# Patient Record
Sex: FEMALE | Race: WHITE | HISPANIC OR LATINO | Employment: OTHER | URBAN - METROPOLITAN AREA
[De-identification: names, ages, dates, MRNs, and addresses within clinical notes are randomized per-mention and may not be internally consistent; named-entity substitution may affect disease eponyms.]

---

## 2018-09-13 ENCOUNTER — HOSPITAL ENCOUNTER (EMERGENCY)
Facility: MEDICAL CENTER | Age: 53
End: 2018-09-13
Attending: EMERGENCY MEDICINE
Payer: COMMERCIAL

## 2018-09-13 VITALS
OXYGEN SATURATION: 97 % | HEART RATE: 78 BPM | TEMPERATURE: 96.8 F | BODY MASS INDEX: 25.6 KG/M2 | SYSTOLIC BLOOD PRESSURE: 145 MMHG | WEIGHT: 139.99 LBS | DIASTOLIC BLOOD PRESSURE: 81 MMHG | RESPIRATION RATE: 16 BRPM

## 2018-09-13 DIAGNOSIS — Z91.148 HISTORY OF MEDICATION NONCOMPLIANCE: ICD-10-CM

## 2018-09-13 DIAGNOSIS — I73.9 PERIPHERAL VASCULAR DISEASE (HCC): ICD-10-CM

## 2018-09-13 LAB
ANION GAP SERPL CALC-SCNC: 7 MMOL/L (ref 0–11.9)
APTT PPP: 28.8 SEC (ref 24.7–36)
BASOPHILS # BLD AUTO: 0.9 % (ref 0–1.8)
BASOPHILS # BLD: 0.06 K/UL (ref 0–0.12)
BUN SERPL-MCNC: 12 MG/DL (ref 8–22)
CALCIUM SERPL-MCNC: 9.1 MG/DL (ref 8.5–10.5)
CHLORIDE SERPL-SCNC: 107 MMOL/L (ref 96–112)
CK SERPL-CCNC: 140 U/L (ref 0–154)
CO2 SERPL-SCNC: 24 MMOL/L (ref 20–33)
CREAT SERPL-MCNC: 0.96 MG/DL (ref 0.5–1.4)
EOSINOPHIL # BLD AUTO: 0.5 K/UL (ref 0–0.51)
EOSINOPHIL NFR BLD: 7.5 % (ref 0–6.9)
ERYTHROCYTE [DISTWIDTH] IN BLOOD BY AUTOMATED COUNT: 40.4 FL (ref 35.9–50)
GLUCOSE SERPL-MCNC: 85 MG/DL (ref 65–99)
HCT VFR BLD AUTO: 44.9 % (ref 37–47)
HGB BLD-MCNC: 14.9 G/DL (ref 12–16)
IMM GRANULOCYTES # BLD AUTO: 0.01 K/UL (ref 0–0.11)
IMM GRANULOCYTES NFR BLD AUTO: 0.1 % (ref 0–0.9)
INR PPP: 0.94 (ref 0.87–1.13)
LYMPHOCYTES # BLD AUTO: 2.24 K/UL (ref 1–4.8)
LYMPHOCYTES NFR BLD: 33.4 % (ref 22–41)
MCH RBC QN AUTO: 28.8 PG (ref 27–33)
MCHC RBC AUTO-ENTMCNC: 33.2 G/DL (ref 33.6–35)
MCV RBC AUTO: 86.7 FL (ref 81.4–97.8)
MONOCYTES # BLD AUTO: 0.88 K/UL (ref 0–0.85)
MONOCYTES NFR BLD AUTO: 13.1 % (ref 0–13.4)
NEUTROPHILS # BLD AUTO: 3.01 K/UL (ref 2–7.15)
NEUTROPHILS NFR BLD: 45 % (ref 44–72)
NRBC # BLD AUTO: 0 K/UL
NRBC BLD-RTO: 0 /100 WBC
PLATELET # BLD AUTO: 277 K/UL (ref 164–446)
PMV BLD AUTO: 9 FL (ref 9–12.9)
POTASSIUM SERPL-SCNC: 3.6 MMOL/L (ref 3.6–5.5)
PROTHROMBIN TIME: 12.3 SEC (ref 12–14.6)
RBC # BLD AUTO: 5.18 M/UL (ref 4.2–5.4)
SODIUM SERPL-SCNC: 138 MMOL/L (ref 135–145)
WBC # BLD AUTO: 6.7 K/UL (ref 4.8–10.8)

## 2018-09-13 PROCEDURE — 36415 COLL VENOUS BLD VENIPUNCTURE: CPT

## 2018-09-13 PROCEDURE — 96374 THER/PROPH/DIAG INJ IV PUSH: CPT

## 2018-09-13 PROCEDURE — 80048 BASIC METABOLIC PNL TOTAL CA: CPT

## 2018-09-13 PROCEDURE — 93931 UPPER EXTREMITY STUDY: CPT

## 2018-09-13 PROCEDURE — 85610 PROTHROMBIN TIME: CPT

## 2018-09-13 PROCEDURE — 82550 ASSAY OF CK (CPK): CPT

## 2018-09-13 PROCEDURE — 85730 THROMBOPLASTIN TIME PARTIAL: CPT

## 2018-09-13 PROCEDURE — 85025 COMPLETE CBC W/AUTO DIFF WBC: CPT

## 2018-09-13 PROCEDURE — 96376 TX/PRO/DX INJ SAME DRUG ADON: CPT

## 2018-09-13 PROCEDURE — 700111 HCHG RX REV CODE 636 W/ 250 OVERRIDE (IP): Performed by: EMERGENCY MEDICINE

## 2018-09-13 PROCEDURE — 99284 EMERGENCY DEPT VISIT MOD MDM: CPT

## 2018-09-13 RX ORDER — DABIGATRAN ETEXILATE 150 MG/1
150 CAPSULE ORAL 2 TIMES DAILY
Qty: 60 CAP | Refills: 0 | Status: SHIPPED | OUTPATIENT
Start: 2018-09-13 | End: 2022-02-19

## 2018-09-13 RX ORDER — MORPHINE SULFATE 4 MG/ML
4 INJECTION, SOLUTION INTRAMUSCULAR; INTRAVENOUS ONCE
Status: COMPLETED | OUTPATIENT
Start: 2018-09-13 | End: 2018-09-13

## 2018-09-13 RX ADMIN — MORPHINE SULFATE 4 MG: 4 INJECTION INTRAVENOUS at 21:38

## 2018-09-13 RX ADMIN — MORPHINE SULFATE 4 MG: 4 INJECTION INTRAVENOUS at 19:36

## 2018-09-13 ASSESSMENT — PAIN SCALES - GENERAL
PAINLEVEL_OUTOF10: 3
PAINLEVEL_OUTOF10: 4
PAINLEVEL_OUTOF10: 4
PAINLEVEL_OUTOF10: 3

## 2018-09-13 ASSESSMENT — LIFESTYLE VARIABLES: DO YOU DRINK ALCOHOL: NO

## 2018-09-14 NOTE — ED TRIAGE NOTES
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 Multiple vascular surgeries on right arm. Pt now with 3rd digit blue/pale.

## 2018-09-14 NOTE — ED NOTES
Pt given discharge instructions by Dr Drake, pt verbalizes understanding of instructions, pt reports pain is in control after Iv Morphine

## 2018-09-14 NOTE — ED NOTES
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

## 2018-09-14 NOTE — DISCHARGE INSTRUCTIONS
Your blood pressure is high today.  This requires followup by a primary care doctor.  Please keep a log of your blood pressures if possible to take to your doctor appointment.  Try to increase the amount of exercise you do in your day to day living, and eliminate sodas and other sweetened beverages from your diet.      Peripheral Vascular Disease  Peripheral vascular disease (PVD) is a disease of the blood vessels that are not part of your heart and brain. A simple term for PVD is poor circulation. In most cases, PVD narrows the blood vessels that carry blood from your heart to the rest of your body. This can result in a decreased supply of blood to your arms, legs, and internal organs, like your stomach or kidneys. However, it most often affects a person’s lower legs and feet.  There are two types of PVD.  · Organic PVD. This is the more common type. It is caused by damage to the structure of blood vessels.  · Functional PVD. This is caused by conditions that make blood vessels contract and tighten (spasm).  Without treatment, PVD tends to get worse over time.  PVD can also lead to acute ischemic limb. This is when an arm or limb suddenly has trouble getting enough blood. This is a medical emergency.  What are the causes?  Each type of PVD has many different causes. The most common cause of PVD is buildup of a fatty material (plaque) inside of your arteries (atherosclerosis). Small amounts of plaque can break off from the walls of the blood vessels and become lodged in a smaller artery. This blocks blood flow and can cause acute ischemic limb.  Other common causes of PVD include:  · Blood clots that form inside of blood vessels.  · Injuries to blood vessels.  · Diseases that cause inflammation of blood vessels or cause blood vessel spasms.  · Health behaviors and health history that increase your risk of developing PVD.  What increases the risk?  You may have a greater risk of PVD if you:  · Have a family history  of PVD.  · Have certain medical conditions, including:  ¨ High cholesterol.  ¨ Diabetes.  ¨ High blood pressure (hypertension).  ¨ Coronary heart disease.  ¨ Past problems with blood clots.  ¨ Past injury, such as burns or a broken bone. These may have damaged blood vessels in your limbs.  ¨ Buerger disease. This is caused by inflamed blood vessels in your hands and feet.  ¨ Some forms of arthritis.  ¨ Rare birth defects that affect the arteries in your legs.  · Use tobacco.  · Do not get enough exercise.  · Are obese.  · Are age 50 or older.  What are the signs or symptoms?  PVD may cause many different symptoms. Your symptoms depend on what part of your body is not getting enough blood. Some common signs and symptoms include:  · Cramps in your lower legs. This may be a symptom of poor leg circulation (claudication).  · Pain and weakness in your legs while you are physically active that goes away when you rest (intermittent claudication).  · Leg pain when at rest.  · Leg numbness, tingling, or weakness.  · Coldness in a leg or foot, especially when compared with the other leg.  · Skin or hair changes. These can include:  ¨ Hair loss.  ¨ Shiny skin.  ¨ Pale or bluish skin.  ¨ Thick toenails.  · Inability to get or maintain an erection (erectile dysfunction).  People with PVD are more prone to developing ulcers and sores on their toes, feet, or legs. These may take longer than normal to heal.  How is this diagnosed?  Your health care provider may diagnose PVD from your signs and symptoms. The health care provider will also do a physical exam. You may have tests to find out what is causing your PVD and determine its severity. Tests may include:  · Blood pressure recordings from your arms and legs and measurements of the strength of your pulses (pulse volume recordings).  · Imaging studies using sound waves to take pictures of the blood flow through your blood vessels (Doppler ultrasound).  · Injecting a dye into your  blood vessels before having imaging studies using:  ¨ X-rays (angiogram or arteriogram).  ¨ Computer-generated X-rays (CT angiogram).  ¨ A powerful electromagnetic field and a computer (magnetic resonance angiogram or MRA).  How is this treated?  Treatment for PVD depends on the cause of your condition and the severity of your symptoms. It also depends on your age. Underlying causes need to be treated and controlled. These include long-lasting (chronic) conditions, such as diabetes, high cholesterol, and high blood pressure. You may need to first try making lifestyle changes and taking medicines. Surgery may be needed if these do not work.  Lifestyle changes may include:  · Quitting smoking.  · Exercising regularly.  · Following a low-fat, low-cholesterol diet.  Medicines may include:  · Blood thinners to prevent blood clots.  · Medicines to improve blood flow.  · Medicines to improve your blood cholesterol levels.  Surgical procedures may include:  · A procedure that uses an inflated balloon to open a blocked artery and improve blood flow (angioplasty).  · A procedure to put in a tube (stent) to keep a blocked artery open (stent implant).  · Surgery to reroute blood flow around a blocked artery (peripheral bypass surgery).  · Surgery to remove dead tissue from an infected wound on the affected limb.  · Amputation. This is surgical removal of the affected limb. This may be necessary in cases of acute ischemic limb that are not improved through medical or surgical treatments.  Follow these instructions at home:  · Take medicines only as directed by your health care provider.  · Do not use any tobacco products, including cigarettes, chewing tobacco, or electronic cigarettes. If you need help quitting, ask your health care provider.  · Lose weight if you are overweight, and maintain a healthy weight as directed by your health care provider.  · Eat a diet that is low in fat and cholesterol. If you need help, ask your  health care provider.  · Exercise regularly. Ask your health care provider to suggest some good activities for you.  · Use compression stockings or other mechanical devices as directed by your health care provider.  · Take good care of your feet.  ¨ Wear comfortable shoes that fit well.  ¨ Check your feet often for any cuts or sores.  Contact a health care provider if:  · You have cramps in your legs while walking.  · You have leg pain when you are at rest.  · You have coldness in a leg or foot.  · Your skin changes.  · You have erectile dysfunction.  · You have cuts or sores on your feet that are not healing.  Get help right away if:  · Your arm or leg turns cold and blue.  · Your arms or legs become red, warm, swollen, painful, or numb.  · You have chest pain or trouble breathing.  · You suddenly have weakness in your face, arm, or leg.  · You become very confused or lose the ability to speak.  · You suddenly have a very bad headache or lose your vision.  This information is not intended to replace advice given to you by your health care provider. Make sure you discuss any questions you have with your health care provider.  Document Released: 01/25/2006 Document Revised: 05/25/2017 Document Reviewed: 05/28/2015  [employer] Interactive Patient Education © 2017 [employer]

## 2018-09-14 NOTE — ED NOTES
Pt back to room. IV established and blood drawn. Pt with blue discoloration to 3rd digit on right hand. Tender to touch. Good radial pulse noted, finger is cold to touch

## 2018-09-14 NOTE — ED NOTES
Pt left er ambulatory she states she is going to walk across the street to a friend's house  Patient verbalizes understanding of discharge instructions and follow up plan

## 2018-09-14 NOTE — ED PROVIDER NOTES
"ED Provider Note    Scribed for Leslie Drake M.D. by Amanda Priest. 9/13/2018, 7:12 PM.    Primary care provider: Mohsen Tamasaby, M.D.  Means of arrival: Walk-in  History obtained from: Patient  History limited by: None    CHIEF COMPLAINT  Chief Complaint   Patient presents with   • Numbness       HPI  Roslyn Kilgore is a 53 y.o. female with a history of arterial occlusion and right index finger amputation who presents to the Emergency Department for evaluation of numbness and tingling to middle right finger, onset today. Patient is supposed to be on blood thinners but has not been taking any since her right index finger amputation performed by Dr. Wilhelm. She felt she was \"taking too many\" medications. She last saw Dr. Wilhelm 1 year ago. She describes numbness and tingling to the finger without pain. No associated chest pain, shortness of breath, abdominal pain, nausea, or vomiting. Patient has a history of alcohol abuse but has been sober for 20 years.     REVIEW OF SYSTEMS  Positives as above. Pertinent negatives include chest pain, shortness of breath, abdominal pain, nausea, or vomiting.    All other review of systems are negative.    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    SURGICAL HISTORY   has a past surgical history that includes other; other; angiogram (8/17/2009); thrombectomy (12/21/2009); angiogram (2/2/2010); angiogram (4/15/2010); angioplasty balloon (4/15/2010); femoral artery repair (6/28/2010); mammoplasty reduction (8/24/2010); u/s leiomyomata ablate <200 cc; thrombectomy (1/9/2011); angiogram " "(1/9/2011); primary c section; hysteroscopy novasure-2 (9/27/2010); angiogram (Right, 11/5/2015); angioplasty upper (Right, 11/5/2015); other orthopedic surgery; debridement (Right, 11/11/2015); and endarterectomy (Right, 11/18/2015).    SOCIAL HISTORY  Social History   Substance Use Topics   • Smoking status: Former Smoker     Types: Cigarettes   • Smokeless tobacco: Never Used      Comment: 1 pk a day for 6 yrs, Quit 1998   • Alcohol use No      Comment: pt stated that she used to be alcoholic 20 years ago      History   Drug Use   • Types: Inhaled, Marijuana     Comment: THC       FAMILY HISTORY  Family History   Problem Relation Age of Onset   • Stroke Mother    • Lung Disease Neg Hx    • Cancer Neg Hx    • Diabetes Neg Hx    • Heart Disease Neg Hx        CURRENT MEDICATIONS  Reviewed. See Encounter Summary.     ALLERGIES  Allergies   Allergen Reactions   • Acetaminophen      \"liver on how it metabolizes plavix making it twice more potent\" per pt.   • Alcohol      REQUESTS NO ALCOHOL CONTENT IN HER MEDICATIONS (per pt, pt is an alcoholic)   • Trazodone Hives     Caused breat hyperplasia. Pt then underwent a breast reduction and attempted to take again, new reaction: Hives.       PHYSICAL EXAM  VITAL SIGNS: /92   Pulse 84   Temp 36.1 °C (96.9 °F) (Temporal)   Resp 16   Wt 63.5 kg (139 lb 15.9 oz)   LMP 01/04/2011   SpO2 93%   BMI 25.60 kg/m²    Pulse ox interpretation: I interpret this pulse ox as normal.  Constitutional: Alert in no apparent distress.  HENT: Normocephalic atraumatic, MMM  Eyes: PERR, Conjunctiva normal, Non-icteric.   Neck: Normal range of motion, No tenderness, Supple, No stridor.   Lymphatic: No lymphadenopathy noted.   Cardiovascular: Regular rate and rhythm, no murmurs.   Thorax & Lungs: Normal breath sounds, No respiratory distress, No wheezing, No chest tenderness.   Abdomen: Bowel sounds normal, Soft, No tenderness, No pulsatile masses. No peritoneal signs.  Extremities: " Right index finger amputation below IP joint. Middle right digit dusky with delayed capillary refill, tender and cool. 2+ radial pulse.   Musculoskeletal: Good range of motion in right hand. No tenderness to palpation or major deformities noted.   Neurologic: Alert and oriented, No focal deficits noted.       DIFFERENTIAL DIAGNOSIS AND WORK UP PLAN    7:12 PM Patient seen and examined at bedside. The patient presents with numbness and tingling to her middle right finger and the differential diagnosis includes but is not limited to arterial occlusion, dissection, venous occlusion. Ordered for UE ART Duplex, Arterial Evaluation RUE, CBC, CMP, Prothrombin Time, APTT, Creatine Kinase, Estimated GFR to evaluate. Patient will be treated with Morphine 4 mg/mL for her symptoms.     DIAGNOSTIC STUDIES / PROCEDURES     LABS  CBC without signs of infection BMP coags are within normal limits normal CPK  All labs were reviewed by me.    RADIOLOGY  UE ART DUPLX/IMAG   Final Result      ARTERIAL EVALUATION UPPER EXTREMITY    (Results Pending)   Findings   Right Upper Extremity   Patent axillary to radial bypass graft, some plaque visualized at the    proximal anastomosis with no evidence of hemodynamic significance.   Graft velocities (cm/sec):   Proximal to anastomosis: 88   Anastomosis: 98   Proximal graft: 98   Mid graft: 99   Distal graft: 38   Distal anastomosis: 100   Distal to anastomosis: 111     Photoplethysmography of the right hand digits:   Waveforms of the 1st, 4th and 5th digits demonstrate a sharp upstroke and    hig amplitude.   Waveforms of the 3rd digit are low amplitude and widened.    The radiologist's interpretation of all radiological studies have been reviewed by me.    COURSE & MEDICAL DECISION MAKING  Pertinent Labs & Imaging studies reviewed. (See chart for details)    9:54 PM Discussed the patient's case with Dr. Wilhelm (Vascular Surgery) who states there is nothing she can do for the patient at this  "time. She agrees to see the patient in the clinic. Discussed plan of care with patient, she understands and agrees to plan she will be restarted on Pradaxa she has not been taking it for very long time.  She understands that she may end up losing the finger but she should return to the ED for any signs or symptoms of infection redness swelling or discharge from the wound.. Patient admits to doing \"a line of crank\" before coming here. The patient will be discharged with Pradaxa and should return if symptoms worsen or if new symptoms arise. The patient understands and agrees to plan.     /81   Pulse 78   Temp 36 °C (96.8 °F)   Resp 16   Wt 63.5 kg (139 lb 15.9 oz)   LMP 01/04/2011   SpO2 97%   BMI 25.60 kg/m²       Patient has known hypertension that is being followed by her primary care provider.        DISPOSITION:  Patient will be discharged home in stable condition.    FOLLOW UP:  Mohsen Tamasaby, M.D.  [address] [id number] [phone]    Schedule an appointment as soon as possible for a visit      Greta Wilehlm M.D.  [address]  [facility] [address] [phone]    Schedule an appointment as soon as possible for a visit      [facility], Emergency Dept  [address] [facility] [address] [phone]    If symptoms worsen - if the hand or arm become blue      OUTPATIENT MEDICATIONS:  New Prescriptions    DABIGATRAN (PRADAXA) 150 MG CAP CAPSULE    Take 1 Cap by mouth 2 Times a Day.       FINAL IMPRESSION  1. Peripheral vascular disease (HCC)    2. History of medication noncompliance          Amanda BLACK), am scribing for, and in the presence of, Leslie Drake M.D..    Electronically signed by: Amanda Polanco), 9/13/2018    Leslie BLACK M.D. personally performed the services described in this documentation, as scribed by Amanda Priest in my presence, and it is both accurate and " complete.    The note accurately reflects work and decisions made by me.  Leslie Drake  9/13/2018  11:09 PM    This dictation has been created using voice recognition software and/or scribes. The accuracy of the dictation is limited by the abilities of the software and the expertise of the scribes. I expect there may be some errors of grammar and possibly content. I made every attempt to manually correct the errors within my dictation. However, errors related to voice recognition software and/or scribes may still exist and should be interpreted within the appropriate context. ANGELIQUE.

## 2022-02-18 ENCOUNTER — APPOINTMENT (OUTPATIENT)
Dept: RADIOLOGY | Facility: MEDICAL CENTER | Age: 57
End: 2022-02-18
Attending: EMERGENCY MEDICINE
Payer: COMMERCIAL

## 2022-02-18 ENCOUNTER — HOSPITAL ENCOUNTER (EMERGENCY)
Facility: MEDICAL CENTER | Age: 57
End: 2022-02-18
Attending: EMERGENCY MEDICINE
Payer: COMMERCIAL

## 2022-02-18 VITALS
TEMPERATURE: 98.1 F | OXYGEN SATURATION: 98 % | DIASTOLIC BLOOD PRESSURE: 74 MMHG | SYSTOLIC BLOOD PRESSURE: 162 MMHG | BODY MASS INDEX: 26.33 KG/M2 | RESPIRATION RATE: 16 BRPM | HEIGHT: 63 IN | HEART RATE: 70 BPM | WEIGHT: 148.59 LBS

## 2022-02-18 DIAGNOSIS — R10.84 GENERALIZED ABDOMINAL PAIN: ICD-10-CM

## 2022-02-18 DIAGNOSIS — K57.92 DIVERTICULITIS: ICD-10-CM

## 2022-02-18 LAB
ALBUMIN SERPL BCP-MCNC: 4.4 G/DL (ref 3.2–4.9)
ALBUMIN/GLOB SERPL: 1.5 G/DL
ALP SERPL-CCNC: 128 U/L (ref 30–99)
ALT SERPL-CCNC: 22 U/L (ref 2–50)
ANION GAP SERPL CALC-SCNC: 12 MMOL/L (ref 7–16)
APPEARANCE UR: CLEAR
APTT PPP: 30.2 SEC (ref 24.7–36)
AST SERPL-CCNC: 18 U/L (ref 12–45)
BASOPHILS # BLD AUTO: 0.7 % (ref 0–1.8)
BASOPHILS # BLD: 0.06 K/UL (ref 0–0.12)
BILIRUB SERPL-MCNC: 0.4 MG/DL (ref 0.1–1.5)
BILIRUB UR QL STRIP.AUTO: NEGATIVE
BUN SERPL-MCNC: 11 MG/DL (ref 8–22)
CALCIUM SERPL-MCNC: 9.5 MG/DL (ref 8.5–10.5)
CHLORIDE SERPL-SCNC: 100 MMOL/L (ref 96–112)
CO2 SERPL-SCNC: 26 MMOL/L (ref 20–33)
COLOR UR: YELLOW
CREAT SERPL-MCNC: 0.83 MG/DL (ref 0.5–1.4)
EOSINOPHIL # BLD AUTO: 0.51 K/UL (ref 0–0.51)
EOSINOPHIL NFR BLD: 5.8 % (ref 0–6.9)
ERYTHROCYTE [DISTWIDTH] IN BLOOD BY AUTOMATED COUNT: 40.5 FL (ref 35.9–50)
GLOBULIN SER CALC-MCNC: 3 G/DL (ref 1.9–3.5)
GLUCOSE SERPL-MCNC: 108 MG/DL (ref 65–99)
GLUCOSE UR STRIP.AUTO-MCNC: NEGATIVE MG/DL
HCT VFR BLD AUTO: 48.1 % (ref 37–47)
HGB BLD-MCNC: 16 G/DL (ref 12–16)
IMM GRANULOCYTES # BLD AUTO: 0.03 K/UL (ref 0–0.11)
IMM GRANULOCYTES NFR BLD AUTO: 0.3 % (ref 0–0.9)
INR PPP: 0.94 (ref 0.87–1.13)
KETONES UR STRIP.AUTO-MCNC: NEGATIVE MG/DL
LACTATE BLD-SCNC: 1 MMOL/L (ref 0.5–2)
LEUKOCYTE ESTERASE UR QL STRIP.AUTO: NEGATIVE
LIPASE SERPL-CCNC: 28 U/L (ref 11–82)
LYMPHOCYTES # BLD AUTO: 2.02 K/UL (ref 1–4.8)
LYMPHOCYTES NFR BLD: 22.8 % (ref 22–41)
MCH RBC QN AUTO: 28.8 PG (ref 27–33)
MCHC RBC AUTO-ENTMCNC: 33.3 G/DL (ref 33.6–35)
MCV RBC AUTO: 86.7 FL (ref 81.4–97.8)
MICRO URNS: NORMAL
MONOCYTES # BLD AUTO: 1.17 K/UL (ref 0–0.85)
MONOCYTES NFR BLD AUTO: 13.2 % (ref 0–13.4)
NEUTROPHILS # BLD AUTO: 5.06 K/UL (ref 2–7.15)
NEUTROPHILS NFR BLD: 57.2 % (ref 44–72)
NITRITE UR QL STRIP.AUTO: NEGATIVE
NRBC # BLD AUTO: 0 K/UL
NRBC BLD-RTO: 0 /100 WBC
PH UR STRIP.AUTO: 6.5 [PH] (ref 5–8)
PLATELET # BLD AUTO: 332 K/UL (ref 164–446)
PMV BLD AUTO: 9.2 FL (ref 9–12.9)
POTASSIUM SERPL-SCNC: 3.9 MMOL/L (ref 3.6–5.5)
PROT SERPL-MCNC: 7.4 G/DL (ref 6–8.2)
PROT UR QL STRIP: NEGATIVE MG/DL
PROTHROMBIN TIME: 12.3 SEC (ref 12–14.6)
RBC # BLD AUTO: 5.55 M/UL (ref 4.2–5.4)
RBC UR QL AUTO: NEGATIVE
SODIUM SERPL-SCNC: 138 MMOL/L (ref 135–145)
SP GR UR STRIP.AUTO: 1.01
UROBILINOGEN UR STRIP.AUTO-MCNC: 0.2 MG/DL
WBC # BLD AUTO: 8.9 K/UL (ref 4.8–10.8)

## 2022-02-18 PROCEDURE — 96375 TX/PRO/DX INJ NEW DRUG ADDON: CPT

## 2022-02-18 PROCEDURE — A9270 NON-COVERED ITEM OR SERVICE: HCPCS | Performed by: EMERGENCY MEDICINE

## 2022-02-18 PROCEDURE — 96374 THER/PROPH/DIAG INJ IV PUSH: CPT | Mod: XS

## 2022-02-18 PROCEDURE — 74177 CT ABD & PELVIS W/CONTRAST: CPT | Mod: MG

## 2022-02-18 PROCEDURE — 85730 THROMBOPLASTIN TIME PARTIAL: CPT

## 2022-02-18 PROCEDURE — 700117 HCHG RX CONTRAST REV CODE 255: Performed by: EMERGENCY MEDICINE

## 2022-02-18 PROCEDURE — 85025 COMPLETE CBC W/AUTO DIFF WBC: CPT

## 2022-02-18 PROCEDURE — 99285 EMERGENCY DEPT VISIT HI MDM: CPT

## 2022-02-18 PROCEDURE — 700102 HCHG RX REV CODE 250 W/ 637 OVERRIDE(OP): Performed by: EMERGENCY MEDICINE

## 2022-02-18 PROCEDURE — 81003 URINALYSIS AUTO W/O SCOPE: CPT

## 2022-02-18 PROCEDURE — 80053 COMPREHEN METABOLIC PANEL: CPT

## 2022-02-18 PROCEDURE — 83690 ASSAY OF LIPASE: CPT

## 2022-02-18 PROCEDURE — 83605 ASSAY OF LACTIC ACID: CPT

## 2022-02-18 PROCEDURE — 700111 HCHG RX REV CODE 636 W/ 250 OVERRIDE (IP): Performed by: EMERGENCY MEDICINE

## 2022-02-18 PROCEDURE — 85610 PROTHROMBIN TIME: CPT

## 2022-02-18 RX ORDER — HYDROCODONE BITARTRATE AND ACETAMINOPHEN 5; 325 MG/1; MG/1
1 TABLET ORAL ONCE
Status: COMPLETED | OUTPATIENT
Start: 2022-02-18 | End: 2022-02-18

## 2022-02-18 RX ORDER — AMOXICILLIN AND CLAVULANATE POTASSIUM 875; 125 MG/1; MG/1
1 TABLET, FILM COATED ORAL ONCE
Status: COMPLETED | OUTPATIENT
Start: 2022-02-18 | End: 2022-02-18

## 2022-02-18 RX ORDER — ONDANSETRON 2 MG/ML
4 INJECTION INTRAMUSCULAR; INTRAVENOUS ONCE
Status: COMPLETED | OUTPATIENT
Start: 2022-02-18 | End: 2022-02-18

## 2022-02-18 RX ORDER — MORPHINE SULFATE 4 MG/ML
4 INJECTION INTRAVENOUS ONCE
Status: COMPLETED | OUTPATIENT
Start: 2022-02-18 | End: 2022-02-18

## 2022-02-18 RX ORDER — AMOXICILLIN AND CLAVULANATE POTASSIUM 875; 125 MG/1; MG/1
1 TABLET, FILM COATED ORAL 2 TIMES DAILY
Qty: 20 TABLET | Refills: 0 | Status: SHIPPED | OUTPATIENT
Start: 2022-02-18 | End: 2022-02-28

## 2022-02-18 RX ADMIN — IOHEXOL 90 ML: 350 INJECTION, SOLUTION INTRAVENOUS at 12:59

## 2022-02-18 RX ADMIN — MORPHINE SULFATE 4 MG: 4 INJECTION INTRAVENOUS at 12:26

## 2022-02-18 RX ADMIN — AMOXICILLIN AND CLAVULANATE POTASSIUM 1 TABLET: 875; 125 TABLET, FILM COATED ORAL at 14:35

## 2022-02-18 RX ADMIN — HYDROCODONE BITARTRATE AND ACETAMINOPHEN 1 TABLET: 5; 325 TABLET ORAL at 14:35

## 2022-02-18 RX ADMIN — ONDANSETRON 4 MG: 2 INJECTION INTRAMUSCULAR; INTRAVENOUS at 12:26

## 2022-02-18 NOTE — ED TRIAGE NOTES
Roslyn Kilgore  57 y.o. female  Chief Complaint   Patient presents with   • Abdominal Pain     Onset of 7/10 sharp aching stomach pain 2 days ago with feeling of fullness. Patient thinks she has a prolapsed colon and that might be related to this issue.        Pt ambulatory to triage with steady gait for above complaint. Patient reports occasional cocaine use but does not want it listed in her chart.    Pt is alert and oriented, speaking in full sentences, follows commands and responds appropriately to questions. Resp are even and unlabored.     Protocol ordered. Placed in hallway for phlebotomy. Pt educated on triage process. Pt encouraged to alert staff for any changes. This RN masked and in appropriate PPE during encounter.     Vitals:    02/18/22 1011   BP: 129/95   Pulse: (!) 102   Resp: 16   Temp: 36.3 °C (97.3 °F)   SpO2: 96%

## 2022-02-18 NOTE — ED PROVIDER NOTES
ED Provider Note        Primary care provider: Mohsen Tamasaby, M.D.    I verified that the patient was wearing a mask and I was wearing appropriate PPE every time I entered the room. The patient's mask was on the patient at all times during my encounter except for a brief view of the oropharynx.      CHIEF COMPLAINT  Chief Complaint   Patient presents with   • Abdominal Pain     Onset of 7/10 sharp aching stomach pain 2 days ago with feeling of fullness. Patient thinks she has a prolapsed colon and that might be related to this issue.        HPI  Roslyn Kilgore is a 57 y.o. female who presents to the Emergency Department with chief complaint of abdominal pain.  Patient reports 2 days of increasing abdominal pain and sense of fullness.  Patient reports that she has had something intermittently prolapsing out of her rectum.  She is concerned that it may be her rectum she states it comes out approximately half an inch and then goes back in.  It does not seem to be limited to one section of her rectum.  Patient has moderate pain diffusely throughout her abdomen but does seem to localize to the left lower quadrant.  She has had chills no measured fever minor nausea no emesis no headache altered mental status cough congestion chest pain shortness of breath no other acute symptoms or modifying factors.    REVIEW OF SYSTEMS  10 systems reviewed and otherwise negative, pertinent positives and negatives listed in the history of present illness.    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    SURGICAL HISTORY   has a past surgical history that  "includes other; other; angiogram (8/17/2009); thrombectomy (12/21/2009); angiogram (2/2/2010); angiogram (4/15/2010); angioplasty balloon (4/15/2010); femoral artery repair (6/28/2010); mammoplasty reduction (8/24/2010); u/s leiomyomata ablate <200 cc; thrombectomy (1/9/2011); angiogram (1/9/2011); primary c section; hysteroscopy novasure-2 (9/27/2010); angiogram (Right, 11/5/2015); angioplasty upper (Right, 11/5/2015); other orthopedic surgery; debridement (Right, 11/11/2015); and endarterectomy (Right, 11/18/2015).    SOCIAL HISTORY  Social History     Tobacco Use   • Smoking status: Former Smoker     Types: Cigarettes   • Smokeless tobacco: Never Used   • Tobacco comment: 1 pk a day for 6 yrs, Quit 1998   Vaping Use   • Vaping Use: Some days   Substance Use Topics   • Alcohol use: No     Comment: pt stated that she used to be alcoholic 20 years ago   • Drug use: Yes     Types: Inhaled, Marijuana     Comment: THC      Social History     Substance and Sexual Activity   Drug Use Yes   • Types: Inhaled, Marijuana    Comment: THC       FAMILY HISTORY  Non-Contributory    CURRENT MEDICATIONS  Home Medications     Reviewed by Radames Fierro R.N. (Registered Nurse) on 02/18/22 at 1042  Med List Status: Partial   Medication Last Dose Status   dabigatran (PRADAXA) 150 MG Cap capsule  Active                ALLERGIES  Allergies   Allergen Reactions   • Alcohol      REQUESTS NO ALCOHOL CONTENT IN HER MEDICATIONS (per pt, pt is an alcoholic)   • Trazodone Hives     Caused breat hyperplasia. Pt then underwent a breast reduction and attempted to take again, new reaction: Hives.       PHYSICAL EXAM  VITAL SIGNS: /95   Pulse (!) 102   Temp 36.3 °C (97.3 °F) (Temporal)   Resp 16   Ht 1.6 m (5' 3\")   Wt 67.4 kg (148 lb 9.4 oz)   LMP 01/04/2011   SpO2 96%   BMI 26.32 kg/m²   Pulse ox interpretation: I interpret this pulse ox as normal.  Constitutional: Alert and oriented x 3, normal distress  HEENT: Atraumatic " normocephalic, pupils are equal round, extraocular movements are intact. The nares is clear, external ears are normal, mouth shows moist mucous membranes  Neck: no obvious JVD or tracheal deviation  Cardiovascular: Regular rate and rhythm no murmur rub or gallop   Thorax & Lungs: No respiratory distress, no wheezes rales or rhonchi, No chest tenderness.   GI: Fuhs tenderness minor localization to the left lower quadrant nondistended positive bowel sounds no peritoneal signs  Rectal: Normal rectal tone no obvious rectal prolapse no obvious fissure or hemorrhoids  Skin: Warm dry no obvious acute rash or lesion  Musculoskeletal: Moving all extremities with normal range strength, no acute  deformity  Neurologic: Cranial nerves III through XII are grossly intact, no sensory deficit, no cerebellar dysfunction   Psychiatric: Appropriate affect for situation at this time      DIAGNOSTIC STUDIES / PROCEDURES  LABS      Results for orders placed or performed during the hospital encounter of 02/18/22   CBC WITH DIFFERENTIAL   Result Value Ref Range    WBC 8.9 4.8 - 10.8 K/uL    RBC 5.55 (H) 4.20 - 5.40 M/uL    Hemoglobin 16.0 12.0 - 16.0 g/dL    Hematocrit 48.1 (H) 37.0 - 47.0 %    MCV 86.7 81.4 - 97.8 fL    MCH 28.8 27.0 - 33.0 pg    MCHC 33.3 (L) 33.6 - 35.0 g/dL    RDW 40.5 35.9 - 50.0 fL    Platelet Count 332 164 - 446 K/uL    MPV 9.2 9.0 - 12.9 fL    Neutrophils-Polys 57.20 44.00 - 72.00 %    Lymphocytes 22.80 22.00 - 41.00 %    Monocytes 13.20 0.00 - 13.40 %    Eosinophils 5.80 0.00 - 6.90 %    Basophils 0.70 0.00 - 1.80 %    Immature Granulocytes 0.30 0.00 - 0.90 %    Nucleated RBC 0.00 /100 WBC    Neutrophils (Absolute) 5.06 2.00 - 7.15 K/uL    Lymphs (Absolute) 2.02 1.00 - 4.80 K/uL    Monos (Absolute) 1.17 (H) 0.00 - 0.85 K/uL    Eos (Absolute) 0.51 0.00 - 0.51 K/uL    Baso (Absolute) 0.06 0.00 - 0.12 K/uL    Immature Granulocytes (abs) 0.03 0.00 - 0.11 K/uL    NRBC (Absolute) 0.00 K/uL   COMP METABOLIC PANEL    Result Value Ref Range    Sodium 138 135 - 145 mmol/L    Potassium 3.9 3.6 - 5.5 mmol/L    Chloride 100 96 - 112 mmol/L    Co2 26 20 - 33 mmol/L    Anion Gap 12.0 7.0 - 16.0    Glucose 108 (H) 65 - 99 mg/dL    Bun 11 8 - 22 mg/dL    Creatinine 0.83 0.50 - 1.40 mg/dL    Calcium 9.5 8.5 - 10.5 mg/dL    AST(SGOT) 18 12 - 45 U/L    ALT(SGPT) 22 2 - 50 U/L    Alkaline Phosphatase 128 (H) 30 - 99 U/L    Total Bilirubin 0.4 0.1 - 1.5 mg/dL    Albumin 4.4 3.2 - 4.9 g/dL    Total Protein 7.4 6.0 - 8.2 g/dL    Globulin 3.0 1.9 - 3.5 g/dL    A-G Ratio 1.5 g/dL   LIPASE   Result Value Ref Range    Lipase 28 11 - 82 U/L   URINALYSIS    Specimen: Urine   Result Value Ref Range    Color Yellow     Character Clear     Specific Gravity 1.007 <1.035    Ph 6.5 5.0 - 8.0    Glucose Negative Negative mg/dL    Ketones Negative Negative mg/dL    Protein Negative Negative mg/dL    Bilirubin Negative Negative    Urobilinogen, Urine 0.2 Negative    Nitrite Negative Negative    Leukocyte Esterase Negative Negative    Occult Blood Negative Negative    Micro Urine Req see below    LACTIC ACID   Result Value Ref Range    Lactic Acid 1.0 0.5 - 2.0 mmol/L   PT/INR   Result Value Ref Range    PT 12.3 12.0 - 14.6 sec    INR 0.94 0.87 - 1.13   PTT   Result Value Ref Range    APTT 30.2 24.7 - 36.0 sec   ESTIMATED GFR   Result Value Ref Range    GFR If African American >60 >60 mL/min/1.73 m 2    GFR If Non African American >60 >60 mL/min/1.73 m 2       All labs reviewed by me.      RADIOLOGY  CT-ABDOMEN-PELVIS WITH   Final Result      1.  Colonic diverticulosis. Evaluation for sigmoid wall thickening is suboptimal due to decompression. No pericolonic inflammatory changes are seen however an early diverticulitis be difficult to exclude.   2.  Atherosclerosis.            COURSE & MEDICAL DECISION MAKING  Pertinent Labs & Imaging studies reviewed. (See chart for details)    11:11 AM - Patient seen and examined at bedside.         Patient noted to have  "slightly elevated blood pressure likely circumstantial secondary to presenting complaint. Referred to primary care physician for further evaluation.        Medical Decision Making: CT shows colonic diverticulosis and some thickening of the distal colon somewhat limited by to decompression of the colon.  Slight concern for diverticulitis.  Patient symptoms are consistent with acute diverticulitis an abundance of caution should be covered with antibiotics at this time she is given Augmentin here and discharged with the same.  In regards to her rectal prolapse this is not evident on physical exam she says it is intermittent.  She states has been going on for over 1 year.  She has been referred to general surgery for further evaluation of this.  Return here for worsening pain fevers chills nausea vomiting blood in stool blood in emesis any other acute symptoms or concerns otherwise discharged stable and improved condition.    /95   Pulse (!) 102   Temp 36.3 °C (97.3 °F) (Temporal)   Resp 16   Ht 1.6 m (5' 3\")   Wt 67.4 kg (148 lb 9.4 oz)   LMP 01/04/2011   SpO2 96%   BMI 26.32 kg/m²     NICOLE CHAN MD  [address]  [facility] [address]  [phone]        Mohsen Tamasaby, M.D.  [address] [id number] [phone]          [facility], Emergency Dept  [address] [facility] [id number] [phone]    in 12-24 hours if symptoms persist, immediately If symptoms worsen, or if you develop any other symptoms or concerns      Discharge Medication List as of 2/18/2022  2:38 PM      START taking these medications    Details   amoxicillin-clavulanate (AUGMENTIN) 875-125 MG Tab Take 1 Tablet by mouth 2 times a day for 10 days., Disp-20 Tablet, R-0, Normal             FINAL IMPRESSION  1. Generalized abdominal pain Active   2. Diverticulitis Active          This dictation has been created using voice recognition software and/or scribes. The accuracy of " the dictation is limited by the abilities of the software and the expertise of the scribes. I expect there may be some errors of grammar and possibly content. I made every attempt to manually correct the errors within my dictation. However, errors related to voice recognition software and/or scribes may still exist and should be interpreted within the appropriate context.

## 2022-02-19 ENCOUNTER — APPOINTMENT (OUTPATIENT)
Dept: RADIOLOGY | Facility: MEDICAL CENTER | Age: 57
DRG: 872 | End: 2022-02-19
Attending: EMERGENCY MEDICINE
Payer: COMMERCIAL

## 2022-02-19 ENCOUNTER — HOSPITAL ENCOUNTER (INPATIENT)
Facility: MEDICAL CENTER | Age: 57
LOS: 1 days | DRG: 872 | End: 2022-02-21
Attending: EMERGENCY MEDICINE | Admitting: STUDENT IN AN ORGANIZED HEALTH CARE EDUCATION/TRAINING PROGRAM
Payer: COMMERCIAL

## 2022-02-19 DIAGNOSIS — R10.11 RIGHT UPPER QUADRANT ABDOMINAL PAIN: ICD-10-CM

## 2022-02-19 DIAGNOSIS — K57.92 ACUTE DIVERTICULITIS: ICD-10-CM

## 2022-02-19 DIAGNOSIS — K57.92 DIVERTICULITIS: ICD-10-CM

## 2022-02-19 LAB
ALBUMIN SERPL BCP-MCNC: 3.9 G/DL (ref 3.2–4.9)
ALBUMIN/GLOB SERPL: 1.2 G/DL
ALP SERPL-CCNC: 110 U/L (ref 30–99)
ALT SERPL-CCNC: 22 U/L (ref 2–50)
ANION GAP SERPL CALC-SCNC: 10 MMOL/L (ref 7–16)
AST SERPL-CCNC: 21 U/L (ref 12–45)
BASOPHILS # BLD AUTO: 0.6 % (ref 0–1.8)
BASOPHILS # BLD: 0.07 K/UL (ref 0–0.12)
BILIRUB SERPL-MCNC: 0.4 MG/DL (ref 0.1–1.5)
BUN SERPL-MCNC: 17 MG/DL (ref 8–22)
CALCIUM SERPL-MCNC: 9 MG/DL (ref 8.5–10.5)
CHLORIDE SERPL-SCNC: 100 MMOL/L (ref 96–112)
CO2 SERPL-SCNC: 22 MMOL/L (ref 20–33)
CREAT SERPL-MCNC: 0.81 MG/DL (ref 0.5–1.4)
EOSINOPHIL # BLD AUTO: 0.36 K/UL (ref 0–0.51)
EOSINOPHIL NFR BLD: 2.9 % (ref 0–6.9)
ERYTHROCYTE [DISTWIDTH] IN BLOOD BY AUTOMATED COUNT: 41.2 FL (ref 35.9–50)
GLOBULIN SER CALC-MCNC: 3.2 G/DL (ref 1.9–3.5)
GLUCOSE SERPL-MCNC: 112 MG/DL (ref 65–99)
HCT VFR BLD AUTO: 43.5 % (ref 37–47)
HGB BLD-MCNC: 14.2 G/DL (ref 12–16)
IMM GRANULOCYTES # BLD AUTO: 0.05 K/UL (ref 0–0.11)
IMM GRANULOCYTES NFR BLD AUTO: 0.4 % (ref 0–0.9)
LIPASE SERPL-CCNC: 23 U/L (ref 11–82)
LYMPHOCYTES # BLD AUTO: 1.77 K/UL (ref 1–4.8)
LYMPHOCYTES NFR BLD: 14.2 % (ref 22–41)
MCH RBC QN AUTO: 28.6 PG (ref 27–33)
MCHC RBC AUTO-ENTMCNC: 32.6 G/DL (ref 33.6–35)
MCV RBC AUTO: 87.7 FL (ref 81.4–97.8)
MONOCYTES # BLD AUTO: 1.89 K/UL (ref 0–0.85)
MONOCYTES NFR BLD AUTO: 15.2 % (ref 0–13.4)
NEUTROPHILS # BLD AUTO: 8.33 K/UL (ref 2–7.15)
NEUTROPHILS NFR BLD: 66.7 % (ref 44–72)
NRBC # BLD AUTO: 0 K/UL
NRBC BLD-RTO: 0 /100 WBC
PLATELET # BLD AUTO: 283 K/UL (ref 164–446)
PMV BLD AUTO: 9.3 FL (ref 9–12.9)
POTASSIUM SERPL-SCNC: 4.1 MMOL/L (ref 3.6–5.5)
PROT SERPL-MCNC: 7.1 G/DL (ref 6–8.2)
RBC # BLD AUTO: 4.96 M/UL (ref 4.2–5.4)
SODIUM SERPL-SCNC: 132 MMOL/L (ref 135–145)
WBC # BLD AUTO: 12.5 K/UL (ref 4.8–10.8)

## 2022-02-19 PROCEDURE — 84484 ASSAY OF TROPONIN QUANT: CPT

## 2022-02-19 PROCEDURE — 80053 COMPREHEN METABOLIC PANEL: CPT

## 2022-02-19 PROCEDURE — 85025 COMPLETE CBC W/AUTO DIFF WBC: CPT

## 2022-02-19 PROCEDURE — 36415 COLL VENOUS BLD VENIPUNCTURE: CPT

## 2022-02-19 PROCEDURE — 83690 ASSAY OF LIPASE: CPT

## 2022-02-19 PROCEDURE — 76705 ECHO EXAM OF ABDOMEN: CPT

## 2022-02-19 PROCEDURE — 96375 TX/PRO/DX INJ NEW DRUG ADDON: CPT

## 2022-02-19 PROCEDURE — 71045 X-RAY EXAM CHEST 1 VIEW: CPT

## 2022-02-19 PROCEDURE — 83880 ASSAY OF NATRIURETIC PEPTIDE: CPT

## 2022-02-19 PROCEDURE — 99285 EMERGENCY DEPT VISIT HI MDM: CPT

## 2022-02-19 PROCEDURE — 85379 FIBRIN DEGRADATION QUANT: CPT

## 2022-02-19 PROCEDURE — 700111 HCHG RX REV CODE 636 W/ 250 OVERRIDE (IP): Performed by: EMERGENCY MEDICINE

## 2022-02-19 RX ORDER — ONDANSETRON 2 MG/ML
4 INJECTION INTRAMUSCULAR; INTRAVENOUS ONCE
Status: COMPLETED | OUTPATIENT
Start: 2022-02-19 | End: 2022-02-20

## 2022-02-19 RX ADMIN — FENTANYL CITRATE 50 MCG: 50 INJECTION, SOLUTION INTRAMUSCULAR; INTRAVENOUS at 23:00

## 2022-02-19 ASSESSMENT — FIBROSIS 4 INDEX: FIB4 SCORE: 0.67

## 2022-02-20 ENCOUNTER — APPOINTMENT (OUTPATIENT)
Dept: RADIOLOGY | Facility: MEDICAL CENTER | Age: 57
DRG: 872 | End: 2022-02-20
Attending: EMERGENCY MEDICINE
Payer: COMMERCIAL

## 2022-02-20 ENCOUNTER — APPOINTMENT (OUTPATIENT)
Dept: RADIOLOGY | Facility: MEDICAL CENTER | Age: 57
DRG: 872 | End: 2022-02-20
Attending: STUDENT IN AN ORGANIZED HEALTH CARE EDUCATION/TRAINING PROGRAM
Payer: COMMERCIAL

## 2022-02-20 PROBLEM — K57.92 ACUTE DIVERTICULITIS: Status: ACTIVE | Noted: 2022-02-20

## 2022-02-20 PROBLEM — E87.1 HYPONATREMIA: Status: ACTIVE | Noted: 2022-02-20

## 2022-02-20 PROBLEM — A41.9 SEPSIS (HCC): Status: ACTIVE | Noted: 2022-02-20

## 2022-02-20 PROBLEM — K57.92 DIVERTICULITIS: Status: ACTIVE | Noted: 2022-02-20

## 2022-02-20 LAB
ALBUMIN SERPL BCP-MCNC: 3.1 G/DL (ref 3.2–4.9)
ALBUMIN/GLOB SERPL: 1.1 G/DL
ALP SERPL-CCNC: 95 U/L (ref 30–99)
ALT SERPL-CCNC: 19 U/L (ref 2–50)
ANION GAP SERPL CALC-SCNC: 9 MMOL/L (ref 7–16)
APPEARANCE UR: CLEAR
AST SERPL-CCNC: 17 U/L (ref 12–45)
BACTERIA #/AREA URNS HPF: NEGATIVE /HPF
BILIRUB SERPL-MCNC: 0.3 MG/DL (ref 0.1–1.5)
BILIRUB UR QL STRIP.AUTO: NEGATIVE
BUN SERPL-MCNC: 9 MG/DL (ref 8–22)
CALCIUM SERPL-MCNC: 8.4 MG/DL (ref 8.5–10.5)
CHLORIDE SERPL-SCNC: 99 MMOL/L (ref 96–112)
CO2 SERPL-SCNC: 24 MMOL/L (ref 20–33)
COLOR UR: YELLOW
CREAT SERPL-MCNC: 0.76 MG/DL (ref 0.5–1.4)
D DIMER PPP IA.FEU-MCNC: 1.78 UG/ML (FEU) (ref 0–0.5)
EKG IMPRESSION: NORMAL
EPI CELLS #/AREA URNS HPF: NEGATIVE /HPF
GLOBULIN SER CALC-MCNC: 2.9 G/DL (ref 1.9–3.5)
GLUCOSE SERPL-MCNC: 95 MG/DL (ref 65–99)
GLUCOSE UR STRIP.AUTO-MCNC: NEGATIVE MG/DL
HYALINE CASTS #/AREA URNS LPF: NORMAL /LPF
KETONES UR STRIP.AUTO-MCNC: ABNORMAL MG/DL
LACTATE BLD-SCNC: 0.9 MMOL/L (ref 0.5–2)
LACTATE BLD-SCNC: 1.5 MMOL/L (ref 0.5–2)
LEUKOCYTE ESTERASE UR QL STRIP.AUTO: NEGATIVE
MICRO URNS: ABNORMAL
NITRITE UR QL STRIP.AUTO: NEGATIVE
NT-PROBNP SERPL IA-MCNC: 551 PG/ML (ref 0–125)
PH UR STRIP.AUTO: 6 [PH] (ref 5–8)
POTASSIUM SERPL-SCNC: 3.8 MMOL/L (ref 3.6–5.5)
PROT SERPL-MCNC: 6 G/DL (ref 6–8.2)
PROT UR QL STRIP: 30 MG/DL
RBC # URNS HPF: NORMAL /HPF
RBC UR QL AUTO: NEGATIVE
SARS-COV+SARS-COV-2 AG RESP QL IA.RAPID: NOTDETECTED
SODIUM SERPL-SCNC: 132 MMOL/L (ref 135–145)
SP GR UR STRIP.AUTO: >=1.045
SPECIMEN SOURCE: NORMAL
TROPONIN T SERPL-MCNC: 6 NG/L (ref 6–19)
UROBILINOGEN UR STRIP.AUTO-MCNC: 1 MG/DL
WBC #/AREA URNS HPF: NORMAL /HPF

## 2022-02-20 PROCEDURE — 96376 TX/PRO/DX INJ SAME DRUG ADON: CPT

## 2022-02-20 PROCEDURE — 81001 URINALYSIS AUTO W/SCOPE: CPT

## 2022-02-20 PROCEDURE — A9270 NON-COVERED ITEM OR SERVICE: HCPCS | Performed by: STUDENT IN AN ORGANIZED HEALTH CARE EDUCATION/TRAINING PROGRAM

## 2022-02-20 PROCEDURE — 700111 HCHG RX REV CODE 636 W/ 250 OVERRIDE (IP): Performed by: STUDENT IN AN ORGANIZED HEALTH CARE EDUCATION/TRAINING PROGRAM

## 2022-02-20 PROCEDURE — 96365 THER/PROPH/DIAG IV INF INIT: CPT

## 2022-02-20 PROCEDURE — 80053 COMPREHEN METABOLIC PANEL: CPT

## 2022-02-20 PROCEDURE — 770001 HCHG ROOM/CARE - MED/SURG/GYN PRIV*

## 2022-02-20 PROCEDURE — 83605 ASSAY OF LACTIC ACID: CPT

## 2022-02-20 PROCEDURE — 96375 TX/PRO/DX INJ NEW DRUG ADDON: CPT

## 2022-02-20 PROCEDURE — C9803 HOPD COVID-19 SPEC COLLECT: HCPCS | Performed by: EMERGENCY MEDICINE

## 2022-02-20 PROCEDURE — 700101 HCHG RX REV CODE 250: Performed by: EMERGENCY MEDICINE

## 2022-02-20 PROCEDURE — 99222 1ST HOSP IP/OBS MODERATE 55: CPT | Mod: AI | Performed by: STUDENT IN AN ORGANIZED HEALTH CARE EDUCATION/TRAINING PROGRAM

## 2022-02-20 PROCEDURE — 87040 BLOOD CULTURE FOR BACTERIA: CPT | Mod: 91

## 2022-02-20 PROCEDURE — 700102 HCHG RX REV CODE 250 W/ 637 OVERRIDE(OP): Performed by: STUDENT IN AN ORGANIZED HEALTH CARE EDUCATION/TRAINING PROGRAM

## 2022-02-20 PROCEDURE — 87426 SARSCOV CORONAVIRUS AG IA: CPT

## 2022-02-20 PROCEDURE — 700101 HCHG RX REV CODE 250: Performed by: STUDENT IN AN ORGANIZED HEALTH CARE EDUCATION/TRAINING PROGRAM

## 2022-02-20 PROCEDURE — 74177 CT ABD & PELVIS W/CONTRAST: CPT | Mod: ME

## 2022-02-20 PROCEDURE — 700105 HCHG RX REV CODE 258: Performed by: EMERGENCY MEDICINE

## 2022-02-20 PROCEDURE — 93005 ELECTROCARDIOGRAM TRACING: CPT | Performed by: EMERGENCY MEDICINE

## 2022-02-20 PROCEDURE — 71275 CT ANGIOGRAPHY CHEST: CPT | Mod: ME

## 2022-02-20 PROCEDURE — 74019 RADEX ABDOMEN 2 VIEWS: CPT

## 2022-02-20 PROCEDURE — 700105 HCHG RX REV CODE 258: Performed by: STUDENT IN AN ORGANIZED HEALTH CARE EDUCATION/TRAINING PROGRAM

## 2022-02-20 PROCEDURE — 700117 HCHG RX CONTRAST REV CODE 255: Performed by: EMERGENCY MEDICINE

## 2022-02-20 PROCEDURE — 700111 HCHG RX REV CODE 636 W/ 250 OVERRIDE (IP): Performed by: EMERGENCY MEDICINE

## 2022-02-20 RX ORDER — ACETAMINOPHEN 500 MG
1000 TABLET ORAL EVERY 6 HOURS PRN
COMMUNITY
End: 2022-02-28 | Stop reason: SDUPTHER

## 2022-02-20 RX ORDER — KETOROLAC TROMETHAMINE 30 MG/ML
30 INJECTION, SOLUTION INTRAMUSCULAR; INTRAVENOUS ONCE
Status: COMPLETED | OUTPATIENT
Start: 2022-02-20 | End: 2022-02-20

## 2022-02-20 RX ORDER — ACETAMINOPHEN 500 MG
1000 TABLET ORAL EVERY 6 HOURS PRN
Status: DISCONTINUED | OUTPATIENT
Start: 2022-02-25 | End: 2022-02-21 | Stop reason: HOSPADM

## 2022-02-20 RX ORDER — ASPIRIN 325 MG
650 TABLET ORAL EVERY 6 HOURS PRN
Status: ON HOLD | COMMUNITY
End: 2022-02-21

## 2022-02-20 RX ORDER — OXYCODONE HYDROCHLORIDE 5 MG/1
5 TABLET ORAL
Status: DISCONTINUED | OUTPATIENT
Start: 2022-02-20 | End: 2022-02-21 | Stop reason: HOSPADM

## 2022-02-20 RX ORDER — MORPHINE SULFATE 4 MG/ML
4 INJECTION INTRAVENOUS ONCE
Status: COMPLETED | OUTPATIENT
Start: 2022-02-20 | End: 2022-02-20

## 2022-02-20 RX ORDER — CEFTRIAXONE 2 G/1
2 INJECTION, POWDER, FOR SOLUTION INTRAMUSCULAR; INTRAVENOUS DAILY
Status: DISCONTINUED | OUTPATIENT
Start: 2022-02-21 | End: 2022-02-21 | Stop reason: HOSPADM

## 2022-02-20 RX ORDER — HEPARIN SODIUM 5000 [USP'U]/ML
5000 INJECTION, SOLUTION INTRAVENOUS; SUBCUTANEOUS EVERY 8 HOURS
Status: DISCONTINUED | OUTPATIENT
Start: 2022-02-20 | End: 2022-02-21 | Stop reason: HOSPADM

## 2022-02-20 RX ORDER — HYDROMORPHONE HYDROCHLORIDE 1 MG/ML
0.25 INJECTION, SOLUTION INTRAMUSCULAR; INTRAVENOUS; SUBCUTANEOUS
Status: DISCONTINUED | OUTPATIENT
Start: 2022-02-20 | End: 2022-02-21 | Stop reason: HOSPADM

## 2022-02-20 RX ORDER — SODIUM CHLORIDE, SODIUM LACTATE, POTASSIUM CHLORIDE, CALCIUM CHLORIDE 600; 310; 30; 20 MG/100ML; MG/100ML; MG/100ML; MG/100ML
1000 INJECTION, SOLUTION INTRAVENOUS ONCE
Status: ACTIVE | OUTPATIENT
Start: 2022-02-20 | End: 2022-02-21

## 2022-02-20 RX ORDER — CEFTRIAXONE 2 G/1
2 INJECTION, POWDER, FOR SOLUTION INTRAMUSCULAR; INTRAVENOUS ONCE
Status: COMPLETED | OUTPATIENT
Start: 2022-02-20 | End: 2022-02-20

## 2022-02-20 RX ORDER — KETOROLAC TROMETHAMINE 30 MG/ML
30 INJECTION, SOLUTION INTRAMUSCULAR; INTRAVENOUS EVERY 6 HOURS PRN
Status: DISCONTINUED | OUTPATIENT
Start: 2022-02-20 | End: 2022-02-21 | Stop reason: HOSPADM

## 2022-02-20 RX ORDER — MORPHINE SULFATE 4 MG/ML
1 INJECTION INTRAVENOUS
Status: DISCONTINUED | OUTPATIENT
Start: 2022-02-20 | End: 2022-02-20

## 2022-02-20 RX ORDER — OXYCODONE HYDROCHLORIDE 5 MG/1
2.5 TABLET ORAL
Status: DISCONTINUED | OUTPATIENT
Start: 2022-02-20 | End: 2022-02-21 | Stop reason: HOSPADM

## 2022-02-20 RX ORDER — ACETAMINOPHEN 500 MG
1000 TABLET ORAL EVERY 6 HOURS
Status: DISCONTINUED | OUTPATIENT
Start: 2022-02-20 | End: 2022-02-21 | Stop reason: HOSPADM

## 2022-02-20 RX ORDER — SODIUM CHLORIDE, SODIUM LACTATE, POTASSIUM CHLORIDE, CALCIUM CHLORIDE 600; 310; 30; 20 MG/100ML; MG/100ML; MG/100ML; MG/100ML
INJECTION, SOLUTION INTRAVENOUS CONTINUOUS
Status: DISCONTINUED | OUTPATIENT
Start: 2022-02-20 | End: 2022-02-21

## 2022-02-20 RX ORDER — METRONIDAZOLE 500 MG/1
500 TABLET ORAL EVERY 8 HOURS
Status: DISCONTINUED | OUTPATIENT
Start: 2022-02-20 | End: 2022-02-21 | Stop reason: HOSPADM

## 2022-02-20 RX ORDER — ACETAMINOPHEN 325 MG/1
650 TABLET ORAL EVERY 6 HOURS PRN
Status: DISCONTINUED | OUTPATIENT
Start: 2022-02-20 | End: 2022-02-20

## 2022-02-20 RX ADMIN — SODIUM CHLORIDE, POTASSIUM CHLORIDE, SODIUM LACTATE AND CALCIUM CHLORIDE: 600; 310; 30; 20 INJECTION, SOLUTION INTRAVENOUS at 23:04

## 2022-02-20 RX ADMIN — CEFTRIAXONE SODIUM 2 G: 2 INJECTION, POWDER, FOR SOLUTION INTRAMUSCULAR; INTRAVENOUS at 04:30

## 2022-02-20 RX ADMIN — OXYCODONE 5 MG: 5 TABLET ORAL at 08:47

## 2022-02-20 RX ADMIN — MORPHINE SULFATE 1 MG: 4 INJECTION INTRAVENOUS at 05:26

## 2022-02-20 RX ADMIN — FENTANYL CITRATE 50 MCG: 50 INJECTION, SOLUTION INTRAMUSCULAR; INTRAVENOUS at 00:01

## 2022-02-20 RX ADMIN — MORPHINE SULFATE 4 MG: 4 INJECTION INTRAVENOUS at 04:03

## 2022-02-20 RX ADMIN — IOHEXOL 65 ML: 350 INJECTION, SOLUTION INTRAVENOUS at 03:35

## 2022-02-20 RX ADMIN — KETOROLAC TROMETHAMINE 30 MG: 30 INJECTION, SOLUTION INTRAMUSCULAR at 13:11

## 2022-02-20 RX ADMIN — FENTANYL CITRATE 50 MCG: 50 INJECTION, SOLUTION INTRAMUSCULAR; INTRAVENOUS at 04:10

## 2022-02-20 RX ADMIN — SODIUM CHLORIDE, POTASSIUM CHLORIDE, SODIUM LACTATE AND CALCIUM CHLORIDE: 600; 310; 30; 20 INJECTION, SOLUTION INTRAVENOUS at 14:35

## 2022-02-20 RX ADMIN — METRONIDAZOLE 500 MG: 500 INJECTION, SOLUTION INTRAVENOUS at 11:34

## 2022-02-20 RX ADMIN — IOHEXOL 40 ML: 350 INJECTION, SOLUTION INTRAVENOUS at 00:52

## 2022-02-20 RX ADMIN — ACETAMINOPHEN 1000 MG: 500 TABLET ORAL at 17:03

## 2022-02-20 RX ADMIN — METRONIDAZOLE 500 MG: 500 TABLET ORAL at 23:03

## 2022-02-20 RX ADMIN — SODIUM CHLORIDE, POTASSIUM CHLORIDE, SODIUM LACTATE AND CALCIUM CHLORIDE: 600; 310; 30; 20 INJECTION, SOLUTION INTRAVENOUS at 05:27

## 2022-02-20 RX ADMIN — ACETAMINOPHEN 1000 MG: 500 TABLET ORAL at 11:35

## 2022-02-20 RX ADMIN — OXYCODONE 5 MG: 5 TABLET ORAL at 23:03

## 2022-02-20 RX ADMIN — KETOROLAC TROMETHAMINE 30 MG: 30 INJECTION, SOLUTION INTRAMUSCULAR at 19:56

## 2022-02-20 RX ADMIN — OXYCODONE 5 MG: 5 TABLET ORAL at 12:26

## 2022-02-20 RX ADMIN — OXYCODONE 5 MG: 5 TABLET ORAL at 17:04

## 2022-02-20 RX ADMIN — HYDROMORPHONE HYDROCHLORIDE 0.25 MG: 1 INJECTION, SOLUTION INTRAMUSCULAR; INTRAVENOUS; SUBCUTANEOUS at 10:18

## 2022-02-20 RX ADMIN — MORPHINE SULFATE 1 MG: 4 INJECTION INTRAVENOUS at 07:34

## 2022-02-20 RX ADMIN — ONDANSETRON 4 MG: 2 INJECTION INTRAMUSCULAR; INTRAVENOUS at 00:01

## 2022-02-20 RX ADMIN — HEPARIN SODIUM 5000 UNITS: 5000 INJECTION, SOLUTION INTRAVENOUS; SUBCUTANEOUS at 23:03

## 2022-02-20 RX ADMIN — HEPARIN SODIUM 5000 UNITS: 5000 INJECTION, SOLUTION INTRAVENOUS; SUBCUTANEOUS at 13:11

## 2022-02-20 RX ADMIN — ACETAMINOPHEN 1000 MG: 500 TABLET ORAL at 23:03

## 2022-02-20 ASSESSMENT — ENCOUNTER SYMPTOMS
EYES NEGATIVE: 1
NECK PAIN: 0
HEADACHES: 0
ABDOMINAL PAIN: 1
DEPRESSION: 0
MYALGIAS: 0
NAUSEA: 1
VOMITING: 0
RESPIRATORY NEGATIVE: 1
NERVOUS/ANXIOUS: 1
SORE THROAT: 0
DIZZINESS: 0
COUGH: 0
PSYCHIATRIC NEGATIVE: 1
CHILLS: 0
FEVER: 0
NEUROLOGICAL NEGATIVE: 1
HEARTBURN: 0
SHORTNESS OF BREATH: 1
BLURRED VISION: 0
SPUTUM PRODUCTION: 0
FOCAL WEAKNESS: 0
PALPITATIONS: 0
CARDIOVASCULAR NEGATIVE: 1
DOUBLE VISION: 0
MUSCULOSKELETAL NEGATIVE: 1

## 2022-02-20 ASSESSMENT — COGNITIVE AND FUNCTIONAL STATUS - GENERAL
DAILY ACTIVITIY SCORE: 24
SUGGESTED CMS G CODE MODIFIER MOBILITY: CH
MOBILITY SCORE: 24
SUGGESTED CMS G CODE MODIFIER DAILY ACTIVITY: CH

## 2022-02-20 ASSESSMENT — PATIENT HEALTH QUESTIONNAIRE - PHQ9
2. FEELING DOWN, DEPRESSED, IRRITABLE, OR HOPELESS: NOT AT ALL
SUM OF ALL RESPONSES TO PHQ9 QUESTIONS 1 AND 2: 0
1. LITTLE INTEREST OR PLEASURE IN DOING THINGS: NOT AT ALL

## 2022-02-20 ASSESSMENT — PAIN DESCRIPTION - PAIN TYPE: TYPE: ACUTE PAIN

## 2022-02-20 ASSESSMENT — PAIN SCALES - WONG BAKER: WONGBAKER_NUMERICALRESPONSE: HURTS A LITTLE MORE

## 2022-02-20 NOTE — H&P
[facility] Medicine History & Physical Note    Date of Service  2/20/2022    Primary Care Physician  Mohsen Tamasaby, M.D.    Consultants  None    Code Status  Full Code    Chief Complaint  Chief Complaint   Patient presents with   • Abdominal Pain     Pt reports abd pain since Monday. Pt was seen here yesterday for the same, was told she had diverticulitis to the LLQ.  Pt reports pain is now on the R flank. Pt reports nausea/ constipation. Denies any urinary symptoms.         History of Presenting Illness  Roslyn Kilgroe is a 57 y.o. female who presented 2/19/2022 with right upper quadrant abdominal pain.  Patient states that pain started about 2 days ago, with radiation up to her neck and difficult for her to breathe.  She initially presented to ED yesterday.  CT abdomen shows diverticulosis.  Right upper quadrant ultrasound negative.  She was sent home with Augmentin.  However, at home she continued to have abdominal pain and developed a fever of 101 °F.  She denies any left-sided abdominal pain.  Due to continued pain and fever, decision was made to return to the ED for further evaluation.    Patient denies taking any home medications. Denies drinking, smoking, illicit drug use.     In the ED, patient found to have elevated BP. Remarkable labs include leukocytosis. CT abd shows sigmoid colon diverticulitis with minimal associated inflammatory stranding and bowel wall thickening without pericolonic abscess or free air. Patient given fluids, rocephin, flagyl, and fentanyl, and endorsed to medicine.     I discussed the plan of care with patient.    Review of Systems  Review of Systems   Constitutional: Positive for malaise/fatigue.   HENT: Negative.    Eyes: Negative.    Respiratory: Negative.    Cardiovascular: Negative.    Gastrointestinal: Positive for abdominal pain.   Genitourinary: Negative.    Musculoskeletal: Negative.    Skin: Negative.    Neurological: Negative.    Endo/Heme/Allergies: Negative.     Psychiatric/Behavioral: Negative.        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    Surgical History   has a past surgical history that includes other; other; angiogram (8/17/2009); thrombectomy (12/21/2009); angiogram (2/2/2010); angiogram (4/15/2010); angioplasty balloon (4/15/2010); femoral artery repair (6/28/2010); mammoplasty reduction (8/24/2010); pr u/s leiomyomata ablate <200 cc; thrombectomy (1/9/2011); angiogram (1/9/2011); primary c section; hysteroscopy novasure-2 (9/27/2010); angiogram (Right, 11/5/2015); angioplasty upper (Right, 11/5/2015); other orthopedic surgery; debridement (Right, 11/11/2015); and endarterectomy (Right, 11/18/2015).     Family History  family history includes Stroke in her mother.   Family history reviewed with patient. There is no family history that is pertinent to the chief complaint.     Social History   reports that she has quit smoking. Her smoking use included cigarettes. She has never used smokeless tobacco. She reports current drug use. Drugs: Inhaled and Marijuana. She reports that she does not drink alcohol.    Allergies  Allergies   Allergen Reactions   • Alcohol      REQUESTS NO ALCOHOL CONTENT IN HER MEDICATIONS (per pt, pt is an alcoholic)   • Trazodone Hives     Caused breat hyperplasia. Pt then underwent a breast reduction and attempted to take again, new reaction: Hives.       Medications  Prior to Admission Medications   Prescriptions Last Dose Informant Patient Reported? Taking?   amoxicillin-clavulanate (AUGMENTIN) 875-125 MG Tab   No No   Sig: Take 1 Tablet by mouth 2 times a day  for 10 days.      Facility-Administered Medications: None       Physical Exam  Temp:  [37.3 °C (99.1 °F)-37.6 °C (99.6 °F)] 37.3 °C (99.1 °F)  Pulse:  [89-97] 91  Resp:  [17-20] 18  BP: (141-166)/(73-92) 162/77  SpO2:  [90 %-96 %] 95 %  Blood Pressure: (!) 162/77   Temperature: 37.3 °C (99.1 °F)   Pulse: 91   Respiration: 18   Pulse Oximetry: 95 %       Physical Exam  Constitutional:       Appearance: Normal appearance. She is normal weight.   HENT:      Head: Normocephalic.      Nose: Nose normal.      Mouth/Throat:      Mouth: Mucous membranes are moist.   Eyes:      Pupils: Pupils are equal, round, and reactive to light.   Cardiovascular:      Rate and Rhythm: Normal rate and regular rhythm.   Pulmonary:      Breath sounds: Normal breath sounds.      Comments: Tachypneic with respiratory rate between 20 and 30  Abdominal:      General: Abdomen is flat. Bowel sounds are normal.      Palpations: Abdomen is soft.      Comments: Denny sign negative   Musculoskeletal:         General: No swelling. Normal range of motion.      Cervical back: Normal range of motion.   Skin:     General: Skin is warm.   Neurological:      General: No focal deficit present.      Mental Status: She is alert and oriented to person, place, and time. Mental status is at baseline.   Psychiatric:         Mood and Affect: Mood normal.         Behavior: Behavior normal.         Thought Content: Thought content normal.         Judgment: Judgment normal.         Laboratory:  Recent Labs     02/18/22 1050 02/19/22 2142   WBC 8.9 12.5*   RBC 5.55* 4.96   HEMOGLOBIN 16.0 14.2   HEMATOCRIT 48.1* 43.5   MCV 86.7 87.7   MCH 28.8 28.6   MCHC 33.3* 32.6*   RDW 40.5 41.2   PLATELETCT 332 283   MPV 9.2 9.3     Recent Labs     02/18/22  1050 02/19/22 2142   SODIUM 138 132*   POTASSIUM 3.9 4.1   CHLORIDE 100 100   CO2 26 22   GLUCOSE 108* 112*   BUN 11 17   CREATININE 0.83 0.81   CALCIUM 9.5 9.0     Recent Labs     02/18/22  1050 02/19/22 2142   ALTSGPT  22 22   ASTSGOT 18 21   ALKPHOSPHAT 128* 110*   TBILIRUBIN 0.4 0.4   LIPASE 28 23   GLUCOSE 108* 112*     Recent Labs     02/18/22  1050   APTT 30.2   INR 0.94     Recent Labs     02/19/22  2142   NTPROBNP 551*         Recent Labs     02/19/22  2142   TROPONINT 6       Imaging:  CT-ABDOMEN-PELVIS WITH   Final Result      Sigmoid colon diverticulitis with minimal associated inflammatory stranding and bowel wall thickening. No pericolonic abscess or free air.      CT-CTA CHEST PULMONARY ARTERY W/ RECONS   Final Result      1.  No acute pulmonary embolism or other acute intrathoracic abnormality.   2.  Small hiatal hernia.         US-RUQ   Final Result      Right upper quadrant ultrasound is within normal limits.      DX-CHEST-PORTABLE (1 VIEW)   Final Result      No radiographic evidence of acute cardiopulmonary process.          no X-Ray or EKG requiring interpretation    Assessment/Plan:  I anticipate this patient will require at least two midnights for appropriate medical management, necessitating inpatient admission.    * Diverticulitis  Assessment & Plan  Admit patient to medical floor  Rocephin and flagyl  Pain medications  Fluids  Advanced diet as tolerated   Lactic acid Lipase negative      Sepsis (HCC)  Assessment & Plan  This is Sepsis Present on admission  SIRS criteria identified on my evaluation include: Fever, with temperature greater than 101 deg F and Leukocytosis, with WBC greater than 12,000  Source is Diverticulitis  Sepsis protocol initiated  Fluid resuscitation ordered per protocol  IV antibiotics as appropriate for source of sepsis  While organ dysfunction may be noted elsewhere in this problem list or in the chart, degree of organ dysfunction does not meet CMS criteria for severe sepsis            VTE prophylaxis: heparin ppx

## 2022-02-20 NOTE — CARE PLAN
The patient is Stable - Low risk of patient condition declining or worsening    Shift Goals  Clinical Goals: pain control  Patient Goals: Pain control, rest  Family Goals: NA    Progress made toward(s) clinical / shift goals:        Problem: Knowledge Deficit - Standard  Goal: Patient and family/care givers will demonstrate understanding of plan of care, disease process/condition, diagnostic tests and medications  Outcome: Progressing     Problem: Pain - Standard  Goal: Alleviation of pain or a reduction in pain to the patient’s comfort goal  Outcome: Progressing       Patient is able to communicate needs effectively. Uses call light appropriately. Updated patient on plan of care and medications. Fall precautions in place. All patient's questions answered at this time. Call light in reach.

## 2022-02-20 NOTE — PROGRESS NOTES
Received report from previous shift RN.  Assessment complete.  Patient A&O x 4. Patient calls appropriately.  Patient ambulates with standby assist .  Patient has 6/10 pain. Pain managed with prescribed medications, rest, repositioning, and heat pad.  Denies N&V. Tolerating regular diet.    + void, + flatus, last BM 2/19.  Patient on RA,  denies SOB.     Reviewed plan of care with patient. Call light and personal belongings within reach. Hourly rounding in place. All needs met at this time.

## 2022-02-20 NOTE — ED NOTES
Pt is alert and oriented, speaking in full sentences, follows commands and responds appropriately to questions. Resp are even and unlabored. On cardiac monitor, RA, SR No needs expressed at this point in time.           31-May-2017 15:17

## 2022-02-20 NOTE — PROGRESS NOTES
[facility] Medicine Daily Progress Note    Date of Service  2/20/2022    Chief Complaint  Roslyn Kilgore is a 57 y.o. female presented 2/19/2022 with abdominal pain    Hospital Course  Roslyn Kilgore is a 57 y.o. female who presented 2/19/2022 with right upper quadrant abdominal pain that started about 2 days ago, with radiation up to her neck and difficult for her to breathe.  She initially presented to ED 2/18/2022.  CT abdomen shows diverticulosis.  Right upper quadrant ultrasound was unremarkarble.  She was sent home with Augmentin.  However, at home, she continued to have abdominal pain and developed a fever of 101 °F. Due to continued pain and fever, decision was made to return to the ED for further evaluation. Patient denies taking any home medications. Denies drinking, smoking, illicit drug use.      In the ED, patient was found to have elevated BP. Remarkable labs include leukocytosis. CT abd showed sigmoid colon diverticulitis with minimal associated inflammatory stranding and bowel wall thickening without pericolonic abscess or free air. Patient was given fluids, Abx coverage with rocephin and flagyl, and fentanyl, and endorsed to medicine.     Interval Problem Update  2/20/2022  Vitals reviewed; afebrile.  The rest of the vitals within normal parameters. BP slightly elevated in 150-160s range.   Pain scale reported - 8-9 in abd (more in RUQ)  Blood glucose in last 24hrs - around 100s   I/O - last BM 2/19    At bedside during rounds, appeared quite uncomfortable with RUQ pain that makes her hard to take a deep breath. I explained adjustment in pain regimen and imaging findings discussed (diverticulitis but overall, benign findings). Abd soft, normoactive BS,  Plan to continue to monitor discussed.     At around noon, still continue to be in abd pain. KUB ordered, repeat CMP requested and Ketorolac added as an alternate pain regimen.     Labs reviewed   BCx in progress       I have personally seen  and examined the patient at bedside. I discussed the plan of care with patient and bedside RN.    Consultants/Specialty  N/A    Code Status  Full Code    Disposition  Patient is not medically cleared for discharge.   Anticipate discharge to to home with close outpatient follow-up.  I have placed the appropriate orders for post-discharge needs.    Review of Systems  Review of Systems   Constitutional: Positive for malaise/fatigue. Negative for chills and fever.   HENT: Negative for sore throat.    Eyes: Negative for blurred vision and double vision.   Respiratory: Positive for shortness of breath. Negative for cough and sputum production.    Cardiovascular: Negative for chest pain, palpitations and leg swelling.   Gastrointestinal: Positive for abdominal pain and nausea. Negative for heartburn and vomiting.   Genitourinary: Negative for dysuria, frequency and urgency.   Musculoskeletal: Negative for myalgias and neck pain.   Neurological: Negative for dizziness, focal weakness and headaches.   Psychiatric/Behavioral: Negative for depression. The patient is nervous/anxious.         Physical Exam  Temp:  [36.3 °C (97.3 °F)-37.6 °C (99.6 °F)] 36.3 °C (97.3 °F)  Pulse:  [88-97] 95  Resp:  [17-29] 18  BP: (141-166)/(73-96) 154/96  SpO2:  [90 %-98 %] 96 %    Physical Exam  Vitals and nursing note reviewed.   Constitutional:       General: She is in acute distress.      Appearance: She is not ill-appearing.   HENT:      Head: Normocephalic and atraumatic.      Mouth/Throat:      Mouth: Mucous membranes are dry.      Pharynx: Oropharynx is clear.   Eyes:      General: No scleral icterus.     Conjunctiva/sclera: Conjunctivae normal.   Cardiovascular:      Rate and Rhythm: Normal rate and regular rhythm.      Pulses: Normal pulses.      Heart sounds: Normal heart sounds.   Pulmonary:      Effort: Pulmonary effort is normal. No respiratory distress.      Breath sounds: Normal breath sounds. No wheezing.   Abdominal:       General: Bowel sounds are normal. There is no distension.      Palpations: Abdomen is soft.      Tenderness: There is abdominal tenderness (More in RUQ). There is no guarding or rebound.   Musculoskeletal:         General: No swelling or tenderness. Normal range of motion.   Skin:     General: Skin is warm and dry.      Capillary Refill: Capillary refill takes less than 2 seconds.   Neurological:      General: No focal deficit present.      Mental Status: She is alert and oriented to person, place, and time. Mental status is at baseline.   Psychiatric:         Mood and Affect: Mood normal.         Fluids  No intake or output data in the 24 hours ending 02/20/22 1357    Laboratory  Recent Labs     02/18/22  1050 02/19/22  2142   WBC 8.9 12.5*   RBC 5.55* 4.96   HEMOGLOBIN 16.0 14.2   HEMATOCRIT 48.1* 43.5   MCV 86.7 87.7   MCH 28.8 28.6   MCHC 33.3* 32.6*   RDW 40.5 41.2   PLATELETCT 332 283   MPV 9.2 9.3     Recent Labs     02/18/22  1050 02/19/22  2142   SODIUM 138 132*   POTASSIUM 3.9 4.1   CHLORIDE 100 100   CO2 26 22   GLUCOSE 108* 112*   BUN 11 17   CREATININE 0.83 0.81   CALCIUM 9.5 9.0     Recent Labs     02/18/22  1050   APTT 30.2   INR 0.94               Imaging  CT-ABDOMEN-PELVIS WITH   Final Result      Sigmoid colon diverticulitis with minimal associated inflammatory stranding and bowel wall thickening. No pericolonic abscess or free air.      CT-CTA CHEST PULMONARY ARTERY W/ RECONS   Final Result      1.  No acute pulmonary embolism or other acute intrathoracic abnormality.   2.  Small hiatal hernia.         US-RUQ   Final Result      Right upper quadrant ultrasound is within normal limits.      DX-CHEST-PORTABLE (1 VIEW)   Final Result      No radiographic evidence of acute cardiopulmonary process.      [id number] VIEWS    (Results Pending)        Assessment/Plan  * Diverticulitis- (present on admission)  Assessment & Plan  Patient has unusual presentation of diverticulitis.  Complains of right upper  quadrant pain without any left lower quadrant pain.  However, right upper quadrant ultrasound negative 2/19 and no signs of elevated LFTs except for a mildly elevated alkaline phosphatase.  Denny sign negative.   Rocephin and flagyl  Pain medications  Fluids  Advanced diet as tolerated   Lactic acid and Lipase negative    2/20: Pain regimen adjusted; KUB ordered to eval free air under diaphragm     Sepsis (HCC)- (present on admission)  Assessment & Plan  This is Sepsis Present on admission  SIRS criteria identified on admission include: Fever, with temperature greater than 101 deg F and Leukocytosis, with WBC greater than 12,000  Source is Diverticulitis  Sepsis protocol initiated  Fluid resuscitation ordered per protocol  IV antibiotics as appropriate for source of sepsis  While organ dysfunction may be noted elsewhere in this problem list or in the chart, degree of organ dysfunction does not meet CMS criteria for severe sepsis          Hyponatremia- (present on admission)  Assessment & Plan  Will monitor and trend  Mild, asymptomatic       VTE prophylaxis: heparin ppx    I have performed a physical exam and reviewed and updated ROS and Plan today (2/20/2022).

## 2022-02-20 NOTE — ED PROVIDER NOTES
ED Provider Note    CHIEF COMPLAINT  Chief Complaint   Patient presents with   • Abdominal Pain     Pt reports abd pain since Monday. Pt was seen here yesterday for the same, was told she had diverticulitis to the LLQ.  Pt reports pain is now on the R flank. Pt reports nausea/ constipation. Denies any urinary symptoms.         HPI  Roslyn Kilgore is a 57 y.o. female who presents with right-sided abdominal pain radiating to her shoulder. She has pain with deep inspiration. She notes that she felt fullness in her abdomen on Tuesday when she flew back from a trip to [place]. She took some laxatives and following that she developed severe belly pain.  At first it was rather diffuse.  She was evaluated here in the emergency department yesterday and CT was performed that was equivocal for possible early diverticulitis.  Now, pain is focal to the right upper quadrant.  She does not have left-sided abdominal pain today. No fevers. She started antibiotics yesterday.  The patient reports shortness of breath.  She states that she has pain with deep inspiration secondary to right-sided pain in the chest and abdomen. No cough or fevers. No prior abdominal surgeries other than  in the past. No dysuria or hematuria.    REVIEW OF SYSTEMS  See HPI for further details. All other systems are negative.     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    SOCIAL HISTORY  Social History     Tobacco Use   • Smoking status: Former Smoker     Types: Cigarettes   • Smokeless tobacco: Never Used   • Tobacco comment: 1 pk a day for 6 yrs, Quit    Vaping Use  "  • Vaping Use: Some days   Substance and Sexual Activity   • Alcohol use: No     Comment: pt stated that she used to be alcoholic 20 years ago   • Drug use: Yes     Types: Inhaled, Marijuana     Comment: THC, meth use   • Sexual activity: Yes     Partners: Male       SURGICAL HISTORY   has a past surgical history that includes other; other; angiogram (8/17/2009); thrombectomy (12/21/2009); angiogram (2/2/2010); angiogram (4/15/2010); angioplasty balloon (4/15/2010); femoral artery repair (6/28/2010); mammoplasty reduction (8/24/2010); u/s leiomyomata ablate <200 cc; thrombectomy (1/9/2011); angiogram (1/9/2011); primary c section; hysteroscopy novasure-2 (9/27/2010); angiogram (Right, 11/5/2015); angioplasty upper (Right, 11/5/2015); other orthopedic surgery; debridement (Right, 11/11/2015); and endarterectomy (Right, 11/18/2015).    CURRENT MEDICATIONS  Home Medications     Reviewed by Roz Camara R.N. (Registered Nurse) on 02/19/22 at 2132  Med List Status: <None>   Medication Last Dose Status   amoxicillin-clavulanate (AUGMENTIN) 875-125 MG Tab  Active                ALLERGIES  Allergies   Allergen Reactions   • Alcohol      REQUESTS NO ALCOHOL CONTENT IN HER MEDICATIONS (per pt, pt is an alcoholic)   • Trazodone Hives     Caused breat hyperplasia. Pt then underwent a breast reduction and attempted to take again, new reaction: Hives.       PHYSICAL EXAM  VITAL SIGNS: BP (!) 166/92   Pulse 97   Temp 37.6 °C (99.6 °F) (Temporal)   Resp 18   Ht 1.6 m (5' 3\")   Wt 67.9 kg (149 lb 11.1 oz)   LMP 01/04/2011   SpO2 95% Comment: Room air  BMI 26.52 kg/m²   Pulse ox interpretation: I interpret this pulse ox as normal.  Constitutional: Alert in mild distress secondary to pain  HENT: No signs of trauma, Bilateral external ears normal, Nose normal.   Eyes: Pupils are equal and reactive, Conjunctiva normal, Non-icteric.   Neck: Normal range of motion, Supple, No stridor.   Cardiovascular: Regular rate and " rhythm.   Thorax & Lungs: Normal breath sounds, No respiratory distress, No wheezing   Abdomen: Bowel sounds normal, Soft, right upper quadrant tenderness, No masses, No pulsatile masses. No peritoneal signs.  Skin: Warm, Dry, No erythema, No rash.   Back: No bony tenderness, No CVA tenderness.   Extremities: Intact distal pulses, No edema, No tenderness, No cyanosis  Musculoskeletal: Good range of motion in all major joints. No major deformities noted.   Neurologic: Alert, No focal deficits noted.       DIAGNOSTIC STUDIES / PROCEDURES    EKG - Physician interpretation  Results for orders placed or performed during the hospital encounter of 22   EKG   Result Value Ref Range    Report       [facility] Emergency Dept.    Test Date:  2022  Pt Name:    OSMANI MORENO              Department: ER  MRN:        [id number]                      Room:       RD 11  Gender:     Female                       Technician: [id number]  :        [id number]                   Requested By:NICK RAZO  Order #:    [id number]                    Reading MD: NICK RAZO MD    Measurements  Intervals                                Axis  Rate:       94                           P:          70  MN:         129                          QRS:        48  QRSD:       104                          T:          12  QT:         345  QTc:        432    Interpretive Statements  Sinus rhythm  Compared to ECG 2015 16:21:52  Electronically Signed On 2022 2:09:13 PST by NICK RAZO MD           LABS  Labs Reviewed   CBC WITH DIFFERENTIAL - Abnormal; Notable for the following components:       Result Value    WBC 12.5 (*)     MCHC 32.6 (*)     Lymphocytes 14.20 (*)     Monocytes 15.20 (*)     Neutrophils (Absolute) 8.33 (*)     Monos (Absolute) 1.89 (*)     All other components within normal limits   COMP METABOLIC PANEL - Abnormal; Notable for the following components:    Sodium 132 (*)     Glucose 112 (*)     Alkaline  "Phosphatase 110 (*)     All other components within normal limits   URINALYSIS - Abnormal; Notable for the following components:    Specific Gravity >=1.045 (*)     Ketones Trace (*)     Protein 30 (*)     All other components within normal limits   D-DIMER - Abnormal; Notable for the following components:    D-Dimer Screen 1.78 (*)     All other components within normal limits   PROBRAIN NATRIURETIC PEPTIDE, NT - Abnormal; Notable for the following components:    NT-proBNP 551 (*)     All other components within normal limits   LIPASE   ESTIMATED GFR   TROPONIN   COV-2, FLU A/B, AND RSV BY PCR ([employer])   SARS-COV ANTIGEN JA   URINE MICROSCOPIC (W/UA)   LACTIC ACID   BLOOD CULTURE    Narrative:     Per Hospital Policy: Only change Specimen Src: to \"Line\" if  specified by physician order.   BLOOD CULTURE    Narrative:     Per Hospital Policy: Only change Specimen Src: to \"Line\" if  specified by physician order.         RADIOLOGY  CT-ABDOMEN-PELVIS WITH   Final Result      Sigmoid colon diverticulitis with minimal associated inflammatory stranding and bowel wall thickening. No pericolonic abscess or free air.      CT-CTA CHEST PULMONARY ARTERY W/ RECONS   Final Result      1.  No acute pulmonary embolism or other acute intrathoracic abnormality.   2.  Small hiatal hernia.         US-RUQ   Final Result      Right upper quadrant ultrasound is within normal limits.      DX-CHEST-PORTABLE (1 VIEW)   Final Result      No radiographic evidence of acute cardiopulmonary process.            COURSE & MEDICAL DECISION MAKING    Medications   fentaNYL (SUBLIMAZE) injection 50 mcg (has no administration in time range)   fentaNYL (SUBLIMAZE) injection 50 mcg (50 mcg Intravenous Given 2/19/22 2300)   ondansetron (ZOFRAN) syringe/vial injection 4 mg (4 mg Intravenous Given 2/20/22 0001)   fentaNYL (SUBLIMAZE) injection 50 mcg (50 mcg Intravenous Given 2/20/22 0001)   iohexol (OMNIPAQUE) 350 mg/mL (IV) (40 mL Intravenous Given " "2/20/22 0052)       Pertinent Labs & Imaging studies reviewed. (See chart for details)  57 y.o. female presenting with right upper quadrant abdominal pain.  Has significant pain there and the patient appears very uncomfortable as a result.  She was given IV analgesic medications.  Laboratory studies were very nonspecific.  Has slightly elevated WBC of 12.5.  No sniffing metabolic abnormalities or liver enzyme abnormalities.  Normal bilirubin.  Given right upper quadrant discomfort ultrasound was performed which was largely unremarkable.    She notes that pain is worse with deep inspiration and she has some shallow breathing.  She recently traveled back from [place].  No lower extremity swelling or pain.  Past medical history does list prior history of DVT.  D-dimer was performed and it was found to be significantly elevated.  CT pulmonary angiogram was ordered for further evaluation.  No acute abnormalities were identified to explain the patient's pain symptoms.    Abdominal pain symptoms persisted despite repeated doses of IV analgesic medications.  Though she had CT abdomen pelvis yesterday, CT abdomen pelvis was repeated again today to evaluate for possible interval worsening or acute changes compared to yesterday given worsening of the patient's symptoms.  Did show evidence of sigmoid diverticulitis without perforation or abscess.  No obvious acute pathology in the right upper quadrant however the patient is experiencing the worst pain.  Patient was started on IV antibiotics for concerns of diverticulitis.    Given the patient's intractable pain symptoms in the setting of diverticulitis diagnosis, patient will be hospitalized for further management.  Spoke with Dr. Smalls from hospitalist service who is agreeable to the hospitalization.      BP (!) 162/77   Pulse 95   Temp 37.3 °C (99.1 °F) (Temporal)   Resp (!) 29   Ht 1.6 m (5' 3\")   Wt 67.9 kg (149 lb 11.1 oz)   LMP 01/04/2011   SpO2 95%   BMI 26.52 " kg/m²         FINAL IMPRESSION  1. Right upper quadrant abdominal pain    2. Diverticulitis            Electronically signed by: Chi Piña M.D., 2/19/2022 10:12 PM

## 2022-02-20 NOTE — ED NOTES
Lab is at bedside to grab second set of BC. Will start IV ABX after BC are collected. ERP is also at bedside to explain reason of why pt will be admitted.

## 2022-02-20 NOTE — ASSESSMENT & PLAN NOTE
This is Sepsis Present on admission  SIRS criteria identified on admission include: Fever, with temperature greater than 101 deg F and Leukocytosis, with WBC greater than 12,000  Source is Diverticulitis  Sepsis protocol initiated  Fluid resuscitation ordered per protocol  IV antibiotics as appropriate for source of sepsis  While organ dysfunction may be noted elsewhere in this problem list or in the chart, degree of organ dysfunction does not meet CMS criteria for severe sepsis

## 2022-02-20 NOTE — DIETARY
NUTRITION SERVICES - Alert received for newly identified wound. No skin breakdown noted per RN skin assessment. No wound team consult pending.  No nutrition recommendations at this time. RD will monitor per dept policy.

## 2022-02-20 NOTE — CARE PLAN
The patient is Stable - Low risk of patient condition declining or worsening    Shift Goals  Clinical Goals: pain control  Patient Goals: Pain control, rest  Family Goals: NA    Progress made toward(s) clinical / shift goals:  Pain controlled per MAR (One dose IV morphine given). IV fluids started. Education provided. Pt resting in bed with call light in reach.    Problem: Fluid Volume  Goal: Fluid volume balance will be maintained  Outcome: Progressing     Problem: Pain - Standard  Goal: Alleviation of pain or a reduction in pain to the patient’s comfort goal  Outcome: Progressing       Patient is not progressing towards the following goals: none

## 2022-02-20 NOTE — PROGRESS NOTES
4 Eyes Skin Assessment Completed by GABO Dial and GABO Boogie.    Head WDL  Ears WDL  Nose WDL  Mouth WDL  Neck WDL  Breast/Chest WDL  Shoulder Blades WDL  Spine WDL  (R) Arm/Elbow/Hand WDL  (L) Arm/Elbow/Hand WDL  Abdomen WDL  Groin WDL  Scrotum/Coccyx/Buttocks WDL  (R) Leg WDL  (L) Leg WDL  (R) Heel/Foot/Toe WDL  (L) Heel/Foot/Toe WDL          Devices In Places Pulse Ox      Interventions In Place N/A    Possible Skin Injury No    Pictures Uploaded Into Epic N/A  Wound Consult Placed N/A  RN Wound Prevention Protocol Ordered No

## 2022-02-20 NOTE — ASSESSMENT & PLAN NOTE
Patient has unusual presentation of diverticulitis.  Complains of right upper quadrant pain without any left lower quadrant pain.  However, right upper quadrant ultrasound negative 2/19 and no signs of elevated LFTs except for a mildly elevated alkaline phosphatase.  Denny sign negative.   Rocephin and flagyl  Pain medications  Fluids  Advanced diet as tolerated   Lactic acid and Lipase negative    2/20: Pain regimen adjusted; KUB ordered to eval free air under diaphragm

## 2022-02-20 NOTE — ED TRIAGE NOTES
"Chief Complaint   Patient presents with   • Abdominal Pain     Pt reports abd pain since Monday. Pt was seen here yesterday for the same, was told she had diverticulitis to the LLQ.  Pt reports pain is now on the R flank. Pt reports nausea/ constipation. Denies any urinary symptoms.         Pt ambulatory to triage for above complaint.   Educated on triage process, encourage to inform staff of any changes.     Pt has hx of arterial occlusion to R arm- no BP    BP (!) 166/92   Pulse 97   Temp 37.6 °C (99.6 °F) (Temporal)   Resp 18   Ht 1.6 m (5' 3\")   Wt 67.9 kg (149 lb 11.1 oz)   LMP 01/04/2011   SpO2 95% Comment: Room air  BMI 26.52 kg/m²   "

## 2022-02-20 NOTE — PROGRESS NOTES
Med rec completed per patient at bedside.  Allergies reviewed with patient.  Patient is on a 10 day course of Augmentin prescribed on 2/18/2022. Patient denies other antibiotic usage within last 30 days and denies taking any other prescription medications.  Patient's preferred pharmacy: [facility] on [address], or [facility] on [address] if [facility] is after hours.

## 2022-02-20 NOTE — ED NOTES
Pt is alert and oriented, speaking in full sentences, follows commands and responds appropriately to questions. Resp are even and unlabored. On cardiac monitor. No needs expressed at this point in time.

## 2022-02-21 VITALS
BODY MASS INDEX: 26.52 KG/M2 | SYSTOLIC BLOOD PRESSURE: 142 MMHG | RESPIRATION RATE: 17 BRPM | DIASTOLIC BLOOD PRESSURE: 90 MMHG | HEART RATE: 75 BPM | OXYGEN SATURATION: 95 % | TEMPERATURE: 97.8 F | HEIGHT: 63 IN | WEIGHT: 149.69 LBS

## 2022-02-21 LAB
ALBUMIN SERPL BCP-MCNC: 2.9 G/DL (ref 3.2–4.9)
ALBUMIN/GLOB SERPL: 1 G/DL
ALP SERPL-CCNC: 96 U/L (ref 30–99)
ALT SERPL-CCNC: 17 U/L (ref 2–50)
ANION GAP SERPL CALC-SCNC: 7 MMOL/L (ref 7–16)
AST SERPL-CCNC: 20 U/L (ref 12–45)
BASOPHILS # BLD AUTO: 0.6 % (ref 0–1.8)
BASOPHILS # BLD: 0.05 K/UL (ref 0–0.12)
BILIRUB SERPL-MCNC: 0.2 MG/DL (ref 0.1–1.5)
BUN SERPL-MCNC: 9 MG/DL (ref 8–22)
CALCIUM SERPL-MCNC: 8.6 MG/DL (ref 8.5–10.5)
CHLORIDE SERPL-SCNC: 104 MMOL/L (ref 96–112)
CO2 SERPL-SCNC: 25 MMOL/L (ref 20–33)
CREAT SERPL-MCNC: 0.7 MG/DL (ref 0.5–1.4)
EOSINOPHIL # BLD AUTO: 0.37 K/UL (ref 0–0.51)
EOSINOPHIL NFR BLD: 4.7 % (ref 0–6.9)
ERYTHROCYTE [DISTWIDTH] IN BLOOD BY AUTOMATED COUNT: 42.4 FL (ref 35.9–50)
GLOBULIN SER CALC-MCNC: 2.9 G/DL (ref 1.9–3.5)
GLUCOSE SERPL-MCNC: 85 MG/DL (ref 65–99)
HCT VFR BLD AUTO: 39.5 % (ref 37–47)
HGB BLD-MCNC: 12.8 G/DL (ref 12–16)
IMM GRANULOCYTES # BLD AUTO: 0.02 K/UL (ref 0–0.11)
IMM GRANULOCYTES NFR BLD AUTO: 0.3 % (ref 0–0.9)
LYMPHOCYTES # BLD AUTO: 1.54 K/UL (ref 1–4.8)
LYMPHOCYTES NFR BLD: 19.8 % (ref 22–41)
MCH RBC QN AUTO: 28.8 PG (ref 27–33)
MCHC RBC AUTO-ENTMCNC: 32.4 G/DL (ref 33.6–35)
MCV RBC AUTO: 88.8 FL (ref 81.4–97.8)
MONOCYTES # BLD AUTO: 1.18 K/UL (ref 0–0.85)
MONOCYTES NFR BLD AUTO: 15.1 % (ref 0–13.4)
NEUTROPHILS # BLD AUTO: 4.63 K/UL (ref 2–7.15)
NEUTROPHILS NFR BLD: 59.5 % (ref 44–72)
NRBC # BLD AUTO: 0 K/UL
NRBC BLD-RTO: 0 /100 WBC
PLATELET # BLD AUTO: 283 K/UL (ref 164–446)
PMV BLD AUTO: 9.3 FL (ref 9–12.9)
POTASSIUM SERPL-SCNC: 4 MMOL/L (ref 3.6–5.5)
PROT SERPL-MCNC: 5.8 G/DL (ref 6–8.2)
RBC # BLD AUTO: 4.45 M/UL (ref 4.2–5.4)
SODIUM SERPL-SCNC: 136 MMOL/L (ref 135–145)
WBC # BLD AUTO: 7.8 K/UL (ref 4.8–10.8)

## 2022-02-21 PROCEDURE — 700111 HCHG RX REV CODE 636 W/ 250 OVERRIDE (IP): Performed by: STUDENT IN AN ORGANIZED HEALTH CARE EDUCATION/TRAINING PROGRAM

## 2022-02-21 PROCEDURE — 80053 COMPREHEN METABOLIC PANEL: CPT

## 2022-02-21 PROCEDURE — A9270 NON-COVERED ITEM OR SERVICE: HCPCS | Performed by: STUDENT IN AN ORGANIZED HEALTH CARE EDUCATION/TRAINING PROGRAM

## 2022-02-21 PROCEDURE — 700105 HCHG RX REV CODE 258: Performed by: STUDENT IN AN ORGANIZED HEALTH CARE EDUCATION/TRAINING PROGRAM

## 2022-02-21 PROCEDURE — 99239 HOSP IP/OBS DSCHRG MGMT >30: CPT | Performed by: STUDENT IN AN ORGANIZED HEALTH CARE EDUCATION/TRAINING PROGRAM

## 2022-02-21 PROCEDURE — 700102 HCHG RX REV CODE 250 W/ 637 OVERRIDE(OP): Performed by: STUDENT IN AN ORGANIZED HEALTH CARE EDUCATION/TRAINING PROGRAM

## 2022-02-21 PROCEDURE — 85025 COMPLETE CBC W/AUTO DIFF WBC: CPT

## 2022-02-21 RX ADMIN — SODIUM CHLORIDE, POTASSIUM CHLORIDE, SODIUM LACTATE AND CALCIUM CHLORIDE: 600; 310; 30; 20 INJECTION, SOLUTION INTRAVENOUS at 06:42

## 2022-02-21 RX ADMIN — HEPARIN SODIUM 5000 UNITS: 5000 INJECTION, SOLUTION INTRAVENOUS; SUBCUTANEOUS at 05:55

## 2022-02-21 RX ADMIN — CEFTRIAXONE SODIUM 2 G: 2 INJECTION, POWDER, FOR SOLUTION INTRAMUSCULAR; INTRAVENOUS at 06:39

## 2022-02-21 RX ADMIN — KETOROLAC TROMETHAMINE 30 MG: 30 INJECTION, SOLUTION INTRAMUSCULAR at 09:55

## 2022-02-21 RX ADMIN — ACETAMINOPHEN 1000 MG: 500 TABLET ORAL at 05:55

## 2022-02-21 RX ADMIN — METRONIDAZOLE 500 MG: 500 TABLET ORAL at 05:55

## 2022-02-21 ASSESSMENT — PAIN DESCRIPTION - PAIN TYPE: TYPE: ACUTE PAIN

## 2022-02-21 NOTE — CARE PLAN
The patient is Stable - Low risk of patient condition declining or worsening    Shift Goals  Clinical Goals: pain control  Patient Goals: Pain control, rest  Family Goals: NA    Problem: Pain - Standard  Goal: Alleviation of pain or a reduction in pain to the patient’s comfort goal  Outcome: Progressing     Problem: Hemodynamics  Goal: Patient's hemodynamics, fluid balance and neurologic status will be stable or improve    Outcome: Progressing

## 2022-02-21 NOTE — DISCHARGE INSTRUCTIONS
Discharge Instructions    Discharged to home by car with relative. Discharged via wheelchair, hospital escort: Yes.  Special equipment needed: Not Applicable    Be sure to schedule a follow-up appointment with your primary care doctor or any specialists as instructed.     Discharge Plan:   Diet Plan: Discussed  Activity Level: Discussed  Confirmed Follow up Appointment: Patient to Call and Schedule Appointment  Confirmed Symptoms Management: Discussed  Medication Reconciliation Updated: Yes  Influenza Vaccine Indication: Not indicated: Previously immunized this influenza season and > 8 years of age    I understand that a diet low in cholesterol, fat, and sodium is recommended for good health. Unless I have been given specific instructions below for another diet, I accept this instruction as my diet prescription.   Other diet: Heart healthy    Special Instructions: None    · Is patient discharged on Warfarin / Coumadin?   No     Depression / Suicide Risk    As you are discharged from this [facility] facility, it is important to learn how to keep safe from harming yourself.    Recognize the warning signs:  · Abrupt changes in personality, positive or negative- including increase in energy   · Giving away possessions  · Change in eating patterns- significant weight changes-  positive or negative  · Change in sleeping patterns- unable to sleep or sleeping all the time   · Unwillingness or inability to communicate  · Depression  · Unusual sadness, discouragement and loneliness  · Talk of wanting to die  · Neglect of personal appearance   · Rebelliousness- reckless behavior  · Withdrawal from people/activities they love  · Confusion- inability to concentrate     If you or a loved one observes any of these behaviors or has concerns about self-harm, here's what you can do:  · Talk about it- your feelings and reasons for harming yourself  · Remove any means that you might use to hurt yourself (examples: pills, rope,  extension cords, firearm)  · Get professional help from the community (Mental Health, Substance Abuse, psychological counseling)  · Do not be alone:Call your Safe Contact- someone whom you trust who will be there for you.  · Call your local CRISIS HOTLINE 731-7883 or 934-039-5484  · Call your local Children's Mobile Crisis Response Team [place] (557) 690-5148 or www.[employer]  · Call the toll free National Suicide Prevention Hotlines   · National Suicide Prevention Lifeline 937-486-KOMR (9837)  · National Hope Line Network 800-SUICIDE (933-0178)

## 2022-02-21 NOTE — PROGRESS NOTES
Bedside report received.  Assessment complete.  A&O x 4. Patient calls appropriately.  Patient ambulates with no assist.    Patient has 6/10 pain. Pain managed with prescribed medications.  Denies N&V. Tolerating clears diet.  + void, + flatus, + BM.  Patient denies SOB.  Patient pleasant with staff and resting in bed.  Review plan with of care with patient. Call light and personal belongings within reach. Hourly rounding in place. All needs met at this time.

## 2022-02-22 ENCOUNTER — HOSPITAL ENCOUNTER (EMERGENCY)
Facility: MEDICAL CENTER | Age: 57
End: 2022-02-22
Attending: EMERGENCY MEDICINE
Payer: COMMERCIAL

## 2022-02-22 ENCOUNTER — APPOINTMENT (OUTPATIENT)
Dept: RADIOLOGY | Facility: MEDICAL CENTER | Age: 57
End: 2022-02-22
Attending: EMERGENCY MEDICINE
Payer: COMMERCIAL

## 2022-02-22 VITALS
HEIGHT: 63 IN | TEMPERATURE: 97.2 F | DIASTOLIC BLOOD PRESSURE: 60 MMHG | WEIGHT: 151.68 LBS | HEART RATE: 76 BPM | RESPIRATION RATE: 16 BRPM | OXYGEN SATURATION: 97 % | SYSTOLIC BLOOD PRESSURE: 104 MMHG | BODY MASS INDEX: 26.88 KG/M2

## 2022-02-22 DIAGNOSIS — R10.11 RIGHT UPPER QUADRANT ABDOMINAL PAIN: ICD-10-CM

## 2022-02-22 LAB
ALBUMIN SERPL BCP-MCNC: 3.7 G/DL (ref 3.2–4.9)
ALBUMIN/GLOB SERPL: 1.1 G/DL
ALP SERPL-CCNC: 109 U/L (ref 30–99)
ALT SERPL-CCNC: 19 U/L (ref 2–50)
ANION GAP SERPL CALC-SCNC: 13 MMOL/L (ref 7–16)
APPEARANCE UR: CLEAR
AST SERPL-CCNC: 18 U/L (ref 12–45)
BASOPHILS # BLD AUTO: 0.7 % (ref 0–1.8)
BASOPHILS # BLD: 0.06 K/UL (ref 0–0.12)
BILIRUB SERPL-MCNC: 0.2 MG/DL (ref 0.1–1.5)
BILIRUB UR QL STRIP.AUTO: NEGATIVE
BUN SERPL-MCNC: 8 MG/DL (ref 8–22)
CALCIUM SERPL-MCNC: 9 MG/DL (ref 8.5–10.5)
CHLORIDE SERPL-SCNC: 104 MMOL/L (ref 96–112)
CO2 SERPL-SCNC: 21 MMOL/L (ref 20–33)
COLOR UR: YELLOW
CREAT SERPL-MCNC: 0.89 MG/DL (ref 0.5–1.4)
EOSINOPHIL # BLD AUTO: 0.2 K/UL (ref 0–0.51)
EOSINOPHIL NFR BLD: 2.3 % (ref 0–6.9)
ERYTHROCYTE [DISTWIDTH] IN BLOOD BY AUTOMATED COUNT: 40.2 FL (ref 35.9–50)
GLOBULIN SER CALC-MCNC: 3.3 G/DL (ref 1.9–3.5)
GLUCOSE SERPL-MCNC: 159 MG/DL (ref 65–99)
GLUCOSE UR STRIP.AUTO-MCNC: NEGATIVE MG/DL
HCT VFR BLD AUTO: 41.7 % (ref 37–47)
HGB BLD-MCNC: 14.1 G/DL (ref 12–16)
IMM GRANULOCYTES # BLD AUTO: 0.04 K/UL (ref 0–0.11)
IMM GRANULOCYTES NFR BLD AUTO: 0.5 % (ref 0–0.9)
KETONES UR STRIP.AUTO-MCNC: NEGATIVE MG/DL
LEUKOCYTE ESTERASE UR QL STRIP.AUTO: NEGATIVE
LIPASE SERPL-CCNC: 22 U/L (ref 11–82)
LYMPHOCYTES # BLD AUTO: 1.17 K/UL (ref 1–4.8)
LYMPHOCYTES NFR BLD: 13.4 % (ref 22–41)
MCH RBC QN AUTO: 29 PG (ref 27–33)
MCHC RBC AUTO-ENTMCNC: 33.8 G/DL (ref 33.6–35)
MCV RBC AUTO: 85.8 FL (ref 81.4–97.8)
MICRO URNS: NORMAL
MONOCYTES # BLD AUTO: 0.94 K/UL (ref 0–0.85)
MONOCYTES NFR BLD AUTO: 10.8 % (ref 0–13.4)
NEUTROPHILS # BLD AUTO: 6.32 K/UL (ref 2–7.15)
NEUTROPHILS NFR BLD: 72.3 % (ref 44–72)
NITRITE UR QL STRIP.AUTO: NEGATIVE
NRBC # BLD AUTO: 0 K/UL
NRBC BLD-RTO: 0 /100 WBC
PH UR STRIP.AUTO: 6 [PH] (ref 5–8)
PLATELET # BLD AUTO: 389 K/UL (ref 164–446)
PMV BLD AUTO: 9.6 FL (ref 9–12.9)
POTASSIUM SERPL-SCNC: 3.6 MMOL/L (ref 3.6–5.5)
PROT SERPL-MCNC: 7 G/DL (ref 6–8.2)
PROT UR QL STRIP: NEGATIVE MG/DL
RBC # BLD AUTO: 4.86 M/UL (ref 4.2–5.4)
RBC UR QL AUTO: NEGATIVE
SODIUM SERPL-SCNC: 138 MMOL/L (ref 135–145)
SP GR UR STRIP.AUTO: 1.01
UROBILINOGEN UR STRIP.AUTO-MCNC: 0.2 MG/DL
WBC # BLD AUTO: 8.7 K/UL (ref 4.8–10.8)

## 2022-02-22 PROCEDURE — 85025 COMPLETE CBC W/AUTO DIFF WBC: CPT

## 2022-02-22 PROCEDURE — 99284 EMERGENCY DEPT VISIT MOD MDM: CPT

## 2022-02-22 PROCEDURE — A9537 TC99M MEBROFENIN: HCPCS

## 2022-02-22 PROCEDURE — 81003 URINALYSIS AUTO W/O SCOPE: CPT

## 2022-02-22 PROCEDURE — 83690 ASSAY OF LIPASE: CPT

## 2022-02-22 PROCEDURE — 80053 COMPREHEN METABOLIC PANEL: CPT

## 2022-02-22 RX ORDER — SINCALIDE 5 UG/5ML
INJECTION, POWDER, LYOPHILIZED, FOR SOLUTION INTRAVENOUS
Status: DISCONTINUED
Start: 2022-02-22 | End: 2022-02-22 | Stop reason: HOSPADM

## 2022-02-22 RX ORDER — WATER 10 ML/10ML
INJECTION INTRAMUSCULAR; INTRAVENOUS; SUBCUTANEOUS
Status: DISCONTINUED
Start: 2022-02-22 | End: 2022-02-22 | Stop reason: HOSPADM

## 2022-02-22 ASSESSMENT — FIBROSIS 4 INDEX: FIB4 SCORE: 0.99

## 2022-02-22 NOTE — ED PROVIDER NOTES
ED Provider Note    CHIEF COMPLAINT  Chief Complaint   Patient presents with   • Abdominal Pain   • Fever     101 this morning , took tylenol couple hours ago       HPI  Roslyn Kilgore is a 57 y.o. female who returns to the emergency department with ongoing abdominal pain.  Patient reports that she has been having abdominal pain for the last week.  Patient was recently discharged from our hospital.  Patient had a CT which showed diverticulosis and a right upper quadrant ultrasound which was unremarkable.  Patient had fever at home and therefore came to the emergency department.  Patient had a repeat CT which showed sigmoid: Diverticulitis without any associated abscess or free air.  Patient was given ceftriaxone and Flagyl.  Patient was discharged home with Augmentin.  Patient has been home for 1 day.  She reports that she had a fever at home and is having ongoing pain.  Patient denies any associated chest pain, she denies any pleuritic nature of the pain, she denies any decreased exertional capacity.  Patient denies any calf pain or calf swelling.  Patient denies any associated shortness of breath.    REVIEW OF SYSTEMS  ROS    See HPI for further details. All other systems are negative.     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    SOCIAL HISTORY  Social History     Tobacco Use   • Smoking status: Former Smoker     Types: Cigarettes   • Smokeless tobacco: Never Used   • Tobacco comment: 1 pk a day for 6 yrs, Quit 1998   Vaping Use   • Vaping Use: Some days   Substance and Sexual Activity   • Alcohol use: No     Comment: pt stated that  she used to be alcoholic 20 years ago   • Drug use: Yes     Types: Inhaled, Marijuana     Comment: THC, meth use   • Sexual activity: Yes     Partners: Male       SURGICAL HISTORY   has a past surgical history that includes other; other; angiogram (8/17/2009); thrombectomy (12/21/2009); angiogram (2/2/2010); angiogram (4/15/2010); angioplasty balloon (4/15/2010); femoral artery repair (6/28/2010); mammoplasty reduction (8/24/2010); u/s leiomyomata ablate <200 cc; thrombectomy (1/9/2011); angiogram (1/9/2011); primary c section; hysteroscopy novasure-2 (9/27/2010); angiogram (Right, 11/5/2015); angioplasty upper (Right, 11/5/2015); other orthopedic surgery; debridement (Right, 11/11/2015); and endarterectomy (Right, 11/18/2015).    CURRENT MEDICATIONS  Home Medications     Reviewed by Patricia Salazar R.N. (Registered Nurse) on 02/22/22 at 1141  Med List Status: <None>   Medication Last Dose Status   acetaminophen (TYLENOL) 500 MG Tab  Active   amoxicillin-clavulanate (AUGMENTIN) 875-125 MG Tab  Active                ALLERGIES  Allergies   Allergen Reactions   • Alcohol Unspecified     REQUESTS NO ALCOHOL CONTENT IN HER MEDICATIONS (per pt, pt is an alcoholic)   • Trazodone Hives     Caused breat hyperplasia. Pt then underwent a breast reduction and attempted to take again, new reaction: Hives.       PHYSICAL EXAM  Vitals:    02/22/22 1129   BP: 154/89   Pulse: 89   Resp: 16   Temp: 36 °C (96.8 °F)   SpO2: 98%       Physical Exam  Constitutional:       Appearance: She is well-developed.   HENT:      Head: Normocephalic and atraumatic.   Eyes:      Conjunctiva/sclera: Conjunctivae normal.   Cardiovascular:      Rate and Rhythm: Normal rate and regular rhythm.   Pulmonary:      Effort: Pulmonary effort is normal.      Breath sounds: Normal breath sounds.   Abdominal:      General: Bowel sounds are normal. There is no distension.      Palpations: Abdomen is soft.      Tenderness: There is abdominal tenderness in the  right upper quadrant. There is no rebound.   Musculoskeletal:      Cervical back: Normal range of motion and neck supple.   Skin:     General: Skin is warm and dry.      Findings: No rash.   Neurological:      Mental Status: She is alert and oriented to person, place, and time.   Psychiatric:         Behavior: Behavior normal.           DIAGNOSTIC STUDIES / PROCEDURES    Rads  NM-BILIARY (HIDA) SCAN WITH CCK   Final Result      Normal hepatobiliary scan. No evidence of acute cholecystitis.  Normal gallbladder ejection fraction.         Normal gallbladder ejection fraction is 38% or greater.            Labs  Results for orders placed or performed during the hospital encounter of 02/22/22   CBC WITH DIFFERENTIAL   Result Value Ref Range    WBC 8.7 4.8 - 10.8 K/uL    RBC 4.86 4.20 - 5.40 M/uL    Hemoglobin 14.1 12.0 - 16.0 g/dL    Hematocrit 41.7 37.0 - 47.0 %    MCV 85.8 81.4 - 97.8 fL    MCH 29.0 27.0 - 33.0 pg    MCHC 33.8 33.6 - 35.0 g/dL    RDW 40.2 35.9 - 50.0 fL    Platelet Count 389 164 - 446 K/uL    MPV 9.6 9.0 - 12.9 fL    Neutrophils-Polys 72.30 (H) 44.00 - 72.00 %    Lymphocytes 13.40 (L) 22.00 - 41.00 %    Monocytes 10.80 0.00 - 13.40 %    Eosinophils 2.30 0.00 - 6.90 %    Basophils 0.70 0.00 - 1.80 %    Immature Granulocytes 0.50 0.00 - 0.90 %    Nucleated RBC 0.00 /100 WBC    Neutrophils (Absolute) 6.32 2.00 - 7.15 K/uL    Lymphs (Absolute) 1.17 1.00 - 4.80 K/uL    Monos (Absolute) 0.94 (H) 0.00 - 0.85 K/uL    Eos (Absolute) 0.20 0.00 - 0.51 K/uL    Baso (Absolute) 0.06 0.00 - 0.12 K/uL    Immature Granulocytes (abs) 0.04 0.00 - 0.11 K/uL    NRBC (Absolute) 0.00 K/uL   COMP METABOLIC PANEL   Result Value Ref Range    Sodium 138 135 - 145 mmol/L    Potassium 3.6 3.6 - 5.5 mmol/L    Chloride 104 96 - 112 mmol/L    Co2 21 20 - 33 mmol/L    Anion Gap 13.0 7.0 - 16.0    Glucose 159 (H) 65 - 99 mg/dL    Bun 8 8 - 22 mg/dL    Creatinine 0.89 0.50 - 1.40 mg/dL    Calcium 9.0 8.5 - 10.5 mg/dL    AST(SGOT) 18 12 -  45 U/L    ALT(SGPT) 19 2 - 50 U/L    Alkaline Phosphatase 109 (H) 30 - 99 U/L    Total Bilirubin 0.2 0.1 - 1.5 mg/dL    Albumin 3.7 3.2 - 4.9 g/dL    Total Protein 7.0 6.0 - 8.2 g/dL    Globulin 3.3 1.9 - 3.5 g/dL    A-G Ratio 1.1 g/dL   LIPASE   Result Value Ref Range    Lipase 22 11 - 82 U/L   URINALYSIS    Specimen: Urine   Result Value Ref Range    Color Yellow     Character Clear     Specific Gravity 1.013 <1.035    Ph 6.0 5.0 - 8.0    Glucose Negative Negative mg/dL    Ketones Negative Negative mg/dL    Protein Negative Negative mg/dL    Bilirubin Negative Negative    Urobilinogen, Urine 0.2 Negative    Nitrite Negative Negative    Leukocyte Esterase Negative Negative    Occult Blood Negative Negative    Micro Urine Req see below    ESTIMATED GFR   Result Value Ref Range    GFR If African American >60 >60 mL/min/1.73 m 2    GFR If Non African American >60 >60 mL/min/1.73 m 2           COURSE & MEDICAL DECISION MAKING  Pertinent Labs & Imaging studies reviewed. (See chart for details)    Patient here with ongoing mild right upper quadrant pain.  She has mild right upper quadrant tenderness on exam.  She had a recent normal ultrasound of her gallbladder.  She also had a recent CT scan which showed diverticulitis and no evidence of any right upper quadrant or right-sided pathology.  Patient denies any associated cough or fevers, furthermore she had a CTA of her chest 2 days ago.  Patient without any associated neck or back pain.  I do not believe this is referred pain.  Possible functional bowel pain.  Certainly patient could have a dysfunctioning gallbladder as the cause, will check HIDA scan.  Patient's HIDA scan has been ordered, unfortunately this will take almost all day to occur as patient is not on the nuclear medicine schedule and an order for this to occur in the emergency department able take place after hours.  Patient basic labs are very reassuring.  She has no leukocytosis.  Her vitals are entirely  unremarkable.  She does not have a fever.  She is not tachycardic.  She does not appear toxic.  Patient's HIDA scan is unremarkable.  I stressed the importance of her following up with her primary care physician.  I have offered to give her pain medication, she does not want any at this point.  I have discussed return precautions.  The patient will return for worsening symptoms and is stable at the time of discharge. The patient verbalizes understanding and will comply.    FINAL IMPRESSION    1. Right upper quadrant abdominal pain               Electronically signed by: Chavez Medina M.D., 2/22/2022 2:24 PM

## 2022-02-22 NOTE — ED TRIAGE NOTES
Chief Complaint   Patient presents with   • Abdominal Pain   • Fever     101 this morning , took tylenol couple hours ago     Pt ambulated to triage c/o right mid abdominal pain , pt was d/c from hospital yesterday with similar symptoms. Pt said that all her test came back negative. She was prescribed abx and tylenol.   Pt requesting to be admitted to figure out what is wrong with her.

## 2022-02-22 NOTE — ED NOTES
Pt ambulated to rm 64 from triage note.  Pt has been seen multiple times in the last week with negative work ups.  CT, labs, and US all negative

## 2022-02-22 NOTE — DISCHARGE SUMMARY
Discharge Summary    CHIEF COMPLAINT ON ADMISSION  Chief Complaint   Patient presents with   • Abdominal Pain     Pt reports abd pain since Monday. Pt was seen here yesterday for the same, was told she had diverticulitis to the LLQ.  Pt reports pain is now on the R flank. Pt reports nausea/ constipation. Denies any urinary symptoms.         Reason for Admission  Acute diverticulitis    Admission Date  2/19/2022    CODE STATUS  FULL    HPI & HOSPITAL COURSE  Roslyn Kilgore is a 57 y.o. female who presented 2/19/2022 with right upper quadrant abdominal pain that started about 2 days ago, with radiation up to her neck and difficult for her to breathe.  She initially presented to ED 2/18/2022.  CT abdomen shows diverticulosis.  Right upper quadrant ultrasound was unremarkarble.  She was sent home with Augmentin.  However, at home, she continued to have abdominal pain and developed a fever of 101 °F. Due to continued pain and fever, decision was made to return to the ED for further evaluation. Patient denies taking any home medications. Denies drinking, smoking, illicit drug use.      In the ED, patient was found to have elevated BP. Remarkable labs include leukocytosis. CT abd showed sigmoid colon diverticulitis with minimal associated inflammatory stranding and bowel wall thickening without pericolonic abscess or free air. Patient was given fluids, Abx coverage with rocephin and flagyl, and fentanyl, and endorsed to medicine.     2/20: stable vitals but continued to have continued abd pain in RUQ. Pain regimen adjusted. CMP grossly unremarkable. KUB done was without free air in abdomen.     2/21: Clinically improved with resolving abd pain. Tolerating diet. At this point, a plan to DC discussed. Reassurance provided. Pt may continue oral Abx that was already prescribed to her. With adequate pain control, agree to DC without opioids.     Therefore, she is discharged in fair and stable condition to home with close  outpatient follow-up.    The patient met 2-midnight criteria for an inpatient stay at the time of discharge.    Discharge Date  2/21/2022    FOLLOW UP ITEMS POST DISCHARGE  N/A    DISCHARGE DIAGNOSES  Principal Problem:    Diverticulitis POA: Yes  Active Problems:    Sepsis (HCC) POA: Yes    Acute diverticulitis POA: Yes    Hyponatremia POA: Yes  Resolved Problems:    * No resolved hospital problems. *      FOLLOW UP  Mohsen Tamasaby, M.D.  [address] [address]  [phone]    Schedule an appointment as soon as possible for a visit       [facility], Emergency Dept  [address] [facility] [id number] [phone]    As needed, If symptoms worsen    Mohsen Tamasaby, M.D.  [address] [address]  [phone]            MEDICATIONS ON DISCHARGE     Medication List      CONTINUE taking these medications      Instructions   acetaminophen 500 MG Tabs  Commonly known as: TYLENOL   Take 1,000 mg by mouth every 6 hours as needed for Mild Pain. 2 tablets = 1,000 mg.  Dose: 1,000 mg     amoxicillin-clavulanate 875-125 MG Tabs  Commonly known as: AUGMENTIN   Take 1 Tablet by mouth 2 times a day for 10 days.  Dose: 1 Tablet        STOP taking these medications    aspirin 325 MG Tabs  Commonly known as: ASA            Allergies  Allergies   Allergen Reactions   • Alcohol Unspecified     REQUESTS NO ALCOHOL CONTENT IN HER MEDICATIONS (per pt, pt is an alcoholic)   • Trazodone Hives     Caused breat hyperplasia. Pt then underwent a breast reduction and attempted to take again, new reaction: Hives.       DIET  Regular    ACTIVITY  As tolerated.  Weight bearing as tolerated    CONSULTATIONS  N/A    PROCEDURES  N/A    LABORATORY  Lab Results   Component Value Date    SODIUM 136 02/21/2022    POTASSIUM 4.0 02/21/2022    CHLORIDE 104 02/21/2022    CO2 25 02/21/2022    GLUCOSE 85 02/21/2022    BUN 9 02/21/2022    CREATININE 0.70 02/21/2022    CREATININE 0.8  06/12/2006        Lab Results   Component Value Date    WBC 7.8 02/21/2022    HEMOGLOBIN 12.8 02/21/2022    HEMATOCRIT 39.5 02/21/2022    PLATELETCT 283 02/21/2022        Total time of the discharge process exceeds 35 minutes.

## 2022-02-23 NOTE — DISCHARGE INSTRUCTIONS
It is important that you follow-up closely with your primary care physician. Your ultrasounds, nuclear medicine HIDA scans, and CTs failed to reveal any evidence of abnormality in the right upper quadrant. Therefore given that we have not been able to figure out exactly what the pain is from it is important that you follow-up closely with your primary care physician for ongoing observation. Continue taking the medications were prescribed to you.

## 2022-02-23 NOTE — ED NOTES
Patient educated on discharge instructions, follow up appointments, and home care. Patient verbalized understanding. Patient ambulated to [facility].

## 2022-02-25 LAB
BACTERIA BLD CULT: NORMAL
BACTERIA BLD CULT: NORMAL
SIGNIFICANT IND 70042: NORMAL
SIGNIFICANT IND 70042: NORMAL
SITE SITE: NORMAL
SITE SITE: NORMAL
SOURCE SOURCE: NORMAL
SOURCE SOURCE: NORMAL

## 2022-02-28 ENCOUNTER — OFFICE VISIT (OUTPATIENT)
Dept: INTERNAL MEDICINE | Facility: OTHER | Age: 57
End: 2022-02-28
Payer: COMMERCIAL

## 2022-02-28 VITALS
HEART RATE: 94 BPM | HEIGHT: 63 IN | TEMPERATURE: 97.9 F | OXYGEN SATURATION: 92 % | SYSTOLIC BLOOD PRESSURE: 149 MMHG | DIASTOLIC BLOOD PRESSURE: 78 MMHG | BODY MASS INDEX: 26.19 KG/M2 | WEIGHT: 147.8 LBS

## 2022-02-28 DIAGNOSIS — Z83.3 FAMILY HISTORY OF TYPE 2 DIABETES MELLITUS: ICD-10-CM

## 2022-02-28 DIAGNOSIS — Z12.12 SCREENING FOR COLORECTAL CANCER: ICD-10-CM

## 2022-02-28 DIAGNOSIS — J45.20 MILD INTERMITTENT EXTRINSIC ASTHMA WITHOUT COMPLICATION: ICD-10-CM

## 2022-02-28 DIAGNOSIS — Z12.11 SCREENING FOR COLORECTAL CANCER: ICD-10-CM

## 2022-02-28 DIAGNOSIS — Z12.31 ENCOUNTER FOR SCREENING MAMMOGRAM FOR BREAST CANCER: ICD-10-CM

## 2022-02-28 DIAGNOSIS — I77.1 ARTERIAL INSUFFICIENCY (HCC): ICD-10-CM

## 2022-02-28 DIAGNOSIS — E78.5 DYSLIPIDEMIA: ICD-10-CM

## 2022-02-28 DIAGNOSIS — Z01.419 VISIT FOR PELVIC EXAM: ICD-10-CM

## 2022-02-28 DIAGNOSIS — I10 PRIMARY HYPERTENSION: ICD-10-CM

## 2022-02-28 DIAGNOSIS — K57.92 DIVERTICULITIS: ICD-10-CM

## 2022-02-28 PROBLEM — J45.909 EXTRINSIC ASTHMA WITHOUT COMPLICATION: Status: ACTIVE | Noted: 2022-02-28

## 2022-02-28 PROCEDURE — 99214 OFFICE O/P EST MOD 30 MIN: CPT | Mod: GC | Performed by: STUDENT IN AN ORGANIZED HEALTH CARE EDUCATION/TRAINING PROGRAM

## 2022-02-28 RX ORDER — MONTELUKAST SODIUM 10 MG/1
10 TABLET ORAL DAILY
Qty: 90 TABLET | Refills: 0 | Status: SHIPPED | OUTPATIENT
Start: 2022-02-28 | End: 2022-05-29

## 2022-02-28 RX ORDER — ALBUTEROL SULFATE 90 UG/1
2 AEROSOL, METERED RESPIRATORY (INHALATION) EVERY 4 HOURS PRN
Qty: 1 EACH | Refills: 3 | Status: SHIPPED | OUTPATIENT
Start: 2022-02-28 | End: 2022-05-29

## 2022-02-28 RX ORDER — ACETAMINOPHEN 500 MG
500-1000 TABLET ORAL EVERY 8 HOURS PRN
Qty: 21 TABLET | Refills: 0 | Status: SHIPPED | OUTPATIENT
Start: 2022-02-28 | End: 2022-03-07

## 2022-02-28 ASSESSMENT — FIBROSIS 4 INDEX: FIB4 SCORE: 0.62

## 2022-03-01 NOTE — ASSESSMENT & PLAN NOTE
Patient with recent diagnosis of acute diverticulitis of the sigmoid colon.  She was hospitalized at [facility] and given a brief course of IV antibiotics.  She was discharged on Augmentin for a total 10-day course.  Her final day of antibiotics is tomorrow.  Her symptoms have been improving, but she is continued to have right upper quadrant abdominal pain.  This is likely related to her acute colitis as her right upper quadrant ultrasound and HIDA scan and CT of the abdomen was negative in the hospital.  She also has a history of total hysterectomy and mesh and pain could be related to scar tissue.    Plan:  Given her prior history of hypercoagulability, would keep acute arterial or venous clot on the differential  We will hold off on CT angiography for now as her symptoms have been improving and treat conservatively and follow-up in 2 weeks  Advised patient that if her symptoms get worse to contact us sooner  Tylenol as needed for pain, avoid NSAIDs given history of GI bleed when on anticoagulation in the past  Also send her for screening and possibly diagnostic colonoscopy

## 2022-03-01 NOTE — ASSESSMENT & PLAN NOTE
No LDL since 2013.  We will obtain a lipid panel and calculate 10-year risk score to see if patient would benefit from statin and aspirin   jahaira hankins  Address: [address] [address]  Phone: [phone]  Phone: (   )    -  Fax: (       -

## 2022-03-01 NOTE — ASSESSMENT & PLAN NOTE
Prior history of right upper extremity thrombosis and anticoagulation use, but has not been anticoagulated for the last couple years due to a couple GI bleeds suffered while on medication.  In the past, she reports being on Coumadin and Plavix.  Her vascular surgeon is Greta Wilhelm and advised patient to schedule a follow-up with her to discuss need for ongoing therapy

## 2022-03-01 NOTE — ASSESSMENT & PLAN NOTE
Patient is interested in getting a pelvic exam, but would like to wait till next visit to set that up.  She will have a 2-week follow-up at which time she can be scheduled.

## 2022-03-01 NOTE — PATIENT INSTRUCTIONS
Try tylenol as needed for pain, 500-1000 mg every 8 hours no more then 3g in one day     Finish antibiotics, try heating pad     Will send to GI for a colonoscopy and for screening mammogram     Will f/u in 2 weeks and if no improvement can consider CA angiogram to look for a possible clot     Refill Singulair and Symbicort, refill albuterol     Obtain blood pressure machine and keep a log 2 times a day for the next 2 weeks to see if we need to put you on a medication     Will get cholesterol, diabetes screen, and thyroid labs     Set up a follow up with your vascular doctor     Set up a pelvic exam at next visit

## 2022-03-01 NOTE — ASSESSMENT & PLAN NOTE
Patient reports being allergic to her cat and having morning wheezing and chest tightness approximately twice a week.  He was on Singulair and as needed albuterol in the past as well as a steroid inhaler at that she cannot recall the name of.    Plan:  We will restart Singulair  We will place her on as needed Symbicort and as needed albuterol

## 2022-03-01 NOTE — PROGRESS NOTES
Established Patient    Roslyn presents today with the following:    CC: Post hospital follow-up    HPI:   Patient with recent diagnosis of acute diverticulitis of the sigmoid colon.  She was hospitalized at [facility] and given a brief course of IV antibiotics.  She was discharged on Augmentin for a total 10-day course.  Her final day of antibiotics is tomorrow.  Her symptoms have been improving, but she is continued to have right upper quadrant abdominal pain.  This is likely related to her acute colitis as her right upper quadrant ultrasound and HIDA scan and CT of the abdomen was negative in the hospital.  She also has a history of total hysterectomy and mesh and pain could be related to scar tissue. Has a hx of right upper extremity clot and family hx of clotting disorder, but patient can't recall which disorder. Not currently on AC due to 2 prior GI bleeds. She follows with Dr. Wilhelm, vascular, but has not seen her in a couple years. Was on blood pressure medication in the past, but stopped her meds and got lost to follow up approximately 7 years ago. Diagnosis of HLD, but last lipid panel from 2013. No recent A1C on file. Reports 10 lb weight gain.     Patient Active Problem List    Diagnosis Date Noted   • Diverticulitis 02/20/2022   • Visit for pelvic exam 02/28/2022   • Extrinsic asthma without complication 02/28/2022   • Acute diverticulitis 02/20/2022   • Sepsis ([facility]) 02/20/2022   • Hyponatremia 02/20/2022   • Stricture of artery ([facility]) 11/18/2015   • Intermittent venous claudication of upper extremity 11/18/2015   • Atherosclerosis of native arteries of extremities with intermittent claudication, other extremity ([facility]) 11/11/2015   • Claudication ([facility]) 11/05/2015   • History of alcohol abuse 01/22/2015   • Osteoarthrosis 01/22/2015   • History of female genital system disorder 01/22/2015   • Chronic pain 07/17/2013   • Narcotic dependence ([facility]) 07/17/2013   • Dyslipidemia 07/17/2013   • Drug use  07/17/2013   • Arterial stenosis (HCC) 07/16/2013   • Chronic arm pain 02/22/2013   • Arterial insufficiency (HCC) 02/22/2013   • Pelvic pain 02/22/2013   • HTN (hypertension) 02/22/2013       Current Outpatient Medications   Medication Sig Dispense Refill   • acetaminophen (TYLENOL) 500 MG Tab Take 1-2 Tablets by mouth every 8 hours as needed for Mild Pain for up to 7 days. 2 tablets = 1,000 mg. 21 Tablet 0   • montelukast (SINGULAIR) 10 MG Tab Take 1 Tablet by mouth every day for 90 days. 90 Tablet 0   • fluticasone-salmeterol (ADVAIR) 500-50 MCG/DOSE AEROSOL POWDER, BREATH ACTIVATED Inhale 1 Puff every 12 hours as needed for up to 90 days. 60 Each 3   • albuterol 108 (90 Base) MCG/ACT Aero Soln inhalation aerosol Inhale 2 Puffs every four hours as needed for Shortness of Breath for up to 90 days. 1 Each 3   • amoxicillin-clavulanate (AUGMENTIN) 875-125 MG Tab Take 1 Tablet by mouth 2 times a day for 10 days. 20 Tablet 0     No current facility-administered medications for this visit.       ROS: As per HPI. Additional pertinent systems as noted below.  Constitutional: Negative for chills and fever.   HENT: Negative for ear pain and sore throat.    Eyes: Negative for discharge and redness.   Respiratory: Negative for cough, hemoptysis, wheezing and stridor.    Cardiovascular: Negative for chest pain, palpitations and leg swelling.   Gastrointestinal: Positive for RUQ abdominal pain, negative for constipation, positive for intermittent diarrhea, negative for heartburn, nausea and vomiting. Negative for hematochezia and melena  Genitourinary: Negative for dysuria, flank pain and hematuria.   Musculoskeletal: Negative for falls and myalgias.   Skin: Negative for rash.   Neurological: Negative for dizziness, seizures, loss of consciousness and headaches.   Psychiatric/Behavioral: Negative for substance abuse       /78 (BP Location: Left arm, Patient Position: Sitting, BP Cuff Size: Adult)   Pulse 94   Temp 36.6  "°C (97.9 °F) (Temporal)   Ht 1.588 m (5' 2.5\")   Wt 67 kg (147 lb 12.8 oz)   LMP 01/04/2011   SpO2 92%   BMI 26.60 kg/m²     Physical Exam   Constitutional:  oriented to person, place, and time. No distress.   Eyes: EOMI. No scleral icterus.  Neck: Neck supple  Cardiovascular: Normal rate, regular rhythm and normal heart sounds.  Exam reveals no gallop and no friction rub.  No murmur heard.  Pulmonary/Chest: Breath sounds normal. No wheezing or rales   Musculoskeletal:   no edema.   Neurological: alert and oriented to person, place, and time.  No acute focal or sensory deficit  Skin: No cyanosis. Nails show no clubbing.  Abdomen: Positive bowel sounds, right upper quadrant tenderness on deep palpation without rebound or guarding    Note: I have reviewed all pertinent labs and diagnostic tests associated with this visit with specific comments listed under the assessment and plan below    Assessment and Plan    Problem List Items Addressed This Visit     Diverticulitis     Patient with recent diagnosis of acute diverticulitis of the sigmoid colon.  She was hospitalized at [facility] and given a brief course of IV antibiotics.  She was discharged on Augmentin for a total 10-day course.  Her final day of antibiotics is tomorrow.  Her symptoms have been improving, but she is continued to have right upper quadrant abdominal pain.  This is likely related to her acute colitis as her right upper quadrant ultrasound and HIDA scan and CT of the abdomen was negative in the hospital.  She also has a history of total hysterectomy and mesh and pain could be related to scar tissue.    Plan:  Given her prior history of hypercoagulability, would keep acute arterial or venous clot on the differential  We will hold off on CT angiography for now as her symptoms have been improving and treat conservatively and follow-up in 2 weeks  Advised patient that if her symptoms get worse to contact us sooner  Tylenol as needed for pain, avoid " NSAIDs given history of GI bleed when on anticoagulation in the past  Also send her for screening and possibly diagnostic colonoscopy         Arterial insufficiency (HCC)     Prior history of right upper extremity thrombosis and anticoagulation use, but has not been anticoagulated for the last couple years due to a couple GI bleeds suffered while on medication.  In the past, she reports being on Coumadin and Plavix.  Her vascular surgeon is Greta Wilhelm and advised patient to schedule a follow-up with her to discuss need for ongoing therapy         HTN (hypertension)     Blood pressure 149/78 today.  Patient has not been checking at home.  She stopped taking her blood pressure medication about 7 years ago and has not followed up.  She does not remember what medication she was taking.    Plan:  Advised patient to purchase blood pressure device and keep a log and follow-up in 2 weeks to see if she needs to be restarted on antihypertensive treatment  Discussed healthy diet and physical exercise           Dyslipidemia     No LDL since 2013.  We will obtain a lipid panel and calculate 10-year risk score to see if patient would benefit from statin and aspirin         Relevant Orders    Lipid Profile    TSH    Visit for pelvic exam     Patient is interested in getting a pelvic exam, but would like to wait till next visit to set that up.  She will have a 2-week follow-up at which time she can be scheduled.         Extrinsic asthma without complication     Patient reports being allergic to her cat and having morning wheezing and chest tightness approximately twice a week.  He was on Singulair and as needed albuterol in the past as well as a steroid inhaler at that she cannot recall the name of.    Plan:  We will restart Singulair  We will place her on as needed Symbicort and as needed albuterol         Relevant Medications    montelukast (SINGULAIR) 10 MG Tab    fluticasone-salmeterol (ADVAIR) 500-50 MCG/DOSE AEROSOL  POWDER, BREATH ACTIVATED    albuterol 108 (90 Base) MCG/ACT Aero Soln inhalation aerosol      Other Visit Diagnoses     Screening for colorectal cancer        Relevant Orders    Referral to GI for Colonoscopy    Encounter for screening mammogram for breast cancer        Relevant Orders    MA-SCREENING MAMMO BILAT W/CAD    Family history of type 2 diabetes mellitus        Relevant Orders    HEMOGLOBIN A1C          Followup: Return in about 2 weeks (around 3/14/2022).      Signed by: Sandro Zeng M.D.

## 2022-03-01 NOTE — ASSESSMENT & PLAN NOTE
Blood pressure 149/78 today.  Patient has not been checking at home.  She stopped taking her blood pressure medication about 7 years ago and has not followed up.  She does not remember what medication she was taking.    Plan:  Advised patient to purchase blood pressure device and keep a log and follow-up in 2 weeks to see if she needs to be restarted on antihypertensive treatment  Discussed healthy diet and physical exercise

## 2022-03-17 ENCOUNTER — APPOINTMENT (OUTPATIENT)
Dept: INTERNAL MEDICINE | Facility: OTHER | Age: 57
End: 2022-03-17
Payer: COMMERCIAL

## 2022-05-12 ENCOUNTER — APPOINTMENT (OUTPATIENT)
Dept: INTERNAL MEDICINE | Facility: OTHER | Age: 57
End: 2022-05-12
Payer: COMMERCIAL

## 2022-05-31 NOTE — TELEPHONE ENCOUNTER
Last seen: 02/28/2022 by Dr. Zeng  Next appt: None    Was the patient seen in the last year in this department? Yes   Does patient have an active prescription for medications requested? No   Received Request Via: Pharmacy

## 2022-06-01 RX ORDER — MONTELUKAST SODIUM 10 MG/1
TABLET ORAL
Qty: 90 TABLET | Refills: 0 | Status: SHIPPED | OUTPATIENT
Start: 2022-06-01 | End: 2023-05-17

## 2022-11-18 ENCOUNTER — APPOINTMENT (OUTPATIENT)
Dept: RADIOLOGY | Facility: IMAGING CENTER | Age: 57
End: 2022-11-18
Attending: PHYSICIAN ASSISTANT
Payer: COMMERCIAL

## 2022-11-18 ENCOUNTER — OFFICE VISIT (OUTPATIENT)
Dept: URGENT CARE | Facility: PHYSICIAN GROUP | Age: 57
End: 2022-11-18
Payer: COMMERCIAL

## 2022-11-18 VITALS
RESPIRATION RATE: 18 BRPM | HEART RATE: 106 BPM | DIASTOLIC BLOOD PRESSURE: 106 MMHG | WEIGHT: 142 LBS | OXYGEN SATURATION: 97 % | BODY MASS INDEX: 25.16 KG/M2 | HEIGHT: 63 IN | TEMPERATURE: 100.1 F | SYSTOLIC BLOOD PRESSURE: 178 MMHG

## 2022-11-18 DIAGNOSIS — R05.1 ACUTE COUGH: ICD-10-CM

## 2022-11-18 DIAGNOSIS — J18.9 PNEUMONIA OF LEFT LOWER LOBE DUE TO INFECTIOUS ORGANISM: ICD-10-CM

## 2022-11-18 LAB
EXTERNAL QUALITY CONTROL: NORMAL
FLUAV+FLUBV AG SPEC QL IA: NORMAL
INT CON NEG: NORMAL
INT CON NEG: NORMAL
INT CON POS: NORMAL
INT CON POS: NORMAL
SARS-COV+SARS-COV-2 AG RESP QL IA.RAPID: NEGATIVE

## 2022-11-18 PROCEDURE — 87804 INFLUENZA ASSAY W/OPTIC: CPT | Performed by: PHYSICIAN ASSISTANT

## 2022-11-18 PROCEDURE — 71046 X-RAY EXAM CHEST 2 VIEWS: CPT | Mod: TC,FY | Performed by: PHYSICIAN ASSISTANT

## 2022-11-18 PROCEDURE — 87426 SARSCOV CORONAVIRUS AG IA: CPT | Performed by: PHYSICIAN ASSISTANT

## 2022-11-18 PROCEDURE — 99214 OFFICE O/P EST MOD 30 MIN: CPT | Mod: CS | Performed by: PHYSICIAN ASSISTANT

## 2022-11-18 RX ORDER — AMOXICILLIN AND CLAVULANATE POTASSIUM 875; 125 MG/1; MG/1
1 TABLET, FILM COATED ORAL 2 TIMES DAILY
Qty: 14 TABLET | Refills: 0 | Status: SHIPPED | OUTPATIENT
Start: 2022-11-18 | End: 2022-11-25

## 2022-11-18 RX ORDER — ALBUTEROL SULFATE 90 UG/1
2 AEROSOL, METERED RESPIRATORY (INHALATION) EVERY 6 HOURS PRN
Qty: 8.5 G | Refills: 0 | Status: SHIPPED | OUTPATIENT
Start: 2022-11-18 | End: 2023-05-25 | Stop reason: SDUPTHER

## 2022-11-18 RX ORDER — ALBUTEROL SULFATE 2.5 MG/3ML
2.5 SOLUTION RESPIRATORY (INHALATION) EVERY 4 HOURS PRN
Qty: 60 ML | Refills: 0 | Status: SHIPPED | OUTPATIENT
Start: 2022-11-18 | End: 2023-05-17

## 2022-11-18 RX ORDER — PREDNISONE 10 MG/1
40 TABLET ORAL DAILY
Qty: 20 TABLET | Refills: 0 | Status: SHIPPED | OUTPATIENT
Start: 2022-11-18 | End: 2022-11-23

## 2022-11-18 RX ORDER — AZITHROMYCIN 250 MG/1
TABLET, FILM COATED ORAL
Qty: 6 TABLET | Refills: 0 | Status: SHIPPED | OUTPATIENT
Start: 2022-11-18 | End: 2023-05-17

## 2022-11-18 ASSESSMENT — ENCOUNTER SYMPTOMS
HEADACHES: 0
SHORTNESS OF BREATH: 1
FEVER: 1
MYALGIAS: 0
VOMITING: 0
CHILLS: 1
SORE THROAT: 1
COUGH: 1
DIARRHEA: 0
EYE PAIN: 0
NAUSEA: 0
ABDOMINAL PAIN: 0
SPUTUM PRODUCTION: 1
CONSTIPATION: 0

## 2022-11-18 ASSESSMENT — FIBROSIS 4 INDEX: FIB4 SCORE: 0.62

## 2022-11-19 NOTE — PROGRESS NOTES
"Subjective:   Roslyn Kilgore is a 57 y.o. female who presents for Cough (Cough, Headache, Chills. Pt states Sx started yesterday, roommate Dx w/ Pneumonia. )      57-year-old female brought in for a 3-day history of cough and congestion, she had a preceding viral illness of mild malaise and fatigue with runny nose and postnasal drip.  Yesterday she began experiencing more shortness of breath, chills.  She also notes that her flap may has been diagnosed with pneumonia.  She has a history of asthma, has been using her albuterol inhaler with minimal improvement.    Review of Systems   Constitutional:  Positive for chills, fever and malaise/fatigue.   HENT:  Positive for congestion and sore throat. Negative for ear pain.    Eyes:  Negative for pain.   Respiratory:  Positive for cough, sputum production and shortness of breath.    Cardiovascular:  Negative for chest pain.   Gastrointestinal:  Negative for abdominal pain, constipation, diarrhea, nausea and vomiting.   Genitourinary:  Negative for dysuria.   Musculoskeletal:  Negative for myalgias.   Skin:  Negative for rash.   Neurological:  Negative for headaches.     Medications, Allergies, and current problem list reviewed today in Epic.     Objective:     BP (!) 178/106   Pulse (!) 106   Temp 37.8 °C (100.1 °F) (Temporal)   Resp 18   Ht 1.6 m (5' 3\")   Wt 64.4 kg (142 lb)   SpO2 97%     Physical Exam  Vitals reviewed.   Constitutional:       Appearance: Normal appearance. She is not toxic-appearing.   HENT:      Head: Normocephalic and atraumatic.      Right Ear: Tympanic membrane, ear canal and external ear normal.      Left Ear: Tympanic membrane, ear canal and external ear normal.      Nose: Rhinorrhea present.      Mouth/Throat:      Mouth: Mucous membranes are moist.      Pharynx: Oropharynx is clear.   Eyes:      Conjunctiva/sclera: Conjunctivae normal.      Pupils: Pupils are equal, round, and reactive to light.   Cardiovascular:      Rate and " Rhythm: Normal rate and regular rhythm.      Heart sounds: Normal heart sounds.   Pulmonary:      Effort: Pulmonary effort is normal.      Breath sounds: Wheezing and rhonchi present.   Musculoskeletal:      Cervical back: Normal range of motion.   Lymphadenopathy:      Cervical: No cervical adenopathy.   Skin:     General: Skin is warm and dry.      Capillary Refill: Capillary refill takes less than 2 seconds.   Neurological:      Mental Status: She is alert and oriented to person, place, and time.         RADIOLOGY RESULTS   DX-CHEST-2 VIEWS    Result Date: 11/18/2022 11/18/2022 4:54 PM HISTORY/REASON FOR EXAM:  Cough. TECHNIQUE/EXAM DESCRIPTION AND NUMBER OF VIEWS: Two views of the chest. COMPARISON:  2/19/2022 FINDINGS: The heart is normal in size. Atherosclerotic calcifications of the aortic arch. No pulmonary infiltrates or consolidations are noted. No pleural effusions are appreciated.     No active disease.           Assessment/Plan:     Diagnosis and associated orders:     1. Acute cough  DX-CHEST-2 VIEWS    POCT Influenza A/B    POCT SARS-COV Antigen JA (Symptomatic only)      2. Pneumonia of left lower lobe due to infectious organism  predniSONE (DELTASONE) 10 MG Tab    albuterol 108 (90 Base) MCG/ACT Aero Soln inhalation aerosol    albuterol (PROVENTIL) 2.5mg/3ml Nebu Soln solution for nebulization    azithromycin (ZITHROMAX) 250 MG Tab    amoxicillin-clavulanate (AUGMENTIN) 875-125 MG Tab         Comments/MDM:     X-ray read as normal by radiology however I am concerned about the area indicated above for potential consolidation.  I also cannot auscultate wheezes and rhonchi of most dominant in the left lower lung field.  In the context of her acute worsening with fevers chills and dyspnea will treat with above therapy.  Recommend the patient monitor closely and if worsening consider repeat evaluation.         Differential diagnosis, natural history, supportive care, and indications for immediate  follow-up discussed.    Advised the patient to follow-up with the primary care physician for recheck, reevaluation, and consideration of further management.    Please note that this dictation was created using voice recognition software. I have made a reasonable attempt to correct obvious errors, but I expect that there are errors of grammar and possibly content that I did not discover before finalizing the note.    This note was electronically signed by Hya Laguna PA-C

## 2023-05-17 ENCOUNTER — APPOINTMENT (OUTPATIENT)
Dept: RADIOLOGY | Facility: MEDICAL CENTER | Age: 58
End: 2023-05-17
Attending: EMERGENCY MEDICINE
Payer: COMMERCIAL

## 2023-05-17 ENCOUNTER — APPOINTMENT (OUTPATIENT)
Dept: RADIOLOGY | Facility: MEDICAL CENTER | Age: 58
End: 2023-05-17
Attending: HOSPITALIST
Payer: COMMERCIAL

## 2023-05-17 ENCOUNTER — HOSPITAL ENCOUNTER (OUTPATIENT)
Facility: MEDICAL CENTER | Age: 58
End: 2023-05-18
Attending: EMERGENCY MEDICINE | Admitting: HOSPITALIST
Payer: COMMERCIAL

## 2023-05-17 DIAGNOSIS — R53.1 WEAKNESS: ICD-10-CM

## 2023-05-17 DIAGNOSIS — I63.9 CEREBROVASCULAR ACCIDENT (CVA), UNSPECIFIED MECHANISM (HCC): ICD-10-CM

## 2023-05-17 PROBLEM — G45.9 TIA (TRANSIENT ISCHEMIC ATTACK): Status: ACTIVE | Noted: 2023-05-17

## 2023-05-17 PROBLEM — R42 DIZZINESS: Status: ACTIVE | Noted: 2023-05-17

## 2023-05-17 PROBLEM — Z86.73 RECENT CEREBROVASCULAR ACCIDENT (CVA): Status: ACTIVE | Noted: 2023-05-17

## 2023-05-17 PROBLEM — Z71.89 ADVANCED CARE PLANNING/COUNSELING DISCUSSION: Status: ACTIVE | Noted: 2022-02-28

## 2023-05-17 LAB
ABO GROUP BLD: NORMAL
ALBUMIN SERPL BCP-MCNC: 4 G/DL (ref 3.2–4.9)
ALBUMIN/GLOB SERPL: 1.3 G/DL
ALP SERPL-CCNC: 110 U/L (ref 30–99)
ALT SERPL-CCNC: 13 U/L (ref 2–50)
ANION GAP SERPL CALC-SCNC: 9 MMOL/L (ref 7–16)
APTT PPP: 28 SEC (ref 24.7–36)
AST SERPL-CCNC: 16 U/L (ref 12–45)
BASOPHILS # BLD AUTO: 1.2 % (ref 0–1.8)
BASOPHILS # BLD: 0.08 K/UL (ref 0–0.12)
BILIRUB SERPL-MCNC: 0.3 MG/DL (ref 0.1–1.5)
BLD GP AB SCN SERPL QL: NORMAL
BUN SERPL-MCNC: 16 MG/DL (ref 8–22)
CALCIUM ALBUM COR SERPL-MCNC: 8.9 MG/DL (ref 8.5–10.5)
CALCIUM SERPL-MCNC: 8.9 MG/DL (ref 8.5–10.5)
CHLORIDE SERPL-SCNC: 106 MMOL/L (ref 96–112)
CO2 SERPL-SCNC: 23 MMOL/L (ref 20–33)
CREAT SERPL-MCNC: 0.84 MG/DL (ref 0.5–1.4)
EKG IMPRESSION: NORMAL
EOSINOPHIL # BLD AUTO: 0.58 K/UL (ref 0–0.51)
EOSINOPHIL NFR BLD: 8.4 % (ref 0–6.9)
ERYTHROCYTE [DISTWIDTH] IN BLOOD BY AUTOMATED COUNT: 43.3 FL (ref 35.9–50)
EST. AVERAGE GLUCOSE BLD GHB EST-MCNC: 123 MG/DL
GFR SERPLBLD CREATININE-BSD FMLA CKD-EPI: 80 ML/MIN/1.73 M 2
GLOBULIN SER CALC-MCNC: 3.1 G/DL (ref 1.9–3.5)
GLUCOSE SERPL-MCNC: 97 MG/DL (ref 65–99)
HBA1C MFR BLD: 5.9 % (ref 4–5.6)
HCT VFR BLD AUTO: 52.7 % (ref 37–47)
HGB BLD-MCNC: 16.9 G/DL (ref 12–16)
IMM GRANULOCYTES # BLD AUTO: 0.02 K/UL (ref 0–0.11)
IMM GRANULOCYTES NFR BLD AUTO: 0.3 % (ref 0–0.9)
INR PPP: 0.92 (ref 0.87–1.13)
LYMPHOCYTES # BLD AUTO: 2.02 K/UL (ref 1–4.8)
LYMPHOCYTES NFR BLD: 29.2 % (ref 22–41)
MCH RBC QN AUTO: 28 PG (ref 27–33)
MCHC RBC AUTO-ENTMCNC: 32.1 G/DL (ref 33.6–35)
MCV RBC AUTO: 87.3 FL (ref 81.4–97.8)
MONOCYTES # BLD AUTO: 0.77 K/UL (ref 0–0.85)
MONOCYTES NFR BLD AUTO: 11.1 % (ref 0–13.4)
NEUTROPHILS # BLD AUTO: 3.45 K/UL (ref 2–7.15)
NEUTROPHILS NFR BLD: 49.8 % (ref 44–72)
NRBC # BLD AUTO: 0 K/UL
NRBC BLD-RTO: 0 /100 WBC
PLATELET # BLD AUTO: 346 K/UL (ref 164–446)
PMV BLD AUTO: 9.2 FL (ref 9–12.9)
POTASSIUM SERPL-SCNC: 4.1 MMOL/L (ref 3.6–5.5)
PROT SERPL-MCNC: 7.1 G/DL (ref 6–8.2)
PROTHROMBIN TIME: 12.3 SEC (ref 12–14.6)
RBC # BLD AUTO: 6.04 M/UL (ref 4.2–5.4)
RH BLD: NORMAL
SODIUM SERPL-SCNC: 138 MMOL/L (ref 135–145)
TROPONIN T SERPL-MCNC: 11 NG/L (ref 6–19)
WBC # BLD AUTO: 6.9 K/UL (ref 4.8–10.8)

## 2023-05-17 PROCEDURE — 83036 HEMOGLOBIN GLYCOSYLATED A1C: CPT

## 2023-05-17 PROCEDURE — 99223 1ST HOSP IP/OBS HIGH 75: CPT | Performed by: HOSPITALIST

## 2023-05-17 PROCEDURE — G0378 HOSPITAL OBSERVATION PER HR: HCPCS

## 2023-05-17 PROCEDURE — 80053 COMPREHEN METABOLIC PANEL: CPT

## 2023-05-17 PROCEDURE — A9270 NON-COVERED ITEM OR SERVICE: HCPCS

## 2023-05-17 PROCEDURE — 85730 THROMBOPLASTIN TIME PARTIAL: CPT

## 2023-05-17 PROCEDURE — 86900 BLOOD TYPING SEROLOGIC ABO: CPT

## 2023-05-17 PROCEDURE — 86850 RBC ANTIBODY SCREEN: CPT

## 2023-05-17 PROCEDURE — 700102 HCHG RX REV CODE 250 W/ 637 OVERRIDE(OP)

## 2023-05-17 PROCEDURE — 85025 COMPLETE CBC W/AUTO DIFF WBC: CPT

## 2023-05-17 PROCEDURE — 70450 CT HEAD/BRAIN W/O DYE: CPT

## 2023-05-17 PROCEDURE — 36415 COLL VENOUS BLD VENIPUNCTURE: CPT

## 2023-05-17 PROCEDURE — 85610 PROTHROMBIN TIME: CPT

## 2023-05-17 PROCEDURE — 70551 MRI BRAIN STEM W/O DYE: CPT

## 2023-05-17 PROCEDURE — 71045 X-RAY EXAM CHEST 1 VIEW: CPT

## 2023-05-17 PROCEDURE — 99285 EMERGENCY DEPT VISIT HI MDM: CPT

## 2023-05-17 PROCEDURE — 93005 ELECTROCARDIOGRAM TRACING: CPT | Performed by: EMERGENCY MEDICINE

## 2023-05-17 PROCEDURE — 700111 HCHG RX REV CODE 636 W/ 250 OVERRIDE (IP)

## 2023-05-17 PROCEDURE — 96372 THER/PROPH/DIAG INJ SC/IM: CPT

## 2023-05-17 PROCEDURE — 86901 BLOOD TYPING SEROLOGIC RH(D): CPT

## 2023-05-17 PROCEDURE — 84484 ASSAY OF TROPONIN QUANT: CPT

## 2023-05-17 RX ORDER — LISINOPRIL 10 MG/1
10 TABLET ORAL DAILY
COMMUNITY
Start: 2023-05-15 | End: 2023-05-25 | Stop reason: SDUPTHER

## 2023-05-17 RX ORDER — AMLODIPINE BESYLATE 10 MG/1
10 TABLET ORAL DAILY
COMMUNITY
Start: 2023-05-15 | End: 2023-05-25 | Stop reason: SDUPTHER

## 2023-05-17 RX ORDER — ASPIRIN 81 MG/1
81 TABLET, CHEWABLE ORAL DAILY
Status: DISCONTINUED | OUTPATIENT
Start: 2023-05-17 | End: 2023-05-18 | Stop reason: HOSPADM

## 2023-05-17 RX ORDER — POLYETHYLENE GLYCOL 3350 17 G/17G
1 POWDER, FOR SOLUTION ORAL
Status: DISCONTINUED | OUTPATIENT
Start: 2023-05-17 | End: 2023-05-18 | Stop reason: HOSPADM

## 2023-05-17 RX ORDER — AMOXICILLIN 250 MG
2 CAPSULE ORAL 2 TIMES DAILY
Status: DISCONTINUED | OUTPATIENT
Start: 2023-05-17 | End: 2023-05-18 | Stop reason: HOSPADM

## 2023-05-17 RX ORDER — ENOXAPARIN SODIUM 100 MG/ML
40 INJECTION SUBCUTANEOUS DAILY
Status: DISCONTINUED | OUTPATIENT
Start: 2023-05-17 | End: 2023-05-18 | Stop reason: HOSPADM

## 2023-05-17 RX ORDER — ASPIRIN 81 MG/1
81 TABLET, CHEWABLE ORAL DAILY
COMMUNITY
Start: 2023-05-16 | End: 2023-05-25 | Stop reason: SDUPTHER

## 2023-05-17 RX ORDER — ATORVASTATIN CALCIUM 40 MG/1
40 TABLET, FILM COATED ORAL DAILY
COMMUNITY
Start: 2023-05-15 | End: 2023-05-25 | Stop reason: SDUPTHER

## 2023-05-17 RX ORDER — AMLODIPINE BESYLATE 5 MG/1
10 TABLET ORAL EVERY EVENING
Status: DISCONTINUED | OUTPATIENT
Start: 2023-05-17 | End: 2023-05-18 | Stop reason: HOSPADM

## 2023-05-17 RX ORDER — ASPIRIN 300 MG/1
300 SUPPOSITORY RECTAL DAILY
Status: DISCONTINUED | OUTPATIENT
Start: 2023-05-17 | End: 2023-05-17

## 2023-05-17 RX ORDER — LISINOPRIL 10 MG/1
10 TABLET ORAL EVERY EVENING
Status: DISCONTINUED | OUTPATIENT
Start: 2023-05-17 | End: 2023-05-18 | Stop reason: HOSPADM

## 2023-05-17 RX ORDER — BISACODYL 10 MG
10 SUPPOSITORY, RECTAL RECTAL
Status: DISCONTINUED | OUTPATIENT
Start: 2023-05-17 | End: 2023-05-18 | Stop reason: HOSPADM

## 2023-05-17 RX ORDER — ASPIRIN 81 MG/1
81 TABLET, CHEWABLE ORAL DAILY
Status: DISCONTINUED | OUTPATIENT
Start: 2023-05-17 | End: 2023-05-17

## 2023-05-17 RX ORDER — ATORVASTATIN CALCIUM 40 MG/1
40 TABLET, FILM COATED ORAL DAILY
Status: DISCONTINUED | OUTPATIENT
Start: 2023-05-17 | End: 2023-05-18 | Stop reason: HOSPADM

## 2023-05-17 RX ORDER — HYDRALAZINE HYDROCHLORIDE 20 MG/ML
10 INJECTION INTRAMUSCULAR; INTRAVENOUS EVERY 4 HOURS PRN
Status: DISCONTINUED | OUTPATIENT
Start: 2023-05-17 | End: 2023-05-18 | Stop reason: HOSPADM

## 2023-05-17 RX ADMIN — ASPIRIN 81 MG 81 MG: 81 TABLET ORAL at 16:37

## 2023-05-17 RX ADMIN — LISINOPRIL 10 MG: 10 TABLET ORAL at 18:08

## 2023-05-17 RX ADMIN — AMLODIPINE BESYLATE 10 MG: 5 TABLET ORAL at 18:08

## 2023-05-17 RX ADMIN — ENOXAPARIN SODIUM 40 MG: 100 INJECTION SUBCUTANEOUS at 18:08

## 2023-05-17 RX ADMIN — ATORVASTATIN CALCIUM 40 MG: 40 TABLET, FILM COATED ORAL at 16:37

## 2023-05-17 ASSESSMENT — ENCOUNTER SYMPTOMS
VOMITING: 0
CHILLS: 0
BACK PAIN: 0
ABDOMINAL PAIN: 0
FOCAL WEAKNESS: 1
SENSORY CHANGE: 1
PSYCHIATRIC NEGATIVE: 1
NAUSEA: 0
PALPITATIONS: 0
MYALGIAS: 0
DIZZINESS: 1
PHOTOPHOBIA: 0
EYE PAIN: 0
DOUBLE VISION: 0
NECK PAIN: 0
WEAKNESS: 1
HEARTBURN: 0
CLAUDICATION: 0
ORTHOPNEA: 0
FEVER: 0
BLURRED VISION: 0
COUGH: 0
HEMOPTYSIS: 0

## 2023-05-17 ASSESSMENT — FIBROSIS 4 INDEX
FIB4 SCORE: 0.76
FIB4 SCORE: 0.76
FIB4 SCORE: 0.9

## 2023-05-17 ASSESSMENT — LIFESTYLE VARIABLES
AVERAGE NUMBER OF DAYS PER WEEK YOU HAVE A DRINK CONTAINING ALCOHOL: 0
TOTAL SCORE: 0
HOW MANY TIMES IN THE PAST YEAR HAVE YOU HAD 5 OR MORE DRINKS IN A DAY: 0
CONSUMPTION TOTAL: NEGATIVE
DOES PATIENT WANT TO STOP DRINKING: NO
HAVE PEOPLE ANNOYED YOU BY CRITICIZING YOUR DRINKING: NO
EVER FELT BAD OR GUILTY ABOUT YOUR DRINKING: NO
TOTAL SCORE: 0
TOTAL SCORE: 0
EVER HAD A DRINK FIRST THING IN THE MORNING TO STEADY YOUR NERVES TO GET RID OF A HANGOVER: NO
ON A TYPICAL DAY WHEN YOU DRINK ALCOHOL HOW MANY DRINKS DO YOU HAVE: 0
ALCOHOL_USE: NO
HAVE YOU EVER FELT YOU SHOULD CUT DOWN ON YOUR DRINKING: NO

## 2023-05-17 ASSESSMENT — PATIENT HEALTH QUESTIONNAIRE - PHQ9
2. FEELING DOWN, DEPRESSED, IRRITABLE, OR HOPELESS: SEVERAL DAYS
3. TROUBLE FALLING OR STAYING ASLEEP OR SLEEPING TOO MUCH: NOT AT ALL
SUM OF ALL RESPONSES TO PHQ QUESTIONS 1-9: 5
SUM OF ALL RESPONSES TO PHQ9 QUESTIONS 1 AND 2: 4
7. TROUBLE CONCENTRATING ON THINGS, SUCH AS READING THE NEWSPAPER OR WATCHING TELEVISION: NOT AT ALL
9. THOUGHTS THAT YOU WOULD BE BETTER OFF DEAD, OR OF HURTING YOURSELF: NOT AT ALL
6. FEELING BAD ABOUT YOURSELF - OR THAT YOU ARE A FAILURE OR HAVE LET YOURSELF OR YOUR FAMILY DOWN: NOT AL ALL
5. POOR APPETITE OR OVEREATING: NOT AT ALL
4. FEELING TIRED OR HAVING LITTLE ENERGY: SEVERAL DAYS
1. LITTLE INTEREST OR PLEASURE IN DOING THINGS: NEARLY EVERY DAY
8. MOVING OR SPEAKING SO SLOWLY THAT OTHER PEOPLE COULD HAVE NOTICED. OR THE OPPOSITE, BEING SO FIGETY OR RESTLESS THAT YOU HAVE BEEN MOVING AROUND A LOT MORE THAN USUAL: NOT AT ALL

## 2023-05-17 ASSESSMENT — PAIN DESCRIPTION - PAIN TYPE
TYPE: ACUTE PAIN

## 2023-05-17 NOTE — ED NOTES
Patient returned from restroom with no issues. Placed back onto monitors and resting comfortably in bed.

## 2023-05-17 NOTE — ED TRIAGE NOTES
Ambulatory to triage with complaint of dizziness, nausea and intermittent numbness around her mouth/lips. She was hospitalized at [facility] over the weekend for a CVA in which she had left sided deficits. No facial droop, extremity weakness or numbness in triage. Explained triage process, to waiting room. Asked to inform RN if questions or concerns arise.

## 2023-05-17 NOTE — H&P
Hospital Medicine History & Physical Note    Date of Service  5/17/2023    Primary Care Physician  Sandro Zeng M.D.    Consultants  neurology    Specialist Names: ke    Code Status  Prior    Chief Complaint  Chief Complaint   Patient presents with    Dizziness       History of Presenting Illness  Roslyn Kilgore is a 58 y.o. female who presented 5/17/2023 with past med history of hypertension, asthma, CVA presents to the emergency department at [facility] stating that she has noticed some left-sided facial numbness and ataxia for 1 day duration.  She also states that she has been dizzy she feels like the room is spinning.      patient was recently at [facility] 4 days ago with complaints of dysarthria and difficulty walking.  At that time as per patient they noted a evolving left MCA CVA on CT and some small intracerebral aneurysms.  Patient was started on aspirin statin and antihypertensive medications and discharged home 2 days ago.    Patient denies any fever or chills    No chest pain or shortness of breath    Patient was evaluated by neurology after having her initial stroke work-up and excluded a CT of the head-that revealed old bilateral thalamic infarct in the right frontal lobe infarct.    Neurology MD recommending an MRI of the brain to further assess her acute complaints    Advanced directives discussed patient is DNR/DNI      I discussed the plan of care with patient.    Review of Systems  Review of Systems   Constitutional:  Negative for chills and fever.   HENT:  Negative for ear discharge, ear pain, hearing loss and tinnitus.    Eyes:  Negative for blurred vision, double vision, photophobia and pain.   Respiratory:  Negative for cough and hemoptysis.    Cardiovascular:  Negative for chest pain, palpitations, orthopnea and claudication.   Gastrointestinal:  Negative for abdominal pain, heartburn, nausea and vomiting.   Genitourinary:  Negative for dysuria,  frequency and urgency.   Musculoskeletal:  Negative for back pain, myalgias and neck pain.   Neurological:  Positive for dizziness, sensory change, focal weakness and weakness.   Psychiatric/Behavioral: Negative.     All other systems reviewed and are negative.      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    Surgical History   has a past surgical history that includes other; other; angiogram (8/17/2009); thrombectomy (12/21/2009); angiogram (2/2/2010); angiogram (4/15/2010); angioplasty balloon (4/15/2010); femoral artery repair (6/28/2010); mammoplasty reduction (8/24/2010); pr u/s leiomyomata ablate <200 cc; thrombectomy (1/9/2011); angiogram (1/9/2011); primary c section; hysteroscopy novasure-2 (9/27/2010); angiogram (Right, 11/5/2015); angioplasty upper (Right, 11/5/2015); other orthopedic surgery; debridement (Right, 11/11/2015); and endarterectomy (Right, 11/18/2015).     Family History  family history includes Stroke in her mother.   Family history reviewed with patient. There is family history that is pertinent to the chief complaint.     Social History   reports that she has quit smoking. Her smoking use included cigarettes. She has never used smokeless tobacco. She reports current drug use. Drugs: Inhaled and Marijuana. She reports that she does not drink alcohol.    Allergies  Allergies   Allergen Reactions    Alcohol Unspecified     REQUESTS NO ALCOHOL CONTENT IN HER MEDICATIONS (per pt, pt is an alcoholic)    Trazodone Hives     Caused breat hyperplasia. Pt then underwent a breast reduction and attempted to take again, new  reaction: Hives.       Medications  Prior to Admission Medications   Prescriptions Last Dose Informant Patient Reported? Taking?   albuterol 108 (90 Base) MCG/ACT Aero Soln inhalation aerosol 5/16/2023 at PM Patient No No   Sig: Inhale 2 Puffs every 6 hours as needed for Shortness of Breath.   amLODIPine (NORVASC) 10 MG Tab 5/16/2023 at PM Patient Yes No   Sig: Take 10 mg by mouth every day.   aspirin (ASA) 81 MG Chew Tab chewable tablet 5/16/2023 at PM Patient Yes Yes   Sig: Chew 81 mg every day.   atorvastatin (LIPITOR) 40 MG Tab 5/16/2023 at PM Patient Yes No   Sig: Take 40 mg by mouth every day.   lisinopril (PRINIVIL) 10 MG Tab 5/16/2023 at PM Patient Yes No   Sig: Take 10 mg by mouth every day.      Facility-Administered Medications: None       Physical Exam  Temp:  [36.2 °C (97.1 °F)] 36.2 °C (97.1 °F)  Pulse:  [72-80] 79  Resp:  [15-20] 15  BP: (112-170)/(73-88) 132/73  SpO2:  [92 %-97 %] 94 %  Blood Pressure: 132/73   Temperature: 36.2 °C (97.1 °F)   Pulse: 79   Respiration: 15   Pulse Oximetry: 94 %       Physical Exam  Constitutional:       General: She is not in acute distress.     Appearance: Normal appearance. She is not ill-appearing, toxic-appearing or diaphoretic.   HENT:      Head: Normocephalic.      Mouth/Throat:      Mouth: Mucous membranes are moist.   Eyes:      General: No scleral icterus.     Extraocular Movements: Extraocular movements intact.      Pupils: Pupils are equal, round, and reactive to light.   Cardiovascular:      Rate and Rhythm: Normal rate and regular rhythm.      Heart sounds: No murmur heard.  Pulmonary:      Effort: Pulmonary effort is normal. No respiratory distress.      Breath sounds: Normal breath sounds. No wheezing or rales.   Abdominal:      General: There is no distension.      Palpations: There is no mass.      Tenderness: There is no abdominal tenderness. There is no guarding.      Hernia: No hernia is present.   Musculoskeletal:         General: No swelling.  Normal range of motion.      Cervical back: Normal range of motion and neck supple. No rigidity.   Skin:     General: Skin is warm.      Capillary Refill: Capillary refill takes 2 to 3 seconds.      Coloration: Skin is not jaundiced.      Findings: No bruising.   Neurological:      General: No focal deficit present.      Mental Status: She is alert and oriented to person, place, and time.      Motor: Weakness present.   Psychiatric:         Mood and Affect: Mood normal.         Laboratory:  Recent Labs     05/15/23  0232 05/17/23  1147   WBC  --  6.9   RBC 5.41* 6.04*   HEMOGLOBIN 15.4 16.9*   HEMATOCRIT 45.7 52.7*   MCV 84.5 87.3   MCH 28.4 28.0   MCHC 33.6 32.1*   RDW 13.7 43.3   PLATELETCT 292 346   MPV 7.4 9.2     Recent Labs     05/15/23  0232 05/17/23  1147   SODIUM 138 138   POTASSIUM 4.0 4.1   CHLORIDE 107 106   CO2 24 23   GLUCOSE 105* 97   BUN 11 16   CREATININE 0.88 0.84   CALCIUM 8.5* 8.9     Recent Labs     05/15/23  0232 05/17/23  1147   ALTSGPT  --  13   ASTSGOT  --  16   ALKPHOSPHAT  --  110*   TBILIRUBIN  --  0.3   GLUCOSE 105* 97     Recent Labs     05/17/23  1147   APTT 28.0   INR 0.92     No results for input(s): NTPROBNP in the last 72 hours.      Recent Labs     05/17/23  1147   TROPONINT 11       Imaging:  CT-HEAD W/O   Final Result      1.  Old bilateral thalamic infarcts and right frontal lobe infarct.   2.  No acute intracranial abnormality.         DX-CHEST-PORTABLE (1 VIEW)   Final Result      1.  No acute cardiac or pulmonary abnormalities are identified.      MR-BRAIN-W/O    (Results Pending)       EKG:  I have personally reviewed the images and compared with prior images.    EKG reviewed by me shows normal rhythm rate of 70 early repolarization abnormality no ST or T wave changes consistent with acute ischemia    Assessment/Plan:  Justification for Admission Status  I anticipate this patient is appropriate for observation status at this time because I expect patient require less than  48 hours for further evaluation and treatment of her dizziness        * TIA (transient ischemic attack)  Assessment & Plan  Rule out CVA    Neurochecks every 4 hours    Monitor on telemetry    Patient already had a recent echocardiogram we will obtain records    MRI of the brain    Control BP    Aspirin statin    Recent cerebrovascular accident (CVA)- (present on admission)  Assessment & Plan  Patient will be continued on aspirin statin and blood pressure control    Monitor on telemetry    Neurochecks every 4 hours    Patient already had a recent echocardiogram    We will request old records from [facility]    MRI of the brain has been ordered    If there is evidence of a new CVA and neurology will officially consult    Dizziness- (present on admission)  Assessment & Plan  Likely attributed to her elevated BP in the setting of possible acute CVA    See plan under stroke protocol    Extrinsic asthma without complication- (present on admission)  Assessment & Plan  Continue albuterol as needed shortness of breath    Dyslipidemia- (present on admission)  Assessment & Plan  Check lipid profile    Continue atorvastatin    Narcotic dependence (HCC)- (present on admission)  Assessment & Plan  We will avoid all narcotics in this patient    HTN (hypertension)- (present on admission)  Assessment & Plan  Norvasc which is her home medication has been continued    Hydralazine IV as needed for SBP greater than 160        VTE prophylaxis: SCDs/TEDs

## 2023-05-17 NOTE — ED NOTES
Pt informed of POC and next steps, pending CT, CT tech verified no known ETA for CT at current time

## 2023-05-17 NOTE — PROGRESS NOTES
Transferred via  to [facility]. Able to safely ambulate from  to bed. Admission started.

## 2023-05-17 NOTE — ED NOTES
Med Rec complete per patient  No oral antibiotics in the last 30 days   Allergies reviewed  Preferred Pharmacy: [facility] in [place]

## 2023-05-17 NOTE — ED PROVIDER NOTES
CHIEF COMPLAINT  Chief Complaint   Patient presents with    Dizziness       LIMITATION TO HISTORY   Select: None    HPI    Roslyn Kilgore is a 58 y.o. female who presents to the Emergency Department complaining of increasing tripping and listing to the left.  The patient had an acute CVA this weekend at [facility] had a significant work-up.  She is noted to have some intercerebral aneurysms as well as an evolving left MCA CVA on the CT scans from [facility].  Patient states that her symptoms initially were dysarthria and increasing difficulty walking at the time.  The patient states that the symptoms did resolve and she was discharged from the hospital on Monday.  She was doing well yesterday but then this morning when she woke up she states she was feeling well but then at about 9:00 had an acute onset of just feeling like she was going to trip on herself.  She just had a sensation of lightheadedness or dizziness denies any headaches vision changes denies any dysarthria or weakness or any other symptoms but because of the acute change in her symptoms she decided to come to the emergency department for evaluation.  Upon arrival she states that she still has the same symptoms going on but denies any other symptoms.    OUTSIDE HISTORIAN(S):  Select:  is also at bedside    EXTERNAL RECORDS REVIEWED  Select: Other admission records from [facility] were reviewed from 5/13/2023 she had a facial droop weakness in the left side and lifting to the left side.  CT scan showed showed evolving but she was not an alteplase candidate at the time.  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    SURGICAL HISTORY  Past Surgical History:   Procedure Laterality Date    ENDARTERECTOMY Right 11/18/2015    Procedure: ENDARTERECTOMY BRACHIAL ARTERY W/VEIN PATCH REPAIR;  Surgeon: Greta Wilhelm M.D.;  Location: SURGERY [facility];  Service:     DEBRIDEMENT Right 11/11/2015    Procedure: FINGER AMPUTATION 2ND;  Surgeon: Greta Wilhelm M.D.;  Location: SURGERY [facility];  Service:     ANGIOGRAM Right 11/5/2015    Procedure: ANGIOGRAM- ARM;  Surgeon: Greta Wilhelm M.D.;  Location: SURGERY [facility];  Service:     ANGIOPLASTY UPPER Right 11/5/2015    Procedure: ANGIOPLASTY UPPER;  Surgeon: Greta Wilhelm M.D.;  Location: SURGERY [facility];  Service:     THROMBECTOMY  1/9/2011    Performed by TREY TRAN at SURGERY [facility] ORS    ANGIOGRAM  1/9/2011    Performed by TREY TRAN at SURGERY [facility] ORS    HYSTEROSCOPY NOVASURE-2  9/27/2010    Performed by JEANIE DE LOS SANTOS at SURGERY [facility] DAY [facility] ORS    MAMMOPLASTY REDUCTION  8/24/2010    Performed by SIRI MOORE at SURGERY [facility] ORS    FEMORAL ARTERY REPAIR  6/28/2010    Performed by GRETA WILHELM at SURGERY [facility] ORS    ANGIOGRAM  4/15/2010    Performed by GRETA WILHELM at SURGERY [facility] ORS    ANGIOPLASTY BALLOON  4/15/2010    Performed by GRETA WILHELM at SURGERY [facility] ORS    ANGIOGRAM  2/2/2010    Performed by GRETA WILHELM at SURGERY [facility] ORS    THROMBECTOMY  12/21/2009    Performed by GRETA WILHELM at SURGERY [facility]     ANGIOGRAM  8/17/2009    Performed by JACK SOLO at [facility]ERY [facility]    OTHER      MULTIPLE BYPASS SURGERIES TO RUE FOR CLOTS    OTHER      LUE CLOT REMOVAL    OTHER ORTHOPEDIC SURGERY      RIGHT HIP ARTHROPLASTY 2014    AL U/S LEIOMYOMATA ABLATE <200 CC      PRIMARY C SECTION      TUBAL LIGATION, ECTOPIC PREGNANCY         FAMILY HISTORY  Family History   Problem Relation Age of Onset    Stroke Mother     Lung Disease Neg Hx     Cancer Neg Hx     Diabetes Neg Hx     Heart Disease Neg Hx           SOCIAL HISTORY  Social History     Socioeconomic History    Marital status:      Spouse name: Not on file    Number of children: Not on file    Years of education: Not on file    Highest education level: Not on file   Occupational History    Not on file   Tobacco Use    Smoking status: Former     Types: Cigarettes    Smokeless tobacco: Never    Tobacco comments:     1 pk a day for 6 yrs, Quit 1998   Vaping Use    Vaping Use: Some days    Substances: THC    Devices: Pre-filled or refillable cartridge   Substance and Sexual Activity    Alcohol use: No     Comment: pt stated that she used to be alcoholic 20 years ago    Drug use: Yes     Types: Inhaled, Marijuana     Comment: THC, meth use    Sexual activity: Yes     Partners: Male   Other Topics Concern    Not on file   Social History Narrative    Not on file     Social Determinants of Health     Financial Resource Strain: Not on file   Food Insecurity: Not on file   Transportation Needs: Not on file   Physical Activity: Not on file   Stress: Not on file   Social Connections: Not on file   Intimate Partner Violence: Not on file   Housing Stability: Not on file         CURRENT MEDICATIONS  No current facility-administered medications on file prior to encounter.     Current Outpatient Medications on File Prior to Encounter   Medication Sig Dispense Refill    albuterol 108 (90 Base) MCG/ACT Aero Soln inhalation aerosol Inhale 2 Puffs every 6 hours as needed  "for Shortness of Breath. 8.5 g 0    albuterol (PROVENTIL) 2.5mg/3ml Nebu Soln solution for nebulization Take 3 mL by nebulization every four hours as needed for Shortness of Breath. 60 mL 0    azithromycin (ZITHROMAX) 250 MG Tab Take 2 tablets by mouth on day one. Take one tablet by mouth the remaining days until gone 6 Tablet 0    montelukast (SINGULAIR) 10 MG Tab TAKE ONE TABLET BY MOUTH ONCE A DAY 90 Tablet 0           ALLERGIES  Allergies   Allergen Reactions    Alcohol Unspecified     REQUESTS NO ALCOHOL CONTENT IN HER MEDICATIONS (per pt, pt is an alcoholic)    Trazodone Hives     Caused breat hyperplasia. Pt then underwent a breast reduction and attempted to take again, new reaction: Hives.       PHYSICAL EXAM  VITAL SIGNS:/73   Pulse 79   Temp 36.2 °C (97.1 °F) (Temporal)   Resp 15   Ht 1.594 m (5' 2.75\")   Wt 65.5 kg (144 lb 6.4 oz)   LMP 01/04/2011   SpO2 94%   BMI 25.78 kg/m²     Constitutional: Well-developed no acute distress   HENT: Normocephalic, Atraumatic, Bilateral external ears normal.  Eyes:  conjunctiva are normal.   Neck: Supple.  Nontender midline  Cardiovascular: Regular rate and rhythm without murmurs gallops or rubs.   Thorax & Lungs: No respiratory distress. Breathing comfortably. Lungs are clear to auscultation bilaterally, there are no wheezes no rales. Chest wall is nontender.  Abdomen: Soft, nontender nondistended.  Skin: Warm, Dry, No erythema,   Back: No tenderness, No CVA tenderness.  Musculoskeletal: No clubbing cyanosis or edema good range of motion no signs of edema.  The patient is missing her right index finger at the PIP joint which is chronic  Neurologic: Alert & oriented x 3, normal sensation moving all extremities appears normal Cranial nerves II-XII grossly intact, moving all 4 extremities, 5/5 strength in all 4 extremities, cerebellar functions intact to finger-nose touch, no other focal abnormalities.  NIH is 0 currently  Psychiatric: Affect normal, " Judgment normal, Mood normal.       DIAGNOSTIC STUDIES / PROCEDURES  EKG  I have independently interpreted this EKG  Interpreted below by myself as     LABS  Results for orders placed or performed during the hospital encounter of 05/17/23   CBC WITH DIFFERENTIAL   Result Value Ref Range    WBC 6.9 4.8 - 10.8 K/uL    RBC 6.04 (H) 4.20 - 5.40 M/uL    Hemoglobin 16.9 (H) 12.0 - 16.0 g/dL    Hematocrit 52.7 (H) 37.0 - 47.0 %    MCV 87.3 81.4 - 97.8 fL    MCH 28.0 27.0 - 33.0 pg    MCHC 32.1 (L) 33.6 - 35.0 g/dL    RDW 43.3 35.9 - 50.0 fL    Platelet Count 346 164 - 446 K/uL    MPV 9.2 9.0 - 12.9 fL    Neutrophils-Polys 49.80 44.00 - 72.00 %    Lymphocytes 29.20 22.00 - 41.00 %    Monocytes 11.10 0.00 - 13.40 %    Eosinophils 8.40 (H) 0.00 - 6.90 %    Basophils 1.20 0.00 - 1.80 %    Immature Granulocytes 0.30 0.00 - 0.90 %    Nucleated RBC 0.00 /100 WBC    Neutrophils (Absolute) 3.45 2.00 - 7.15 K/uL    Lymphs (Absolute) 2.02 1.00 - 4.80 K/uL    Monos (Absolute) 0.77 0.00 - 0.85 K/uL    Eos (Absolute) 0.58 (H) 0.00 - 0.51 K/uL    Baso (Absolute) 0.08 0.00 - 0.12 K/uL    Immature Granulocytes (abs) 0.02 0.00 - 0.11 K/uL    NRBC (Absolute) 0.00 K/uL   COMP METABOLIC PANEL   Result Value Ref Range    Sodium 138 135 - 145 mmol/L    Potassium 4.1 3.6 - 5.5 mmol/L    Chloride 106 96 - 112 mmol/L    Co2 23 20 - 33 mmol/L    Anion Gap 9.0 7.0 - 16.0    Glucose 97 65 - 99 mg/dL    Bun 16 8 - 22 mg/dL    Creatinine 0.84 0.50 - 1.40 mg/dL    Calcium 8.9 8.5 - 10.5 mg/dL    AST(SGOT) 16 12 - 45 U/L    ALT(SGPT) 13 2 - 50 U/L    Alkaline Phosphatase 110 (H) 30 - 99 U/L    Total Bilirubin 0.3 0.1 - 1.5 mg/dL    Albumin 4.0 3.2 - 4.9 g/dL    Total Protein 7.1 6.0 - 8.2 g/dL    Globulin 3.1 1.9 - 3.5 g/dL    A-G Ratio 1.3 g/dL   PROTHROMBIN TIME   Result Value Ref Range    PT 12.3 12.0 - 14.6 sec    INR 0.92 0.87 - 1.13   APTT   Result Value Ref Range    APTT 28.0 24.7 - 36.0 sec   COD (ADULT)   Result Value Ref Range    ABO Grouping  Only A     Rh Grouping Only POS     Antibody Screen-Cod NEG    TROPONIN   Result Value Ref Range    Troponin T 11 6 - 19 ng/L   CORRECTED CALCIUM   Result Value Ref Range    Correct Calcium 8.9 8.5 - 10.5 mg/dL   ESTIMATED GFR   Result Value Ref Range    GFR (CKD-EPI) 80 >60 mL/min/1.73 m 2   EKG (NOW)   Result Value Ref Range    Report       [facility]    Test Date:  2023  Pt Name:    OSMANI MORENO              Department: ER  MRN:        [id number]                      Room:       [facility]  Gender:     Female                       Technician: [id number]  :        1965                   Requested By:MANUEL VO  Order #:    [id number]                    Reading MD: MANUEL VO MD    Measurements  Intervals                                Axis  Rate:       70                           P:          63  MD:         110                          QRS:        60  QRSD:       102                          T:          228  QT:         364  QTc:        393    Interpretive Statements  Sinus rhythm  Borderline short MD interval  Borderline repolarization abnormality  Compared to ECG 2022 01:01:59  No significant changes  Electronically Signed On 2023 14:24:17 PDT by MANUEL VO MD           RADIOLOGY  I have independently interpreted the diagnostic imaging associated with this visit and am waiting the final reading from the radiologist.   My preliminary interpretation is as follows: No acute bleed on CT scan chest x-ray shows no acute infiltrates      Radiologist interpretation:   CT-HEAD W/O   Final Result      1.  Old bilateral thalamic infarcts and right frontal lobe infarct.   2.  No acute intracranial abnormality.         DX-CHEST-PORTABLE (1 VIEW)   Final Result      1.  No acute cardiac or pulmonary abnormalities are identified.           COURSE & MEDICAL DECISION MAKING    ED COURSE:    ED Observation Status? Yes; Patient placed in observation status at  11:46 AM 5/17/2023    Observation plan is as follows: CT scans will be reobtained here.  I will contact neurology and observe the patient for neurologic changes.  At this point I do not feel the patient is an alteplase candidate because of her recent stroke    INTERVENTIONS BY ME:  Medications - No data to display    Response on recheck: Patient is still having symptoms.      I have discussed management of the patient with the following physicians and sources:   Dr. Stephens neurology who stated the patient should be admitted for observation and an MRI and if MRI shows new findings then to have the hospitalist consult them.    Patient discharged from ED observation at 2:25 PM 05/17/23 patient has not improved and will be escalated to admission patient will be admitted to the hospital for further treatment and care.      INITIAL ASSESSMENT, COURSE AND PLAN  Care Narrative: Patient presents emerged department for evaluation.  The patient has no significant heart findings NIH was 0 on my examination.  CT scan however does show the thalamic infarcts as described above but no signs of bleeds.  I did briefly speak with Dr. Stephens stroke neurology and at this point he recommended admission to the hospital for observation neurologically as well as an MRI.  He recommended that if the MRI had any new findings then to have stroke be consulted however at this point he did not feel that any further intervention was necessary as long as the MRI was unchanged.  Currently the patient does describe a sensation when she stands up as being off balance that has been continuous since this morning.              MEDICAL DECISION MAKING:  PROBLEMS EVALUATED THIS VISIT:  Recurrent CVA  Hypertension    RISK:  Patient has significant risks upon initial evaluation was concerning for the possibility of intercerebral hemorrhage, further embolic strokes.  However the patient not an alteplase candidate due to the recent CVA.    FINAL DIAGNOSIS  1. Weakness     2. Recent Cerebrovascular accident (CVA), unspecified mechanism (HCC)             Electronically signed by: Lincoln Deng M.D.,2:25 PM 05/17/23

## 2023-05-17 NOTE — ED NOTES
"Patient states she was seen at [facility] that past weekend and \"diagnosed with a stroke\" was discharged Monday morning. This morning patient states she was \"experiencing dizziness and stumbling around.\" Patient also states a numbness feeling in her lips.   "

## 2023-05-18 VITALS
OXYGEN SATURATION: 90 % | WEIGHT: 142.42 LBS | TEMPERATURE: 97.5 F | DIASTOLIC BLOOD PRESSURE: 64 MMHG | HEART RATE: 75 BPM | HEIGHT: 63 IN | BODY MASS INDEX: 25.23 KG/M2 | SYSTOLIC BLOOD PRESSURE: 131 MMHG | RESPIRATION RATE: 16 BRPM

## 2023-05-18 PROBLEM — I67.1 INTERNAL CAROTID ANEURYSM: Status: ACTIVE | Noted: 2023-05-18

## 2023-05-18 PROBLEM — Z86.73 HISTORY OF CVA (CEREBROVASCULAR ACCIDENT): Status: ACTIVE | Noted: 2023-05-18

## 2023-05-18 LAB
ALBUMIN SERPL BCP-MCNC: 3.5 G/DL (ref 3.2–4.9)
ALBUMIN/GLOB SERPL: 1.3 G/DL
ALP SERPL-CCNC: 97 U/L (ref 30–99)
ALT SERPL-CCNC: 14 U/L (ref 2–50)
ANION GAP SERPL CALC-SCNC: 10 MMOL/L (ref 7–16)
AST SERPL-CCNC: 17 U/L (ref 12–45)
BILIRUB SERPL-MCNC: 0.2 MG/DL (ref 0.1–1.5)
BUN SERPL-MCNC: 21 MG/DL (ref 8–22)
CALCIUM ALBUM COR SERPL-MCNC: 8.8 MG/DL (ref 8.5–10.5)
CALCIUM SERPL-MCNC: 8.4 MG/DL (ref 8.5–10.5)
CHLORIDE SERPL-SCNC: 107 MMOL/L (ref 96–112)
CHOLEST SERPL-MCNC: 153 MG/DL (ref 100–199)
CO2 SERPL-SCNC: 23 MMOL/L (ref 20–33)
CREAT SERPL-MCNC: 1.07 MG/DL (ref 0.5–1.4)
ERYTHROCYTE [DISTWIDTH] IN BLOOD BY AUTOMATED COUNT: 44.5 FL (ref 35.9–50)
GFR SERPLBLD CREATININE-BSD FMLA CKD-EPI: 60 ML/MIN/1.73 M 2
GLOBULIN SER CALC-MCNC: 2.8 G/DL (ref 1.9–3.5)
GLUCOSE SERPL-MCNC: 104 MG/DL (ref 65–99)
HCT VFR BLD AUTO: 47.8 % (ref 37–47)
HDLC SERPL-MCNC: 47 MG/DL
HGB BLD-MCNC: 15.7 G/DL (ref 12–16)
LDLC SERPL CALC-MCNC: 84 MG/DL
MCH RBC QN AUTO: 29 PG (ref 27–33)
MCHC RBC AUTO-ENTMCNC: 32.8 G/DL (ref 33.6–35)
MCV RBC AUTO: 88.4 FL (ref 81.4–97.8)
PLATELET # BLD AUTO: 316 K/UL (ref 164–446)
PMV BLD AUTO: 9.2 FL (ref 9–12.9)
POTASSIUM SERPL-SCNC: 4.1 MMOL/L (ref 3.6–5.5)
PROT SERPL-MCNC: 6.3 G/DL (ref 6–8.2)
RBC # BLD AUTO: 5.41 M/UL (ref 4.2–5.4)
SODIUM SERPL-SCNC: 140 MMOL/L (ref 135–145)
TRIGL SERPL-MCNC: 110 MG/DL (ref 0–149)
WBC # BLD AUTO: 7.2 K/UL (ref 4.8–10.8)

## 2023-05-18 PROCEDURE — 80061 LIPID PANEL: CPT

## 2023-05-18 PROCEDURE — 99239 HOSP IP/OBS DSCHRG MGMT >30: CPT | Performed by: HOSPITALIST

## 2023-05-18 PROCEDURE — 700102 HCHG RX REV CODE 250 W/ 637 OVERRIDE(OP)

## 2023-05-18 PROCEDURE — 80053 COMPREHEN METABOLIC PANEL: CPT

## 2023-05-18 PROCEDURE — A9270 NON-COVERED ITEM OR SERVICE: HCPCS

## 2023-05-18 PROCEDURE — 96372 THER/PROPH/DIAG INJ SC/IM: CPT

## 2023-05-18 PROCEDURE — G0378 HOSPITAL OBSERVATION PER HR: HCPCS

## 2023-05-18 PROCEDURE — 85027 COMPLETE CBC AUTOMATED: CPT

## 2023-05-18 RX ADMIN — ASPIRIN 81 MG 81 MG: 81 TABLET ORAL at 05:17

## 2023-05-18 RX ADMIN — ATORVASTATIN CALCIUM 40 MG: 40 TABLET, FILM COATED ORAL at 05:17

## 2023-05-18 NOTE — ASSESSMENT & PLAN NOTE
Likely attributed to her elevated BP in the setting of possible acute CVA    See plan under stroke protocol

## 2023-05-18 NOTE — DISCHARGE INSTRUCTIONS
"Transient Ischemic Attack (TIA)  Discharge Instructions    You experienced a Transient Ischemic Attack.  If an artery that supplies brain tissue is blocked for a short time, the blood flow to that area of the brain slows down or stops, which may cause temporary or transient symptoms of a stroke. A TIA is a serious warning sign that you could have a stroke.      Thrombolytic Treatment for Ischemic Stroke    You received thrombolytic treatment for an Ischemic Stroke. Thrombolytics are medicines that can break up blood clots. These medicines help to treat a stroke when given as soon as possible after stroke symptoms start.  The medicine was given to you through an IV tube.  In some cases, the medicine may go to the affected area through a thin tube (catheter). This tube is usually put in at the top of your leg.    Get help right away if:  You have blood in your vomit, pee, or poop.  You have a serious fall or accident, or you hit your head.  You have symptoms of an allergy to the medicine, such as a rash or trouble breathing.  You have other signs of a stroke, such as:  A sudden, very bad headache with no known cause.  Feeling like you may vomit (nausea).  Vomiting.  A seizure    Stroke Risk Factors    You are at increased risk of having another stroke event.  See your Patient Stroke Guide to help reduce your stroke risk. These are your specific risk factors:  High blood pressure  High Cholesterol and lipids  Previous TIAs or \"mini strokes\"  Substance abuse     Get help right away if you have any signs of a stroke.  \"BE FAST\" is an easy way to remember the main warning signs of a stroke:  B - Balance. Dizziness, sudden trouble walking, or loss of balance.  E - Eyes. Trouble seeing or a change in how you see.  F - Face. Sudden weakness or loss of feeling in the face. The face or eyelid may droop on one side.  A - Arms. Weakness or loss of feeling in an arm. This happens all of a sudden and most often on one side of the " body.  S - Speech. Sudden trouble speaking, slurred speech, or trouble understanding what people say.  T - Time. Time to call emergency services. Write down what time symptoms started.

## 2023-05-18 NOTE — PROGRESS NOTES
Stroke Neurology note     Neurology team contacted about Roslyn who was just seen at [facility] for R thalamic stroke.  We recommended an MRI to determine if there is any new infarct.  There is not- the acute R thalamic stroke was redemonstrated without new areas of acute infarct.  We will defer to the [facility] workup and management given no new acute findings.  Please reconsult Neurology with additional questions/concerns.    Paramjit Stephens MD  Stroke Neurology

## 2023-05-18 NOTE — ASSESSMENT & PLAN NOTE
Patient will be continued on aspirin statin and blood pressure control    Monitor on telemetry    Neurochecks every 4 hours    Patient already had a recent echocardiogram    We will request old records from [facility]    MRI of the brain has been ordered    If there is evidence of a new CVA and neurology will officially consult

## 2023-05-18 NOTE — DISCHARGE SUMMARY
Discharge Summary    CHIEF COMPLAINT ON ADMISSION  Chief Complaint   Patient presents with    Dizziness       Reason for Admission  Other     Admission Date  5/17/2023    CODE STATUS  DNAR/DNI    HPI & HOSPITAL COURSE  Roslyn Kilgore is a 58 y.o. female who presented 5/17/2023 with past med history of hypertension, asthma, CVA presents to the emergency department at [facility] stating that she has noticed some left-sided facial numbness and ataxia for 1 day duration.  She also states that she has been dizzy she feels like the room is spinning.       patient was recently at [facility] 4 days ago with complaints of dysarthria and difficulty walking.  At that time as per patient they noted a evolving left MCA CVA on CT and some small intracerebral aneurysms.  Patient was started on aspirin statin and antihypertensive medications and discharged home 2 days ago.     Patient denies any fever or chills     No chest pain or shortness of breath     Patient was evaluated by neurology after having her initial stroke work-up and excluded a CT of the head-that revealed old bilateral thalamic infarct in the right frontal lobe infarct.     Neurology MD recommending an MRI of the brain to further assess her acute complaints     Advanced directives discussed patient is DNR/DNI    =======================================    MRI was completed and showed evidence of old bilateral small thalamic infarcts and an old right frontal infarct and an acute right thalamic infarct.  Discussed with Dr. Franklin of neurology who reviewed previous images and feel that this infarct was previously known and there is no evidence of a new infarct.  Dr foster did not recommend any changes to her current regimen of aspirin ,statin and antihypertensive medication and patient to follow-up with [facility] neurology in the outpatient setting= we will follow her for her diagnosis of CVA and internal carotid and basilar artery  aneurysm        Therefore, she is discharged in good and stable condition to home with close outpatient follow-up.    The patient recovered much more quickly than anticipated on admission.    Discharge Date  5/18    FOLLOW UP ITEMS POST DISCHARGE  [facility]    DISCHARGE DIAGNOSES  Principal Problem:    TIA (transient ischemic attack) (POA: Unknown)  Active Problems:    Recent cerebrovascular accident (CVA) (POA: Yes)    HTN (hypertension) (POA: Yes)    Dyslipidemia (POA: Yes)    Advanced care planning/counseling discussion (POA: Yes)    Extrinsic asthma without complication (POA: Yes)    Dizziness (POA: Yes)    Internal carotid aneurysm (POA: Yes)    History of CVA (cerebrovascular accident) (POA: Yes)  Resolved Problems:    Narcotic dependence (HCC) (POA: Yes)      FOLLOW UP  Future Appointments   Date Time Provider Department Center   6/6/2023  3:00 PM SUE Esposito     No follow-up provider specified.    MEDICATIONS ON DISCHARGE     Medication List        CONTINUE taking these medications        Instructions   albuterol 108 (90 Base) MCG/ACT Aers inhalation aerosol   Inhale 2 Puffs every 6 hours as needed for Shortness of Breath.  Dose: 2 Puff     amLODIPine 10 MG Tabs  Commonly known as: NORVASC   Take 10 mg by mouth every day.  Dose: 10 mg     aspirin 81 MG Chew chewable tablet  Commonly known as: ASA   Chew 81 mg every day.  Dose: 81 mg     atorvastatin 40 MG Tabs  Commonly known as: LIPITOR   Take 40 mg by mouth every day.  Dose: 40 mg     lisinopril 10 MG Tabs  Commonly known as: PRINIVIL   Take 10 mg by mouth every day.  Dose: 10 mg              Allergies  Allergies   Allergen Reactions    Alcohol Unspecified     REQUESTS NO ALCOHOL CONTENT IN HER MEDICATIONS (per pt, pt is an alcoholic)    Trazodone Hives     Caused breat hyperplasia. Pt then underwent a breast reduction and attempted to take again, new reaction: Hives.       DIET  Orders Placed This Encounter   Procedures     Diet Order Diet: Regular     Standing Status:   Standing     Number of Occurrences:   1     Order Specific Question:   Diet:     Answer:   Regular [1]       ACTIVITY  As tolerated.  Weight bearing as tolerated    CONSULTATIONS  Dr foster neuro    PROCEDURES  Reading Physician Reading Date Result Priority   Yvette Cancino M.D.  [phone] 5/18/2023 Disposition Pending     Narrative & Impression     5/17/2023 10:28 PM     HISTORY/REASON FOR EXAM: assess for acute cva  Left-sided face numbness and ataxia     TECHNIQUE/EXAM DESCRIPTION:     T1 sagittal, T2 axial, flair coronal, T1 coronal, and diffusion-weighted axial images were obtained of the brain.     COMPARISON: CT brain 5/17/2023     FINDINGS:     Diffusion-weighted images demonstrate an area of acute infarction involving the right thalamus. The gradient-echo images did not demonstrate any evidence of hemorrhage.  Remote bilateral thalamic infarcts are also present.. Remote right frontal opercular subcortical infarct with encephalomalacia noted. Nonspecific T2 hyperintensities are noted in the periventricular and deep white matter, most likely related to chronic   microvascular ischemia.  There is no evidence of intracranial mass or mass effect. No evidence of midline shift.  There are no extra axial collections.  Visualized intracranial arterial flow voids are within normal limits.  Bone marrow signal in the calvarium is within normal limits.  Included portions of the paranasal sinuses are within normal limits.  Included portions of the mastoid air cells are within normal limits.  Included portions of the orbits are within normal limits     IMPRESSION:        Acute right thalamic lacunar infarct.     Remote bilateral thalamic infarcts. Remote right frontal infarct.     Nonspecific T2 hyperintensities are noted in the periventricular and deep white matter, most likely related to chronic microvascular ischemia.              Exam Ended: 05/17/23 11:19 PM  Last Resulted: 05/18/23  6:39 AM       Order Details     View Encounter     Lab and Collection Details     Routing     Result History     View All Conversations on this Encounter           Result Care Coordination      Patient Communication     Add Comments            ======================================================    CT-CTA HEAD WITH & W/O-POST PROCESS  Order: [id number]  Impression      1.  Evolving right frontal lobe MCA distribution infarct.   2.  Aneurysms involving both internal cerebral arteries and the basilar artery.   Subspecialty consultation is recommended.   3.  60 percent stenosis of the left common carotid artery   4.  White matter disease, likely related to chronic small vessel ischemia.   AGUSTIN CÁRDENAS was notified regarding these findings on 5/13/2023 8:18 PM.         Electronically Signed by: Tray Suarez 5/13/2023 8:24 PM  Narrative    EXAMINATION:  CTA HEAD W IV CONT, CTA NECK W IV CONT     INDICATION: 58 years old, Female.  Neuro deficit, acute, stroke suspected     COMPARISON:  No relevant studies available.     TECHNIQUE: Axial contrast-enhanced imaging of the head and neck was performed   following the uneventful administration of intravenous contrast. Sagittal,   coronal, and 3-D reformats were then rendered. All CT scans at this facility use   dose modulation, iterative reconstruction, and/or size-based dosing when   appropriate to reduce radiation dose to as low as reasonably achievable.     3-D multiplanar MIP images re-created separately on an independent workstation   and are reviewed. Carotid stenosis was calculated using NASCET criteria adapted   for this modality.       CONTRAST:  75 mL [id number]     FINDINGS:   Partially visualized lungs: Clear.     Thoracic aorta: No visualized aneurysm or dissection.     Partially visualized great vessels: No significant stenosis.   Neck soft tissues: Unremarkable.     Right side.   Common carotid artery: No significant stenosis or evidence  for dissection.   Internal carotid artery: No significant stenosis or evidence for dissection.   Vertebral artery: No significant stenosis or evidence for dissection.     Left side.   Common carotid artery: 60 percent stenosis secondary to noncalcified plaque.   Internal carotid artery: No significant stenosis or evidence for dissection.   Vertebral artery: No significant stenosis or evidence for dissection.     Intracranial arteries:   Internal carotid arteries: 6 mm distal right internal carotid artery aneurysm. 5   mm saccular aneurysm of the cavernous left internal carotid artery. Smaller   adjacent 3 mm saccular aneurysm.   Anterior cerebral arteries: No significant stenosis. No aneurysm identified.   Middle cerebral arteries: No significant stenosis. No aneurysm identified.   Posterior cerebral arteries: No significant stenosis. No aneurysm identified.   Basilar artery: No significant stenosis. 3 mm basilar tip aneurysm..     Other: Right frontal gray-white matter interface loss. Mild periventricular   white matter hypodensity. Ventricles and sulci are age-appropriate. Basilar   cisterns are patent. 7 mm left thalamic lacunar infarct.  Exam End: 05/13/23  7:59 PM     LABORATORY  Lab Results   Component Value Date    SODIUM 140 05/18/2023    POTASSIUM 4.1 05/18/2023    CHLORIDE 107 05/18/2023    CO2 23 05/18/2023    GLUCOSE 104 (H) 05/18/2023    BUN 21 05/18/2023    CREATININE 1.07 05/18/2023    CREATININE 0.8 06/12/2006    GLOMRATE 66 05/15/2023        Lab Results   Component Value Date    WBC 7.2 05/18/2023    HEMOGLOBIN 15.7 05/18/2023    HEMATOCRIT 47.8 (H) 05/18/2023    PLATELETCT 316 05/18/2023        Total time of the discharge process exceeds 39  minutes.

## 2023-05-18 NOTE — ASSESSMENT & PLAN NOTE
Norvasc which is her home medication has been continued    Hydralazine IV as needed for SBP greater than 160

## 2023-05-18 NOTE — ASSESSMENT & PLAN NOTE
Rule out CVA    Neurochecks every 4 hours    Monitor on telemetry    Patient already had a recent echocardiogram we will obtain records    MRI of the brain    Control BP    Aspirin statin

## 2023-05-18 NOTE — CARE PLAN
The patient is Watcher - Medium risk of patient condition declining or worsening    Shift Goals  Clinical Goals: Brain MRI, neuro checks  Patient Goals: rest  Family Goals: not at bedside    Progress made toward(s) clinical / shift goals:    Problem: Optimal Care of the Stroke Patient  Goal: Optimal acute care for the stroke patient  Outcome: Progressing  Expected end date: 5/19/2023     Problem: Knowledge Deficit - Stroke Education  Goal: Patient's knowledge of stroke and risk factors will improve  Outcome: Progressing  Expected end date: 5/19/2023     Problem: Psychosocial - Patient Condition  Goal: Patient's ability to verbalize feelings about condition will improve  Outcome: Progressing  Expected end date: 5/19/2023

## 2023-05-18 NOTE — PROGRESS NOTES
Patient was seen and evaluated by , patient is ready for discharge. Ordered discharge lounge per protocol. Answered all questions and concerns.

## 2023-05-18 NOTE — PROGRESS NOTES
Neuro assessment completed. Patient A&O x4, denies numbness/tingling, or feeling dizzy. NIH 0, no deficits noted. Provided coffee for comfort. Bed is low and call light within reach.

## 2023-05-18 NOTE — PROGRESS NOTES
Discharge instructions reviewed and signed by patient. No further questions at this time. IV DC'd by RN. All belongings gathered and given to patient. Patient left via car with friend.

## 2023-05-25 ENCOUNTER — OFFICE VISIT (OUTPATIENT)
Dept: INTERNAL MEDICINE | Facility: OTHER | Age: 58
End: 2023-05-25
Payer: COMMERCIAL

## 2023-05-25 VITALS
OXYGEN SATURATION: 96 % | HEART RATE: 79 BPM | SYSTOLIC BLOOD PRESSURE: 119 MMHG | HEIGHT: 62 IN | BODY MASS INDEX: 27.09 KG/M2 | WEIGHT: 147.2 LBS | DIASTOLIC BLOOD PRESSURE: 73 MMHG | TEMPERATURE: 97.7 F

## 2023-05-25 DIAGNOSIS — F19.90 DRUG USE: ICD-10-CM

## 2023-05-25 DIAGNOSIS — Z86.73 RECENT CEREBROVASCULAR ACCIDENT (CVA): ICD-10-CM

## 2023-05-25 DIAGNOSIS — J18.9 PNEUMONIA OF LEFT LOWER LOBE DUE TO INFECTIOUS ORGANISM: ICD-10-CM

## 2023-05-25 DIAGNOSIS — R73.03 PREDIABETES: ICD-10-CM

## 2023-05-25 DIAGNOSIS — H53.2 DOUBLE VISION: ICD-10-CM

## 2023-05-25 PROBLEM — I63.9 ACUTE CVA (CEREBROVASCULAR ACCIDENT) (HCC): Status: ACTIVE | Noted: 2023-05-13

## 2023-05-25 PROBLEM — K57.92 ACUTE DIVERTICULITIS: Status: RESOLVED | Noted: 2022-02-20 | Resolved: 2023-05-25

## 2023-05-25 PROBLEM — K57.92 DIVERTICULITIS: Status: RESOLVED | Noted: 2022-02-20 | Resolved: 2023-05-25

## 2023-05-25 PROBLEM — E87.1 HYPONATREMIA: Status: RESOLVED | Noted: 2022-02-20 | Resolved: 2023-05-25

## 2023-05-25 PROBLEM — A41.9 SEPSIS (HCC): Status: RESOLVED | Noted: 2022-02-20 | Resolved: 2023-05-25

## 2023-05-25 PROCEDURE — 3074F SYST BP LT 130 MM HG: CPT | Mod: GC | Performed by: STUDENT IN AN ORGANIZED HEALTH CARE EDUCATION/TRAINING PROGRAM

## 2023-05-25 PROCEDURE — 3078F DIAST BP <80 MM HG: CPT | Mod: GC | Performed by: STUDENT IN AN ORGANIZED HEALTH CARE EDUCATION/TRAINING PROGRAM

## 2023-05-25 PROCEDURE — 99214 OFFICE O/P EST MOD 30 MIN: CPT | Mod: GC | Performed by: STUDENT IN AN ORGANIZED HEALTH CARE EDUCATION/TRAINING PROGRAM

## 2023-05-25 RX ORDER — AMLODIPINE BESYLATE 10 MG/1
10 TABLET ORAL DAILY
Qty: 30 TABLET | Refills: 3 | Status: SHIPPED | OUTPATIENT
Start: 2023-05-25 | End: 2023-07-27

## 2023-05-25 RX ORDER — ALBUTEROL SULFATE 90 UG/1
2 AEROSOL, METERED RESPIRATORY (INHALATION) EVERY 6 HOURS PRN
Qty: 8.5 G | Refills: 3 | Status: SHIPPED | OUTPATIENT
Start: 2023-05-25

## 2023-05-25 RX ORDER — LISINOPRIL 10 MG/1
10 TABLET ORAL DAILY
Qty: 30 TABLET | Refills: 3 | Status: SHIPPED
Start: 2023-05-25 | End: 2023-08-29 | Stop reason: SINTOL

## 2023-05-25 RX ORDER — ASPIRIN 81 MG/1
81 TABLET, CHEWABLE ORAL DAILY
Qty: 30 TABLET | Refills: 0 | Status: SHIPPED | OUTPATIENT
Start: 2023-05-25 | End: 2023-06-24

## 2023-05-25 RX ORDER — MONTELUKAST SODIUM 10 MG/1
10 TABLET ORAL DAILY
Qty: 30 TABLET | Refills: 2 | Status: SHIPPED | OUTPATIENT
Start: 2023-05-25

## 2023-05-25 RX ORDER — ATORVASTATIN CALCIUM 40 MG/1
40 TABLET, FILM COATED ORAL DAILY
Qty: 30 TABLET | Refills: 3 | Status: ON HOLD | OUTPATIENT
Start: 2023-05-25 | End: 2023-10-30

## 2023-05-25 ASSESSMENT — FIBROSIS 4 INDEX: FIB4 SCORE: 0.85

## 2023-05-25 NOTE — PATIENT INSTRUCTIONS
Thank you for visiting today!  Please follow-up in 5 weeks  Please follow-up on referrals and schedule an appointment with neuro-ophthalmology if you do not hear within the next 5 days please let us know, also make sure to follow-up with your neurosurgeon your neurologist and your vascular surgeon.  Keep a blood pressure log at home  Good job staying off of methamphetamines, marijuana, and the cigarettes it can be hard let us know if you need any help.  If you are feeling more down or depressed please let us know and we can work to get you help its not pharmacological in nature.  If you have any new weakness, blurry vision or changes in your vision, noticed that your blood pressure skyrocket's above 180 is the top number when you are calm and without pain please head to the emergency department.  Please try and eat healthy, get at least 30 minutes of cardiovascular exercise a day to help keep your health as best as it can be.  If you have any questions or concerns please feel free to contact us at [phone].  If you feel like you are experiencing a medical emergency please seek immediate medical attention at an urgent care or in the emergency department.

## 2023-05-25 NOTE — PROGRESS NOTES
Roslyn Kilgore is a 58 y.o. female who presents today with the following:    CC: Establish Care with Primary Care Physician, Hospital follow-up    HPI:  Ms. Kilgore is a very pleasant 58-year-old female with a very complex past medical history most recently significant for CVA, methamphetamine use, major depressive disorder.She also has a history of total hysterectomy and mesh and pain could be related to scar tissue. Has a hx of right upper extremity clot and family hx of clotting disorder, but patient can't recall which disorder. Not currently on AC due to 2 prior GI bleeds. She follows with Dr. Wilhelm, vascular, but has not seen her in a couple yearsPatient was recently hospitalized on 5/13/2023 at [facility] with concern for CVA right MCA distribution, patient then had acute dizziness on 5/17 which brought her to [facility], multiple thalamic stroke present as well as small intercerebral aneurysms present.  Patient symptomatology resolved resolved and she was discharged home.  Only complaint currently from patient is that she has issues with vision only when driving she will sometimes feel nauseous and appears to have double vision alleviated by closing 1 eye, no other vision changes no curtain downing, blurriness, headaches, lightheadedness, fevers, chills, night sweats, chest pain, palpitations, new onset musculoskeletal pain or weakness, dysuria, hematuria, nausea/vomiting/diarrhea/constipation, or melena.  Patient does tear up at times during her interview, specifically she is almost to the 1 year roshan on her son's death secondary to alcohol use patient has been sober for many years, however she feels like she had passed on this disease to her son and feels guilty about it, has good support through  and her roommate which she met at  currently does not want therapy or medical intervention.  Patient also recently using methamphetamines patient stating the day before her visit  to the ED and stroke on 5/13.  Patient also recently quit smoking on 5/13 and has been doing well with nicotine gum does not want a patch at this time.  14 point review of systems as above and below.    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)     Arthritis     osteo- right hip    ASTHMA     Bronchitis     Cancer (HCC) 10 years ago    pre cancerous s/p leep    Carcinoma in situ of respiratory system     per pt early 70's, early 80's    Dental disorder     full dentures    DEPRESSION     Heart burn     Hypertension     Indigestion     Other specified symptom associated with female genital organs     see cat scan    Personal history of venous thrombosis and embolism     Pneumonia 01/1996       Past Surgical History:   Procedure Laterality Date    ENDARTERECTOMY Right 11/18/2015    Procedure: ENDARTERECTOMY BRACHIAL ARTERY W/VEIN PATCH REPAIR;  Surgeon: Greta Wilhelm M.D.;  Location: [facility];  Service:     DEBRIDEMENT Right 11/11/2015    Procedure: FINGER AMPUTATION 2ND;  Surgeon: Greta Wilhelm M.D.;  Location: [facility];  Service:     ANGIOGRAM Right 11/5/2015    Procedure: ANGIOGRAM- ARM;  Surgeon: Greta Wilhelm M.D.;  Location: [facility];  Service:     ANGIOPLASTY UPPER Right 11/5/2015    Procedure: ANGIOPLASTY UPPER;  Surgeon: Greta Wilhelm M.D.;   Location: SURGERY [facility];  Service:     THROMBECTOMY  1/9/2011    Performed by TREY TRAN at SURGERY [facility] ORS    ANGIOGRAM  1/9/2011    Performed by TREY TRAN at SURGERY [facility]    HYSTEROSCOPY NOVASURE-2  9/27/2010    Performed by JEANIE DE LOS SANTOS at SURGERY SAME DAY [facility] ORS    MAMMOPLASTY REDUCTION  8/24/2010    Performed by SIRI MOORE at SURGERY [facility] ORS    FEMORAL ARTERY REPAIR  6/28/2010    Performed by JACK SOLO at SURGERY [facility] ORS    ANGIOGRAM  4/15/2010    Performed by JACK SOLO at SURGERY [facility] ORS    ANGIOPLASTY BALLOON  4/15/2010    Performed by JACK SOLO at SURGERY [facility] ORS    ANGIOGRAM  2/2/2010    Performed by JACK SOLO at SURGERY [facility] ORS    THROMBECTOMY  12/21/2009    Performed by JACK SOLO at SURGERY [facility] ORS    ANGIOGRAM  8/17/2009    Performed by JACK SOLO at SURGERY [facility] ORS    OTHER      MULTIPLE BYPASS SURGERIES TO RUE FOR CLOTS    OTHER      LUE CLOT REMOVAL    OTHER ORTHOPEDIC SURGERY      RIGHT HIP ARTHROPLASTY 2014    AR U/S LEIOMYOMATA ABLATE <200 CC      PRIMARY C SECTION      TUBAL LIGATION, ECTOPIC PREGNANCY       Family History   Problem Relation Age of Onset    Stroke Mother     Lung Disease Neg Hx     Cancer Neg Hx     Diabetes Neg Hx     Heart Disease Neg Hx        Social History     Tobacco Use    Smoking status: Former     Types: Cigarettes    Smokeless tobacco: Never    Tobacco comments:     1 pk a day for 6 yrs, Quit 1998   Vaping Use    Vaping Use: Some days    Substances: THC    Devices: Pre-filled or refillable cartridge   Substance Use Topics    Alcohol use: No     Comment: pt stated that she used to be alcoholic 20 years ago    Drug use: Yes     Types: Inhaled, Marijuana     Comment: THC, meth use     Occupation: Works as a   Social: Lives with a male roommate  Sexual: Denies any history of STIs when offered  "testing denies  Gordon: No  Exposures: None  Safety: Wears seatbelts and sunscreen  Activity: Patient achieves at least 30 minutes of cardiovascular exercise per day very active job  Diet: Healthy diet    Current Outpatient Medications   Medication Sig Dispense Refill    amLODIPine (NORVASC) 10 MG Tab Take 10 mg by mouth every day.      aspirin (ASA) 81 MG Chew Tab chewable tablet Chew 81 mg every day.      atorvastatin (LIPITOR) 40 MG Tab Take 40 mg by mouth every day.      lisinopril (PRINIVIL) 10 MG Tab Take 10 mg by mouth every day.      albuterol 108 (90 Base) MCG/ACT Aero Soln inhalation aerosol Inhale 2 Puffs every 6 hours as needed for Shortness of Breath. 8.5 g 0     No current facility-administered medications for this visit.       Physical Exam:  /73 (BP Location: Left arm, Patient Position: Sitting, BP Cuff Size: Adult)   Pulse 79   Temp 36.5 °C (97.7 °F) (Temporal)   Ht 1.575 m (5' 2\")   Wt 66.8 kg (147 lb 3.2 oz)   LMP 01/04/2011   SpO2 96%   BMI 26.92 kg/m²   General: Well developed, well nourished female, in no distress.  HEENT: NC/AT, PERRL, EOMI, no scleral icterus or conjunctival pallor, fair dentition/denture in, no nasal discharge or oral erythema or exudates.   Neck: Supple, No cervical or supraclavicular LAD  CV:RRR, no murmurs gallops or Rubs, no JVD  Pulm: LCAB, no crackles, rales, rhonchi, or wheezing  GI: Normal bowel sounds, abdomen soft, nontender, nondistended to deep or light palpation in all 4 quadrants, no HSM.  MSK: Radial and dorsalis pedis pulses 2+ and equal bilaterally, respectively.  Strength 5 out of 5 in upper and lower extremities.  No lower extremity edema  Neuro: Patient is alert and oriented x3, no focal deficits, cranial nerves II through XII intact, intact field of vision, no nystagmus, no appreciable weakness or hemineglect, 2+ deep tendon reflexes in both upper and lower extremities  Psych: Tearful mood, reasonable affect.    Assessment and Plan: "   Recent cerebrovascular accident (CVA)  Double vision  5/13 and 5/18, Thalamic infarcts, Aneurysms x2 bilateral internal cerebral arteries and basilar artery.  Specialty consultation recommended by Radiology.  Patient wishes to follow-up with [facility] surgery, Greta Wilhelm. ED provider contacted Neurosurgery, Alexandro Bryant, with recommendation outpatient follow-up with Dr. Colbert.  Left carotid stenosis 60%, asymptomatic currently, souble vision   -Pt with follow up for  NeuroIR   - Referral to Neuro Ophthalmology  -GDT: Aspirin, Statin  -Meth cessation   -smoking cessation  -No accidents, cover one eye with patch or glasses when driving only as needed, until follow up.     Drug use  Hx of polysubstance abuse, tobacco, MJ, methamphetamine use most recent   -Patient endorses sobriety   -Offered counseling, resource on NA, outpatient cessation in setting of CVA with Methamphetamine as likely contributor  -Pt would like to quit on own, not interested in counseling or rehab at this time  -Continue to encourage sobriety at next visit.       Prediabetes  A1c 5.9 0n 5/23  - continue to monitor yearly   -Counseling on healthy diet given     Asthma, mild persistant  -No PFTs on file  -No inhaler use greater than 2x daily, not nighlty   -  - albuterol 108 (90 Base) MCG/ACT Aero Soln inhalation aerosol; Inhale 2 Puffs every 6 hours as needed for Shortness of Breath.  Dispense: 8.5 g; Refill: 3    Situational Depression   Tearful, nearing anniversary of son's death, No HI or SSI, social support of Roommates, AA.   -Psychotherapy and SSRI offered patient declines  -Provided resources on Meditation, Depression, anxiety  -Strict return precautions given.     HTN  -on Lisinopril 10mg, denies side effects   -No lightheadedness or dizziness  -Bring cuff and BP log to next visit for calibration and titration   -continue lisinopril 10mg       Return in about 5 weeks (around 6/29/2023).    Patient Instructions   Thank you for  visiting today!  Please follow-up in 5 weeks  Please follow-up on referrals and schedule an appointment with neuro-ophthalmology if you do not hear within the next 5 days please let us know, also make sure to follow-up with your neurosurgeon your neurologist and your vascular surgeon.  Keep a blood pressure log at home  Good job staying off of methamphetamines, marijuana, and the cigarettes it can be hard let us know if you need any help.  If you are feeling more down or depressed please let us know and we can work to get you help its not pharmacological in nature.  If you have any new weakness, blurry vision or changes in your vision, noticed that your blood pressure skyrocket's above 180 is the top number when you are calm and without pain please head to the emergency department.  Please try and eat healthy, get at least 30 minutes of cardiovascular exercise a day to help keep your health as best as it can be.  If you have any questions or concerns please feel free to contact us at [phone].  If you feel like you are experiencing a medical emergency please seek immediate medical attention at an urgent care or in the emergency department.       This note was created using voice recognition software.  While every attempt is made to ensure accuracy of transcription, occasionally errors occur.         Return in about 5 weeks (around 6/29/2023).  Follow-up with neurology neurosurgery, titrate antihypertensives, discuss substance use cessation, continue to address care gaps    Signed by: Ken Taylor M.D.      Ken Taylor, PGY2, Internal Medicine  [facility]

## 2023-06-15 DIAGNOSIS — I77.1 ARTERIAL INSUFFICIENCY (HCC): ICD-10-CM

## 2023-06-29 ENCOUNTER — APPOINTMENT (OUTPATIENT)
Dept: INTERNAL MEDICINE | Facility: OTHER | Age: 58
End: 2023-06-29
Payer: COMMERCIAL

## 2023-07-05 ENCOUNTER — OFFICE VISIT (OUTPATIENT)
Dept: INTERNAL MEDICINE | Facility: OTHER | Age: 58
End: 2023-07-05
Payer: COMMERCIAL

## 2023-07-05 VITALS
OXYGEN SATURATION: 97 % | BODY MASS INDEX: 25.76 KG/M2 | DIASTOLIC BLOOD PRESSURE: 72 MMHG | HEIGHT: 63 IN | SYSTOLIC BLOOD PRESSURE: 113 MMHG | HEART RATE: 85 BPM | TEMPERATURE: 97.6 F | WEIGHT: 145.4 LBS

## 2023-07-05 DIAGNOSIS — Z12.31 ENCOUNTER FOR SCREENING MAMMOGRAM FOR BREAST CANCER: ICD-10-CM

## 2023-07-05 DIAGNOSIS — H53.2 DOUBLE VISION: Primary | ICD-10-CM

## 2023-07-05 DIAGNOSIS — Z86.73 RECENT CEREBROVASCULAR ACCIDENT (CVA): ICD-10-CM

## 2023-07-05 PROCEDURE — 99214 OFFICE O/P EST MOD 30 MIN: CPT | Mod: GC

## 2023-07-05 PROCEDURE — 3074F SYST BP LT 130 MM HG: CPT | Mod: GC

## 2023-07-05 PROCEDURE — 3078F DIAST BP <80 MM HG: CPT | Mod: GC

## 2023-07-05 RX ORDER — ASPIRIN 81 MG/1
81 TABLET ORAL DAILY
COMMUNITY

## 2023-07-05 ASSESSMENT — ENCOUNTER SYMPTOMS
FEVER: 0
SPUTUM PRODUCTION: 0
DEPRESSION: 0
ABDOMINAL PAIN: 0
TREMORS: 0
ORTHOPNEA: 0
DIARRHEA: 0
VOMITING: 0
WHEEZING: 0
PALPITATIONS: 0
SPEECH CHANGE: 0
SENSORY CHANGE: 0
CHILLS: 0
BLURRED VISION: 1
DOUBLE VISION: 1
DIZZINESS: 1
SHORTNESS OF BREATH: 0

## 2023-07-05 ASSESSMENT — FIBROSIS 4 INDEX: FIB4 SCORE: 0.85

## 2023-07-05 NOTE — PATIENT INSTRUCTIONS
Thank you for visiting me today at the [facility].   Please follow up with Dr. Taylor in 2 months.

## 2023-07-05 NOTE — PROGRESS NOTES
Chief Complaint   Patient presents with    Follow-Up     Patient has questions regarding strokes and vision       HISTORY OF PRESENT ILLNESS: Patient is a 58 y.o. female established patient who presents today for the following.      Patient is a 58-year-old female with a complex past medical history including methamphetamine use disorder, arterial thrombosis of upper extremity, recent cerebrovascular accident, major depressive disorder.  Patient recently had a stroke on 5-13 involving the right frontal lobe middle cerebral artery distribution, following her stroke she notices while she is driving or watching TV or reading a book at times she is noting she has double vision which is transient and improves when she patches 1 eye.  Patient denies any painful eye movements, states while she is not doing his activities she is able to see fine.  Patient states she has been compliant with her medications.  Patient denies any other complaints including fevers, chills, lightheadedness, headaches, lightheadedness, chest pain, abdominal pain, nausea, vomiting.  Patient states that she has been dealing with the loss of her son and was tearful.  Denies any thoughts of depression.  Patient states she needs to get her teeth done and is asking for clearance regarding her internal carotid aneurysms.    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)     Arthritis     osteo- right hip    ASTHMA     Bronchitis     Cancer (HCC) 10 years ago    pre cancerous s/p leep    Carcinoma in situ of respiratory system     per pt early 70's, early 80's    Dental disorder     full dentures    DEPRESSION     Heart burn     Hypertension     Hyponatremia 2/20/2022    Indigestion     Other specified symptom associated with female genital organs     see cat scan    Personal history of venous thrombosis and embolism     Pneumonia 01/1996    Sepsis (HCC) 2/20/2022        Patient Active Problem List    Diagnosis Date Noted    Prediabetes 05/25/2023    Internal carotid aneurysm 05/18/2023    History of CVA (cerebrovascular accident) 05/18/2023    Dizziness 05/17/2023    Recent cerebrovascular accident (CVA) 05/17/2023    TIA (transient ischemic attack) 05/17/2023    Acute CVA (cerebrovascular accident) (HCC) 05/13/2023    Advanced care planning/counseling discussion 02/28/2022    Extrinsic asthma without complication 02/28/2022    Stricture of artery (HCC) 11/18/2015    Intermittent venous claudication of upper extremity 11/18/2015    Atherosclerosis of native arteries of extremities with intermittent claudication, other extremity (HCC) 11/11/2015    Claudication (HCC) 11/05/2015    History of alcohol abuse 01/22/2015    Osteoarthrosis 01/22/2015    History of female genital system disorder 01/22/2015    Chronic pain 07/17/2013    Dyslipidemia 07/17/2013    Drug use 07/17/2013    Arterial stenosis (HCC) 07/16/2013    Chronic arm pain 02/22/2013    Arterial insufficiency (HCC) 02/22/2013    Pelvic pain 02/22/2013    HTN (hypertension) 02/22/2013       Allergies: Alcohol and Trazodone    Current Outpatient Medications   Medication Sig Dispense Refill    aspirin (ASA) 81 MG Chew Tab chewable tablet Chew 81 mg every day.      lisinopril (PRINIVIL) 10 MG Tab Take 1 Tablet by mouth every day. 30 Tablet 3    atorvastatin (LIPITOR) 40 MG Tab Take 1 Tablet by mouth every day. 30 Tablet 3    amLODIPine (NORVASC) 10 MG Tab Take 1 Tablet by mouth every day. 30 Tablet 3    albuterol 108 (90 Base) MCG/ACT Aero Soln inhalation aerosol Inhale 2 Puffs every 6 hours as needed for Shortness of Breath. 8.5 g 3    montelukast (SINGULAIR) 10 MG Tab Take 1 Tablet by mouth every day. 30 Tablet 2     No current facility-administered medications for this visit.       Social History     Tobacco Use    Smoking status: Former     Types: Cigarettes    Smokeless tobacco: Never    Tobacco comments:     1 cyndy schumacher  "day for 6 yrs, Quit 1998   Vaping Use    Vaping Use: Some days    Substances: THC    Devices: Pre-filled or refillable cartridge   Substance Use Topics    Alcohol use: No     Comment: pt stated that she used to be alcoholic 20 years ago    Drug use: Not Currently     Types: Marijuana, Oral     Comment: THC, sober from other drugs       Family History   Problem Relation Age of Onset    Stroke Mother     Lung Disease Neg Hx     Cancer Neg Hx     Diabetes Neg Hx     Heart Disease Neg Hx          Review of Systems   Review of Systems   Constitutional:  Negative for chills and fever.   HENT:  Negative for ear pain and tinnitus.    Eyes:  Positive for blurred vision and double vision.   Respiratory:  Negative for sputum production, shortness of breath and wheezing.    Cardiovascular:  Negative for chest pain, palpitations and orthopnea.   Gastrointestinal:  Negative for abdominal pain, diarrhea and vomiting.   Genitourinary:  Negative for dysuria and urgency.   Neurological:  Positive for dizziness. Negative for tremors, sensory change and speech change.   Psychiatric/Behavioral:  Negative for depression.        Exam:  /72 (BP Location: Left arm, Patient Position: Sitting, BP Cuff Size: Adult long)   Pulse 85   Temp 36.4 °C (97.6 °F) (Temporal)   Ht 1.6 m (5' 3\")   Wt 66 kg (145 lb 6.4 oz)   SpO2 97%  Body mass index is 25.76 kg/m².    Constitutional:  Not in acute distress, well appearing.  HEENT:   NC/AT  Cardiovascular: Regular rate and rhythm. No murmurs or gallops.      Lungs:   Clear to auscultation bilaterally. No wheezes or crackles. No respiratory distress.  Abdomen: Not distended, soft, not tender. No guarding or rigidity. No masses.  Extremities:  No cyanosis/clubbing/edema. No obvious deformities.  Skin:  Warm and dry.  No visible rashes.  Neurologic: Alert & oriented x 3, CN II-XII grossly intact, strength and sensation grossly intact.  No focal deficits noted.  Psychiatric:  Affect normal, mood " normal, judgment normal.    Assessment/Plan:     1. Double vision  Had thalamic infarcts, recent MCA distribution infarct.  I suspect there was a involvement with the ophthalmic tract which could be contributing to her double vision, she could possibly also have a palsy.  Patient states she has an upcoming appointment with neuro-ophthalmology.  - Referral to Ophthalmology    2. Encounter for screening mammogram for breast cancer  - MA-SCREENING MAMMO BILAT W/TOMOSYNTHESIS W/CAD; Future    3.  Recent cerebrovascular accident  Patient is on goal-directed medical therapy with aspirin, statin  Follow-up with vascular surgery      All imaging results and lab results and consult notes are reviewed at this visit.  Followup: Return in about 2 months (around 9/5/2023).    Please note that this dictation was created using voice recognition software. I have made every reasonable attempt to correct obvious errors, but I expect that there are errors of grammar and possibly content that I did not discover before finalizing the note.    Boni Oneil, DO  PGY-1  Internal Medicine

## 2023-07-19 ENCOUNTER — APPOINTMENT (OUTPATIENT)
Dept: RADIOLOGY | Facility: MEDICAL CENTER | Age: 58
End: 2023-07-19
Payer: COMMERCIAL

## 2023-07-19 DIAGNOSIS — Z12.31 ENCOUNTER FOR SCREENING MAMMOGRAM FOR BREAST CANCER: ICD-10-CM

## 2023-07-19 PROCEDURE — 77063 BREAST TOMOSYNTHESIS BI: CPT

## 2023-07-21 ENCOUNTER — TELEPHONE (OUTPATIENT)
Dept: INTERNAL MEDICINE | Facility: OTHER | Age: 58
End: 2023-07-21
Payer: COMMERCIAL

## 2023-07-22 NOTE — TELEPHONE ENCOUNTER
Attempted to contact patient regarding abnormal mammogram results. Patient did not  phone call. Follow up diagnostic mammogram and U/S ordered.

## 2023-07-24 ENCOUNTER — TELEPHONE (OUTPATIENT)
Dept: HOSPITALIST | Facility: MEDICAL CENTER | Age: 58
End: 2023-07-24
Payer: COMMERCIAL

## 2023-07-26 ENCOUNTER — TELEPHONE (OUTPATIENT)
Dept: INTERNAL MEDICINE | Facility: OTHER | Age: 58
End: 2023-07-26
Payer: COMMERCIAL

## 2023-07-26 NOTE — TELEPHONE ENCOUNTER
Patient notify [facility] does not take patient's insurance. I faxed orders to [facility] [phone] and phone number [phone].

## 2023-07-27 RX ORDER — AMLODIPINE BESYLATE 10 MG/1
10 TABLET ORAL DAILY
Qty: 30 TABLET | Refills: 0 | Status: SHIPPED | OUTPATIENT
Start: 2023-07-27 | End: 2023-10-10

## 2023-08-22 ENCOUNTER — OFFICE VISIT (OUTPATIENT)
Dept: OPHTHALMOLOGY | Facility: MEDICAL CENTER | Age: 58
End: 2023-08-22
Payer: COMMERCIAL

## 2023-08-22 DIAGNOSIS — I70.218 ATHEROSCLEROSIS OF NATIVE ARTERIES OF EXTREMITIES WITH INTERMITTENT CLAUDICATION, OTHER EXTREMITY (HCC): ICD-10-CM

## 2023-08-22 DIAGNOSIS — H40.003 GLAUCOMA SUSPECT OF BOTH EYES: ICD-10-CM

## 2023-08-22 DIAGNOSIS — H49.9 OPHTHALMOPLEGIA: ICD-10-CM

## 2023-08-22 DIAGNOSIS — H52.13 MYOPIA OF BOTH EYES: ICD-10-CM

## 2023-08-22 DIAGNOSIS — I63.9 ACUTE CVA (CEREBROVASCULAR ACCIDENT) (HCC): ICD-10-CM

## 2023-08-22 PROCEDURE — 99205 OFFICE O/P NEW HI 60 MIN: CPT | Mod: 25 | Performed by: OPHTHALMOLOGY

## 2023-08-22 PROCEDURE — 92060 SENSORIMOTOR EXAMINATION: CPT | Performed by: OPHTHALMOLOGY

## 2023-08-22 PROCEDURE — 92250 FUNDUS PHOTOGRAPHY W/I&R: CPT | Performed by: OPHTHALMOLOGY

## 2023-08-22 ASSESSMENT — CONF VISUAL FIELD
OD_SUPERIOR_NASAL_RESTRICTION: 0
OS_INFERIOR_TEMPORAL_RESTRICTION: 0
OS_SUPERIOR_NASAL_RESTRICTION: 0
OD_INFERIOR_NASAL_RESTRICTION: 0
OD_SUPERIOR_TEMPORAL_RESTRICTION: 0
OD_NORMAL: 1
OD_INFERIOR_TEMPORAL_RESTRICTION: 0
OS_INFERIOR_NASAL_RESTRICTION: 0
OS_SUPERIOR_TEMPORAL_RESTRICTION: 0
OS_NORMAL: 1

## 2023-08-22 ASSESSMENT — REFRACTION_WEARINGRX
OS_AXIS: 102
OD_CYLINDER: +1.25
OS_ADD: +1.75
OD_ADD: +1.75
OD_AXIS: 086
OS_CYLINDER: +0.75
SPECS_TYPE: TRIFOCAL
OS_SPHERE: -1.50
OD_SPHERE: -2.00

## 2023-08-22 ASSESSMENT — REFRACTION_MANIFEST
OS_AXIS: 098
OD_SPHERE: -1.75
OD_AXIS: 097
OS_CYLINDER: +1.00
OS_SPHERE: -1.75
OD_CYLINDER: +1.00
METHOD_AUTOREFRACTION: 1

## 2023-08-22 ASSESSMENT — VISUAL ACUITY
CORRECTION_TYPE: GLASSES
OS_CC: 20/20-2
OS_CC: J1+
OD_CC: 20/20-2
METHOD: SNELLEN - LINEAR
OD_CC: J1+

## 2023-08-22 ASSESSMENT — ENCOUNTER SYMPTOMS
BLURRED VISION: 1
DOUBLE VISION: 1

## 2023-08-22 ASSESSMENT — EXTERNAL EXAM - LEFT EYE: OS_EXAM: NORMAL

## 2023-08-22 ASSESSMENT — SLIT LAMP EXAM - LIDS
COMMENTS: NORMAL
COMMENTS: NORMAL

## 2023-08-22 ASSESSMENT — TONOMETRY
OD_IOP_MMHG: 15
OS_IOP_MMHG: 16

## 2023-08-22 ASSESSMENT — CUP TO DISC RATIO
OD_RATIO: 0.6
OS_RATIO: 0.6

## 2023-08-22 ASSESSMENT — EXTERNAL EXAM - RIGHT EYE: OD_EXAM: NORMAL

## 2023-08-22 NOTE — PROGRESS NOTES
Peds/Neuro Ophthalmology:   Epi Donis M.D.    Date & Time note created:    8/22/2023   12:15 PM     Referring MD / APRN:  Ken Taylor M.D., No att. providers found    Patient ID:  Name:             Roslyn Kilgore   YOB: 1965  Age:                 58 y.o.  female   MRN:               [id number]    Chief Complaint/Reason for Visit:     Diplopia      History of Present Illness:    Roslyn Kilgore is a 58 y.o. female   Patient referred for double vision post stroke which occurred 5-.Double vision has improved and also distance vision has improved since stroke.No headaches.        Review of Systems:  Review of Systems   Eyes:  Positive for blurred vision and double vision.   All other systems reviewed and are negative.      Past Medical History:   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)     Arthritis     osteo- right hip    ASTHMA     Bronchitis     Cancer (HCC) 10 years ago    pre cancerous s/p leep    Carcinoma in situ of respiratory system     per pt early 70's, early 80's    Dental disorder     full dentures    DEPRESSION     Heart burn     Hypertension     Hyponatremia 2/20/2022    Indigestion     Other specified symptom associated with female genital organs     see cat scan    Personal history of venous thrombosis and embolism     Pneumonia 01/1996    Sepsis (HCC) 2/20/2022       Past Surgical History:  Past Surgical History:   Procedure Laterality Date    ENDARTERECTOMY Right 11/18/2015    Procedure: ENDARTERECTOMY BRACHIAL ARTERY W/VEIN PATCH REPAIR;  Surgeon: Greta Wilhelm M.D.;  Location: SURGERY [facility];  Service:     DEBRIDEMENT Right 11/11/2015    Procedure: FINGER AMPUTATION 2ND;  Surgeon: Greta Wilhelm M.D.;  Location: SURGERY [facility];  Service:     ANGIOGRAM Right 11/5/2015    Procedure: ANGIOGRAM- ARM;  Surgeon: Greta Wilhelm M.D.;   Location: SURGERY [facility];  Service:     ANGIOPLASTY UPPER Right 11/5/2015    Procedure: ANGIOPLASTY UPPER;  Surgeon: Greta Wilhelm M.D.;  Location: SURGERY [facility];  Service:     THROMBECTOMY  1/9/2011    Performed by TREY TRAN at SURGERY [facility]    ANGIOGRAM  1/9/2011    Performed by TREY TRAN at SURGERY [facility]    HYSTEROSCOPY NOVASURE-2  9/27/2010    Performed by JEANIE DE LOS SANTOS at SURGERY SAME DAY [facility] ORS    MAMMOPLASTY REDUCTION  8/24/2010    Performed by SIRI MOORE at SURGERY [facility]    FEMORAL ARTERY REPAIR  6/28/2010    Performed by GRETA WILHELM at SURGERY [facility] ORS    ANGIOGRAM  4/15/2010    Performed by GRETA WILHELM at SURGERY [facility]    ANGIOPLASTY BALLOON  4/15/2010    Performed by GRETA WILHELM at SURGERY [facility]    ANGIOGRAM  2/2/2010    Performed by GRETA WILHELM at SURGERY [facility] ORS    THROMBECTOMY  12/21/2009    Performed by GRETA WILHELM at SURGERY [facility]    ANGIOGRAM  8/17/2009    Performed by GRETA WILHELM at SURGERY [facility] ORS    OTHER      MULTIPLE BYPASS SURGERIES TO RUE FOR CLOTS    OTHER      LUE CLOT REMOVAL    OTHER ORTHOPEDIC SURGERY      RIGHT HIP ARTHROPLASTY 2014    SC U/S LEIOMYOMATA ABLATE <200 CC      PRIMARY C SECTION      TUBAL LIGATION, ECTOPIC PREGNANCY       Current Outpatient Medications:  Current Outpatient Medications   Medication Sig Dispense Refill    amLODIPine (NORVASC) 10 MG Tab TAKE 1 TABLET BY MOUTH DAILY 30 Tablet 0    aspirin (ASA) 81 MG Chew Tab chewable tablet Chew 81 mg every day.      lisinopril (PRINIVIL) 10 MG Tab Take 1 Tablet by mouth every day. 30 Tablet 3    atorvastatin (LIPITOR) 40 MG Tab Take 1 Tablet by mouth every day. 30 Tablet 3    albuterol 108 (90 Base) MCG/ACT Aero Soln inhalation aerosol Inhale 2 Puffs every 6 hours as needed for Shortness of Breath. 8.5 g 3    montelukast (SINGULAIR) 10 MG Tab Take  1 Tablet by mouth every day. 30 Tablet 2     No current facility-administered medications for this visit.       Allergies:  Allergies   Allergen Reactions    Alcohol Unspecified     REQUESTS NO ALCOHOL CONTENT IN HER MEDICATIONS (per pt, pt is an alcoholic)    Trazodone Hives     Caused breat hyperplasia. Pt then underwent a breast reduction and attempted to take again, new reaction: Hives.       Family History:  Family History   Problem Relation Age of Onset    Stroke Mother     Lung Disease Neg Hx     Cancer Neg Hx     Diabetes Neg Hx     Heart Disease Neg Hx        Social History:  Social History     Socioeconomic History    Marital status:      Spouse name: Not on file    Number of children: Not on file    Years of education: Not on file    Highest education level: Not on file   Occupational History    Not on file   Tobacco Use    Smoking status: Former     Types: Cigarettes    Smokeless tobacco: Never    Tobacco comments:     1 pk a day for 6 yrs, Quit 1998   Vaping Use    Vaping Use: Some days    Substances: THC    Devices: Pre-filled or refillable cartridge   Substance and Sexual Activity    Alcohol use: No     Comment: pt stated that she used to be alcoholic 20 years ago    Drug use: Not Currently     Types: Marijuana, Oral     Comment: THC, sober from other drugs    Sexual activity: Yes     Partners: Male   Other Topics Concern    Not on file   Social History Narrative    retired     Social Determinants of Health     Financial Resource Strain: Not on file   Food Insecurity: Not on file   Transportation Needs: Not on file   Physical Activity: Not on file   Stress: Not on file   Social Connections: Not on file   Intimate Partner Violence: Not on file   Housing Stability: Not on file          Physical Exam:  Physical Exam    Oriented x 3  Weight/BMI: There is no height or weight on file to calculate BMI.  There were no vitals taken for this visit.    Base Eye Exam       Visual Acuity (Snellen - Linear)          Right Left    Dist cc 20/20-2 20/20-2    Near cc J1+ J1+      Correction: Glasses              Tonometry (i care, 9:41 AM)         Right Left    Pressure 15 16              Pupils         Pupils    Right PERRL    Left PERRL              Visual Fields         Right Left     Full Full              Neuro/Psych       Oriented x3: Yes    Mood/Affect: Normal                  Additional Tests       Color         Right Left    Ishihara 9/9 9/9              Stereo       Fly: -    Animals: 2/3    Circles: 2/9                  Strabismus Exam       Correction: cc      Distance Near Near +3DS N Bifocals                      0 0 0   0 0 0                      LH(T) 2 0  0  LH(T) 2 0  0  LH(T) 2                     0 0 0   0 0 0                       Slit Lamp and Fundus Exam       External Exam         Right Left    External Normal Normal              Slit Lamp Exam         Right Left    Lids/Lashes Normal Normal    Conjunctiva/Sclera White and quiet White and quiet    Cornea Clear Clear    Anterior Chamber Deep and quiet Deep and quiet    Iris Round and reactive Round and reactive    Lens Clear Clear    Vitreous Normal Normal              Fundus Exam         Right Left    Disc Normal Normal    C/D Ratio 0.6 0.6    Macula Normal Normal    Vessels Normal Normal    Periphery Normal Normal                  Refraction       Wearing Rx         Sphere Cylinder Axis Add    Right -2.00 +1.25 086 +1.75    Left -1.50 +0.75 102 +1.75      Age: 2yrs    Type: Trifocal              Manifest Refraction (Auto)         Sphere Cylinder Axis    Right -1.75 +1.00 097    Left -1.75 +1.00 098                    Pertinent Lab/Test/Imaging Review:      Assessment and Plan:     Ophthalmoplegia  8/22/2023-mild right supranuclear elevation deficits secondary to right thalamic stroke.  Vertical deviation was best picked up on Meyers asad testing.  Placed to base down press on prism over the left lens.  Will monitor.  If no continued improvement then  can grind and prism if helpful    Acute CVA (cerebrovascular accident) (HCC)  8/22/2023-right thalamic stroke.  Most likely this led to a right supranuclear vertical gaze deficit.    Atherosclerosis of native arteries of extremities with intermittent claudication, other extremity ([facility])  8/22/2023-given the extent of her strokes as well as peripheral atherosclerosis, her retinal vasculature is relatively normal    Glaucoma suspect of both eyes  822 2023-0.6 cups OU.  No segmental cupping.  OCT neurofibrillary thickness 97 OD 96 OS    Myopia of both eyes  8/22/2023-seeing well with current Rx    60 minutes total time spent.  This included extensive sensorimotor evaluation with Meyers testing and prism adaptation testing.  Reviewing notes in epic regarding recent strokes including MRI images, counseling patient on findings, formulating note in epic.    Epi Donis M.D.

## 2023-08-22 NOTE — ASSESSMENT & PLAN NOTE
8/22/2023-mild right supranuclear elevation deficits secondary to right thalamic stroke.  Vertical deviation was best picked up on Meyers asad testing.  Placed to base down press on prism over the left lens.  Will monitor.  If no continued improvement then can grind and prism if helpful

## 2023-08-22 NOTE — ASSESSMENT & PLAN NOTE
8/22/2023-given the extent of her strokes as well as peripheral atherosclerosis, her retinal vasculature is relatively normal

## 2023-08-22 NOTE — ASSESSMENT & PLAN NOTE
8/22/2023-right thalamic stroke.  Most likely this led to a right supranuclear vertical gaze deficit.

## 2023-08-29 ENCOUNTER — OFFICE VISIT (OUTPATIENT)
Dept: INTERNAL MEDICINE | Facility: OTHER | Age: 58
End: 2023-08-29
Payer: COMMERCIAL

## 2023-08-29 VITALS
WEIGHT: 144.8 LBS | HEIGHT: 63 IN | OXYGEN SATURATION: 94 % | HEART RATE: 72 BPM | SYSTOLIC BLOOD PRESSURE: 128 MMHG | BODY MASS INDEX: 25.66 KG/M2 | DIASTOLIC BLOOD PRESSURE: 76 MMHG | TEMPERATURE: 97.7 F

## 2023-08-29 DIAGNOSIS — Z86.73 RECENT CEREBROVASCULAR ACCIDENT (CVA): ICD-10-CM

## 2023-08-29 DIAGNOSIS — H49.9 OPHTHALMOPLEGIA: ICD-10-CM

## 2023-08-29 DIAGNOSIS — I10 PRIMARY HYPERTENSION: ICD-10-CM

## 2023-08-29 DIAGNOSIS — I65.22 MORE THAN 50 PERCENT STENOSIS OF LEFT INTERNAL CAROTID ARTERY: ICD-10-CM

## 2023-08-29 DIAGNOSIS — I67.1 INTRACRANIAL ANEURYSM: ICD-10-CM

## 2023-08-29 PROCEDURE — 3078F DIAST BP <80 MM HG: CPT | Performed by: INTERNAL MEDICINE

## 2023-08-29 PROCEDURE — 3074F SYST BP LT 130 MM HG: CPT | Performed by: INTERNAL MEDICINE

## 2023-08-29 PROCEDURE — 99214 OFFICE O/P EST MOD 30 MIN: CPT | Performed by: INTERNAL MEDICINE

## 2023-08-29 RX ORDER — LOSARTAN POTASSIUM 25 MG/1
25 TABLET ORAL DAILY
Qty: 30 TABLET | Refills: 2 | Status: ON HOLD | OUTPATIENT
Start: 2023-08-29 | End: 2023-11-06

## 2023-08-29 ASSESSMENT — FIBROSIS 4 INDEX: FIB4 SCORE: 0.85

## 2023-08-29 NOTE — PROGRESS NOTES
8/29/2023  Chief Complaint   Patient presents with    Follow-Up     Pap smear    Medication Management     Lisinopril is giving her a reaction (tickly throat)       HISTORY OF PRESENT ILLNESS: Patient is a 58 y.o. female established patient who presents today for follow-up.    Appointment initially schedule for pap smear, however, on review of records patient with complete hysterectomy done in 2014, complicated by vaginal stricture and atresia. Patient reports that her right fallopian tube and ovary was removed in her 20s due to an ectopic pregnancy. When the patient was   At the age of 18 yo, she had a ectopic pregnancy with removal of the right fallopian tube and ovarian. At 35 yo, she was placed on anticoagulation due to upper extremity emboli with worsening menorrhagia. Patient underwent an ablation with improvement in bleeding, however, has immense pelvic pain/contractions. It was recommend that she have a hysterectomy, which was done in 2014. Patient is certain her cervix was removed.    Today, patient also complains of a cough/tickle in her throat since starting Lisinopril in May. Drinking water helps, but is bothersome. She has been checking her blood pressure at home. Home BP readings 110-130/70s. Denies any dizziness, light-headedness or chest pains.    Patient with acute CVA in May, was found to have acute right thalamic stroke. Reports that overall she is doing well. Has seen Opthalmology and was diagnosed with ophthalmoplegia and sticker prism was placed on her left lens which has improved her double vision. She has seen Neurology and Vascular Surgery. Patient reports that she has not been contacted to follow-up with Neuro IR for intracranial aneurysm noted on CTA. She denies any new symptoms.     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)     Arthritis     osteo- right hip    ASTHMA     Bronchitis     Cancer  ([facility]) 10 years ago    pre cancerous s/p leep    Carcinoma in situ of respiratory system     per pt early 70's, early 80's    Dental disorder     full dentures    DEPRESSION     Heart burn     Hypertension     Hyponatremia 2/20/2022    Indigestion     Other specified symptom associated with female genital organs     see cat scan    Personal history of venous thrombosis and embolism     Pneumonia 01/1996    Sepsis ([facility]) 2/20/2022       Patient Active Problem List    Diagnosis Date Noted    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    Glaucoma suspect of both eyes 08/22/2023    Myopia of both eyes 08/22/2023    Prediabetes 05/25/2023    Internal carotid aneurysm 05/18/2023    History of CVA (cerebrovascular accident) 05/18/2023    Dizziness 05/17/2023    Recent cerebrovascular accident (CVA) 05/17/2023    TIA (transient ischemic attack) 05/17/2023    Acute CVA (cerebrovascular accident) ([facility]) 05/13/2023    Advanced care planning/counseling discussion 02/28/2022    Extrinsic asthma without complication 02/28/2022    Stricture of artery ([facility]) 11/18/2015    Intermittent venous claudication of upper extremity 11/18/2015    Atherosclerosis of native arteries of extremities with intermittent claudication, other extremity ([facility]) 11/11/2015    Claudication ([facility]) 11/05/2015    History of alcohol abuse 01/22/2015    Osteoarthrosis 01/22/2015    History of female genital system disorder 01/22/2015    Chronic pain 07/17/2013    Dyslipidemia 07/17/2013    Drug use 07/17/2013    Arterial stenosis (HCC) 07/16/2013    Chronic arm pain 02/22/2013    Arterial insufficiency (HCC) 02/22/2013    Pelvic pain 02/22/2013    HTN (hypertension) 02/22/2013       Allergies:Alcohol and Trazodone    Current Outpatient Medications   Medication Sig Dispense Refill    losartan (COZAAR) 25 MG Tab Take 1 Tablet by mouth every day. 30 Tablet 2    amLODIPine (NORVASC) 10 MG Tab TAKE 1 TABLET BY MOUTH DAILY 30 Tablet 0    aspirin (ASA) 81 MG Chew Tab chewable tablet Chew  "81 mg every day.      atorvastatin (LIPITOR) 40 MG Tab Take 1 Tablet by mouth every day. 30 Tablet 3    albuterol 108 (90 Base) MCG/ACT Aero Soln inhalation aerosol Inhale 2 Puffs every 6 hours as needed for Shortness of Breath. 8.5 g 3    montelukast (SINGULAIR) 10 MG Tab Take 1 Tablet by mouth every day. 30 Tablet 2     No current facility-administered medications for this visit.       Social History     Tobacco Use    Smoking status: Former     Types: Cigarettes    Smokeless tobacco: Never    Tobacco comments:     1 pk a day for 6 yrs, Quit 1998   Vaping Use    Vaping Use: Some days    Substances: THC    Devices: Pre-filled or refillable cartridge   Substance Use Topics    Alcohol use: No     Comment: pt stated that she used to be alcoholic 20 years ago    Drug use: Not Currently     Types: Marijuana, Oral     Comment: THC, sober from other drugs       Family History   Problem Relation Age of Onset    Stroke Mother     Lung Disease Neg Hx     Cancer Neg Hx     Diabetes Neg Hx     Heart Disease Neg Hx          Review of Systems:   Constitutional: Negative for chills and fever.   HENT: Negative for ear and eye pain.     Eyes: Negative for discharge and redness.   Respiratory: Negative for hemoptysis  Cardiovascular: Negative for chest pain and leg swelling.   Gastrointestinal: Negative for abdominal pain, nausea and vomiting.   Musculoskeletal: Negative for myalgias  Skin: Negative for itching and rash.   Neurological: Negative for dizziness, seizures, loss of consciousness and headaches.   Endo/Heme/Allergies: Does not bruise/bleed easily.   All systems reviewed and are negative except as mention in HPI    Exam:  /76 (BP Location: Left arm, Patient Position: Sitting, BP Cuff Size: Adult)   Pulse 72   Temp 36.5 °C (97.7 °F) (Temporal)   Ht 1.595 m (5' 2.8\")   Wt 65.7 kg (144 lb 12.8 oz)   SpO2 94%  Body mass index is 25.82 kg/m².  Constitutional:  NAD, well appearing.  HEENT:   Normcephalic, atraumatic, " no eye redness, pupils equal  Cardiovascular: RRR.   No murmurs, rubs, gallops. No carotid bruits.       Lungs:   CTAB, no wheezes, rales or rhonchi, no respiratory distress.  Abdomen: Soft, non-tender, non-distended  Extremities:  No clubbing, cyanosis or edema  Skin:  Warm and dry.    Neurologic: Alert & oriented x 3, No focal deficits noted.  Psychiatric:  Affect normal, mood normal, judgment normal.    Assessment/Plan:     1. Primary hypertension  Controlled, continue Amlodipine, will transition to Losartan 25mg daily given side effect of cough. Stop Lisinopril.  Patient advised to check blood pressure daily and keep a log.  - losartan (COZAAR) 25 MG Tab; Take 1 Tablet by mouth every day.  Dispense: 30 Tablet; Refill: 2    2. Intracranial aneurysm  Noted on CTA, per discharge summary patient to follow-up with NeuroIR for evaluation.   Referral placed.  - Referral to Interventional Radiology    3. Recent CVA  Hospitalized for 5/2023, noted to have acute right thalamic CVA.   Followed by Neurology.  Continue current medication.    4. Ophthalmoplegia  Evaluated by Ophthalmology, sticker prism placed on left lens with improvement in double vision.  All imaging results and lab results and consult notes are reviewed at this visit.    5. More than 50% stenosis of internal carotid artery  60% stenosis, seen by Vascular surgery, no treatment indicated at this time  Continue statin and ASA    Followup: 3 months    My total time spent caring for the patient on the day of the encounter was 35 minutes.   This does not include time spent on separately billable procedures/tests.

## 2023-08-30 PROBLEM — I65.22 MORE THAN 50 PERCENT STENOSIS OF LEFT INTERNAL CAROTID ARTERY: Status: ACTIVE | Noted: 2023-08-30

## 2023-08-31 ENCOUNTER — TELEPHONE (OUTPATIENT)
Dept: INTERNAL MEDICINE | Facility: OTHER | Age: 58
End: 2023-08-31
Payer: COMMERCIAL

## 2023-08-31 NOTE — TELEPHONE ENCOUNTER
Caller Name: Roslyn   Call Back Number: [phone]    How would the patient prefer to be contacted with a response: Phone call OK to leave a detailed message    Patient went on vacation and for she forgot her medication. She was asking if Dr Taylor was able to send a 5 day rx to a pharmacy in [place]. I did explain that our providers weren't able to prescribed outside of [place].

## 2023-09-19 ENCOUNTER — HOSPITAL ENCOUNTER (OUTPATIENT)
Dept: RADIOLOGY | Facility: MEDICAL CENTER | Age: 58
End: 2023-09-19
Payer: COMMERCIAL

## 2023-09-20 NOTE — PROGRESS NOTES
Neuro Interventional Service Consultation      Re: Roslyn Kilgore     MRN: [id number]   : 1965    Roslyn Kilgore was referred to our service by Rocío Voss DO. She is a 58 y.o. female seen in clinic for evaluation and possible intracranial aneurysm intervention. She is also under the care of Greta Wilhelm MD, Epi Donis MD, and Nancy SINGH,    History of Present Illness:   has a history of arterial emboli requiring multiple angiographic interventions. She also describes hemorrhagic events while taking blood thinners. In May 2023, she presented to [facility] with a stroke and hypertension and symptoms of imbalance, slurred speech, and a facial droop. She also describes a concurrent allergic reaction to environmental triggers. Imaging revealed right MCA distribution and right thalamic infarcts and incidental bilateral ICA aneurysms and a basilar artery aneurysm and the patient has been referred to the Neuro Interventional Service for evaluation and management of this finding.     She is seen today for review of imaging studies and discussion of possible intervention for multiple intracranial aneurysms. Today, the patient reports no symptoms from her aneurysms. She has mostly recovered from her stroke and denies left side weakness. She has double vision managed well with prism glasses. She has regular follow up with her vascular surgeon. There is a family history of mother who  from brain aneurysm at age 40 and she has anxiety over the findings of aneurysm on her scan. Blood pressure is controlled with medications. She quit smoking. She uses methamphetamine on rare occasion. The patient has had anesthesia in the past and reports no problems or aftereffects. The patient has taken DAPT/ warfarin in the past and reports major bleeding with hematomas. She is averse to taking blood thinners for that reason but is agreeable to taking clopidogrel. She currently takes  aspirin 81 mg daily. She has upcoming dental work planned in the next couple of weeks and is planning to have a cardiac monitor placed for evaluation for palpitations. 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 She is unaccompanied by a support person to today's consultation.    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)     Arthritis     osteo- right hip    ASTHMA     Bronchitis     Cancer (HCC) 10 years ago    pre cancerous s/p leep    Carcinoma in situ of respiratory system     per pt early 70's, early 80's    Dental disorder     full dentures    DEPRESSION     Heart burn     Hypertension     Hyponatremia 2/20/2022    Indigestion     Other specified symptom associated with female genital organs     see cat scan    Personal history of venous thrombosis and embolism     Pneumonia 01/1996    Sepsis (HCC) 2/20/2022     Past Surgical History:   Procedure Laterality Date    ENDARTERECTOMY Right 11/18/2015    Procedure: ENDARTERECTOMY BRACHIAL ARTERY W/VEIN PATCH REPAIR;  Surgeon: Greta Wilhelm M.D.;  Location: SURGERY [facility];  Service:     DEBRIDEMENT Right 11/11/2015    Procedure: FINGER AMPUTATION 2ND;  Surgeon: Greta Wilhelm M.D.;  Location: SURGERY [facility];  Service:     ANGIOGRAM Right 11/5/2015    Procedure: ANGIOGRAM- ARM;  Surgeon: Greta Wilhelm M.D.;  Location: SURGERY [facility];  Service:     ANGIOPLASTY UPPER Right 11/5/2015    Procedure: ANGIOPLASTY UPPER;  Surgeon: Greta Wilhelm M.D.;  Location: [facility];  Service:     THROMBECTOMY  1/9/2011    Performed by TREY TRAN at SURGERY [facility]    ANGIOGRAM  1/9/2011    Performed by TREY TRAN at SURGERY [facility]    HYSTEROSCOPY NOVASURE-2  9/27/2010    Performed by JEANIE DE LOS SANTOS at SURGERY SAME DAY [facility]    MAMMOPLASTY REDUCTION  8/24/2010    Performed by  SIRI MOORE at SURGERY [facility] ORS    FEMORAL ARTERY REPAIR  6/28/2010    Performed by JACK SOLO at SURGERY [facility]S    ANGIOGRAM  4/15/2010    Performed by JACK SOLO at SURGERY [facility] ORS    ANGIOPLASTY BALLOON  4/15/2010    Performed by JACK SOLO at SURGERY [facility] ORS    ANGIOGRAM  2/2/2010    Performed by JACK SOLO at SURGERY [facility] ORS    THROMBECTOMY  12/21/2009    Performed by JACK SOLO at SURGERY [facility] ORS    ANGIOGRAM  8/17/2009    Performed by JACK SOLO at SURGERY [facility] ORS    OTHER      MULTIPLE BYPASS SURGERIES TO RUE FOR CLOTS    OTHER      LUE CLOT REMOVAL    OTHER ORTHOPEDIC SURGERY      RIGHT HIP ARTHROPLASTY 2014    WA U/S LEIOMYOMATA ABLATE <200 CC      PRIMARY C SECTION      TUBAL LIGATION, ECTOPIC PREGNANCY     Social History     Socioeconomic History    Marital status:      Spouse name: Not on file    Number of children: Not on file    Years of education: Not on file    Highest education level: Not on file   Occupational History    Not on file   Tobacco Use    Smoking status: Former     Types: Cigarettes    Smokeless tobacco: Never    Tobacco comments:     1 pk a day for 6 yrs, Quit 1998   Vaping Use    Vaping Use: Some days    Substances: THC    Devices: Pre-filled or refillable cartridge   Substance and Sexual Activity    Alcohol use: No     Comment: pt stated that she used to be alcoholic 20 years ago    Drug use: Not Currently     Types: Marijuana, Oral     Comment: THC, sober from other drugs    Sexual activity: Yes     Partners: Male   Other Topics Concern    Not on file   Social History Narrative    retired     Social Determinants of Health     Financial Resource Strain: Not on file   Food Insecurity: Not on file   Transportation Needs: Not on file   Physical Activity: Not on file   Stress: Not on file   Social Connections: Not on file   Intimate Partner Violence: Not on file   Housing  Stability: Not on file     Family History   Problem Relation Age of Onset    Stroke Mother     Lung Disease Neg Hx     Cancer Neg Hx     Diabetes Neg Hx     Heart Disease Neg Hx        Review of Systems   Constitutional: Negative.  Negative for chills, diaphoresis, fever, malaise/fatigue and weight loss.   Eyes:  Positive for double vision.   Respiratory: Negative.     Cardiovascular: Negative.    Skin: Negative.    Neurological:  Negative for sensory change, speech change, focal weakness and weakness.   Endo/Heme/Allergies:         Positive for prior hemorrhage on anticoagulation   Psychiatric/Behavioral:  Positive for substance abuse (negative for alcohol and tobacco, positive for marijuana and occasional methamphetamine). The patient is nervous/anxious.      A comprehensive 14-point review of systems was negative except as described above.     Labs:    Latest Reference Range & Units Most Recent   WBC 4.8 - 10.8 K/uL 7.2  5/18/23 00:31   RBC 4.20 - 5.40 M/uL 5.41 (H)  5/18/23 00:31   Hemoglobin 12.0 - 16.0 g/dL 15.7  5/18/23 00:31   Hematocrit 37.0 - 47.0 % 47.8 (H)  5/18/23 00:31   MCV 81.4 - 97.8 fL 88.4  5/18/23 00:31   MCH 27.0 - 33.0 pg 29.0  5/18/23 00:31   MCHC 33.6 - 35.0 g/dL 32.8 (L)  5/18/23 00:31   RDW 35.9 - 50.0 fL 44.5  5/18/23 00:31   Platelet Count 164 - 446 K/uL 316  5/18/23 00:31   MPV 9.0 - 12.9 fL 9.2  5/18/23 00:31   Neutrophils-Polys 44.00 - 72.00 % 49.80  5/17/23 11:47   Neutrophils (Absolute) 2.00 - 7.15 K/uL 3.45  5/17/23 11:47   Lymphocytes 22.00 - 41.00 % 29.20  5/17/23 11:47   Lymphs (Absolute) 1.00 - 4.80 K/uL 2.02  5/17/23 11:47   Monocytes 0.00 - 13.40 % 11.10  5/17/23 11:47   Monos (Absolute) 0.00 - 0.85 K/uL 0.77  5/17/23 11:47   Eosinophils 0.00 - 6.90 % 8.40 (H)  5/17/23 11:47   Eos (Absolute) 0.00 - 0.51 K/uL 0.58 (H)  5/17/23 11:47   Basophils 0.00 - 1.80 % 1.20  5/17/23 11:47   Baso (Absolute) 0.00 - 0.12 K/uL 0.08  5/17/23 11:47   Immature Granulocytes 0.00 - 0.90 %  0.30  5/17/23 11:47   Immature Granulocytes (abs) 0.00 - 0.11 K/uL 0.02  5/17/23 11:47   Nucleated RBC /100 WBC 0.00  5/17/23 11:47   NRBC (Absolute) K/uL 0.00  5/17/23 11:47   Sodium 135 - 145 mmol/L 140  5/18/23 00:31   Potassium 3.6 - 5.5 mmol/L 4.1  5/18/23 00:31   Chloride 96 - 112 mmol/L 107  5/18/23 00:31   Co2 20 - 33 mmol/L 23  5/18/23 00:31   Anion Gap 7.0 - 16.0  10.0  5/18/23 00:31   Glucose 65 - 99 mg/dL 104 (H)  5/18/23 00:31   Bun 8 - 22 mg/dL 21  5/18/23 00:31   Creatinine 0.50 - 1.40 mg/dL 1.07  5/18/23 00:31   GFR (CKD-EPI) >60 mL/min/1.73 m 2 60  5/18/23 00:31   GFR If African American >60 mL/min/1.73 m 2 >60  2/22/22 11:54   GFR If Non African American >60 mL/min/1.73 m 2 >60  2/22/22 11:54   Calcium 8.5 - 10.5 mg/dL 8.4 (L)  5/18/23 00:31   Correct Calcium 8.5 - 10.5 mg/dL 8.8  5/18/23 00:31   AST(SGOT) 12 - 45 U/L 17  5/18/23 00:31   ALT(SGPT) 2 - 50 U/L 14  5/18/23 00:31   Alkaline Phosphatase 30 - 99 U/L 97  5/18/23 00:31   Total Bilirubin 0.1 - 1.5 mg/dL 0.2  5/18/23 00:31   Albumin 3.2 - 4.9 g/dL 3.5  5/18/23 00:31   Total Protein 6.0 - 8.2 g/dL 6.3  5/18/23 00:31   Globulin 1.9 - 3.5 g/dL 2.8  5/18/23 00:31   A-G Ratio g/dL 1.3  5/18/23 00:31   Phosphorus 2.4 - 4.7 mg/dL 3.8 (E)  5/15/23 02:32   Lipase 11 - 82 U/L 22  2/22/22 11:54   Lactic Acid 0.5 - 2.0 mmol/L 1.5  2/20/22 13:33   Glycohemoglobin 4.0 - 5.6 % 5.9 (H)  5/17/23 12:13   Estim. Avg Glu mg/dL 123  5/17/23 12:13   Glom Filt Rate, Est >60 mL/min/1.7 66 (E)  5/15/23 02:32   Cholesterol,Tot 100 - 199 mg/dL 153  5/18/23 00:31   Triglycerides 0 - 149 mg/dL 110  5/18/23 00:31   HDL >=40 mg/dL 47  5/18/23 00:31   LDL <100 mg/dL 84  5/18/23 00:31   Troponin T 6 - 19 ng/L 11  5/17/23 11:47   NT-proBNP 0 - 125 pg/mL 551 (H)  2/19/22 21:42   (H): Data is abnormally high   Latest Reference Range & Units Most Recent   INR 0.87 - 1.13  0.92  5/17/23 11:47   PT 12.0 - 14.6 sec 12.3  5/17/23 11:47   APTT 24.7 - 36.0 sec 28.0  5/17/23 11:47    D-Dimer Screen 0.00 - 0.50 ug/mL (FEU) 1.78 (H)  2/19/22 23:51   (H): Data is abnormally high    Radiology:   MRI brain 5/18/23 at [facility]:  Acute right thalamic lacunar infarct.     Remote bilateral thalamic infarcts. Remote right frontal infarct.     Nonspecific T2 hyperintensities are noted in the periventricular and deep white matter, most likely related to chronic microvascular ischemia.    CT head w/o 5/17/23 at [facility]:  1.  Old bilateral thalamic infarcts and right frontal lobe infarct.  2.  No acute intracranial abnormality.     SYDNEY w/bubble study 5/14/23 at [facility]:  Interpretation Summary   There is mild concentric left ventricular hypertrophy. Left ventricular systolic function is normal.   The Ejection Fraction estimate is 55-60%     There is mild mitral regurgitation      MRI brain 5/14/23 at [facility]:  1.  Acute right thalamic nucleus infarction. Subacute or older posterior right   frontal ischemic change and infarction. Background periventricular white matter   disease chronically.   2.  Chronic left maxillary sinus disease.     CTA head and Neck 5/14/23 at [facility]:  1.  Evolving right frontal lobe MCA distribution infarct.   2.  Aneurysms involving both internal cerebral arteries and the basilar artery.   Subspecialty consultation is recommended.   3.  60 percent stenosis of the left common carotid artery   4.  White matter disease, likely related to chronic small vessel ischemia.   AGUSTIN CÁRDENAS was notified regarding these findings on 5/13/2023 8:18 PM.                Current Outpatient Medications   Medication Sig Dispense Refill    losartan (COZAAR) 25 MG Tab Take 1 Tablet by mouth every day. 30 Tablet 2    amLODIPine (NORVASC) 10 MG Tab TAKE 1 TABLET BY MOUTH DAILY 30 Tablet 0    aspirin (ASA) 81 MG Chew Tab chewable tablet Chew 81 mg every day.      atorvastatin (LIPITOR) 40 MG Tab Take 1 Tablet by mouth every day. 30 Tablet 3    albuterol 108 (90 Base) MCG/ACT Aero Soln inhalation aerosol  Inhale 2 Puffs every 6 hours as needed for Shortness of Breath. 8.5 g 3    montelukast (SINGULAIR) 10 MG Tab Take 1 Tablet by mouth every day. 30 Tablet 2     No current facility-administered medications for this visit.       Allergies   Allergen Reactions    Alcohol Unspecified     REQUESTS NO ALCOHOL CONTENT IN HER MEDICATIONS (per pt, pt is an alcoholic)    Trazodone Hives     Caused breat hyperplasia. Pt then underwent a breast reduction and attempted to take again, new reaction: Hives.       Physical Exam  Constitutional:       General: She is not in acute distress.     Appearance: She is not diaphoretic.   HENT:      Head: Normocephalic.   Eyes:      General: No scleral icterus.  Pulmonary:      Effort: Pulmonary effort is normal. No respiratory distress.   Abdominal:      General: There is no distension.   Musculoskeletal:         General: Deformity (rt index finger partial amputation) present.   Skin:     General: Skin is warm and dry.      Coloration: Skin is not pale.      Findings: No erythema or rash.   Neurological:      General: No focal deficit present.      Mental Status: She is alert and oriented to person, place, and time. She is not disoriented.      Cranial Nerves: No cranial nerve deficit or facial asymmetry.      Sensory: Sensation is intact.      Motor: No weakness or tremor.      Coordination: Coordination normal.      Gait: Gait is intact. Gait normal.   Psychiatric:         Mood and Affect: Mood and affect normal.         Behavior: Behavior normal.         Thought Content: Thought content normal.         Cognition and Memory: Memory normal.         Judgment: Judgment normal.         PHASES Aneurysm Risk Score:   Left PCOM: 5 (5 year rupture risk 1.3%)  Right ICA: 1 (5 year rupture risk 0.4%)  Basilar: too small to characterize    Impression:   1. Unruptured left posterior communicating artery aneurysm 4 mm in size.  2. Unruptured right internal carotid artery aneurysm.  3. Basilar artery  aneurysm.  4. Hypertension.  5. History of arterial emboli with arterial bypass bilateral upper extremities.  6. History of tobacco dependence, with cessation.  7. Methamphetamine use.    Plan:   Mauri Montes MD has reviewed 's history and imaging studies, examined the patient, and discussed treatment options.  has bilateral ICA aneurysms that are amenable to intervention, with the left PCOM aneurysm carrying the greater risk for potential rupture with PHASES score of 5. The right ICA aneurysm would likely need a flow diverter to repair with a PHASES score of 1. The basilar aneurysm is small and difficult to appreciate on CTA, and evaluation with conventional angiography is needed before recommendations can be made. Because of her family history, we recommend intervention of at least the left PCOM aneurysm. We discussed her previous vascular history that includes extensive interventions for revascularization of her upper extremities, as well as her hemorrhagic complications from blood thinners. She is agreeable to a course of clopidogrel if necessary to treat the aneurysms. Overall procedure risk is approximately 3%. We discussed the method of the procedure at length including the possibility of the use of stents or balloons to facilitate the procedure and associated risks of those devices. We additionally discussed the procedure risks, including bleeding and infection, damage to the arteries, reaction to any medications given during the procedure, side effects of contrast, radiation exposure, in stent stenosis, coil or stent migration, mechanical failure, stroke, hemorrhage, and death. There is a risk for unanticipated neurologic or non neurologic complications. There is a chance the aneurysm(s) may ultimately not be amenable to endovascular intervention. After the procedure, there is a chance that the aneurysm(s) could recur. We reviewed known risk factors for aneurysm recurrence that  include uncontrolled hypertension and tobacco and methamphetamine use, and modifiable risk factors were discussed. We explained that the patient will need to have ongoing surveillance imaging after the procedure, which will be managed by us, and that future treatment depends on multiple factors including lab studies, imaging, and performance status. We discussed alternatives to the procedure including surveillance  and surgical intervention, which could be discussed with a surgeon but these aneurysms are in difficult surgical locations.  The patient verbalizes understanding of risks, benefits, and alternatives and elects to proceed. We discussed the need for aspirin and Plavix prior to the procedure and for up to 6 months post procedure if a stent is placed. Side effects of antiplatelet therapy, specifically bleeding, were discussed with instructions given should the patient develop minor or major bleeding. Written pre- and post- procedure care instructions were provided. We discussed signs of a sentinel headache and stroke with instructions to call an ambulance for the onset of these symptoms. The patient has been scheduled for October 19 for diagnostic angiogram and repair of the left PCOM aneurysm.    Roslyn Kilgore is a 58 y.o. female scheduled for intracranial aneurysm repair. This surgery will be higher risk surgery due to risk of intracranial hemorrhage or thrombotic events. Care provided will be in the Intensive Care Unit. Post procedurally, this patient will require every 1 hour nursing interventions and physician evaluations more than 3-4 times a day. The care needed cannot be provided in outpatient setting and without such care there is high risk of development of complications and death.     FRANCISCO Cano with Mauri Montes MD  Neuro Interventional Service   [facility]   [id number]  [place] [id number]  [phone]

## 2023-09-22 ENCOUNTER — HOSPITAL ENCOUNTER (OUTPATIENT)
Dept: RADIOLOGY | Facility: MEDICAL CENTER | Age: 58
End: 2023-09-22
Attending: INTERNAL MEDICINE
Payer: COMMERCIAL

## 2023-09-22 DIAGNOSIS — I67.1 INTRACRANIAL ANEURYSM: ICD-10-CM

## 2023-09-22 RX ORDER — CLOPIDOGREL BISULFATE 75 MG/1
75 TABLET ORAL DAILY
Qty: 30 TABLET | Refills: 5 | Status: ON HOLD
Start: 2023-09-22 | End: 2023-10-20

## 2023-09-22 ASSESSMENT — ENCOUNTER SYMPTOMS
CHILLS: 0
FOCAL WEAKNESS: 0
CARDIOVASCULAR NEGATIVE: 1
WEAKNESS: 0
SENSORY CHANGE: 0
DIAPHORESIS: 0
SPEECH CHANGE: 0
WEIGHT LOSS: 0
RESPIRATORY NEGATIVE: 1
NERVOUS/ANXIOUS: 1
DOUBLE VISION: 1
FEVER: 0
CONSTITUTIONAL NEGATIVE: 1

## 2023-09-22 ASSESSMENT — LIFESTYLE VARIABLES: SUBSTANCE_ABUSE: 1

## 2023-10-05 ENCOUNTER — APPOINTMENT (OUTPATIENT)
Dept: ADMISSIONS | Facility: MEDICAL CENTER | Age: 58
DRG: 039 | End: 2023-10-05
Attending: RADIOLOGY
Payer: COMMERCIAL

## 2023-10-10 RX ORDER — AMLODIPINE BESYLATE 10 MG/1
10 TABLET ORAL DAILY
Qty: 30 TABLET | Refills: 0 | Status: SHIPPED | OUTPATIENT
Start: 2023-10-10 | End: 2023-11-09

## 2023-10-10 NOTE — TELEPHONE ENCOUNTER
Received request via: Pharmacy    Was the patient seen in the last year in this department? Yes    Does the patient have an active prescription (recently filled or refills available) for medication(s) requested?  yes    Does the patient have [facility] Plus and need 100 day supply (blood pressure, diabetes and cholesterol meds only)? Patient does not have SCP

## 2023-10-12 ENCOUNTER — PRE-ADMISSION TESTING (OUTPATIENT)
Dept: ADMISSIONS | Facility: MEDICAL CENTER | Age: 58
DRG: 039 | End: 2023-10-12
Attending: RADIOLOGY
Payer: COMMERCIAL

## 2023-10-13 ENCOUNTER — PRE-ADMISSION TESTING (OUTPATIENT)
Dept: ADMISSIONS | Facility: MEDICAL CENTER | Age: 58
DRG: 039 | End: 2023-10-13
Attending: RADIOLOGY
Payer: COMMERCIAL

## 2023-10-13 DIAGNOSIS — Z01.812 PRE-OPERATIVE LABORATORY EXAMINATION: ICD-10-CM

## 2023-10-13 LAB
ANION GAP SERPL CALC-SCNC: 8 MMOL/L (ref 7–16)
BUN SERPL-MCNC: 15 MG/DL (ref 8–22)
CALCIUM SERPL-MCNC: 9 MG/DL (ref 8.5–10.5)
CHLORIDE SERPL-SCNC: 105 MMOL/L (ref 96–112)
CO2 SERPL-SCNC: 28 MMOL/L (ref 20–33)
CREAT SERPL-MCNC: 0.96 MG/DL (ref 0.5–1.4)
ERYTHROCYTE [DISTWIDTH] IN BLOOD BY AUTOMATED COUNT: 43.7 FL (ref 35.9–50)
GFR SERPLBLD CREATININE-BSD FMLA CKD-EPI: 68 ML/MIN/1.73 M 2
GLUCOSE SERPL-MCNC: 86 MG/DL (ref 65–99)
HCT VFR BLD AUTO: 44.9 % (ref 37–47)
HGB BLD-MCNC: 14.7 G/DL (ref 12–16)
INR PPP: 0.96 (ref 0.87–1.13)
MCH RBC QN AUTO: 29 PG (ref 27–33)
MCHC RBC AUTO-ENTMCNC: 32.7 G/DL (ref 32.2–35.5)
MCV RBC AUTO: 88.6 FL (ref 81.4–97.8)
PLATELET # BLD AUTO: 335 K/UL (ref 164–446)
PMV BLD AUTO: 9.6 FL (ref 9–12.9)
POTASSIUM SERPL-SCNC: 4 MMOL/L (ref 3.6–5.5)
PROTHROMBIN TIME: 12.9 SEC (ref 12–14.6)
RBC # BLD AUTO: 5.07 M/UL (ref 4.2–5.4)
SODIUM SERPL-SCNC: 141 MMOL/L (ref 135–145)
WBC # BLD AUTO: 7 K/UL (ref 4.8–10.8)

## 2023-10-13 PROCEDURE — 85610 PROTHROMBIN TIME: CPT

## 2023-10-13 PROCEDURE — 85027 COMPLETE CBC AUTOMATED: CPT

## 2023-10-13 PROCEDURE — 80048 BASIC METABOLIC PNL TOTAL CA: CPT

## 2023-10-13 PROCEDURE — 36415 COLL VENOUS BLD VENIPUNCTURE: CPT

## 2023-10-17 ENCOUNTER — OFFICE VISIT (OUTPATIENT)
Dept: OPHTHALMOLOGY | Facility: MEDICAL CENTER | Age: 58
End: 2023-10-17
Payer: COMMERCIAL

## 2023-10-17 DIAGNOSIS — H49.9 OPHTHALMOPLEGIA: ICD-10-CM

## 2023-10-17 DIAGNOSIS — I70.218 ATHEROSCLEROSIS OF NATIVE ARTERIES OF EXTREMITIES WITH INTERMITTENT CLAUDICATION, OTHER EXTREMITY (HCC): ICD-10-CM

## 2023-10-17 DIAGNOSIS — H52.13 MYOPIA OF BOTH EYES: ICD-10-CM

## 2023-10-17 DIAGNOSIS — I63.9 ACUTE CVA (CEREBROVASCULAR ACCIDENT) (HCC): ICD-10-CM

## 2023-10-17 DIAGNOSIS — H40.003 GLAUCOMA SUSPECT OF BOTH EYES: ICD-10-CM

## 2023-10-17 PROCEDURE — 99213 OFFICE O/P EST LOW 20 MIN: CPT | Performed by: OPHTHALMOLOGY

## 2023-10-17 PROCEDURE — 92060 SENSORIMOTOR EXAMINATION: CPT | Performed by: OPHTHALMOLOGY

## 2023-10-17 ASSESSMENT — CONF VISUAL FIELD
OD_NORMAL: 1
OS_SUPERIOR_NASAL_RESTRICTION: 0
OS_INFERIOR_NASAL_RESTRICTION: 0
OD_INFERIOR_NASAL_RESTRICTION: 0
OD_INFERIOR_TEMPORAL_RESTRICTION: 0
OS_INFERIOR_TEMPORAL_RESTRICTION: 0
OD_SUPERIOR_NASAL_RESTRICTION: 0
OD_SUPERIOR_TEMPORAL_RESTRICTION: 0
OS_NORMAL: 1
OS_SUPERIOR_TEMPORAL_RESTRICTION: 0

## 2023-10-17 ASSESSMENT — TONOMETRY
OD_IOP_MMHG: 15
IOP_METHOD: I-CARE
OS_IOP_MMHG: 14

## 2023-10-17 ASSESSMENT — VISUAL ACUITY
OD_CC: 20/20
METHOD: SNELLEN - LINEAR
CORRECTION_TYPE: GLASSES
OS_CC: 20/20
OS_CC+: -1

## 2023-10-17 ASSESSMENT — REFRACTION_WEARINGRX
OD_AXIS: 090
OD_ADD: +2.00
OS_ADD: +2.00
OD_SPHERE: -2.25
OS_CYLINDER: +0.75
OD_CYLINDER: +1.50
OS_AXIS: 099
SPECS_TYPE: PAL
OS_SPHERE: -1.50

## 2023-10-17 ASSESSMENT — REFRACTION_MANIFEST
OS_CYLINDER: +0.75
OD_AXIS: 093
OS_SPHERE: -1.50
METHOD_AUTOREFRACTION: 1
OS_AXIS: 101
OD_CYLINDER: +0.75
OD_SPHERE: -1.50

## 2023-10-17 ASSESSMENT — EXTERNAL EXAM - RIGHT EYE: OD_EXAM: NORMAL

## 2023-10-17 ASSESSMENT — REFRACTION_FINALRX
OD_VPRISM: 1.0
OS_VPRISM: 1.0

## 2023-10-17 ASSESSMENT — EXTERNAL EXAM - LEFT EYE: OS_EXAM: NORMAL

## 2023-10-17 ASSESSMENT — CUP TO DISC RATIO
OS_RATIO: 0.6
OD_RATIO: 0.6

## 2023-10-17 ASSESSMENT — SLIT LAMP EXAM - LIDS
COMMENTS: NORMAL
COMMENTS: NORMAL

## 2023-10-17 NOTE — ASSESSMENT & PLAN NOTE
8/22/2023-0.6 cups OU.  No segmental cupping.  OCT neurofibrillary thickness 97 OD 96 OS'  10/18/2023-IOP stable

## 2023-10-17 NOTE — PROGRESS NOTES
Peds/Neuro Ophthalmology:   Epi Donis M.D.    Date & Time note created:    10/17/2023   1:59 PM     Referring MD / APRN:  Ken Taylor M.D., No att. providers found    Patient ID:  Name:             Roslyn Kilgore   YOB: 1965  Age:                 58 y.o.  female   MRN:               [id number]    Chief Complaint/Reason for Visit:     Other (8 week follow up for Ophthalmoplegia. )      History of Present Illness:    Roslyn Kilgore is a 58 y.o. female   8 week follow up for Ophthalmoplegia. Pt states vision has improved with the stick on prism. Pt wonders if she takes it off would her vision be good and not need it. Pt states she does have dry eyes. Does not use eye drops.         Review of Systems:  Review of Systems   Eyes:         Ophthalmoplegia   All other systems reviewed and are negative.      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)     Arthritis     osteo- right hip    ASTHMA     Bronchitis     Cancer (HCC) 10 years ago    pre cancerous s/p leep    Carcinoma in situ of respiratory system     per pt early 70's, early 80's    Dental disorder     full dentures    DEPRESSION     anxiety    Heart burn     High cholesterol     Hypertension     Hyponatremia 02/20/2022    Indigestion     Other specified symptom associated with female genital organs     see cat scan    Personal history of venous thrombosis and embolism     Pneumonia 01/1996    Sepsis (HCC) 02/20/2022    Stroke (HCC)     4 strokes, 3 aneurysms       Past Surgical History:  Past Surgical History:   Procedure Laterality Date    ENDARTERECTOMY Right 11/18/2015    Procedure: ENDARTERECTOMY BRACHIAL ARTERY W/VEIN PATCH REPAIR;  Surgeon: Greta Wilhelm M.D.;  Location: SURGERY [facility];  Service:     DEBRIDEMENT Right 11/11/2015     Procedure: FINGER AMPUTATION 2ND;  Surgeon: Greta Wilhelm M.D.;  Location: SURGERY [facility];  Service:     ANGIOGRAM Right 11/05/2015    Procedure: ANGIOGRAM- ARM;  Surgeon: Greta Wilhelm M.D.;  Location: SURGERY [facility];  Service:     ANGIOPLASTY UPPER Right 11/05/2015    Procedure: ANGIOPLASTY UPPER;  Surgeon: Greta Wilhelm M.D.;  Location: SURGERY [facility];  Service:     HYSTERECTOMY LAPAROSCOPY  2015    THROMBECTOMY  01/09/2011    Performed by TREY TRAN at SURGERY [facility]    ANGIOGRAM  01/09/2011    Performed by TREY TRAN at SURGERY [facility]    HYSTEROSCOPY NOVASURE-2  09/27/2010    Performed by JEANIE DE LOS SANTOS at SURGERY SAME DAY [facility] ORS    MAMMOPLASTY REDUCTION  08/24/2010    Performed by SIRI MOORE at SURGERY [facility] ORS    FEMORAL ARTERY REPAIR  06/28/2010    Performed by GRETA WILHELM at SURGERY [facility] ORS    ANGIOGRAM  04/15/2010    Performed by GRETA WILHELM at SURGERY [facility] ORS    ANGIOPLASTY BALLOON  04/15/2010    Performed by GRETA WILHELM at SURGERY [facility] ORS    ANGIOGRAM  02/02/2010    Performed by GRETA WILHELM at SURGERY [facility] ORS    THROMBECTOMY  12/21/2009    Performed by GRETA WILHELM at SURGERY [facility] ORS    ANGIOGRAM  08/17/2009    Performed by GRETA WILHELM at SURGERY [facility] ORS    OTHER      MULTIPLE BYPASS SURGERIES TO RUE FOR CLOTS    OTHER      LUE CLOT REMOVAL    OTHER ORTHOPEDIC SURGERY      RIGHT HIP ARTHROPLASTY 2014    OH U/S LEIOMYOMATA ABLATE <200 CC      PRIMARY C SECTION      TUBAL LIGATION, ECTOPIC PREGNANCY       Current Outpatient Medications:  Current Outpatient Medications   Medication Sig Dispense Refill    amLODIPine (NORVASC) 10 MG Tab TAKE 1 TABLET BY MOUTH DAILY 30 Tablet 0    clopidogrel (PLAVIX) 75 MG Tab Take 1 Tablet by mouth every day for 180 days. Start taking this medication 5 days prior to your scheduled procedure. 30  Tablet 5    losartan (COZAAR) 25 MG Tab Take 1 Tablet by mouth every day. 30 Tablet 2    aspirin (ASA) 81 MG Chew Tab chewable tablet Chew 81 mg every day.      atorvastatin (LIPITOR) 40 MG Tab Take 1 Tablet by mouth every day. 30 Tablet 3    albuterol 108 (90 Base) MCG/ACT Aero Soln inhalation aerosol Inhale 2 Puffs every 6 hours as needed for Shortness of Breath. 8.5 g 3    montelukast (SINGULAIR) 10 MG Tab Take 1 Tablet by mouth every day. 30 Tablet 2     No current facility-administered medications for this visit.       Allergies:  Allergies   Allergen Reactions    Alcohol Unspecified     REQUESTS NO ALCOHOL CONTENT IN HER MEDICATIONS (per pt, pt is an alcoholic)    Trazodone Hives     Caused breat hyperplasia. Pt then underwent a breast reduction and attempted to take again, new reaction: Hives.    Lisinopril Cough       Family History:  Family History   Problem Relation Age of Onset    Stroke Mother     Lung Disease Neg Hx     Cancer Neg Hx     Diabetes Neg Hx     Heart Disease Neg Hx        Social History:  Social History     Socioeconomic History    Marital status:      Spouse name: Not on file    Number of children: Not on file    Years of education: Not on file    Highest education level: Not on file   Occupational History    Not on file   Tobacco Use    Smoking status: Former     Current packs/day: 0.00     Average packs/day: 1 pack/day for 19.0 years (19.0 ttl pk-yrs)     Types: Cigarettes     Start date:      Quit date:      Years since quittin.8    Smokeless tobacco: Never    Tobacco comments:     1 pk a day for 6 yrs, Quit    Vaping Use    Vaping Use: Some days    Substances: THC    Devices: Pre-filled or refillable cartridge   Substance and Sexual Activity    Alcohol use: No     Comment: quit in     Drug use: Not Currently     Types: Marijuana, Oral     Comment: THC, sober from other drugs since     Sexual activity: Yes     Partners: Male   Other Topics Concern    Not  on file   Social History Narrative    retired     Social Determinants of Health     Financial Resource Strain: Not on file   Food Insecurity: Not on file   Transportation Needs: Not on file   Physical Activity: Not on file   Stress: Not on file   Social Connections: Not on file   Intimate Partner Violence: Not on file   Housing Stability: Not on file          Physical Exam:  Physical Exam    Oriented x 3  Weight/BMI: There is no height or weight on file to calculate BMI.  There were no vitals taken for this visit.    Base Eye Exam       Visual Acuity (Snellen - Linear)         Right Left    Dist cc 20/20 20/20 -1      Correction: Glasses              Tonometry (I-care, 1:14 PM)         Right Left    Pressure 15 14              Pupils         Pupils    Right PERRL    Left PERRL              Visual Fields         Right Left     Full Full              Extraocular Movement         Right Left     Full Full              Neuro/Psych       Oriented x3: Yes    Mood/Affect: Normal                  Strabismus Exam       Correction: cc      Distance Near Near +3DS N Bifocals                      0 0 0   0 0 0                      LH(T) 2 0  0  LH(T) 2 0  0  LH(T) 2                     0 0 0   0 0 0                       Slit Lamp and Fundus Exam       External Exam         Right Left    External Normal Normal              Slit Lamp Exam         Right Left    Lids/Lashes Normal Normal    Conjunctiva/Sclera White and quiet White and quiet    Cornea Clear Clear    Anterior Chamber Deep and quiet Deep and quiet    Iris Round and reactive Round and reactive    Lens Clear Clear    Vitreous Normal Normal              Fundus Exam         Right Left    Disc Normal Normal    C/D Ratio 0.6 0.6    Macula Normal Normal    Vessels Normal Normal    Periphery Normal Normal                  Refraction       Wearing Rx         Sphere Cylinder Axis Add    Right -2.25 +1.50 090 +2.00    Left -1.50 +0.75 099 +2.00      Age: 1yrs    Type: PAL               Manifest Refraction (Auto)         Sphere Cylinder Axis    Right -1.50 +0.75 093    Left -1.50 +0.75 101              Final Rx         Sphere Cylinder Axis Add Vert Prism    Right -1.75 +0.75 090 +2.50 1.0 up    Left -1.50 +0.75 090 +2.50 1.0 down                    Pertinent Lab/Test/Imaging Review:      Assessment and Plan:     Ophthalmoplegia  8/22/2023-mild right supranuclear elevation deficits secondary to right thalamic stroke.  Vertical deviation was best picked up on Meyers asad testing.  Placed two base down press on prism over the left lens.  Will monitor.  If no continued improvement then can grind and prism if helpful  10/17/2023-doing well with press on prism left eye.  Will give Rx and split.  1 diopter base up right 1 diopter base down left    Myopia of both eyes  8/22/2023-seeing well with current Rx  10/17/2023-slight adjustment in Rx    Atherosclerosis of native arteries of extremities with intermittent claudication, other extremity ([facility])  8/22/2023-given the extent of her strokes as well as peripheral atherosclerosis, her retinal vasculature is relatively normal    Acute CVA (cerebrovascular accident) (HCC)  8/22/2023-right thalamic stroke.  Most likely this led to a right supranuclear vertical gaze deficit.    Glaucoma suspect of both eyes  8/22/2023-0.6 cups OU.  No segmental cupping.  OCT neurofibrillary thickness 97 OD 96 OS'  10/18/2023-IOP stable        Epi Donis M.D.

## 2023-10-17 NOTE — ASSESSMENT & PLAN NOTE
8/22/2023-mild right supranuclear elevation deficits secondary to right thalamic stroke.  Vertical deviation was best picked up on Meyers asad testing.  Placed two base down press on prism over the left lens.  Will monitor.  If no continued improvement then can grind and prism if helpful  10/17/2023-doing well with press on prism left eye.  Will give Rx and split.  1 diopter base up right 1 diopter base down left

## 2023-10-19 ENCOUNTER — ANESTHESIA EVENT (OUTPATIENT)
Dept: RADIOLOGY | Facility: MEDICAL CENTER | Age: 58
DRG: 039 | End: 2023-10-19
Payer: COMMERCIAL

## 2023-10-19 ENCOUNTER — HOSPITAL ENCOUNTER (INPATIENT)
Facility: MEDICAL CENTER | Age: 58
LOS: 1 days | DRG: 039 | End: 2023-10-20
Attending: RADIOLOGY
Payer: COMMERCIAL

## 2023-10-19 ENCOUNTER — APPOINTMENT (OUTPATIENT)
Dept: RADIOLOGY | Facility: MEDICAL CENTER | Age: 58
DRG: 039 | End: 2023-10-19
Attending: RADIOLOGY
Payer: COMMERCIAL

## 2023-10-19 ENCOUNTER — ANESTHESIA (OUTPATIENT)
Dept: RADIOLOGY | Facility: MEDICAL CENTER | Age: 58
DRG: 039 | End: 2023-10-19
Payer: COMMERCIAL

## 2023-10-19 DIAGNOSIS — I10 PRIMARY HYPERTENSION: ICD-10-CM

## 2023-10-19 DIAGNOSIS — G45.9 TIA (TRANSIENT ISCHEMIC ATTACK): ICD-10-CM

## 2023-10-19 DIAGNOSIS — I67.1 INTRACRANIAL ANEURYSM: ICD-10-CM

## 2023-10-19 PROBLEM — Z86.73 HISTORY OF STROKE: Status: ACTIVE | Noted: 2023-05-13

## 2023-10-19 LAB
ACT BLD: 203 SEC (ref 74–137)
ACT BLD: 239 SEC (ref 74–137)
ANION GAP SERPL CALC-SCNC: 9 MMOL/L (ref 7–16)
BUN SERPL-MCNC: 14 MG/DL (ref 8–22)
CALCIUM SERPL-MCNC: 8.6 MG/DL (ref 8.5–10.5)
CFT BLD TEG: ABNORMAL MIN (ref 4.6–9.1)
CFT P HPASE BLD TEG: 9.7 MIN (ref 4.3–8.3)
CHLORIDE SERPL-SCNC: 105 MMOL/L (ref 96–112)
CLOT ANGLE BLD TEG: ABNORMAL DEGREES (ref 63–78)
CO2 SERPL-SCNC: 26 MMOL/L (ref 20–33)
CREAT SERPL-MCNC: 0.74 MG/DL (ref 0.5–1.4)
CT.EXTRINSIC BLD ROTEM: ABNORMAL MIN (ref 0.8–2.1)
GFR SERPLBLD CREATININE-BSD FMLA CKD-EPI: 93 ML/MIN/1.73 M 2
GLUCOSE SERPL-MCNC: 80 MG/DL (ref 65–99)
MCF BLD TEG: ABNORMAL MM (ref 52–69)
MCF.PLATELET INHIB BLD ROTEM: 14.8 MM (ref 15–32)
PA AA BLD-ACNC: 53.2 % (ref 0–11)
PA ADP BLD-ACNC: 13.8 % (ref 0–17)
POTASSIUM SERPL-SCNC: 3.7 MMOL/L (ref 3.6–5.5)
SODIUM SERPL-SCNC: 140 MMOL/L (ref 135–145)
TEG ALGORITHM TGALG: ABNORMAL

## 2023-10-19 PROCEDURE — 770022 HCHG ROOM/CARE - ICU (200)

## 2023-10-19 PROCEDURE — 160036 HCHG PACU - EA ADDL 30 MINS PHASE I

## 2023-10-19 PROCEDURE — 700105 HCHG RX REV CODE 258: Performed by: RADIOLOGY

## 2023-10-19 PROCEDURE — A9270 NON-COVERED ITEM OR SERVICE: HCPCS | Performed by: RADIOLOGY

## 2023-10-19 PROCEDURE — A9270 NON-COVERED ITEM OR SERVICE: HCPCS | Performed by: INTERNAL MEDICINE

## 2023-10-19 PROCEDURE — 80048 BASIC METABOLIC PNL TOTAL CA: CPT

## 2023-10-19 PROCEDURE — 160035 HCHG PACU - 1ST 60 MINS PHASE I

## 2023-10-19 PROCEDURE — 85576 BLOOD PLATELET AGGREGATION: CPT

## 2023-10-19 PROCEDURE — 03VL3HZ RESTRICTION OF LEFT INTERNAL CAROTID ARTERY WITH INTRALUMINAL DEVICE, FLOW DIVERTER, PERCUTANEOUS APPROACH: ICD-10-PCS | Performed by: RADIOLOGY

## 2023-10-19 PROCEDURE — 700102 HCHG RX REV CODE 250 W/ 637 OVERRIDE(OP): Performed by: INTERNAL MEDICINE

## 2023-10-19 PROCEDURE — 75894 X-RAYS TRANSCATH THERAPY: CPT

## 2023-10-19 PROCEDURE — 160002 HCHG RECOVERY MINUTES (STAT)

## 2023-10-19 PROCEDURE — 61624 TCAT PERM OCCLS/EMBOLJ CNS: CPT

## 2023-10-19 PROCEDURE — 700111 HCHG RX REV CODE 636 W/ 250 OVERRIDE (IP): Mod: JZ | Performed by: ANESTHESIOLOGY

## 2023-10-19 PROCEDURE — 700101 HCHG RX REV CODE 250: Performed by: ANESTHESIOLOGY

## 2023-10-19 PROCEDURE — 99291 CRITICAL CARE FIRST HOUR: CPT | Performed by: INTERNAL MEDICINE

## 2023-10-19 PROCEDURE — 700102 HCHG RX REV CODE 250 W/ 637 OVERRIDE(OP): Performed by: RADIOLOGY

## 2023-10-19 PROCEDURE — 75898 FOLLOW-UP ANGIOGRAPHY: CPT

## 2023-10-19 PROCEDURE — 85347 COAGULATION TIME ACTIVATED: CPT

## 2023-10-19 PROCEDURE — 85384 FIBRINOGEN ACTIVITY: CPT

## 2023-10-19 RX ORDER — PROMETHAZINE HYDROCHLORIDE 12.5 MG/1
12.5-25 SUPPOSITORY RECTAL EVERY 4 HOURS PRN
Status: DISCONTINUED | OUTPATIENT
Start: 2023-10-19 | End: 2023-10-20 | Stop reason: HOSPADM

## 2023-10-19 RX ORDER — SODIUM CHLORIDE, SODIUM LACTATE, POTASSIUM CHLORIDE, CALCIUM CHLORIDE 600; 310; 30; 20 MG/100ML; MG/100ML; MG/100ML; MG/100ML
INJECTION, SOLUTION INTRAVENOUS CONTINUOUS
Status: DISCONTINUED | OUTPATIENT
Start: 2023-10-19 | End: 2023-10-19 | Stop reason: HOSPADM

## 2023-10-19 RX ORDER — MEPERIDINE HYDROCHLORIDE 25 MG/ML
12.5 INJECTION INTRAMUSCULAR; INTRAVENOUS; SUBCUTANEOUS
Status: DISCONTINUED | OUTPATIENT
Start: 2023-10-19 | End: 2023-10-19 | Stop reason: HOSPADM

## 2023-10-19 RX ORDER — HEPARIN SODIUM 1000 [USP'U]/ML
INJECTION, SOLUTION INTRAVENOUS; SUBCUTANEOUS
Status: COMPLETED
Start: 2023-10-19 | End: 2023-10-19

## 2023-10-19 RX ORDER — ONDANSETRON 2 MG/ML
4 INJECTION INTRAMUSCULAR; INTRAVENOUS EVERY 4 HOURS PRN
Status: DISCONTINUED | OUTPATIENT
Start: 2023-10-19 | End: 2023-10-20 | Stop reason: HOSPADM

## 2023-10-19 RX ORDER — BISACODYL 10 MG
10 SUPPOSITORY, RECTAL RECTAL
Status: DISCONTINUED | OUTPATIENT
Start: 2023-10-19 | End: 2023-10-20 | Stop reason: HOSPADM

## 2023-10-19 RX ORDER — POLYETHYLENE GLYCOL 3350 17 G/17G
1 POWDER, FOR SOLUTION ORAL
Status: DISCONTINUED | OUTPATIENT
Start: 2023-10-19 | End: 2023-10-20 | Stop reason: HOSPADM

## 2023-10-19 RX ORDER — HEPARIN SODIUM,PORCINE 1000/ML
VIAL (ML) INJECTION PRN
Status: DISCONTINUED | OUTPATIENT
Start: 2023-10-19 | End: 2023-10-19 | Stop reason: SURG

## 2023-10-19 RX ORDER — HALOPERIDOL 5 MG/ML
1 INJECTION INTRAMUSCULAR
Status: DISCONTINUED | OUTPATIENT
Start: 2023-10-19 | End: 2023-10-19 | Stop reason: HOSPADM

## 2023-10-19 RX ORDER — ASPIRIN 81 MG/1
81 TABLET, CHEWABLE ORAL DAILY
Status: DISCONTINUED | OUTPATIENT
Start: 2023-10-19 | End: 2023-10-20 | Stop reason: HOSPADM

## 2023-10-19 RX ORDER — HYDRALAZINE HYDROCHLORIDE 20 MG/ML
5 INJECTION INTRAMUSCULAR; INTRAVENOUS
Status: DISCONTINUED | OUTPATIENT
Start: 2023-10-19 | End: 2023-10-19 | Stop reason: HOSPADM

## 2023-10-19 RX ORDER — DEXAMETHASONE SODIUM PHOSPHATE 4 MG/ML
INJECTION, SOLUTION INTRA-ARTICULAR; INTRALESIONAL; INTRAMUSCULAR; INTRAVENOUS; SOFT TISSUE PRN
Status: DISCONTINUED | OUTPATIENT
Start: 2023-10-19 | End: 2023-10-19 | Stop reason: SURG

## 2023-10-19 RX ORDER — MONTELUKAST SODIUM 10 MG/1
10 TABLET ORAL EVERY EVENING
Status: DISCONTINUED | OUTPATIENT
Start: 2023-10-19 | End: 2023-10-20 | Stop reason: HOSPADM

## 2023-10-19 RX ORDER — LOSARTAN POTASSIUM 50 MG/1
25 TABLET ORAL EVERY EVENING
Status: DISCONTINUED | OUTPATIENT
Start: 2023-10-19 | End: 2023-10-20 | Stop reason: HOSPADM

## 2023-10-19 RX ORDER — PROMETHAZINE HYDROCHLORIDE 25 MG/1
12.5-25 TABLET ORAL EVERY 4 HOURS PRN
Status: DISCONTINUED | OUTPATIENT
Start: 2023-10-19 | End: 2023-10-20 | Stop reason: HOSPADM

## 2023-10-19 RX ORDER — HYDROMORPHONE HYDROCHLORIDE 1 MG/ML
0.4 INJECTION, SOLUTION INTRAMUSCULAR; INTRAVENOUS; SUBCUTANEOUS
Status: DISCONTINUED | OUTPATIENT
Start: 2023-10-19 | End: 2023-10-19 | Stop reason: HOSPADM

## 2023-10-19 RX ORDER — AMOXICILLIN 250 MG
2 CAPSULE ORAL 2 TIMES DAILY
Status: DISCONTINUED | OUTPATIENT
Start: 2023-10-19 | End: 2023-10-20 | Stop reason: HOSPADM

## 2023-10-19 RX ORDER — HYDROMORPHONE HYDROCHLORIDE 1 MG/ML
0.6 INJECTION, SOLUTION INTRAMUSCULAR; INTRAVENOUS; SUBCUTANEOUS
Status: DISCONTINUED | OUTPATIENT
Start: 2023-10-19 | End: 2023-10-19 | Stop reason: HOSPADM

## 2023-10-19 RX ORDER — AMLODIPINE BESYLATE 10 MG/1
10 TABLET ORAL EVERY EVENING
Status: DISCONTINUED | OUTPATIENT
Start: 2023-10-19 | End: 2023-10-20 | Stop reason: HOSPADM

## 2023-10-19 RX ORDER — ATORVASTATIN CALCIUM 40 MG/1
40 TABLET, FILM COATED ORAL EVERY EVENING
Status: DISCONTINUED | OUTPATIENT
Start: 2023-10-19 | End: 2023-10-20 | Stop reason: HOSPADM

## 2023-10-19 RX ORDER — ONDANSETRON 4 MG/1
4 TABLET, ORALLY DISINTEGRATING ORAL EVERY 4 HOURS PRN
Status: DISCONTINUED | OUTPATIENT
Start: 2023-10-19 | End: 2023-10-20 | Stop reason: HOSPADM

## 2023-10-19 RX ORDER — CLOPIDOGREL BISULFATE 75 MG/1
75 TABLET ORAL DAILY
Status: DISCONTINUED | OUTPATIENT
Start: 2023-10-19 | End: 2023-10-19

## 2023-10-19 RX ORDER — HYDROMORPHONE HYDROCHLORIDE 1 MG/ML
0.2 INJECTION, SOLUTION INTRAMUSCULAR; INTRAVENOUS; SUBCUTANEOUS
Status: DISCONTINUED | OUTPATIENT
Start: 2023-10-19 | End: 2023-10-19 | Stop reason: HOSPADM

## 2023-10-19 RX ORDER — CEFAZOLIN SODIUM 1 G/3ML
INJECTION, POWDER, FOR SOLUTION INTRAMUSCULAR; INTRAVENOUS PRN
Status: DISCONTINUED | OUTPATIENT
Start: 2023-10-19 | End: 2023-10-19 | Stop reason: SURG

## 2023-10-19 RX ORDER — PROCHLORPERAZINE EDISYLATE 5 MG/ML
5-10 INJECTION INTRAMUSCULAR; INTRAVENOUS EVERY 4 HOURS PRN
Status: DISCONTINUED | OUTPATIENT
Start: 2023-10-19 | End: 2023-10-20 | Stop reason: HOSPADM

## 2023-10-19 RX ORDER — DIPHENHYDRAMINE HYDROCHLORIDE 50 MG/ML
12.5 INJECTION INTRAMUSCULAR; INTRAVENOUS
Status: DISCONTINUED | OUTPATIENT
Start: 2023-10-19 | End: 2023-10-19 | Stop reason: HOSPADM

## 2023-10-19 RX ORDER — OXYCODONE HCL 5 MG/5 ML
10 SOLUTION, ORAL ORAL
Status: DISCONTINUED | OUTPATIENT
Start: 2023-10-19 | End: 2023-10-19 | Stop reason: HOSPADM

## 2023-10-19 RX ORDER — ONDANSETRON 2 MG/ML
4 INJECTION INTRAMUSCULAR; INTRAVENOUS
Status: DISCONTINUED | OUTPATIENT
Start: 2023-10-19 | End: 2023-10-19 | Stop reason: HOSPADM

## 2023-10-19 RX ORDER — MIDAZOLAM HYDROCHLORIDE 1 MG/ML
INJECTION INTRAMUSCULAR; INTRAVENOUS
Status: COMPLETED
Start: 2023-10-19 | End: 2023-10-19

## 2023-10-19 RX ORDER — SODIUM CHLORIDE, SODIUM LACTATE, POTASSIUM CHLORIDE, CALCIUM CHLORIDE 600; 310; 30; 20 MG/100ML; MG/100ML; MG/100ML; MG/100ML
INJECTION, SOLUTION INTRAVENOUS CONTINUOUS
Status: ACTIVE | OUTPATIENT
Start: 2023-10-19 | End: 2023-10-19

## 2023-10-19 RX ORDER — ONDANSETRON 2 MG/ML
INJECTION INTRAMUSCULAR; INTRAVENOUS PRN
Status: DISCONTINUED | OUTPATIENT
Start: 2023-10-19 | End: 2023-10-19 | Stop reason: HOSPADM

## 2023-10-19 RX ORDER — ASPIRIN 81 MG/1
81 TABLET ORAL DAILY
Status: DISCONTINUED | OUTPATIENT
Start: 2023-10-19 | End: 2023-10-19

## 2023-10-19 RX ORDER — OXYCODONE HCL 5 MG/5 ML
5 SOLUTION, ORAL ORAL
Status: DISCONTINUED | OUTPATIENT
Start: 2023-10-19 | End: 2023-10-19 | Stop reason: HOSPADM

## 2023-10-19 RX ADMIN — SODIUM CHLORIDE, POTASSIUM CHLORIDE, SODIUM LACTATE AND CALCIUM CHLORIDE: 600; 310; 30; 20 INJECTION, SOLUTION INTRAVENOUS at 10:44

## 2023-10-19 RX ADMIN — HEPARIN SODIUM 5000 UNITS: 1000 INJECTION, SOLUTION INTRAVENOUS; SUBCUTANEOUS at 13:02

## 2023-10-19 RX ADMIN — FENTANYL CITRATE 50 MCG: 50 INJECTION, SOLUTION INTRAMUSCULAR; INTRAVENOUS at 14:27

## 2023-10-19 RX ADMIN — ASPIRIN 81 MG: 81 TABLET, CHEWABLE ORAL at 20:03

## 2023-10-19 RX ADMIN — FENTANYL CITRATE 100 MCG: 50 INJECTION, SOLUTION INTRAMUSCULAR; INTRAVENOUS at 12:20

## 2023-10-19 RX ADMIN — ATORVASTATIN CALCIUM 40 MG: 40 TABLET, FILM COATED ORAL at 17:23

## 2023-10-19 RX ADMIN — TICAGRELOR 180 MG: 90 TABLET ORAL at 15:32

## 2023-10-19 RX ADMIN — AMLODIPINE BESYLATE 10 MG: 10 TABLET ORAL at 17:23

## 2023-10-19 RX ADMIN — MONTELUKAST 10 MG: 10 TABLET, FILM COATED ORAL at 17:23

## 2023-10-19 RX ADMIN — ROCURONIUM BROMIDE 50 MG: 10 INJECTION, SOLUTION INTRAVENOUS at 12:24

## 2023-10-19 RX ADMIN — CEFAZOLIN 2 G: 1 INJECTION, POWDER, FOR SOLUTION INTRAMUSCULAR; INTRAVENOUS at 12:20

## 2023-10-19 RX ADMIN — LOSARTAN POTASSIUM 25 MG: 50 TABLET, FILM COATED ORAL at 17:23

## 2023-10-19 RX ADMIN — MIDAZOLAM HYDROCHLORIDE 2 MG: 2 INJECTION, SOLUTION INTRAMUSCULAR; INTRAVENOUS at 12:22

## 2023-10-19 RX ADMIN — ONDANSETRON 4 MG: 2 INJECTION INTRAMUSCULAR; INTRAVENOUS at 14:28

## 2023-10-19 RX ADMIN — DEXAMETHASONE SODIUM PHOSPHATE 4 MG: 4 INJECTION INTRA-ARTICULAR; INTRALESIONAL; INTRAMUSCULAR; INTRAVENOUS; SOFT TISSUE at 14:28

## 2023-10-19 RX ADMIN — PROPOFOL 200 MG: 10 INJECTION, EMULSION INTRAVENOUS at 12:22

## 2023-10-19 RX ADMIN — HEPARIN SODIUM 2000 UNITS: 1000 INJECTION, SOLUTION INTRAVENOUS; SUBCUTANEOUS at 13:37

## 2023-10-19 ASSESSMENT — COGNITIVE AND FUNCTIONAL STATUS - GENERAL
SUGGESTED CMS G CODE MODIFIER MOBILITY: CH
MOBILITY SCORE: 24
DAILY ACTIVITIY SCORE: 24
SUGGESTED CMS G CODE MODIFIER DAILY ACTIVITY: CH

## 2023-10-19 ASSESSMENT — COPD QUESTIONNAIRES
DO YOU EVER COUGH UP ANY MUCUS OR PHLEGM?: NO/ONLY WITH OCCASIONAL COLDS OR INFECTIONS
HAVE YOU SMOKED AT LEAST 100 CIGARETTES IN YOUR ENTIRE LIFE: YES
COPD SCREENING SCORE: 3
DURING THE PAST 4 WEEKS HOW MUCH DID YOU FEEL SHORT OF BREATH: NONE/LITTLE OF THE TIME

## 2023-10-19 ASSESSMENT — LIFESTYLE VARIABLES
HAVE PEOPLE ANNOYED YOU BY CRITICIZING YOUR DRINKING: NO
EVER HAD A DRINK FIRST THING IN THE MORNING TO STEADY YOUR NERVES TO GET RID OF A HANGOVER: NO
DOES PATIENT WANT TO STOP DRINKING: NO
TOTAL SCORE: 0
CONSUMPTION TOTAL: NEGATIVE
HAVE YOU EVER FELT YOU SHOULD CUT DOWN ON YOUR DRINKING: NO
ON A TYPICAL DAY WHEN YOU DRINK ALCOHOL HOW MANY DRINKS DO YOU HAVE: 0
AVERAGE NUMBER OF DAYS PER WEEK YOU HAVE A DRINK CONTAINING ALCOHOL: 0
TOTAL SCORE: 0
EVER FELT BAD OR GUILTY ABOUT YOUR DRINKING: NO
HOW MANY TIMES IN THE PAST YEAR HAVE YOU HAD 5 OR MORE DRINKS IN A DAY: 0
ALCOHOL_USE: NO
TOTAL SCORE: 0

## 2023-10-19 ASSESSMENT — PATIENT HEALTH QUESTIONNAIRE - PHQ9
SUM OF ALL RESPONSES TO PHQ9 QUESTIONS 1 AND 2: 0
1. LITTLE INTEREST OR PLEASURE IN DOING THINGS: NOT AT ALL
2. FEELING DOWN, DEPRESSED, IRRITABLE, OR HOPELESS: NOT AT ALL

## 2023-10-19 ASSESSMENT — PAIN SCALES - GENERAL: PAIN_LEVEL: 0

## 2023-10-19 ASSESSMENT — PAIN DESCRIPTION - PAIN TYPE
TYPE: ACUTE PAIN
TYPE: SURGICAL PAIN

## 2023-10-19 ASSESSMENT — FIBROSIS 4 INDEX
FIB4 SCORE: 0.8
FIB4 SCORE: 0.8

## 2023-10-19 NOTE — ASSESSMENT & PLAN NOTE
Multiple intracranial aneurysms  S/P endovascular repair with flow diverter stent of the left ICA aneurysm on 10/19  Strict blood pressure control with goal SBP less than 160  Neuro checks every hour  Atorvastatin  Aspirin and ticagrelor (% ADP inhibition on clopidogrel is only 13.8)  Loaded with ticagrelor, 180 mg on 10/19

## 2023-10-19 NOTE — ANESTHESIA POSTPROCEDURE EVALUATION
Patient: Roslyn Kilgore    Procedure Summary     Date: 10/19/23 Room / Location: [facility] - Interventional - [facility]    Anesthesia Start: 1220 Anesthesia Stop: 1446    Procedure: IR-EMBOLIZE-NEURO-INTRACRANIAL Diagnosis:       Intracranial aneurysm      Cerebral aneurysm, nonruptured      (mutliple intracranial aneurysms)      (full diagnostic angiogram, coiling of left PCOM)    Scheduled Providers: Mauri Montes M.D.; Barron Reyes M.D. Responsible Provider: Barron Reyes M.D.    Anesthesia Type: general ASA Status: 3          Final Anesthesia Type: general  Last vitals  BP   Blood Pressure: 111/53    Temp   36.5 °C (97.7 °F)    Pulse   64   Resp   (!) 10    SpO2   100 %      Anesthesia Post Evaluation    Patient location during evaluation: PACU  Patient participation: complete - patient participated  Level of consciousness: awake and alert  Pain score: 0    Airway patency: patent  Anesthetic complications: no  Cardiovascular status: hemodynamically stable  Respiratory status: acceptable  Hydration status: euvolemic    PONV: none          No notable events documented.     Nurse Pain Score: 0 (NPRS)

## 2023-10-19 NOTE — ANESTHESIA PREPROCEDURE EVALUATION
Date/Time: 10/19/23 1200    Scheduled providers: Mauri Montes M.D.; Barron Reyes M.D.    Procedure: IR-EMBOLIZE-NEURO-INTRACRANIAL    Diagnosis:       Intracranial aneurysm [I67.1]      Cerebral aneurysm, nonruptured [I67.1]    Indications:       mutliple intracranial aneurysms      full diagnostic angiogram, coiling of left PCOM    [facility]          Relevant Problems   PULMONARY   (positive) Extrinsic asthma without complication      NEURO   (positive) Acute CVA (cerebrovascular accident) (HCC)   (positive) TIA (transient ischemic attack)      CARDIAC   (positive) Arterial insufficiency (HCC)   (positive) Arterial stenosis (HCC)   (positive) HTN (hypertension)   (positive) Internal carotid aneurysm   (positive) More than 50 percent stenosis of left internal carotid artery   (positive) Stricture of artery (HCC)       Physical Exam    Airway   Mallampati: II  TM distance: >3 FB  Neck ROM: full       Cardiovascular - normal exam  Rhythm: regular  Rate: normal  (-) murmur     Dental - normal exam           Pulmonary - normal exam  Breath sounds clear to auscultation     Abdominal    Neurological - normal exam                 Anesthesia Plan    ASA 3   ASA physical status 3 criteria: hypertension - poorly controlled and other (comment)    Plan - general       Airway plan will be ETT          Induction: intravenous    Postoperative Plan: Postoperative administration of opioids is intended.    Pertinent diagnostic labs and testing reviewed    Informed Consent:    Anesthetic plan and risks discussed with patient.    Use of blood products discussed with: patient whom consented to blood products.

## 2023-10-19 NOTE — OR NURSING
Pt A&OX4. VSS. Pt on 2 L of oxygen via nasal cannula. Afebrile. Neuro assessment WNL. Pt in the middle of strict bedrest, flat and straight. R groin soft, dressing CDI. 1+ bilateral pedal pulses. Pt denies numbness/tingling to BLE. Pt denies pain. Tolerated sips of water.   Loading dose of Brilinta administered per Dr. Montes's orders.   Report called to GABO Jamison. Pt to [facility] via rney and on monitor. Updates given to pt's daughter in law, Peace, and friend, Ed.

## 2023-10-19 NOTE — CONSULTS
PULMONARY AND CRITICAL CARE MEDICINE CONSULTATION    Date of Consultation:  10/19/2023    Requesting Physician:  Mauri Montes MD    Consulting Physician:  Lam Lowe MD    Reason for Consultation: Critical care management following endovascular repair of left ICA aneurysm    Chief Complaint: Intracranial aneurysm    History of Present Illness:    I was kindly asked to see and evaluate Roslyn Kilgore, a 58 y.o. female for evaluation and management of the above problem.    This lady has a history of intracranial aneurysms, prior ischemic strokes, arterial thromboses, primary hypertension, dyslipidemia and diverticulitis.  She has multiple intracranial aneurysms.  Today Dr. Montes took her to the operating theater and performed endovascular repair of a left ICA aneurysm with a flow diverter stent.  She is now being admitted to the ICU for very close neurological observation and strict blood pressure control.    Medications Prior to Admission:    No current facility-administered medications on file prior to encounter.     Current Outpatient Medications on File Prior to Encounter   Medication Sig Dispense Refill    losartan (COZAAR) 25 MG Tab Take 1 Tablet by mouth every day. 30 Tablet 2    aspirin 81 MG EC tablet Take 81 mg by mouth every day.      atorvastatin (LIPITOR) 40 MG Tab Take 1 Tablet by mouth every day. 30 Tablet 3    albuterol 108 (90 Base) MCG/ACT Aero Soln inhalation aerosol Inhale 2 Puffs every 6 hours as needed for Shortness of Breath. 8.5 g 3    montelukast (SINGULAIR) 10 MG Tab Take 1 Tablet by mouth every day. 30 Tablet 2       Current Medications:      Current Facility-Administered Medications:     aspirin (Asa) chewable tab 81 mg, 81 mg, Oral, DAILY, Mauri Montes M.D.    senna-docusate (Pericolace Or Senokot S) 8.6-50 MG per tablet 2 Tablet, 2 Tablet, Oral, BID **AND** polyethylene glycol/lytes (Miralax) PACKET 1 Packet, 1 Packet, Oral, QDAY PRN **AND** magnesium  hydroxide (Milk Of Magnesia) suspension 30 mL, 30 mL, Oral, QDAY PRN **AND** bisacodyl (Dulcolax) suppository 10 mg, 10 mg, Rectal, QDAY PRN, Lam Lowe M.D.    Respiratory Therapy Consult, , Nebulization, Continuous RT, Lam Lowe M.D.    ondansetron (Zofran) syringe/vial injection 4 mg, 4 mg, Intravenous, Q4HRS PRN, Lam Lowe M.D.    ondansetron (Zofran ODT) dispertab 4 mg, 4 mg, Oral, Q4HRS PRN, Lam Lowe M.D.    promethazine (Phenergan) tablet 12.5-25 mg, 12.5-25 mg, Oral, Q4HRS PRN, Lam Lowe M.D.    promethazine (Phenergan) suppository 12.5-25 mg, 12.5-25 mg, Rectal, Q4HRS PRN, Lam Lowe M.D.    prochlorperazine (Compazine) injection 5-10 mg, 5-10 mg, Intravenous, Q4HRS PRN, Lam Lowe M.D.    amLODIPine (Norvasc) tablet 10 mg, 10 mg, Oral, Q EVENING, Lam Lowe M.D.    atorvastatin (Lipitor) tablet 40 mg, 40 mg, Oral, Q EVENING, Lam Lowe M.D.    losartan (Cozaar) tablet 25 mg, 25 mg, Oral, Q EVENING, Lam Lowe M.D.    montelukast (Singulair) tablet 10 mg, 10 mg, Oral, Q EVENING, Lam Lowe M.D. MD Alert...ICU Electrolyte Replacement per Pharmacy, , Other, PHARMACY TO DOSE, Lam Lowe M.D.    Allergies:    Alcohol, Trazodone, and Lisinopril    Past Surgical History:    Past Surgical History:   Procedure Laterality Date    ENDARTERECTOMY Right 11/18/2015    Procedure: ENDARTERECTOMY BRACHIAL ARTERY W/VEIN PATCH REPAIR;  Surgeon: Greta Wilhelm M.D.;  Location: SURGERY [facility];  Service:     DEBRIDEMENT Right 11/11/2015    Procedure: FINGER AMPUTATION 2ND;  Surgeon: Greta Wilhelm M.D.;  Location: [facility];  Service:     ANGIOGRAM Right 11/05/2015    Procedure: ANGIOGRAM- ARM;  Surgeon: Greta Wilhelm M.D.;  Location: [facility];  Service:     ANGIOPLASTY UPPER Right 11/05/2015    Procedure: ANGIOPLASTY UPPER;   Surgeon: Greta Wilhelm M.D.;  Location: SURGERY [facility];  Service:     HYSTERECTOMY LAPAROSCOPY  2015    THROMBECTOMY  01/09/2011    Performed by TREY TRAN at SURGERY [facility]    ANGIOGRAM  01/09/2011    Performed by TREY TRAN at SURGERY [facility]    HYSTEROSCOPY NOVASURE-2  09/27/2010    Performed by JEANIE DE LOS SANTOS at SURGERY SAME DAY [facility]    MAMMOPLASTY REDUCTION  08/24/2010    Performed by SIRI MOORE at SURGERY [facility]    FEMORAL ARTERY REPAIR  06/28/2010    Performed by GRETA WILHELM at SURGERY [facility]    ANGIOGRAM  04/15/2010    Performed by GRETA WILHELM at SURGERY [facility] ORS    ANGIOPLASTY BALLOON  04/15/2010    Performed by GRETA WILHELM at SURGERY [facility]    ANGIOGRAM  02/02/2010    Performed by GRETA WILHELM at SURGERY [facility]    THROMBECTOMY  12/21/2009    Performed by GRETA WILHELM at SURGERY 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 (HCC)     Arthritis     osteo- right hip    ASTHMA     Bronchitis     Cancer (HCC) 10 years ago    pre cancerous s/p leep    Carcinoma in situ of respiratory system     per pt early 70's, early 80's    Dental disorder     full dentures    DEPRESSION     anxiety    Heart burn     High cholesterol     Hypertension     Hyponatremia 02/20/2022    Indigestion     Other specified symptom associated with female genital organs     see cat scan    Personal  "history of venous thrombosis and embolism     Pneumonia 1996    Sepsis (HCC) 2022    Stroke ([facility])     4 strokes, 3 aneurysms       Social History:    Social History     Socioeconomic History    Marital status:      Spouse name: Not on file    Number of children: Not on file    Years of education: Not on file    Highest education level: Not on file   Occupational History    Not on file   Tobacco Use    Smoking status: Former     Current packs/day: 0.00     Average packs/day: 1 pack/day for 19.0 years (19.0 ttl pk-yrs)     Types: Cigarettes     Start date:      Quit date:      Years since quittin.8    Smokeless tobacco: Never    Tobacco comments:     1 pk a day for 6 yrs, Quit    Vaping Use    Vaping Use: Some days    Substances: THC    Devices: Pre-filled or refillable cartridge   Substance and Sexual Activity    Alcohol use: No     Comment: quit in     Drug use: Not Currently     Types: Marijuana, Oral     Comment: THC, sober from other drugs since     Sexual activity: Yes     Partners: Male   Other Topics Concern    Not on file   Social History Narrative    retired     Social Determinants of Health     Financial Resource Strain: Not on file   Food Insecurity: Not on file   Transportation Needs: Not on file   Physical Activity: Not on file   Stress: Not on file   Social Connections: Not on file   Intimate Partner Violence: Not on file   Housing Stability: Not on file       Family History:    Family History   Problem Relation Age of Onset    Stroke Mother     Lung Disease Neg Hx     Cancer Neg Hx     Diabetes Neg Hx     Heart Disease Neg Hx        Review of System:    Review of Systems   Unable to perform ROS: Acuity of condition       Physical Examination:    /56   Pulse (!) 57   Temp (!) 35.7 °C (96.3 °F) (Temporal)   Resp 13   Ht 1.6 m (5' 3\")   Wt 68.6 kg (151 lb 3.8 oz)   LMP 2011   SpO2 97%   Breastfeeding No   BMI 26.79 kg/m²   Physical " Exam  Constitutional:       Appearance: She is not diaphoretic.   HENT:      Head: Normocephalic and atraumatic.      Right Ear: External ear normal.      Left Ear: External ear normal.      Nose: Nose normal.      Mouth/Throat:      Mouth: Mucous membranes are moist.      Pharynx: Oropharynx is clear.   Eyes:      Pupils: Pupils are equal, round, and reactive to light.   Cardiovascular:      Comments: Sinus rhythm  Pulmonary:      Breath sounds: No wheezing or rales.   Abdominal:      General: There is no distension.      Tenderness: There is no abdominal tenderness.   Musculoskeletal:      Cervical back: Normal range of motion.      Right lower leg: No edema.      Left lower leg: No edema.   Skin:     General: Skin is warm.      Capillary Refill: Capillary refill takes less than 2 seconds.   Neurological:      General: No focal deficit present.      Mental Status: She is oriented to person, place, and time.      Cranial Nerves: No cranial nerve deficit.         Laboratory Data:            Recent Labs     10/19/23  1043   SODIUM 140   POTASSIUM 3.7   CHLORIDE 105   CO2 26   GLUCOSE 80   BUN 14   CREATININE 0.74   CALCIUM 8.6                   Imaging:    I personally viewed all the available CXR and CT scan images as well as reviewed the radiology interpretation reports.    IR-EMBOLIZE-NEURO-INTRACRANIAL    (Results Pending)       Assessment and Plan:    * Intracranial aneurysm- (present on admission)  Assessment & Plan  Multiple intracranial aneurysms  S/P endovascular repair with flow diverter stent of the left ICA aneurysm on 10/19  Strict blood pressure control with goal SBP less than 160  Neuro checks every hour  Atorvastatin  Aspirin and ticagrelor (% ADP inhibition on clopidogrel is only 13.8)  Loaded with ticagrelor, 180 mg on 10/19    History of stroke- (present on admission)  Assessment & Plan  History of prior strokes with MRI revealing evidence of ischemic infarctions in both thalami and right frontal  lobe  Continue statin    Primary hypertension- (present on admission)  Assessment & Plan  Goal SBP less than 160  Continue amlodipine, 10 mg daily  Continue losartan, 25 mg daily    Dyslipidemia- (present on admission)  Assessment & Plan  Continue statin        High risk of deterioration and worsening vital organ dysfunction and death without the above critical care interventions.    I have assessed and reassessed her blood pressure, hemodynamics, cardiovascular status and neurologic status.  She is at increased risk for worsening CNS system dysfunction.    Thank you for allowing me to participate in the care of this lady.  I will continue to follow her with great interest.    The patient is critically ill.  Critical care time = 35 minutes in directly providing and coordinating critical care and extensive data review.  No time overlap and excludes procedures.    Discussed with RN    Lam Lowe MD  Pulmonary and Critical Care Medicine

## 2023-10-19 NOTE — ANESTHESIA PROCEDURE NOTES
Airway    Date/Time: 10/19/2023 12:24 PM    Performed by: Barron Reyes M.D.  Authorized by: Barron Reyes M.D.    Location:  OR  Urgency:  Elective  Indications for Airway Management:  Anesthesia      Spontaneous Ventilation: absent    Sedation Level:  Deep  Preoxygenated: Yes    Patient Position:  Sniffing  Final Airway Type:  Endotracheal airway  Final Endotracheal Airway:  ETT  Cuffed: Yes    Technique Used for Successful ETT Placement:  Direct laryngoscopy    Insertion Site:  Oral  Blade Type:  Sohan  Laryngoscope Blade/Videolaryngoscope Blade Size:  3  ETT Size (mm):  6.5  Measured from:  Teeth  ETT to Teeth (cm):  21  Placement Verified by: auscultation and capnometry    Cormack-Lehane Classification:  Grade I - full view of glottis  Number of Attempts at Approach:  1

## 2023-10-19 NOTE — OR SURGEON
Immediate Post- Operative Note        Findings: Left ICA aneurysm       Procedure(s): Endovascular repair-Flow diverter placement       Estimated Blood Loss: Less than 5 ml        Complications: None            10/19/2023     2:31 PM     Mauri Montes M.D.

## 2023-10-19 NOTE — ANESTHESIA PROCEDURE NOTES
Arterial Line    Performed by: Barron Reyes M.D.  Authorized by: Barron Reyes M.D.    Start Time:  10/19/2023 12:29 PM  End Time:  10/19/2023 12:29 PM  Localization: surface landmarks    Patient Location:  OR  Indication: continuous blood pressure monitoring        Catheter Size:  20 G  Seldinger Technique?: Yes    Site:  Radial artery  Line Secured:  Antimicrobial disc, tape and transparent dressing  Events: patient tolerated procedure well with no complications

## 2023-10-19 NOTE — ANESTHESIA TIME REPORT
Anesthesia Start and Stop Event Times     Date Time Event    10/19/2023 1112 Ready for Procedure     1220 Anesthesia Start     1446 Anesthesia Stop        Responsible Staff  10/19/23    Name Role Begin End    Barron Reyes M.D. Anesth 1220 1446        Overtime Reason:  no overtime (within assigned shift)    Comments: Amy LATE CALL

## 2023-10-19 NOTE — PROGRESS NOTES
Pt presents to IR 2. Pt was consented by MD at bedside, confirmed by this RN and consent at bedside. Anesthesia consent confirmed and at bedside. Anesthesia care provided by .Pt transferred to IR table in supine position. Patient underwent a full diagnostic cerebral angiogram with stent placement in the left ICA by Dr. Montes. Procedure site was marked by MD and verified using imaging guidance. Pt placed on monitor, prepped and draped in a sterile fashion. All vital signs monitoring and medication administration by anesthesia services - See anesthesia flowsheet for details. Report called to Pricilla AYON. Pt transported by merritt with RN to PACU 12B.       [employer] TERUMO  ELOY X  Flow Redirection Endoluminal Device  4.5mm x 20mm x 13mm  REF: [id number]  LOT: [id number]  EXP: 11/30/2024  Deployed at 1355     TERUMO  Angio-Seal VIP  Vascular Closure Device  8F (2.67mm)  REF: [id number]  LOT:[id number]  EXP: 06/05/2024

## 2023-10-19 NOTE — ASSESSMENT & PLAN NOTE
History of prior strokes with MRI revealing evidence of ischemic infarctions in both thalami and right frontal lobe  Continue statin

## 2023-10-19 NOTE — PROGRESS NOTES
Med Rec UPDATED and COMPLETE per PATIENT  Allergies Reviewed  Antibiotcs in past 30 days:NO  Anticoagulant in past 14 days:YES  Anticoagulant:PLAVIX 75 MG Last dose:10/18/2023  Preferred pharmacy:[facility] in [place]    Pt states she takes all her oral medications in the evening

## 2023-10-20 ENCOUNTER — PHARMACY VISIT (OUTPATIENT)
Dept: PHARMACY | Facility: MEDICAL CENTER | Age: 58
End: 2023-10-20
Payer: COMMERCIAL

## 2023-10-20 VITALS
RESPIRATION RATE: 19 BRPM | WEIGHT: 152.12 LBS | HEART RATE: 78 BPM | TEMPERATURE: 98.3 F | HEIGHT: 63 IN | DIASTOLIC BLOOD PRESSURE: 59 MMHG | BODY MASS INDEX: 26.95 KG/M2 | OXYGEN SATURATION: 96 % | SYSTOLIC BLOOD PRESSURE: 119 MMHG

## 2023-10-20 DIAGNOSIS — I67.1 CEREBRAL ANEURYSM: ICD-10-CM

## 2023-10-20 LAB
ANION GAP SERPL CALC-SCNC: 6 MMOL/L (ref 7–16)
BASOPHILS # BLD AUTO: 0.4 % (ref 0–1.8)
BASOPHILS # BLD: 0.04 K/UL (ref 0–0.12)
BUN SERPL-MCNC: 11 MG/DL (ref 8–22)
CALCIUM SERPL-MCNC: 8.5 MG/DL (ref 8.5–10.5)
CHLORIDE SERPL-SCNC: 109 MMOL/L (ref 96–112)
CO2 SERPL-SCNC: 24 MMOL/L (ref 20–33)
CREAT SERPL-MCNC: 0.81 MG/DL (ref 0.5–1.4)
EOSINOPHIL # BLD AUTO: 0 K/UL (ref 0–0.51)
EOSINOPHIL NFR BLD: 0 % (ref 0–6.9)
ERYTHROCYTE [DISTWIDTH] IN BLOOD BY AUTOMATED COUNT: 41.8 FL (ref 35.9–50)
GFR SERPLBLD CREATININE-BSD FMLA CKD-EPI: 83 ML/MIN/1.73 M 2
GLUCOSE SERPL-MCNC: 145 MG/DL (ref 65–99)
HCT VFR BLD AUTO: 44 % (ref 37–47)
HGB BLD-MCNC: 14.1 G/DL (ref 12–16)
IMM GRANULOCYTES # BLD AUTO: 0.03 K/UL (ref 0–0.11)
IMM GRANULOCYTES NFR BLD AUTO: 0.3 % (ref 0–0.9)
LYMPHOCYTES # BLD AUTO: 0.77 K/UL (ref 1–4.8)
LYMPHOCYTES NFR BLD: 7.4 % (ref 22–41)
MAGNESIUM SERPL-MCNC: 2.1 MG/DL (ref 1.5–2.5)
MCH RBC QN AUTO: 28.4 PG (ref 27–33)
MCHC RBC AUTO-ENTMCNC: 32 G/DL (ref 32.2–35.5)
MCV RBC AUTO: 88.5 FL (ref 81.4–97.8)
MONOCYTES # BLD AUTO: 0.42 K/UL (ref 0–0.85)
MONOCYTES NFR BLD AUTO: 4 % (ref 0–13.4)
NEUTROPHILS # BLD AUTO: 9.13 K/UL (ref 1.82–7.42)
NEUTROPHILS NFR BLD: 87.9 % (ref 44–72)
NRBC # BLD AUTO: 0 K/UL
NRBC BLD-RTO: 0 /100 WBC (ref 0–0.2)
PHOSPHATE SERPL-MCNC: 3.3 MG/DL (ref 2.5–4.5)
PLATELET # BLD AUTO: 275 K/UL (ref 164–446)
PMV BLD AUTO: 8.9 FL (ref 9–12.9)
POTASSIUM SERPL-SCNC: 4.1 MMOL/L (ref 3.6–5.5)
RBC # BLD AUTO: 4.97 M/UL (ref 4.2–5.4)
SODIUM SERPL-SCNC: 139 MMOL/L (ref 135–145)
WBC # BLD AUTO: 10.4 K/UL (ref 4.8–10.8)

## 2023-10-20 PROCEDURE — A9270 NON-COVERED ITEM OR SERVICE: HCPCS | Performed by: NURSE PRACTITIONER

## 2023-10-20 PROCEDURE — 84100 ASSAY OF PHOSPHORUS: CPT

## 2023-10-20 PROCEDURE — 80048 BASIC METABOLIC PNL TOTAL CA: CPT

## 2023-10-20 PROCEDURE — 85025 COMPLETE CBC W/AUTO DIFF WBC: CPT

## 2023-10-20 PROCEDURE — 99239 HOSP IP/OBS DSCHRG MGMT >30: CPT | Performed by: HOSPITALIST

## 2023-10-20 PROCEDURE — RXMED WILLOW AMBULATORY MEDICATION CHARGE: Performed by: HOSPITALIST

## 2023-10-20 PROCEDURE — 700102 HCHG RX REV CODE 250 W/ 637 OVERRIDE(OP): Performed by: NURSE PRACTITIONER

## 2023-10-20 PROCEDURE — 83735 ASSAY OF MAGNESIUM: CPT

## 2023-10-20 RX ADMIN — TICAGRELOR 90 MG: 90 TABLET ORAL at 09:52

## 2023-10-20 ASSESSMENT — ENCOUNTER SYMPTOMS
SPEECH CHANGE: 0
PALPITATIONS: 0
SENSORY CHANGE: 0
HEADACHES: 0
WEAKNESS: 0
SHORTNESS OF BREATH: 0
FEVER: 0
CHILLS: 0
VOMITING: 0
NAUSEA: 0
TINGLING: 0

## 2023-10-20 ASSESSMENT — FIBROSIS 4 INDEX: FIB4 SCORE: 0.97

## 2023-10-20 NOTE — DISCHARGE PLANNING
Case Management Discharge Planning    Admission Date: 10/19/2023  GMLOS: 2.2  ALOS: 1    6-Clicks ADL Score: 24  6-Clicks Mobility Score: 24    Anticipated Discharge Dispo: Discharge Disposition: Discharged to home/self care (01)    Per chart review pt resides in [place], [place]    Family support:  [place] Daughter-in-law   [phone]     Current Insurance on file: [facility]/[employer] Medicare    DME Needed: pending hospital course    Action(s) Taken: chart reviewed    Escalations Completed: None    Medically Clear: No    Next Steps: f/u with pt and medical team to discuss dc needs and barriers.    Barriers to Discharge: Medical clearance /Specialty Clearance    Is the patient up for discharge tomorrow: No

## 2023-10-20 NOTE — PROGRESS NOTES
Radiology Progress Note   Author: JUANCHO Johnston Date & Time created: 10/20/2023  9:02 AM   Date of admission  10/19/2023  Note to reader: this note follows the APSO format rather than the historical SOAP format. Assessment and plan located at the top of the note for ease of use.    Chief Complaint  58 y.o. female admitted 10/19/2023 for post procedure monitoring      HPI  58-year-old female with PMH significant for 4mm intracranial aneurysms, prior ischemic strokes, arterial thromboses, primary hypertension, dyslipidemia and diverticulitis who presented for elective left supraclinoid internal carotid aneurysm endovascular repair using flow diverter with IR Dr. Montes on 10/19/23. Intraoperative course was uneventful. He was admitted to the ICU postprocedure for serial neurologic exams and close hemodynamic monitoring.       Interval History:  10/20/23 - Pt feeling well overall. No acute events overnight. Neuro intact. Brilinta loading dose administered yesterday at 1530. Brilinta 90 mg bid ordered and started this morning.    Assessment/Plan     Principal Problem:    Intracranial aneurysm  Active Problems:    Primary hypertension    Dyslipidemia    History of stroke      Plan IR  - Continue Brilinta 90 mg bid and aspirin 81 mg daily x 6 months  - Post procedure site instructions: no lifting greater than 5 lbs and no baths/ swimming/ soaking in tub for 5 days. Shower OK. OK to change dressings/band aid as needed.  - Follow up appointment in 2 weeks with OP Neuro IR, our office to call and schedule  - Neuro Checks per Neurology   - From a Neuro Interventional Service standpoint, OK to discharge to home when medically cleared by Hospitalist Service.     -Thank you for allowing Neuro Interventional Radiology team to participate in the patients care, if any additional care or requests are needed in the future please do not hesitate call or place IR order   [phone]            Review of Systems   Physical Exam   Review of Systems   Constitutional:  Negative for chills and fever.   Respiratory:  Negative for shortness of breath.    Cardiovascular:  Negative for chest pain and palpitations.   Gastrointestinal:  Negative for nausea and vomiting.   Neurological:  Negative for tingling, sensory change, speech change, weakness and headaches.      Vitals:    10/20/23 0800   BP: (!) 149/73   Pulse: 67   Resp: 19   Temp: 36.7 °C (98 °F)   SpO2: 93%        Physical Exam  Constitutional:       Appearance: Normal appearance.   Cardiovascular:      Rate and Rhythm: Normal rate and regular rhythm.      Pulses: Normal pulses.   Pulmonary:      Effort: Pulmonary effort is normal.   Abdominal:      Palpations: Abdomen is soft.   Skin:     General: Skin is warm and dry.      Comments: Right groin access site soft, non-tender, without ecchymosis; dressing cdi.     Neurological:      General: No focal deficit present.      Mental Status: She is alert and oriented to person, place, and time.      Cranial Nerves: No cranial nerve deficit.      Sensory: No sensory deficit.      Motor: No weakness.      Coordination: Coordination normal.   Psychiatric:         Mood and Affect: Mood normal.         Behavior: Behavior normal.             Labs    Recent Labs     10/20/23  0500   WBC 10.4   RBC 4.97   HEMOGLOBIN 14.1   HEMATOCRIT 44.0   MCV 88.5   MCH 28.4   MCHC 32.0*   RDW 41.8   PLATELETCT 275   MPV 8.9*     Recent Labs     10/19/23  1043 10/20/23  0500   SODIUM 140 139   POTASSIUM 3.7 4.1   CHLORIDE 105 109   CO2 26 24   GLUCOSE 80 145*   BUN 14 11   CREATININE 0.74 0.81   CALCIUM 8.6 8.5     Recent Labs     10/19/23  1043 10/20/23  0500   CREATININE 0.74 0.81     IR-EMBOLIZE-NEURO-INTRACRANIAL   Final Result      *  Unruptured 4 mm sized left supraclinoid internal carotid aneurysms.   *  Successful endovascular repair using flow diverter device.   *  Unruptured basilar artery aneurysm.   *  We will plan for endovascular repair in  "near future.   *  Unruptured right internal carotid artery present.   *  We will plan for endovascular repair in near future.   *  Unruptured right middle cerebral artery aneurysm.   *  We will plan for endovascular repair in near future.          INR   Date Value Ref Range Status   10/13/2023 0.96 0.87 - 1.13 Final     Comment:     INR - Non-therapeutic Reference Range: 0.87-1.13  INR - Therapeutic Reference Range: 2.0-4.0       No results found for: \"POCINR\"     Intake/Output Summary (Last 24 hours) at 10/20/2023 0902  Last data filed at 10/20/2023 0800  Gross per 24 hour   Intake 1840 ml   Output 910 ml   Net 930 ml      Labs not explicitly included in this progress note were reviewed by the author. Radiology/imaging not explicitly included in this progress note was reviewed by the author.     I have performed a physical exam and reviewed and updated ROS and Plan today (10/20/2023).     45 minutes in directly providing and coordinating care and extensive data review.  No time overlap and excludes procedures.   "

## 2023-10-20 NOTE — DISCHARGE INSTRUCTIONS
Continue Brilinta 90 mg bid and aspirin 81 mg daily x 6 months  - Post procedure site instructions: no lifting greater than 5 lbs and no baths/ swimming/ soaking in tub for 5 days. Shower OK. OK to change dressings/band aid as needed.  - Follow up appointment in 2 weeks with OP Neuro IR, our office to call and schedule      Discharge Instructions per Lam Flores D.O.    DIET: Healthy balanced diet    ACTIVITY: as tolerates with no heavy lifting as per radiology discharge orders    DIAGNOSIS: Intracranial aneurysm s/p stent placement    Return to ER if needed

## 2023-10-20 NOTE — DISCHARGE SUMMARY
Discharge Summary    CHIEF COMPLAINT ON ADMISSION  Here for intracranial aneurysm for interventional radiology stent placement.    Reason for Admission  Cerebral aneurysm, nonruptured     Admission Date  10/19/2023    CODE STATUS  Full Code    HPI & HOSPITAL COURSE  Roslyn Kilgore is a very pleasant 58-year-old female here for scheduled endovascular repair of a left ICA aneurysm.  Patient has a history of intracranial aneurysms, prior stroke, arterial thrombosis, essential hypertension, dyslipidemia and diverticulitis.  She underwent a left ICA aneurysm with a flow diverting stent on 10/19 and was monitored in the ICU for close neurologic evaluation.  On 10/20 she was neurologically intact no deficits.  I have spoken to the interventional radiologist and he is okay of discharged and he will follow up with her in 2 weeks.  Instructions for no heavy lifting due to the right groin arterial puncture site was discussed with her.  She is to return to the hospital if any acute neurologic changes or any sudden signs of bleeding or swelling at the right groin site.  Patient was initiated on Brilinta at time of discharge and told to stop her Plavix.  She will continue on her aspirin 81 mg.    Therefore, she is discharged in good and stable condition to home with close outpatient follow-up.    The patient recovered much more quickly than anticipated on admission.    Discharge Date  10/20/23    FOLLOW UP ITEMS POST DISCHARGE  none    DISCHARGE DIAGNOSES  Principal Problem:    Intracranial aneurysm (POA: Yes)  Active Problems:    Primary hypertension (POA: Yes)    Dyslipidemia (POA: Yes)    History of stroke (POA: Yes)  Resolved Problems:    * No resolved hospital problems. *      FOLLOW UP  Future Appointments   Date Time Provider Department Center   11/15/2023  1:00 PM Ken Taylor M.D. [facility]   4/22/2024  1:30 PM Epi Donis M.D. [facility] None     Mauri Montes M.D.  [address] [facility]  Radiology  [facility] [id number] [phone]    Follow up  Follow-up with outpatient clinic within 2 weeks.  Clinic will call to make appointment.    Ken Taylor M.D.  [address] [facility] [id number] [phone]    Follow up        MEDICATIONS ON DISCHARGE     Medication List        START taking these medications        Instructions   ticagrelor 90 MG Tabs tablet  Commonly known as: Brilinta   Take 1 Tablet by mouth 2 times a day.  Dose: 90 mg            CONTINUE taking these medications        Instructions   albuterol 108 (90 Base) MCG/ACT Aers inhalation aerosol   Inhale 2 Puffs every 6 hours as needed for Shortness of Breath.  Dose: 2 Puff     amLODIPine 10 MG Tabs  Commonly known as: Norvasc   TAKE 1 TABLET BY MOUTH DAILY  Dose: 10 mg     aspirin 81 MG EC tablet   Take 81 mg by mouth every day.  Dose: 81 mg     atorvastatin 40 MG Tabs  Commonly known as: Lipitor   Take 1 Tablet by mouth every day.  Dose: 40 mg     losartan 25 MG Tabs  Commonly known as: Cozaar   Take 1 Tablet by mouth every day.  Dose: 25 mg     montelukast 10 MG Tabs  Commonly known as: Singulair   Take 1 Tablet by mouth every day.  Dose: 10 mg            STOP taking these medications      clopidogrel 75 MG Tabs  Commonly known as: Plavix              Allergies  Allergies   Allergen Reactions    Alcohol Unspecified     REQUESTS NO ALCOHOL CONTENT IN HER MEDICATIONS (per pt, pt is an alcoholic)    Trazodone Hives     Caused breat hyperplasia. Pt then underwent a breast reduction and attempted to take again, new reaction: Hives.    Lisinopril Cough       DIET  Orders Placed This Encounter   Procedures    Diet Order Diet: Regular     Standing Status:   Standing     Number of Occurrences:   1     Order Specific Question:   Diet:     Answer:   Regular [1]       ACTIVITY  As tolerated.  Weight bearing as tolerated    CONSULTATIONS  Intensive care    PROCEDURES  None 10/19/2023 endovascular repair with a flow diverter placement by Dr. Dotson  MD Simon    LABORATORY  Lab Results   Component Value Date    SODIUM 139 10/20/2023    POTASSIUM 4.1 10/20/2023    CHLORIDE 109 10/20/2023    CO2 24 10/20/2023    GLUCOSE 145 (H) 10/20/2023    BUN 11 10/20/2023    CREATININE 0.81 10/20/2023    CREATININE 0.8 06/12/2006    GLOMRATE 66 05/15/2023        Lab Results   Component Value Date    WBC 10.4 10/20/2023    HEMOGLOBIN 14.1 10/20/2023    HEMATOCRIT 44.0 10/20/2023    PLATELETCT 275 10/20/2023      IR-EMBOLIZE-NEURO-INTRACRANIAL   Final Result      *  Unruptured 4 mm sized left supraclinoid internal carotid aneurysms.   *  Successful endovascular repair using flow diverter device.   *  Unruptured basilar artery aneurysm.   *  We will plan for endovascular repair in near future.   *  Unruptured right internal carotid artery present.   *  We will plan for endovascular repair in near future.   *  Unruptured right middle cerebral artery aneurysm.   *  We will plan for endovascular repair in near future.            Total time of the discharge process exceeds 32 minutes.

## 2023-10-20 NOTE — CARE PLAN
Problem: Knowledge Deficit - Standard  Goal: Patient and family/care givers will demonstrate understanding of plan of care, disease process/condition, diagnostic tests and medications  Outcome: Progressing   The patient is Stable - Low risk of patient condition declining or worsening    Shift Goals  Clinical Goals: ambulate  Patient Goals: comfort    Progress made toward(s) clinical / shift goals:Karely checks WNL, Patient up and ambulatory this shift            Statement Selected

## 2023-10-24 NOTE — PROGRESS NOTES
Message forwarded to APRN on non urgent phone line from 10/23/23 stating she is having allergic reaction to Brilinta. Pt contacted, has not spoken with any providers about this yet. States her symptoms of dizziness and nausea have passed. Had an alert on her wrist monitor for heart rate on Saturday but is back to baseline now. She continues to take Brilinta and aspirin, has not missed dose. She was noted to have poor response to Plavix on pre procedure platelet mapping. Discussed that it is imperative she remain on Brilinta with flow diverter, and she may move forward with her dental extractions as long as she can take DAPT. Also confirmed 5 days of activity restrictions post arterial puncture. She has additional aneurysms, will f/u with treatment plan once she recovers from procedure 10/19/23 w/Dr. Montes.     On call DINH CHENG number provided to pt so that urgent concerns may be escalated and addressed quickly in the future.

## 2023-10-26 ENCOUNTER — HOSPITAL ENCOUNTER (INPATIENT)
Facility: MEDICAL CENTER | Age: 58
LOS: 10 days | DRG: 981 | End: 2023-11-06
Attending: EMERGENCY MEDICINE | Admitting: INTERNAL MEDICINE
Payer: COMMERCIAL

## 2023-10-26 ENCOUNTER — APPOINTMENT (OUTPATIENT)
Dept: RADIOLOGY | Facility: MEDICAL CENTER | Age: 58
DRG: 981 | End: 2023-10-26
Attending: EMERGENCY MEDICINE
Payer: COMMERCIAL

## 2023-10-26 DIAGNOSIS — I63.232 ACUTE ISCHEMIC LEFT INTERNAL CAROTID ARTERY (ICA) STROKE (HCC): ICD-10-CM

## 2023-10-26 DIAGNOSIS — I63.9 CEREBROVASCULAR ACCIDENT (CVA), UNSPECIFIED MECHANISM (HCC): ICD-10-CM

## 2023-10-26 DIAGNOSIS — J69.0 ASPIRATION PNEUMONIA OF RIGHT LOWER LOBE DUE TO GASTRIC SECRETIONS (HCC): ICD-10-CM

## 2023-10-26 DIAGNOSIS — I10 PRIMARY HYPERTENSION: ICD-10-CM

## 2023-10-26 DIAGNOSIS — I77.1 ARTERIAL INSUFFICIENCY (HCC): ICD-10-CM

## 2023-10-26 DIAGNOSIS — Z86.73 RECENT CEREBROVASCULAR ACCIDENT (CVA): ICD-10-CM

## 2023-10-26 DIAGNOSIS — J96.01 ACUTE RESPIRATORY FAILURE WITH HYPOXIA (HCC): ICD-10-CM

## 2023-10-26 DIAGNOSIS — G45.9 TIA (TRANSIENT ISCHEMIC ATTACK): ICD-10-CM

## 2023-10-26 DIAGNOSIS — I67.1 INTRACRANIAL ANEURYSM: ICD-10-CM

## 2023-10-26 DIAGNOSIS — Z86.73 HISTORY OF STROKE: ICD-10-CM

## 2023-10-26 DIAGNOSIS — I67.1 INTERNAL CAROTID ANEURYSM: ICD-10-CM

## 2023-10-26 DIAGNOSIS — G81.91 RIGHT HEMIPLEGIA (HCC): ICD-10-CM

## 2023-10-26 PROCEDURE — 36415 COLL VENOUS BLD VENIPUNCTURE: CPT

## 2023-10-26 PROCEDURE — 85610 PROTHROMBIN TIME: CPT

## 2023-10-26 PROCEDURE — 99291 CRITICAL CARE FIRST HOUR: CPT

## 2023-10-26 PROCEDURE — 86850 RBC ANTIBODY SCREEN: CPT

## 2023-10-26 PROCEDURE — 85730 THROMBOPLASTIN TIME PARTIAL: CPT

## 2023-10-26 PROCEDURE — 86900 BLOOD TYPING SEROLOGIC ABO: CPT

## 2023-10-26 PROCEDURE — 70450 CT HEAD/BRAIN W/O DYE: CPT

## 2023-10-26 PROCEDURE — 84484 ASSAY OF TROPONIN QUANT: CPT

## 2023-10-26 PROCEDURE — 94760 N-INVAS EAR/PLS OXIMETRY 1: CPT

## 2023-10-26 PROCEDURE — 85025 COMPLETE CBC W/AUTO DIFF WBC: CPT

## 2023-10-26 PROCEDURE — 80053 COMPREHEN METABOLIC PANEL: CPT

## 2023-10-26 PROCEDURE — 86901 BLOOD TYPING SEROLOGIC RH(D): CPT

## 2023-10-26 ASSESSMENT — FIBROSIS 4 INDEX: FIB4 SCORE: 0.97

## 2023-10-27 ENCOUNTER — APPOINTMENT (OUTPATIENT)
Dept: RADIOLOGY | Facility: MEDICAL CENTER | Age: 58
DRG: 981 | End: 2023-10-27
Attending: EMERGENCY MEDICINE
Payer: COMMERCIAL

## 2023-10-27 ENCOUNTER — HOSPITAL ENCOUNTER (OUTPATIENT)
Dept: RADIOLOGY | Facility: MEDICAL CENTER | Age: 58
End: 2023-10-27
Attending: EMERGENCY MEDICINE

## 2023-10-27 ENCOUNTER — APPOINTMENT (OUTPATIENT)
Dept: RADIOLOGY | Facility: MEDICAL CENTER | Age: 58
DRG: 981 | End: 2023-10-27
Attending: PSYCHIATRY & NEUROLOGY
Payer: COMMERCIAL

## 2023-10-27 ENCOUNTER — APPOINTMENT (OUTPATIENT)
Dept: RADIOLOGY | Facility: MEDICAL CENTER | Age: 58
DRG: 981 | End: 2023-10-27
Attending: INTERNAL MEDICINE
Payer: COMMERCIAL

## 2023-10-27 ENCOUNTER — APPOINTMENT (OUTPATIENT)
Dept: RADIOLOGY | Facility: MEDICAL CENTER | Age: 58
DRG: 981 | End: 2023-10-27
Attending: RADIOLOGY
Payer: COMMERCIAL

## 2023-10-27 ENCOUNTER — HOSPITAL ENCOUNTER (INPATIENT)
Facility: REHABILITATION | Age: 58
End: 2023-10-27
Attending: PHYSICAL MEDICINE & REHABILITATION | Admitting: PHYSICAL MEDICINE & REHABILITATION
Payer: COMMERCIAL

## 2023-10-27 PROBLEM — I67.1 INTRACRANIAL ANEURYSM: Status: RESOLVED | Noted: 2023-10-19 | Resolved: 2023-10-27

## 2023-10-27 PROBLEM — I63.232 ACUTE ISCHEMIC LEFT INTERNAL CAROTID ARTERY (ICA) STROKE (HCC): Status: ACTIVE | Noted: 2023-10-27

## 2023-10-27 LAB
ABO GROUP BLD: NORMAL
ALBUMIN SERPL BCP-MCNC: 4.4 G/DL (ref 3.2–4.9)
ALBUMIN/GLOB SERPL: 1.6 G/DL
ALP SERPL-CCNC: 104 U/L (ref 30–99)
ALT SERPL-CCNC: 19 U/L (ref 2–50)
ANION GAP SERPL CALC-SCNC: 10 MMOL/L (ref 7–16)
ANION GAP SERPL CALC-SCNC: 11 MMOL/L (ref 7–16)
ANION GAP SERPL CALC-SCNC: 8 MMOL/L (ref 7–16)
APTT PPP: 28.1 SEC (ref 24.7–36)
AST SERPL-CCNC: 21 U/L (ref 12–45)
BASOPHILS # BLD AUTO: 1.1 % (ref 0–1.8)
BASOPHILS # BLD: 0.1 K/UL (ref 0–0.12)
BILIRUB SERPL-MCNC: 0.3 MG/DL (ref 0.1–1.5)
BLD GP AB SCN SERPL QL: NORMAL
BUN SERPL-MCNC: 11 MG/DL (ref 8–22)
BUN SERPL-MCNC: 13 MG/DL (ref 8–22)
BUN SERPL-MCNC: 15 MG/DL (ref 8–22)
BUN SERPL-MCNC: 17 MG/DL (ref 8–22)
BUN SERPL-MCNC: 9 MG/DL (ref 8–22)
CALCIUM ALBUM COR SERPL-MCNC: 8.8 MG/DL (ref 8.5–10.5)
CALCIUM SERPL-MCNC: 8.3 MG/DL (ref 8.5–10.5)
CALCIUM SERPL-MCNC: 8.3 MG/DL (ref 8.5–10.5)
CALCIUM SERPL-MCNC: 8.4 MG/DL (ref 8.5–10.5)
CALCIUM SERPL-MCNC: 8.6 MG/DL (ref 8.5–10.5)
CALCIUM SERPL-MCNC: 9.1 MG/DL (ref 8.5–10.5)
CFT BLD TEG: 4.7 MIN (ref 4.6–9.1)
CFT BLD TEG: 7.3 MIN (ref 4.6–9.1)
CFT P HPASE BLD TEG: 4.3 MIN (ref 4.3–8.3)
CFT P HPASE BLD TEG: 6.1 MIN (ref 4.3–8.3)
CHLORIDE SERPL-SCNC: 107 MMOL/L (ref 96–112)
CHLORIDE SERPL-SCNC: 107 MMOL/L (ref 96–112)
CHLORIDE SERPL-SCNC: 110 MMOL/L (ref 96–112)
CHLORIDE SERPL-SCNC: 114 MMOL/L (ref 96–112)
CHLORIDE SERPL-SCNC: 120 MMOL/L (ref 96–112)
CLOT ANGLE BLD TEG: 66.4 DEGREES (ref 63–78)
CLOT ANGLE BLD TEG: 76.5 DEGREES (ref 63–78)
CLOT LYSIS 30M P MA LENFR BLD TEG: 0 % (ref 0–2.6)
CLOT LYSIS 30M P MA LENFR BLD TEG: 0.3 % (ref 0–2.6)
CO2 SERPL-SCNC: 19 MMOL/L (ref 20–33)
CO2 SERPL-SCNC: 20 MMOL/L (ref 20–33)
CO2 SERPL-SCNC: 21 MMOL/L (ref 20–33)
CO2 SERPL-SCNC: 22 MMOL/L (ref 20–33)
CO2 SERPL-SCNC: 23 MMOL/L (ref 20–33)
CREAT SERPL-MCNC: 0.64 MG/DL (ref 0.5–1.4)
CREAT SERPL-MCNC: 0.67 MG/DL (ref 0.5–1.4)
CREAT SERPL-MCNC: 0.75 MG/DL (ref 0.5–1.4)
CREAT SERPL-MCNC: 0.89 MG/DL (ref 0.5–1.4)
CREAT SERPL-MCNC: 1.04 MG/DL (ref 0.5–1.4)
CT.EXTRINSIC BLD ROTEM: 1 MIN (ref 0.8–2.1)
CT.EXTRINSIC BLD ROTEM: 1.9 MIN (ref 0.8–2.1)
EKG IMPRESSION: NORMAL
EOSINOPHIL # BLD AUTO: 0.9 K/UL (ref 0–0.51)
EOSINOPHIL NFR BLD: 9.5 % (ref 0–6.9)
ERYTHROCYTE [DISTWIDTH] IN BLOOD BY AUTOMATED COUNT: 41.3 FL (ref 35.9–50)
EST. AVERAGE GLUCOSE BLD GHB EST-MCNC: 120 MG/DL
GFR SERPLBLD CREATININE-BSD FMLA CKD-EPI: 100 ML/MIN/1.73 M 2
GFR SERPLBLD CREATININE-BSD FMLA CKD-EPI: 101 ML/MIN/1.73 M 2
GFR SERPLBLD CREATININE-BSD FMLA CKD-EPI: 62 ML/MIN/1.73 M 2
GFR SERPLBLD CREATININE-BSD FMLA CKD-EPI: 74 ML/MIN/1.73 M 2
GFR SERPLBLD CREATININE-BSD FMLA CKD-EPI: 91 ML/MIN/1.73 M 2
GLOBULIN SER CALC-MCNC: 2.8 G/DL (ref 1.9–3.5)
GLUCOSE BLD STRIP.AUTO-MCNC: 120 MG/DL (ref 65–99)
GLUCOSE BLD STRIP.AUTO-MCNC: 125 MG/DL (ref 65–99)
GLUCOSE BLD STRIP.AUTO-MCNC: 132 MG/DL (ref 65–99)
GLUCOSE SERPL-MCNC: 115 MG/DL (ref 65–99)
GLUCOSE SERPL-MCNC: 128 MG/DL (ref 65–99)
GLUCOSE SERPL-MCNC: 129 MG/DL (ref 65–99)
GLUCOSE SERPL-MCNC: 130 MG/DL (ref 65–99)
GLUCOSE SERPL-MCNC: 139 MG/DL (ref 65–99)
HBA1C MFR BLD: 5.8 % (ref 4–5.6)
HCT VFR BLD AUTO: 44.9 % (ref 37–47)
HGB BLD-MCNC: 14.8 G/DL (ref 12–16)
IMM GRANULOCYTES # BLD AUTO: 0.03 K/UL (ref 0–0.11)
IMM GRANULOCYTES NFR BLD AUTO: 0.3 % (ref 0–0.9)
INR PPP: 1 (ref 0.87–1.13)
LYMPHOCYTES # BLD AUTO: 2.32 K/UL (ref 1–4.8)
LYMPHOCYTES NFR BLD: 24.4 % (ref 22–41)
MCF BLD TEG: 60.2 MM (ref 52–69)
MCF BLD TEG: 63.5 MM (ref 52–69)
MCF.PLATELET INHIB BLD ROTEM: 24.4 MM (ref 15–32)
MCF.PLATELET INHIB BLD ROTEM: 26.6 MM (ref 15–32)
MCH RBC QN AUTO: 28.6 PG (ref 27–33)
MCHC RBC AUTO-ENTMCNC: 33 G/DL (ref 32.2–35.5)
MCV RBC AUTO: 86.7 FL (ref 81.4–97.8)
MONOCYTES # BLD AUTO: 1.29 K/UL (ref 0–0.85)
MONOCYTES NFR BLD AUTO: 13.6 % (ref 0–13.4)
NEUTROPHILS # BLD AUTO: 4.86 K/UL (ref 1.82–7.42)
NEUTROPHILS NFR BLD: 51.1 % (ref 44–72)
NRBC # BLD AUTO: 0 K/UL
NRBC BLD-RTO: 0 /100 WBC (ref 0–0.2)
PA AA BLD-ACNC: 35.6 % (ref 0–11)
PA AA BLD-ACNC: 97.2 % (ref 0–11)
PA ADP BLD-ACNC: 19 % (ref 0–17)
PA ADP BLD-ACNC: 98.6 % (ref 0–17)
PLATELET # BLD AUTO: 320 K/UL (ref 164–446)
PMV BLD AUTO: 9.3 FL (ref 9–12.9)
POTASSIUM SERPL-SCNC: 3.5 MMOL/L (ref 3.6–5.5)
POTASSIUM SERPL-SCNC: 3.5 MMOL/L (ref 3.6–5.5)
POTASSIUM SERPL-SCNC: 3.7 MMOL/L (ref 3.6–5.5)
POTASSIUM SERPL-SCNC: 4.1 MMOL/L (ref 3.6–5.5)
POTASSIUM SERPL-SCNC: 4.3 MMOL/L (ref 3.6–5.5)
PROT SERPL-MCNC: 7.2 G/DL (ref 6–8.2)
PROTHROMBIN TIME: 13.3 SEC (ref 12–14.6)
RBC # BLD AUTO: 5.18 M/UL (ref 4.2–5.4)
RH BLD: NORMAL
SODIUM SERPL-SCNC: 140 MMOL/L (ref 135–145)
SODIUM SERPL-SCNC: 140 MMOL/L (ref 135–145)
SODIUM SERPL-SCNC: 141 MMOL/L (ref 135–145)
SODIUM SERPL-SCNC: 144 MMOL/L (ref 135–145)
SODIUM SERPL-SCNC: 147 MMOL/L (ref 135–145)
TEG ALGORITHM TGALG: ABNORMAL
TEG ALGORITHM TGALG: ABNORMAL
TROPONIN T SERPL-MCNC: 8 NG/L (ref 6–19)
WBC # BLD AUTO: 9.5 K/UL (ref 4.8–10.8)

## 2023-10-27 PROCEDURE — 99222 1ST HOSP IP/OBS MODERATE 55: CPT | Performed by: PHYSICAL MEDICINE & REHABILITATION

## 2023-10-27 PROCEDURE — 700105 HCHG RX REV CODE 258: Performed by: INTERNAL MEDICINE

## 2023-10-27 PROCEDURE — 700111 HCHG RX REV CODE 636 W/ 250 OVERRIDE (IP): Mod: JZ | Performed by: PSYCHIATRY & NEUROLOGY

## 2023-10-27 PROCEDURE — A9270 NON-COVERED ITEM OR SERVICE: HCPCS | Performed by: INTERNAL MEDICINE

## 2023-10-27 PROCEDURE — 700111 HCHG RX REV CODE 636 W/ 250 OVERRIDE (IP): Performed by: INTERNAL MEDICINE

## 2023-10-27 PROCEDURE — 93005 ELECTROCARDIOGRAM TRACING: CPT | Performed by: INTERNAL MEDICINE

## 2023-10-27 PROCEDURE — 93010 ELECTROCARDIOGRAM REPORT: CPT | Performed by: INTERNAL MEDICINE

## 2023-10-27 PROCEDURE — 700101 HCHG RX REV CODE 250: Performed by: RADIOLOGY

## 2023-10-27 PROCEDURE — 99292 CRITICAL CARE ADDL 30 MIN: CPT | Performed by: INTERNAL MEDICINE

## 2023-10-27 PROCEDURE — 70498 CT ANGIOGRAPHY NECK: CPT

## 2023-10-27 PROCEDURE — 70496 CT ANGIOGRAPHY HEAD: CPT

## 2023-10-27 PROCEDURE — 700105 HCHG RX REV CODE 258: Performed by: RADIOLOGY

## 2023-10-27 PROCEDURE — 96374 THER/PROPH/DIAG INJ IV PUSH: CPT

## 2023-10-27 PROCEDURE — 0042T CT-CEREBRAL PERFUSION ANALYSIS: CPT

## 2023-10-27 PROCEDURE — C1760 CLOSURE DEV, VASC: HCPCS

## 2023-10-27 PROCEDURE — 83036 HEMOGLOBIN GLYCOSYLATED A1C: CPT

## 2023-10-27 PROCEDURE — 700111 HCHG RX REV CODE 636 W/ 250 OVERRIDE (IP): Performed by: RADIOLOGY

## 2023-10-27 PROCEDURE — 700102 HCHG RX REV CODE 250 W/ 637 OVERRIDE(OP): Performed by: INTERNAL MEDICINE

## 2023-10-27 PROCEDURE — 80048 BASIC METABOLIC PNL TOTAL CA: CPT

## 2023-10-27 PROCEDURE — 85576 BLOOD PLATELET AGGREGATION: CPT | Mod: 91

## 2023-10-27 PROCEDURE — 770022 HCHG ROOM/CARE - ICU (200)

## 2023-10-27 PROCEDURE — 36415 COLL VENOUS BLD VENIPUNCTURE: CPT

## 2023-10-27 PROCEDURE — 99291 CRITICAL CARE FIRST HOUR: CPT | Performed by: PSYCHIATRY & NEUROLOGY

## 2023-10-27 PROCEDURE — 85347 COAGULATION TIME ACTIVATED: CPT | Mod: 91

## 2023-10-27 PROCEDURE — 700117 HCHG RX CONTRAST REV CODE 255: Performed by: EMERGENCY MEDICINE

## 2023-10-27 PROCEDURE — 99291 CRITICAL CARE FIRST HOUR: CPT | Performed by: INTERNAL MEDICINE

## 2023-10-27 PROCEDURE — A9270 NON-COVERED ITEM OR SERVICE: HCPCS | Performed by: RADIOLOGY

## 2023-10-27 PROCEDURE — 82962 GLUCOSE BLOOD TEST: CPT

## 2023-10-27 PROCEDURE — 71045 X-RAY EXAM CHEST 1 VIEW: CPT

## 2023-10-27 PROCEDURE — 700102 HCHG RX REV CODE 250 W/ 637 OVERRIDE(OP): Performed by: RADIOLOGY

## 2023-10-27 PROCEDURE — 85384 FIBRINOGEN ACTIVITY: CPT | Mod: 91

## 2023-10-27 PROCEDURE — 51798 US URINE CAPACITY MEASURE: CPT

## 2023-10-27 PROCEDURE — 03CL3ZZ EXTIRPATION OF MATTER FROM LEFT INTERNAL CAROTID ARTERY, PERCUTANEOUS APPROACH: ICD-10-PCS | Performed by: RADIOLOGY

## 2023-10-27 PROCEDURE — 70450 CT HEAD/BRAIN W/O DYE: CPT

## 2023-10-27 RX ORDER — HYDRALAZINE HYDROCHLORIDE 20 MG/ML
10 INJECTION INTRAMUSCULAR; INTRAVENOUS
Status: COMPLETED | OUTPATIENT
Start: 2023-10-27 | End: 2023-10-27

## 2023-10-27 RX ORDER — LABETALOL HYDROCHLORIDE 5 MG/ML
10 INJECTION, SOLUTION INTRAVENOUS EVERY 4 HOURS PRN
Status: DISCONTINUED | OUTPATIENT
Start: 2023-10-27 | End: 2023-11-06 | Stop reason: HOSPADM

## 2023-10-27 RX ORDER — POTASSIUM CHLORIDE 7.45 MG/ML
10 INJECTION INTRAVENOUS
Status: COMPLETED | OUTPATIENT
Start: 2023-10-27 | End: 2023-10-27

## 2023-10-27 RX ORDER — EPTIFIBATIDE 2 MG/ML
180 INJECTION, SOLUTION INTRAVENOUS ONCE
Status: DISCONTINUED | OUTPATIENT
Start: 2023-10-27 | End: 2023-10-27

## 2023-10-27 RX ORDER — EPTIFIBATIDE 0.75 MG/ML
2 INJECTION, SOLUTION INTRAVENOUS CONTINUOUS
Status: CANCELLED | OUTPATIENT
Start: 2023-10-27

## 2023-10-27 RX ORDER — HYDRALAZINE HYDROCHLORIDE 20 MG/ML
20 INJECTION INTRAMUSCULAR; INTRAVENOUS EVERY 6 HOURS PRN
Status: DISCONTINUED | OUTPATIENT
Start: 2023-10-27 | End: 2023-11-06 | Stop reason: HOSPADM

## 2023-10-27 RX ORDER — MONTELUKAST SODIUM 10 MG/1
10 TABLET ORAL DAILY
Status: DISCONTINUED | OUTPATIENT
Start: 2023-10-27 | End: 2023-10-28

## 2023-10-27 RX ORDER — ATORVASTATIN CALCIUM 40 MG/1
80 TABLET, FILM COATED ORAL EVERY EVENING
Status: DISCONTINUED | OUTPATIENT
Start: 2023-10-27 | End: 2023-10-27

## 2023-10-27 RX ORDER — ASPIRIN 81 MG/1
81 TABLET, CHEWABLE ORAL DAILY
Status: DISCONTINUED | OUTPATIENT
Start: 2023-10-27 | End: 2023-11-06

## 2023-10-27 RX ORDER — 3% SODIUM CHLORIDE 3 G/100ML
0-60 INJECTION, SOLUTION INTRAVENOUS CONTINUOUS
Status: DISCONTINUED | OUTPATIENT
Start: 2023-10-27 | End: 2023-11-01

## 2023-10-27 RX ORDER — EPTIFIBATIDE 0.75 MG/ML
1 INJECTION, SOLUTION INTRAVENOUS CONTINUOUS
Status: DISPENSED | OUTPATIENT
Start: 2023-10-27 | End: 2023-10-27

## 2023-10-27 RX ORDER — ATORVASTATIN CALCIUM 40 MG/1
40 TABLET, FILM COATED ORAL DAILY
Status: DISCONTINUED | OUTPATIENT
Start: 2023-10-27 | End: 2023-10-28

## 2023-10-27 RX ORDER — 3% SODIUM CHLORIDE 3 G/100ML
INJECTION, SOLUTION INTRAVENOUS CONTINUOUS
Status: DISCONTINUED | OUTPATIENT
Start: 2023-10-27 | End: 2023-10-27

## 2023-10-27 RX ORDER — BISACODYL 10 MG
10 SUPPOSITORY, RECTAL RECTAL
Status: DISCONTINUED | OUTPATIENT
Start: 2023-10-27 | End: 2023-10-28

## 2023-10-27 RX ORDER — LABETALOL HYDROCHLORIDE 5 MG/ML
10 INJECTION, SOLUTION INTRAVENOUS
Status: COMPLETED | OUTPATIENT
Start: 2023-10-27 | End: 2023-10-27

## 2023-10-27 RX ORDER — EPTIFIBATIDE 2 MG/ML
10 INJECTION, SOLUTION INTRAVENOUS ONCE
Status: COMPLETED | OUTPATIENT
Start: 2023-10-27 | End: 2023-10-27

## 2023-10-27 RX ORDER — EPTIFIBATIDE 2 MG/ML
INJECTION, SOLUTION INTRAVENOUS
Status: DISPENSED
Start: 2023-10-27 | End: 2023-10-27

## 2023-10-27 RX ORDER — AMOXICILLIN 250 MG
2 CAPSULE ORAL 2 TIMES DAILY
Status: DISCONTINUED | OUTPATIENT
Start: 2023-10-27 | End: 2023-10-28

## 2023-10-27 RX ORDER — EPTIFIBATIDE 2 MG/ML
2 INJECTION, SOLUTION INTRAVENOUS ONCE
Status: COMPLETED | OUTPATIENT
Start: 2023-10-27 | End: 2023-10-27

## 2023-10-27 RX ORDER — SODIUM CHLORIDE, SODIUM LACTATE, POTASSIUM CHLORIDE, CALCIUM CHLORIDE 600; 310; 30; 20 MG/100ML; MG/100ML; MG/100ML; MG/100ML
INJECTION, SOLUTION INTRAVENOUS CONTINUOUS
Status: DISCONTINUED | OUTPATIENT
Start: 2023-10-27 | End: 2023-10-27

## 2023-10-27 RX ORDER — ACETAMINOPHEN 325 MG/1
650 TABLET ORAL EVERY 6 HOURS PRN
Status: DISCONTINUED | OUTPATIENT
Start: 2023-10-27 | End: 2023-10-28

## 2023-10-27 RX ORDER — POLYETHYLENE GLYCOL 3350 17 G/17G
1 POWDER, FOR SOLUTION ORAL
Status: DISCONTINUED | OUTPATIENT
Start: 2023-10-27 | End: 2023-10-28

## 2023-10-27 RX ADMIN — HYDRALAZINE HYDROCHLORIDE 20 MG: 20 INJECTION, SOLUTION INTRAMUSCULAR; INTRAVENOUS at 14:35

## 2023-10-27 RX ADMIN — EPTIFIBATIDE 1 MCG/KG/MIN: 0.75 INJECTION, SOLUTION INTRAVENOUS at 01:52

## 2023-10-27 RX ADMIN — EPTIFIBATIDE 10 MG: 2 INJECTION, SOLUTION INTRAVENOUS at 01:36

## 2023-10-27 RX ADMIN — POTASSIUM CHLORIDE 10 MEQ: 7.46 INJECTION, SOLUTION INTRAVENOUS at 09:00

## 2023-10-27 RX ADMIN — TICAGRELOR 90 MG: 90 TABLET ORAL at 18:00

## 2023-10-27 RX ADMIN — HYDRALAZINE HYDROCHLORIDE 20 MG: 20 INJECTION, SOLUTION INTRAMUSCULAR; INTRAVENOUS at 06:02

## 2023-10-27 RX ADMIN — SODIUM CHLORIDE 5 MG/HR: 9 INJECTION, SOLUTION INTRAVENOUS at 23:06

## 2023-10-27 RX ADMIN — SODIUM CHLORIDE 5 MG/HR: 9 INJECTION, SOLUTION INTRAVENOUS at 17:54

## 2023-10-27 RX ADMIN — EPTIFIBATIDE 2 MG: 2 INJECTION, SOLUTION INTRAVENOUS at 01:47

## 2023-10-27 RX ADMIN — LABETALOL HYDROCHLORIDE 10 MG: 5 INJECTION INTRAVENOUS at 13:04

## 2023-10-27 RX ADMIN — ASPIRIN 81 MG 81 MG: 81 TABLET ORAL at 13:24

## 2023-10-27 RX ADMIN — SODIUM CHLORIDE 30 ML/HR: 3 INJECTION, SOLUTION INTRAVENOUS at 23:04

## 2023-10-27 RX ADMIN — DOCUSATE SODIUM 50 MG AND SENNOSIDES 8.6 MG 2 TABLET: 8.6; 5 TABLET, FILM COATED ORAL at 17:36

## 2023-10-27 RX ADMIN — SODIUM CHLORIDE: 3 INJECTION, SOLUTION INTRAVENOUS at 02:53

## 2023-10-27 RX ADMIN — HYDRALAZINE HYDROCHLORIDE 10 MG: 20 INJECTION, SOLUTION INTRAMUSCULAR; INTRAVENOUS at 03:50

## 2023-10-27 RX ADMIN — TICAGRELOR 90 MG: 90 TABLET ORAL at 13:22

## 2023-10-27 RX ADMIN — LABETALOL HYDROCHLORIDE 10 MG: 5 INJECTION INTRAVENOUS at 16:49

## 2023-10-27 RX ADMIN — HYDRALAZINE HYDROCHLORIDE 20 MG: 20 INJECTION, SOLUTION INTRAMUSCULAR; INTRAVENOUS at 17:18

## 2023-10-27 RX ADMIN — IOHEXOL 100 ML: 350 INJECTION, SOLUTION INTRAVENOUS at 00:19

## 2023-10-27 RX ADMIN — LABETALOL HYDROCHLORIDE 10 MG: 5 INJECTION, SOLUTION INTRAVENOUS at 10:04

## 2023-10-27 RX ADMIN — IOHEXOL 40 ML: 350 INJECTION, SOLUTION INTRAVENOUS at 00:16

## 2023-10-27 RX ADMIN — HYDRALAZINE HYDROCHLORIDE 10 MG: 20 INJECTION, SOLUTION INTRAMUSCULAR; INTRAVENOUS at 10:47

## 2023-10-27 RX ADMIN — POTASSIUM CHLORIDE 10 MEQ: 7.46 INJECTION, SOLUTION INTRAVENOUS at 08:00

## 2023-10-27 RX ADMIN — LABETALOL HYDROCHLORIDE 10 MG: 5 INJECTION INTRAVENOUS at 07:30

## 2023-10-27 RX ADMIN — HYDRALAZINE HYDROCHLORIDE 10 MG: 20 INJECTION, SOLUTION INTRAMUSCULAR; INTRAVENOUS at 10:48

## 2023-10-27 RX ADMIN — LABETALOL HYDROCHLORIDE 10 MG: 5 INJECTION, SOLUTION INTRAVENOUS at 04:18

## 2023-10-27 ASSESSMENT — COGNITIVE AND FUNCTIONAL STATUS - GENERAL
PERSONAL GROOMING: TOTAL
DRESSING REGULAR LOWER BODY CLOTHING: TOTAL
EATING MEALS: TOTAL
MOVING FROM LYING ON BACK TO SITTING ON SIDE OF FLAT BED: UNABLE
TURNING FROM BACK TO SIDE WHILE IN FLAT BAD: UNABLE
SUGGESTED CMS G CODE MODIFIER MOBILITY: CN
WALKING IN HOSPITAL ROOM: TOTAL
DRESSING REGULAR UPPER BODY CLOTHING: TOTAL
SUGGESTED CMS G CODE MODIFIER DAILY ACTIVITY: CN
TOILETING: TOTAL
MOBILITY SCORE: 6
CLIMB 3 TO 5 STEPS WITH RAILING: TOTAL
DAILY ACTIVITIY SCORE: 6
STANDING UP FROM CHAIR USING ARMS: TOTAL
HELP NEEDED FOR BATHING: TOTAL
MOVING TO AND FROM BED TO CHAIR: UNABLE

## 2023-10-27 ASSESSMENT — PAIN SCALES - PAIN ASSESSMENT IN ADVANCED DEMENTIA (PAINAD)
TOTALSCORE: 2
CONSOLABILITY: NO NEED TO CONSOLE
BODYLANGUAGE: RIGID, FISTS CLENCHED, KNEES UP, PUSHING/PULLING AWAY, STRIKES OUT
FACIALEXPRESSION: SMILING OR INEXPRESSIVE
BREATHING: NORMAL

## 2023-10-27 ASSESSMENT — FIBROSIS 4 INDEX: FIB4 SCORE: 0.89

## 2023-10-27 ASSESSMENT — LIFESTYLE VARIABLES: DO YOU DRINK ALCOHOL: NO

## 2023-10-27 ASSESSMENT — PAIN DESCRIPTION - PAIN TYPE
TYPE: ACUTE PAIN
TYPE: ACUTE PAIN

## 2023-10-27 NOTE — PROGRESS NOTES
Cerebral angiogram with mechanical thrombectomy for Left ICA occlusion by Dr. Montes.  Emergent consent. Pre-procedure pedal pulses +2. Right Femoral Artery access site. Pt placed on monitor, prepped and draped in a sterile fashion. Vital signs were taken every 5 minutes and remained within parameters (see doc flow sheets). Platelet Teg Mapping intraprocedure. Penumbra system was used to retrieve clot. Hemostasis achieved using AngioSeal deployed at 0149. Integrilin bolus given and Integrilin infusion started per MD orders. Report given to GABO Lubin. Pt was transported to ICU with RN and monitor to R107. Stroke worksheet review during warm handoff with Amee. ICU responsible for serial checks starting at 0204. See Stroke Procedure flowsheet for VS, neuro, access, extremity serial assessments.    LKW:2200  NIH:15  Time in IR:0051  Access:Right Femoral Artery  1st angio:0105  1st pass:0115  Closure (end time):0149    Goal SBP per MD: <140    TICI score 3  @ 0134    Post procedure pedal pulses +2    Terumo AngioSeal Vascular Closure Device 8French x 2.67mm  REF: [id number]  LOT: [id number]  EXP: 06/11/2024  Deployed @ 0149

## 2023-10-27 NOTE — OR SURGEON
Immediate Post- Operative Note        Findings: Left ICA stent thrombosis       Procedure(s): Thrombectomy -TICI 3 flow       Estimated Blood Loss: Less than 5 ml        Complications: None            10/27/2023     2:02 AM     Mauri Montes M.D.

## 2023-10-27 NOTE — THERAPY
Speech Language Therapy Contact Note    Patient Name: Roslyn Kilgore  Age:  58 y.o., Sex:  female  Medical Record #: [id number]  Today's Date: 10/27/2023    Hold swallow eval r/t lethargy per RN. No source of nutrition.

## 2023-10-27 NOTE — CONSULTS
Physical Medicine and Rehabilitation Consultation              Date of initial consultation: 10/27/2023  Requesting provider: ordered by Ken Tejeda M.D. at 10/27/23 1146  Consulting provider: Yojana Live D.O.  Reason for consultation: assess for acute inpatient rehab appropriateness  LOS: 0 Day(s)    Chief complaint: Right sided weakness and aphasia     HPI: The patient is a 58 y.o.  female with a past medical history of HTN, HLD, history of RUE arterial thrombosis, recent left ICA aneurysm s/p stent diverter placement on 10/19 on brilinta and prior arrhythmias;  who presented on 10/26/2023 11:39 PM with acute onset right sided weakness and aphasia. Per documentation, patient's roommates noticed that the patient had new right sided weakness that started earlier in the day. EMS was activated and patient was found to be confused around 10pm. Upon eval in the ED, patient had expressive aphasia. Upon eval in the ED, CTA  head showed left ICA occlusion up to stent and R ICA aneurysm. CTA neck showed occlusion of the mid left ICA distal to stent. Neurology and neuro IR were consulted, patient was taken to IR on 10/27 for left ICA stent thrombectomy with TICI 3 flow. Patient is currently on ASA, statin, and brilinta. Patient currently has bed rest orders.     Patient seen and examined at bedside, patient very fatigued, has minimal interaction.  Keeps eyes closed during exam.  Patient does follow commands with left upper extremity.  But is unable to answer review of systems or details about her housing set up.  Patient was unable to participate with therapy for SLP eval today.  Patient is on bedrest, PT/OT pending.    Social Hx:  Patient lives with  a rommate in a single story house. Patient's daughter lives near by.   Unable to determine   At prior level of function patient was Independent with mobility and ADLs.     Employment: works as an independent contontractor   Tobacco: Denies   Alcohol: former  alcohol abuse, sober x 20 years   Drugs: regular marijuana use     THERAPY:  Restrictions: fall risk, currently on bed rest   PT: Functional mobility   Pending     OT: ADLs  pending    SLP:   Pending     IMAGING:  DX-ABDOMEN FOR TUBE PLACEMENT  Narrative: 10/27/2023 11:33 AM    HISTORY/REASON FOR EXAM:  Line evaluation.    TECHNIQUE/EXAM DESCRIPTION AND NUMBER OF VIEWS:  1 view(s) of the abdomen.    COMPARISON:  None.    FINDINGS:  Enteric tube has been placed.    The tip projects over the midabdomen.    The bowel gas pattern is within normal limits.  Impression: 1.  Enteric tube overlies the stomach.  CT-HEAD W/O  Narrative: 10/27/2023 6:19 AM    HISTORY/REASON FOR EXAM: follow up post thrombectomy, ischemic stroke left ICA    TECHNIQUE/EXAM DESCRIPTION:  CT of the head without contrast.    Sequential axial images were obtained from the vertex to the skull base without contrast.    Up to date radiation dose reduction adjustments have been utilized to meet ALARA standards for radiation dose reduction.    COMPARISON: Yesterday    FINDINGS:    There is mild diffuse parenchymal volume loss observed. Periventricular and subcortical white matter low-attenuation changes are seen, most commonly associated with small vessel ischemic disease. Low-density encephalomalacia changes in the right frontal   lobe are seen. The ventricles are normal in caliber and configuration. No space occupying lesions or areas of acute vascular territory infarctions are identified. There are no abnormal extra axial fluid collections or extra axial hemorrhage identified.    Opacification of the left maxillary sinus is seen. No depressed calvarial fractures are identified. The visualized globes and retrobulbar soft tissues appear within normal limits.  Atherosclerotic intracranial calcifications are seen.  Impression: 1.  No acute intracranial abnormality is identified, there are nonspecific white matter changes, commonly associated with small  vessel ischemic disease.  Associated mild cerebral atrophy is noted.  2.  Left maxillary sinusitis changes  3.  Atherosclerosis.  CT-CTA HEAD WITH & W/O-POST PROCESS  Narrative:   10/26/2023 11:48 PM    HISTORY/REASON FOR EXAM:  Emergency Medical Condition ? Stroke    TECHNIQUE/EXAM DESCRIPTION:    CT angiogram of the [place] without and with contrast.  Initial precontrast images were obtained of the head from the skull base through the vertex.  Postcontrast images were obtained of the [place] following the power injection of   nonionic contrast at 5.0 mL/sec. Thin-section helical images were obtained with overlapping reconstruction interval. Coronal and sagittal multiplanar volume reformats were generated.  3D angiographic images were reviewed on PACS.  Maximum intensity   projection (MIP) images were generated and reviewed.    100 mL of Omnipaque 350 nonionic contrast was injected intravenously.    Low dose optimization technique was utilized for this CT exam including automated exposure control and adjustment of the mA and/or kV according to patient size.    COMPARISON:  None.    FINDINGS:    The posterior circulation shows the distal vertebral arteries to be patent. The vertebrobasilar confluence is intact. The basilar artery is patent. 3 mm aneurysm is seen at the basilar tip.    Occlusion of the distal left internal carotid artery is seen extending up to distal carotid arterial stent. 5.7 mm fusiform dilatation of the distal right internal carotid artery is seen. 2.3 mm aneurysm is seen inferiorly at the right M1/M2 junction.   The left anterior cerebral artery demonstrates decreased contrast density. The distal left middle cerebral artery branches are small in caliber and taper and not visualized as distally.    The brain is unremarkable.    3D angiographic/MIP images of the vasculature confirm the vascular findings as described above.  Impression: 1.  Left internal carotid artery occlusion  extending up to distal carotid arterial stent.  2.  Decreased density within the left anterior cerebral artery, appearance suggesting slow flow or spasm.  3.  Small caliber of the left middle cerebral artery branches which taper and are not visualized most distally, changes most compatible with diminished flow due to left internal carotid artery occlusion.  4.  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  5.  3 mm basilar artery tip aneurysm  6.  2.3 mm aneurysm inferiorly at the right M1/M2 junction  DX-CHEST-PORTABLE (1 VIEW)  Narrative:   10/27/2023 12:26 AM    HISTORY/REASON FOR EXAM: Emergency Medical Condition - Stroke    TECHNIQUE/EXAM DESCRIPTION:  Single AP view of the chest.    COMPARISON: May 17, 2023    FINDINGS:    The cardiac silhouette appears within normal limits.    Atherosclerotic calcification of the aorta is noted.  The central pulmonary vasculature appears normal.    Bilateral lung volumes are diminished.  Hazy linear density in the left lung base is observed.    No significant pleural effusions are identified.    The bony structures appear age-appropriate.  Impression: 1.  Left basilar atelectasis, no focal infiltrate  2.  Atherosclerosis  CT-CTA NECK WITH & W/O-POST PROCESSING  Narrative:   10/26/2023 11:48 PM    HISTORY/REASON FOR EXAM:  Emergency Medical Condition ? Stroke    TECHNIQUE/EXAM DESCRIPTION:  CT angiogram of the neck with contrast.    Postcontrast images were obtained of the neck from the great vessels through the skull base following the power injection of nonionic contrast at 5.0 mL/sec. Thin-section helical images were obtained with overlapping reconstruction interval. Coronal and   oblique multiplanar volume reformats were generated.    Cervical internal carotid artery percent stenosis is calculated using the standard method according to the NASCET criteria wherein a segment of uniform caliber mid or distal cervical internal carotid is used as  the reference denominator.    3D angiographic images were reviewed on PACS.  Maximum intensity projection (MIP) images were generated and reviewed    100 mL of Omnipaque 350 nonionic contrast was injected intravenously.    Low dose optimization technique was utilized for this CT exam including automated exposure control and adjustment of the mA and/or kV according to patient size.    COMPARISON: February 26, 2012    FINDINGS:    Aortic arch: conventional branching pattern.    There is atherosclerotic plaque of the aorta.    Right common carotid artery: Patent    Right internal carotid artery: Normal in appearance    Left common carotid artery is patent.    Left internal carotid artery: Atherosclerotic plaque at the bifurcation without significant stenosis (less than 50%). There is nonopacification of the mid left internal carotid artery identified extending intracranially through the bony carotid canal   through left distal ICA proximal middle cerebral artery stent.    The right vertebral artery is patent without dissection or stenosis.    The left vertebral artery is patent without dissection or stenosis.    Vertebrobasilar confluence: The vertebrobasilar confluence appears normal.    See dedicated CT angiogram of the head for intracranial findings.    Lung apices are clear    The soft tissues of the neck are within normal limits.    Opacification of left maxillary sinus is seen.    3D angiographic/MIP images of the vasculature confirm the vascular findings as described above.  Impression: 1.  Occlusion from the mid left internal carotid artery through intracranial stent at the distal left ICA.    These findings were discussed with the patient's clinician, Kiet Brown, on 10/27/2023 12:37 AM.  CT-CEREBRAL PERFUSION ANALYSIS  Narrative:   10/26/2023 11:46 PM    HISTORY/REASON FOR EXAM:  Emergency Medical Condition ? Stroke.    TECHNIQUE/EXAM DESCRIPTION AND NUMBER OF VIEWS:    CT Cerebral Perfusion Analysis.    The  study was performed on a 128 slice G.E. Lightspeed Multidetector CT scanner. Perfusion data and corresponding time-activity curves are processed and displayed as color-coded maps in the axial plane for Cerebral Blood Flow (CBF), Cerebral Blood Volume   (CBV),T Max and Mean Transit Time (MTT) and are post processed on the Ischemia view-RAPID virtual .    40 mL of Omnipaque 350 nonionic contrast was injected intravenously.    Low dose optimization technique was utilized for this CT exam including automated exposure control and adjustment of the mA and/or kV according to patient size.    COMPARISON:  None.    FINDINGS:    Cerebral blood flow less than 30% = 183 mL    T Max more than 6 seconds = 280 mL    Mismatch volume = 97 mL    Mismatch ratio = 1.5  Impression: 1.  Cerebral blood flow less than 30% likely representing completed infarct = 183 mL.    2.  T Max more than 6 seconds likely representing combination of completed infarct and ischemia = 280 mL.    3.  Mismatched volume likely representing ischemic brain/penumbra = 97 mL    4.  Please note that the cerebral perfusion was performed on the limited brain tissue around the basal ganglia region. Infarct/ischemia outside the CT perfusion sections can be missed in this study.  CT-HEAD W/O  Narrative: 10/26/2023 11:46 PM    HISTORY/REASON FOR EXAM: Emergency Medical Condition ? Stroke    TECHNIQUE/EXAM DESCRIPTION:  CT of the head without contrast.    Sequential axial images were obtained from the vertex to the skull base without contrast.    Up to date radiation dose reduction adjustments have been utilized to meet ALARA standards for radiation dose reduction.    COMPARISON: None    FINDINGS:    There is mild diffuse parenchymal volume loss observed. Periventricular and subcortical white matter low-attenuation changes are seen, most commonly associated with small vessel ischemic disease. Right frontal lobe encephalomalacia changes are seen. The   ventricles  are normal in caliber and configuration. No space occupying lesions or areas of acute vascular territory infarctions are identified. There are no abnormal extra axial fluid collections or extra axial hemorrhage identified.    Opacification of the left maxillary sinus is seen. No depressed calvarial fractures are identified. The visualized globes and retrobulbar soft tissues appear within normal limits.  Impression: 1.  No acute intracranial abnormality is identified, there are nonspecific white matter changes, commonly associated with small vessel ischemic disease.  Associated mild cerebral atrophy is noted.  2.  Left maxillary sinusitis changes    Note: Artifacts are present limiting diagnostic sensitivity of this exam.      PROCEDURES:  10/27 thrombectomy for Left ICA stent thrombosis, TICI 3 flow performed by Dr. Montes     PMH:  Past Medical History:   Diagnosis Date    Acute diverticulitis 02/20/2022    Arrhythmia 10/12/2023    tachy, wears monitor    Arterial embolism and thrombosis of upper extremity (HCC)     Arthritis     osteo- right hip    ASTHMA     Bronchitis     Cancer (HCC) 10 years ago    pre cancerous s/p leep    Carcinoma in situ of respiratory system     per pt early 70's, early 80's    Dental disorder     full dentures    DEPRESSION     anxiety    Heart burn     High cholesterol     Hypertension     Hyponatremia 02/20/2022    Indigestion     Other specified symptom associated with female genital organs     see cat scan    Personal history of venous thrombosis and embolism     Pneumonia 01/1996    Sepsis (HCC) 02/20/2022    Stroke ([facility])     4 strokes, 3 aneurysms       PSH:  Past Surgical History:   Procedure Laterality Date    ENDARTERECTOMY Right 11/18/2015    Procedure: ENDARTERECTOMY BRACHIAL ARTERY W/VEIN PATCH REPAIR;  Surgeon: Greta Wilhelm M.D.;  Location: SURGERY [facility];  Service:     DEBRIDEMENT Right 11/11/2015    Procedure: FINGER AMPUTATION 2ND;  Surgeon: Greta THOMPSON  SUE Wilhelm;  [place] [facility];  Service:     ANGIOGRAM Right 11/05/2015    Procedure: ANGIOGRAM- ARM;  Surgeon: Greta Wilhelm M.D.;  [place] [facility] [place];  Service:     ANGIOPLASTY UPPER Right 11/05/2015    Procedure: ANGIOPLASTY UPPER;  Surgeon: Greta Wilhelm M.D.;  [place] [facility] [place];  Service:     HYSTERECTOMY LAPAROSCOPY  2015    THROMBECTOMY  01/09/2011    Performed by TREY TRAN at SURGERY [facility]S    ANGIOGRAM  01/09/2011    Performed by TREY TRAN at SURGERY [facility] ORS    HYSTEROSCOPY NOVASURE-2  09/27/2010    Performed by JEANIE DE LOS SANTOS at SURGERY [facility] DAY [facility] ORS    MAMMOPLASTY REDUCTION  08/24/2010    Performed by SIRI MOORE at SURGERY [facility]    FEMORAL ARTERY REPAIR  06/28/2010    Performed by GRETA WILHELM at SURGERY [facility] ORS    ANGIOGRAM  04/15/2010    Performed by GRETA WILHELM at SURGERY [facility] ORS    ANGIOPLASTY BALLOON  04/15/2010    Performed by GRETA WILHELM at SURGERY [facility] ORS    ANGIOGRAM  02/02/2010    Performed by GRETA WILHELM at SURGERY [facility] ORS    THROMBECTOMY  12/21/2009    Performed by GRETA WILHELM at SURGERY [facility] ORS    ANGIOGRAM  08/17/2009    Performed by GRETA WILHELM at SURGERY [facility] ORS    OTHER      MULTIPLE BYPASS SURGERIES TO RUE FOR CLOTS    OTHER      LUE CLOT REMOVAL    OTHER ORTHOPEDIC SURGERY      RIGHT HIP ARTHROPLASTY 2014    HI U/S LEIOMYOMATA ABLATE <200 CC      PRIMARY C SECTION      TUBAL LIGATION, ECTOPIC PREGNANCY       FHX:  Family History   Problem Relation Age of Onset    Stroke Mother     Lung Disease Neg Hx     Cancer Neg Hx     Diabetes Neg Hx     Heart Disease Neg Hx        Medications:  Current Facility-Administered Medications   Medication Dose    senna-docusate (Pericolace Or Senokot S) 8.6-50 MG per tablet 2 Tablet  2 Tablet    And    polyethylene glycol/lytes (Miralax) PACKET 1  "Packet  1 Packet    And    magnesium hydroxide (Milk Of Magnesia) suspension 30 mL  30 mL    And    bisacodyl (Dulcolax) suppository 10 mg  10 mg    acetaminophen (Tylenol) tablet 650 mg  650 mg    insulin regular (HumuLIN R,NovoLIN R) injection  1-6 Units    And    dextrose 10 % BOLUS 25 g  25 g    eptifibatide 0.75 mg/mL (Integrilin) infusion  1 mcg/kg/min    niCARdipine (Cardene) 25 mg in  mL Standard Infusion  0-15 mg/hr    labetalol (Normodyne/Trandate) injection 10 mg  10 mg    hydrALAZINE (Apresoline) injection 20 mg  20 mg    atorvastatin (Lipitor) tablet 40 mg  40 mg    montelukast (Singulair) tablet 10 mg  10 mg    MD Alert...ICU Electrolyte Replacement per Pharmacy      aspirin (Asa) chewable tab 81 mg  81 mg    ticagrelor (Brilinta) tablet 90 mg  90 mg    3% sodium chloride (Hypertonic Saline) 500mL infusion  0-60 mL/hr     Facility-Administered Medications Ordered in Other Encounters   Medication Dose    EPTIFIBATIDE 20 MG/10ML IV SOLN         Allergies:  Allergies   Allergen Reactions    Alcohol Unspecified     REQUESTS NO ALCOHOL CONTENT IN HER MEDICATIONS (per pt, pt is an alcoholic)    Trazodone Hives     Caused breat hyperplasia. Pt then underwent a breast reduction and attempted to take again, new reaction: Hives.    Lisinopril Cough       Physical Exam:  Vitals: /64   Pulse 82   Temp 36.7 °C (98 °F) (Temporal)   Resp 15   Ht 1.6 m (5' 3\")   Wt 64.5 kg (142 lb 3.2 oz)   SpO2 96%   Gen: NAD, laying comfortably in bed  Head:  NC/AT  Eyes/ Nose/ Mouth: PERRLA, moist mucous membranes, keeps eyes closed  Cardio: RRR, good distal perfusion, warm extremities  Pulm: normal respiratory effort, no cyanosis, on 2 L O2  Abd: Soft NTND, negative borborygmi   Ext: No peripheral edema. No calf tenderness. No clubbing.    Mental status: follows commands intermittently  Speech: fluent, no aphasia or dysarthria    CRANIAL NERVES:  2,3: visual acuity grossly intact, PERRL  3,4,6: EOMI bilaterally, " no nystagmus or diplopia  5: sensation intact to light touch bilaterally and symmetric  7: no facial asymmetry  8: hearing grossly intact      Motor: Does not follow commands for full MMT.  Does squeeze left upper extremity 5 out of 5 strength, attempts to move left upper extremity at least 3 out of 5, has restraints in place.  Is able to move left upper extremity 3 out of 5.  Minimal interaction with right side.    Sensory:   intact to light touch through out left side      DTRs: 2+ in bilateral  biceps  No clonus at bilateral ankles  Negative Antoine b/l     Tone: no spasticity noted      Labs: Reviewed and significant for   Recent Labs     10/26/23  2345   RBC 5.18   HEMOGLOBIN 14.8   HEMATOCRIT 44.9   PLATELETCT 320   PROTHROMBTM 13.3   APTT 28.1   INR 1.00     Recent Labs     10/27/23  0234 10/27/23  0545 10/27/23  1150   SODIUM 140 141 147*   POTASSIUM 4.1 3.5* 4.3   CHLORIDE 107 110 120*   CO2 22 21 19*   GLUCOSE 128* 139* 130*   BUN 15 13 11   CREATININE 0.89 0.75 0.67   CALCIUM 8.3* 8.4* 8.3*     Recent Results (from the past 24 hour(s))   CBC WITH DIFFERENTIAL    Collection Time: 10/26/23 11:45 PM   Result Value Ref Range    WBC 9.5 4.8 - 10.8 K/uL    RBC 5.18 4.20 - 5.40 M/uL    Hemoglobin 14.8 12.0 - 16.0 g/dL    Hematocrit 44.9 37.0 - 47.0 %    MCV 86.7 81.4 - 97.8 fL    MCH 28.6 27.0 - 33.0 pg    MCHC 33.0 32.2 - 35.5 g/dL    RDW 41.3 35.9 - 50.0 fL    Platelet Count 320 164 - 446 K/uL    MPV 9.3 9.0 - 12.9 fL    Neutrophils-Polys 51.10 44.00 - 72.00 %    Lymphocytes 24.40 22.00 - 41.00 %    Monocytes 13.60 (H) 0.00 - 13.40 %    Eosinophils 9.50 (H) 0.00 - 6.90 %    Basophils 1.10 0.00 - 1.80 %    Immature Granulocytes 0.30 0.00 - 0.90 %    Nucleated RBC 0.00 0.00 - 0.20 /100 WBC    Neutrophils (Absolute) 4.86 1.82 - 7.42 K/uL    Lymphs (Absolute) 2.32 1.00 - 4.80 K/uL    Monos (Absolute) 1.29 (H) 0.00 - 0.85 K/uL    Eos (Absolute) 0.90 (H) 0.00 - 0.51 K/uL    Baso (Absolute) 0.10 0.00 - 0.12 K/uL     Immature Granulocytes (abs) 0.03 0.00 - 0.11 K/uL    NRBC (Absolute) 0.00 K/uL   COMP METABOLIC PANEL    Collection Time: 10/26/23 11:45 PM   Result Value Ref Range    Sodium 140 135 - 145 mmol/L    Potassium 3.7 3.6 - 5.5 mmol/L    Chloride 107 96 - 112 mmol/L    Co2 23 20 - 33 mmol/L    Anion Gap 10.0 7.0 - 16.0    Glucose 129 (H) 65 - 99 mg/dL    Bun 17 8 - 22 mg/dL    Creatinine 1.04 0.50 - 1.40 mg/dL    Calcium 9.1 8.5 - 10.5 mg/dL    Correct Calcium 8.8 8.5 - 10.5 mg/dL    AST(SGOT) 21 12 - 45 U/L    ALT(SGPT) 19 2 - 50 U/L    Alkaline Phosphatase 104 (H) 30 - 99 U/L    Total Bilirubin 0.3 0.1 - 1.5 mg/dL    Albumin 4.4 3.2 - 4.9 g/dL    Total Protein 7.2 6.0 - 8.2 g/dL    Globulin 2.8 1.9 - 3.5 g/dL    A-G Ratio 1.6 g/dL   PROTHROMBIN TIME    Collection Time: 10/26/23 11:45 PM   Result Value Ref Range    PT 13.3 12.0 - 14.6 sec    INR 1.00 0.87 - 1.13   APTT    Collection Time: 10/26/23 11:45 PM   Result Value Ref Range    APTT 28.1 24.7 - 36.0 sec   COD (ADULT)    Collection Time: 10/26/23 11:45 PM   Result Value Ref Range    ABO Grouping Only A     Rh Grouping Only POS     Antibody Screen-Cod NEG    TROPONIN    Collection Time: 10/26/23 11:45 PM   Result Value Ref Range    Troponin T 8 6 - 19 ng/L   ESTIMATED GFR    Collection Time: 10/26/23 11:45 PM   Result Value Ref Range    GFR (CKD-EPI) 62 >60 mL/min/1.73 m 2   PLATELET MAPPING WITH BASIC TEG    Collection Time: 10/27/23  1:22 AM   Result Value Ref Range    Reaction Time Initial-R 4.7 4.6 - 9.1 min    React Time Initial Hep 4.3 4.3 - 8.3 min    Clot Kinetics-K 1.0 0.8 - 2.1 min    Clot Angle-Angle 76.5 63.0 - 78.0 degrees    Maximum Clot Strength-MA 63.5 52.0 - 69.0 mm    TEG Functional Fibrinogen(MA) 26.6 15.0 - 32.0 mm    Lysis 30 minutes-LY30 0.3 0.0 - 2.6 %    % Inhibition ADP 19.0 (H) 0.0 - 17.0 %    % Inhibition AA 35.6 (H) 0.0 - 11.0 %    TEG Algorithm Link Algorithm    Basic Metabolic Panel    Collection Time: 10/27/23  2:34 AM   Result Value  Ref Range    Sodium 140 135 - 145 mmol/L    Potassium 4.1 3.6 - 5.5 mmol/L    Chloride 107 96 - 112 mmol/L    Co2 22 20 - 33 mmol/L    Glucose 128 (H) 65 - 99 mg/dL    Bun 15 8 - 22 mg/dL    Creatinine 0.89 0.50 - 1.40 mg/dL    Calcium 8.3 (L) 8.5 - 10.5 mg/dL    Anion Gap 11.0 7.0 - 16.0   PLATELET MAPPING WITH BASIC TEG    Collection Time: 10/27/23  2:34 AM   Result Value Ref Range    Reaction Time Initial-R 7.3 4.6 - 9.1 min    React Time Initial Hep 6.1 4.3 - 8.3 min    Clot Kinetics-K 1.9 0.8 - 2.1 min    Clot Angle-Angle 66.4 63.0 - 78.0 degrees    Maximum Clot Strength-MA 60.2 52.0 - 69.0 mm    TEG Functional Fibrinogen(MA) 24.4 15.0 - 32.0 mm    Lysis 30 minutes-LY30 0.0 0.0 - 2.6 %    % Inhibition ADP 98.6 (H) 0.0 - 17.0 %    % Inhibition AA 97.2 (H) 0.0 - 11.0 %    TEG Algorithm Link Algorithm    HEMOGLOBIN A1C    Collection Time: 10/27/23  2:34 AM   Result Value Ref Range    Glycohemoglobin 5.8 (H) 4.0 - 5.6 %    Est Avg Glucose 120 mg/dL   ESTIMATED GFR    Collection Time: 10/27/23  2:34 AM   Result Value Ref Range    GFR (CKD-EPI) 74 >60 mL/min/1.73 m 2   POCT glucose device results    Collection Time: 10/27/23  2:37 AM   Result Value Ref Range    POC Glucose, Blood 120 (H) 65 - 99 mg/dL   Basic Metabolic Panel    Collection Time: 10/27/23  5:45 AM   Result Value Ref Range    Sodium 141 135 - 145 mmol/L    Potassium 3.5 (L) 3.6 - 5.5 mmol/L    Chloride 110 96 - 112 mmol/L    Co2 21 20 - 33 mmol/L    Glucose 139 (H) 65 - 99 mg/dL    Bun 13 8 - 22 mg/dL    Creatinine 0.75 0.50 - 1.40 mg/dL    Calcium 8.4 (L) 8.5 - 10.5 mg/dL    Anion Gap 10.0 7.0 - 16.0   POCT glucose device results    Collection Time: 10/27/23  5:45 AM   Result Value Ref Range    POC Glucose, Blood 132 (H) 65 - 99 mg/dL   ESTIMATED GFR    Collection Time: 10/27/23  5:45 AM   Result Value Ref Range    GFR (CKD-EPI) 91 >60 mL/min/1.73 m 2   EKG (IP)    Collection Time: 10/27/23  8:53 AM   Result Value Ref Range    Report       [facility]    Test Date:  2023-10-27  Pt Name:    OSMANI MORENO              Department: [facility]  MRN:        [id number]                      Room:       R107  Gender:     Female                       Technician: GLYNN  :        [id number]                   Requested By:JORGE STAUFFER III  Order #:    [id number]                    Reading MD:    Measurements  Intervals                                Axis  Rate:       73                           P:          85  CA:         146                          QRS:        67  QRSD:       104                          T:          90  QT:         421  QTc:        464    Interpretive Statements  Sinus rhythm  Atrial premature complexes  Probable left atrial enlargement  Borderline T wave abnormalities  Compared to ECG 2023 11:46:25  Atrial premature complex(es) now present  T-wave abnormality now present     Basic Metabolic Panel    Collection Time: 10/27/23 11:50 AM   Result Value Ref Range    Sodium 147 (H) 135 - 145 mmol/L    Potassium 4.3 3.6 - 5.5 mmol/L    Chloride 120 (H) 96 - 112 mmol/L    Co2 19 (L) 20 - 33 mmol/L    Glucose 130 (H) 65 - 99 mg/dL    Bun 11 8 - 22 mg/dL    Creatinine 0.67 0.50 - 1.40 mg/dL    Calcium 8.3 (L) 8.5 - 10.5 mg/dL    Anion Gap 8.0 7.0 - 16.0   ESTIMATED GFR    Collection Time: 10/27/23 11:50 AM   Result Value Ref Range    GFR (CKD-EPI) 100 >60 mL/min/1.73 m 2         ASSESSMENT:  Patient is a 58 y.o. female admitted with left ICA stent thrombosis     [facility] Code / Diagnosis to Support: 0001.2 - Stroke: Right Body Involvement (Left Brain)    Rehabilitation: Impaired ADLs and mobility  Patient is anticipated to be a good candidate for inpatient rehab based on right-sided deficits.  However patient is currently on bedrest, therapy eval's pending.    Barriers to transfer include: Insurance authorization, TCCs to verify disposition, medical clearance and bed availability     Additional Recommendations:  Left ICA stent thrombosis   - originally  admitted with Right sided weakness   -s/p Left ICA stent placement on 10/19   - CTA head and Neck showed evidence of Left ICA stent thrombosis   - taken to IR on 10/27 for thrombectomy of left ICA stent thrombosis with TICI3   - on ASA, statin, and brilinita and Integrilin drip after thrombectomy   - nicardipine drip for BP control   - PT/OT pending, on bed rest     Hypernatremia   - goal sodium 150-160  - on hypertonic saline drip   - 10/27 Na 147   - primary team monitoring electorlytes     Dispo:  - patient is currently functioning below their level of baseline, recommend post acute rehab  -Anticipate that patient will need  IRF level therapy with 3hr of therapy 5 days per week  - piror to acceptance to IRF, will need insurance auth, therapy eval's completed, and confirmation of discharge support  - TCC to assist with insurance auth and DC support         Medical Complexity:  Left ICA stent thrombosis   Right sided weakness   Hypernatremia   Impaired mobility and ADLs       DVT PPX: Brilinta       Thank you for allowing us to participate in the care of this patient.     Patient was seen for 65 minutes on unit/floor of which > 50% of time was spent on counseling and coordination of care regarding the above, including prognosis, risk reduction, benefits of treatment, and options for next stage of care.    Yojana Live D.O.   Physical Medicine and Rehabilitation     Please note that this dictation was created using voice recognition software. I have made every reasonable attempt to correct obvious errors, but there may be errors of grammar and possibly content that I did not discover before finalizing the note.

## 2023-10-27 NOTE — PROGRESS NOTES
4 Eyes Skin Assessment Completed by Jessica, RN and Amee, RN.    Head WDL  Ears WDL  Nose WDL  Mouth WDL  Neck WDL  Breast/Chest WDL  Shoulder Blades WDL  Spine WDL  (R) Arm/Elbow/Hand WDL  (L) Arm/Elbow/Hand Bruising  Abdomen WDL  Groin Incision  Scrotum/Coccyx/Buttocks WDL  (R) Leg WDL  (L) Leg WDL  (R) Heel/Foot/Toe WDL  (L) Heel/Foot/Toe WDL          Devices In Places ECG, Blood Pressure Cuff, Pulse Ox, and SCD's      Interventions In Place Sacral Mepilex, TAP System, Pillows, Q2 Turns, Low Air Loss Mattress, and Heels Loaded W/Pillows    Possible Skin Injury No    Pictures Uploaded Into Epic N/A  Wound Consult Placed N/A  RN Wound Prevention Protocol Ordered No

## 2023-10-27 NOTE — CONSULTS
Pulmonary/Critical Care Consultation    Date of consult: 10/27/2023    Referring Physician  Ken Tejeda M.D.    Reason for Consultation  Left ICA stroke    History of Presenting Illness  58 y.o. female who presented 10/26/2023 with a stroke alert.  Patient was brought by emergency medical services to the emergency department after her roommate noticed the patient having aphasia, right-sided body weakness (upper and lower limbs) earlier today.  At initial evaluation in the emergency department she was found to be confused with altered mental status, last time known at her normal was around 10 PM on 10/26/2023.  Has remained nonverbal in the ED, blood pressure was in the 130s over 80s, glucose was within normal limits.  The patient has a past medical history of left ICA aneurysm status post stent placement with diversion of flow with subsequent treatment with Brilinta.  Per notes it seems that she was not tolerating well the Brilinta?  But was advised to continue taking it.  Diagnostic work-up in the ED with a CTA shows no flow through the left ICA for what neurology Dr. Akbar was consulted.  Given recent neurosurgical intervention she was deemed poor candidate for thrombolytics due to high risk of bleeding.  IR was consulted for emergent thrombectomy.      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    Surgical History   has a past surgical history that includes other; other; angiogram (08/17/2009); thrombectomy (12/21/2009); angiogram (02/02/2010); angiogram (04/15/2010); angioplasty balloon (04/15/2010); femoral artery repair (06/28/2010); mammoplasty reduction (08/24/2010); pr u/s leiomyomata ablate <200 cc; thrombectomy (01/09/2011); angiogram (01/09/2011); primary c section; hysteroscopy novasure-2 (09/27/2010); angiogram (Right, 11/05/2015); angioplasty upper (Right, 11/05/2015); other orthopedic surgery; debridement (Right, 11/11/2015); endarterectomy (Right, 11/18/2015); and hysterectomy laparoscopy (2015).    Family History  family history includes Stroke in her mother.    Social History   reports that she quit smoking about 24 years ago. Her smoking use included cigarettes. She started smoking about 43 years ago. She has a 19.0 pack-year smoking history. She has never used smokeless tobacco. She reports that she does not currently use drugs after having used the following drugs: Marijuana and Oral. She reports that she does not drink alcohol.    Medications  Home Medications       Reviewed by Rossana Groves R.N. (Registered Nurse) on 10/26/23 at 2353  Med List Status: Partial     Medication Last Dose Status   albuterol 108 (90 Base) MCG/ACT Aero Soln inhalation aerosol  Active   amLODIPine (NORVASC) 10 MG Tab  Active   aspirin 81 MG EC tablet  Active   atorvastatin (LIPITOR) 40 MG Tab  Active   losartan (COZAAR) 25 MG Tab  Active   montelukast (SINGULAIR) 10 MG Tab  Active   ticagrelor (BRILINTA) 90 MG Tab tablet  Active                  Current Facility-Administered Medications   Medication Dose Route Frequency Provider Last Rate Last Admin    senna-docusate (Pericolace Or Senokot S) 8.6-50 MG per tablet 2 Tablet  2 Tablet Oral BID Ken Tejeda M.D.        And    polyethylene glycol/lytes (Miralax) PACKET 1 Packet  1 Packet Oral QDAY PRN Ken Tejeda M.D.        And     magnesium hydroxide (Milk Of Magnesia) suspension 30 mL  30 mL Oral QDAY PRN Ken Tejeda M.D.        And    bisacodyl (Dulcolax) suppository 10 mg  10 mg Rectal QDAY PRN Ken Tejeda M.D.        lactated ringers infusion   Intravenous Continuous Ken Tejeda M.D.        acetaminophen (Tylenol) tablet 650 mg  650 mg Oral Q6HRS PRN Ken Tejeda M.D.        insulin regular (HumuLIN R,NovoLIN R) injection  1-6 Units Subcutaneous Q6HRS Ken Tejeda M.D.        And    dextrose 10 % BOLUS 25 g  25 g Intravenous Q15 MIN PRN Ken Tejeda M.D.        3% sodium chloride (Hypertonic Saline) 500mL infusion   Intravenous Continuous Ken Tejeda M.D.        eptifibatide 0.75 mg/mL (Integrilin) infusion  1 mcg/kg/min Intravenous Continuous Mary Lou Akbar M.D. 5.4 mL/hr at 10/27/23 0152 1 mcg/kg/min at 10/27/23 0152    labetalol (Normodyne/Trandate) injection 10 mg  10 mg Intravenous Q10 MIN PRN Mauri Montes M.D.        hydrALAZINE (Apresoline) injection 10 mg  10 mg Intravenous Q2HRS PRN Mauri Montes M.D.        niCARdipine (Cardene) 25 mg in  mL Standard Infusion  0-15 mg/hr Intravenous Continuous Mauri Montes M.D.   Dose not Required at 10/27/23 0245    labetalol (Normodyne/Trandate) injection 10 mg  10 mg Intravenous Q4HRS PRN Ken Tejeda M.D.        hydrALAZINE (Apresoline) injection 20 mg  20 mg Intravenous Q6HRS PRN Ken Tejeda M.D.        atorvastatin (Lipitor) tablet 40 mg  40 mg Oral DAILY Ken Tejeda M.D.        montelukast (Singulair) tablet 10 mg  10 mg Oral DAILY Ken Tejeda M.D.         Facility-Administered Medications Ordered in Other Encounters   Medication Dose Route Frequency Provider Last Rate Last Admin    EPTIFIBATIDE 20 MG/10ML IV SOLN                Allergies  Allergies   Allergen Reactions    Alcohol Unspecified     REQUESTS NO ALCOHOL CONTENT IN HER MEDICATIONS (per pt, pt is an alcoholic)     Trazodone Hives     Caused breat hyperplasia. Pt then underwent a breast reduction and attempted to take again, new reaction: Hives.    Lisinopril Cough       Vital Signs last 24 hours  Temp:  [36.7 °C (98.1 °F)] 36.7 °C (98.1 °F)  Pulse:  [61-99] 76  Resp:  [12-23] 17  BP: (121-169)/(71-96) 154/79  SpO2:  [89 %-99 %] 98 %    Physical Exam  Physical Exam  Vitals and nursing note reviewed.   Constitutional:       General: She is not in acute distress.     Appearance: She is ill-appearing. She is not toxic-appearing.   HENT:      Head: Normocephalic and atraumatic.      Nose: Nose normal.      Mouth/Throat:      Mouth: Mucous membranes are moist.   Eyes:      Pupils: Pupils are equal, round, and reactive to light.      Comments: Horizontal nistagmus noted   Cardiovascular:      Rate and Rhythm: Normal rate and regular rhythm.      Pulses: Normal pulses.      Heart sounds: Normal heart sounds. No murmur heard.     No gallop.   Pulmonary:      Effort: Pulmonary effort is normal. No respiratory distress.      Breath sounds: Normal breath sounds. No stridor. No wheezing or rales.   Abdominal:      General: There is no distension.      Tenderness: There is no abdominal tenderness. There is no guarding.   Musculoskeletal:         General: No swelling. Normal range of motion.      Right lower leg: No edema.      Left lower leg: No edema.   Lymphadenopathy:      Cervical: No cervical adenopathy.   Skin:     General: Skin is warm.      Capillary Refill: Capillary refill takes less than 2 seconds.      Findings: No rash.   Neurological:      Comments: Spontaneously moving left limbs, withdraws with pain in left limbs, right side reacts moving the left side. Horizontal nistagmus noted. Protecting her airway.         Fluids  No intake or output data in the 24 hours ending 10/27/23 0243    Laboratory  Recent Results (from the past 48 hour(s))   CBC WITH DIFFERENTIAL    Collection Time: 10/26/23 11:45 PM   Result Value Ref Range     WBC 9.5 4.8 - 10.8 K/uL    RBC 5.18 4.20 - 5.40 M/uL    Hemoglobin 14.8 12.0 - 16.0 g/dL    Hematocrit 44.9 37.0 - 47.0 %    MCV 86.7 81.4 - 97.8 fL    MCH 28.6 27.0 - 33.0 pg    MCHC 33.0 32.2 - 35.5 g/dL    RDW 41.3 35.9 - 50.0 fL    Platelet Count 320 164 - 446 K/uL    MPV 9.3 9.0 - 12.9 fL    Neutrophils-Polys 51.10 44.00 - 72.00 %    Lymphocytes 24.40 22.00 - 41.00 %    Monocytes 13.60 (H) 0.00 - 13.40 %    Eosinophils 9.50 (H) 0.00 - 6.90 %    Basophils 1.10 0.00 - 1.80 %    Immature Granulocytes 0.30 0.00 - 0.90 %    Nucleated RBC 0.00 0.00 - 0.20 /100 WBC    Neutrophils (Absolute) 4.86 1.82 - 7.42 K/uL    Lymphs (Absolute) 2.32 1.00 - 4.80 K/uL    Monos (Absolute) 1.29 (H) 0.00 - 0.85 K/uL    Eos (Absolute) 0.90 (H) 0.00 - 0.51 K/uL    Baso (Absolute) 0.10 0.00 - 0.12 K/uL    Immature Granulocytes (abs) 0.03 0.00 - 0.11 K/uL    NRBC (Absolute) 0.00 K/uL   COMP METABOLIC PANEL    Collection Time: 10/26/23 11:45 PM   Result Value Ref Range    Sodium 140 135 - 145 mmol/L    Potassium 3.7 3.6 - 5.5 mmol/L    Chloride 107 96 - 112 mmol/L    Co2 23 20 - 33 mmol/L    Anion Gap 10.0 7.0 - 16.0    Glucose 129 (H) 65 - 99 mg/dL    Bun 17 8 - 22 mg/dL    Creatinine 1.04 0.50 - 1.40 mg/dL    Calcium 9.1 8.5 - 10.5 mg/dL    Correct Calcium 8.8 8.5 - 10.5 mg/dL    AST(SGOT) 21 12 - 45 U/L    ALT(SGPT) 19 2 - 50 U/L    Alkaline Phosphatase 104 (H) 30 - 99 U/L    Total Bilirubin 0.3 0.1 - 1.5 mg/dL    Albumin 4.4 3.2 - 4.9 g/dL    Total Protein 7.2 6.0 - 8.2 g/dL    Globulin 2.8 1.9 - 3.5 g/dL    A-G Ratio 1.6 g/dL   PROTHROMBIN TIME    Collection Time: 10/26/23 11:45 PM   Result Value Ref Range    PT 13.3 12.0 - 14.6 sec    INR 1.00 0.87 - 1.13   APTT    Collection Time: 10/26/23 11:45 PM   Result Value Ref Range    APTT 28.1 24.7 - 36.0 sec   COD (ADULT)    Collection Time: 10/26/23 11:45 PM   Result Value Ref Range    ABO Grouping Only A     Rh Grouping Only POS     Antibody Screen-Cod NEG    TROPONIN    Collection Time:  10/26/23 11:45 PM   Result Value Ref Range    Troponin T 8 6 - 19 ng/L   ESTIMATED GFR    Collection Time: 10/26/23 11:45 PM   Result Value Ref Range    GFR (CKD-EPI) 62 >60 mL/min/1.73 m 2       Imaging  DX-CHEST-PORTABLE (1 VIEW)   Final Result         1.  Left basilar atelectasis, no focal infiltrate   2.  Atherosclerosis      CT-CTA NECK WITH & W/O-POST PROCESSING   Final Result         1.  Occlusion from the mid left internal carotid artery through intracranial stent at the distal left ICA.      These findings were discussed with the patient's clinician, Kiet Brown, on 10/27/2023 12:37 AM.      CT-CTA HEAD WITH & W/O-POST PROCESS   Final Result         1.  Left internal carotid artery occlusion extending up to distal carotid arterial stent.   2.  Decreased density within the left anterior cerebral artery, appearance suggesting slow flow or spasm.   3.  Small caliber of the left middle cerebral artery branches which taper and are not visualized most distally, changes most compatible with diminished flow due to left internal carotid artery occlusion.   4.  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   5.  3 mm basilar artery tip aneurysm   6.  2.3 mm aneurysm inferiorly at the right M1/M2 junction      CT-CEREBRAL PERFUSION ANALYSIS   Final Result         1.  Cerebral blood flow less than 30% likely representing completed infarct = 183 mL.      2.  T Max more than 6 seconds likely representing combination of completed infarct and ischemia = 280 mL.      3.  Mismatched volume likely representing ischemic brain/penumbra = 97 mL      4.  Please note that the cerebral perfusion was performed on the limited brain tissue around the basal ganglia region. Infarct/ischemia outside the CT perfusion sections can be missed in this study.      CT-HEAD W/O   Final Result         1.  No acute intracranial abnormality is identified, there are nonspecific white matter changes, commonly associated  with small vessel ischemic disease.  Associated mild cerebral atrophy is noted.   2.  Left maxillary sinusitis changes      Note: Artifacts are present limiting diagnostic sensitivity of this exam.      IR-THROMBO MECHANICAL ARTERY,INIT    (Results Pending)   MR-BRAIN-W/O    (Results Pending)   CT-HEAD W/O    (Results Pending)       Assessment/Plan  * Acute ischemic left internal carotid artery (ICA) stroke (HCC)- (present on admission)  Assessment & Plan  SBP goal for TICI 2c/3: 100-140 to mitigate reperfusion injury. This is in an effort to minimize reperfusion injury and/or hemorrhagic conversion   Avoid hypotension <100 systolic.  Use IV 0.9% normal saline bolus if necessary.   Serial neurologic exams  MRI brain w/o contrast   Atorvastatin 80mg qhs  - adjust dose to target LDL <70  Maintain euglycemia <180mg/dL  SLP - ok for swallowing eval prior to 24 hours if clinically doing well  Hold pharm DVT prophylaxis until cleared by neurology  TEG stat now and can give brillinta at 6 am if ct head has no bleeding  S/p Integrillin 12 mg bolus and 1 mcg per neuro IR  CT head 6 am  Hypertonic saline at 30cc/hr, goal is Na 150-160 per neurology recommendations  Bmp q 4 hr    Dyslipidemia- (present on admission)  Assessment & Plan  Continue with lipitor 40mg when cleared by speech for PO    Extrinsic asthma without complication- (present on admission)  Assessment & Plan  duonebs prn  Continue montelukast when cleared by speech    Primary hypertension- (present on admission)  Assessment & Plan  Will start amlodipine and losartan at home doses once patient is able to tolerate PO and more stable  Will use IV labetalol and hydralazine prn for now  Goal -140 mm Hg        Discussed patient condition and risk of morbidity and/or mortality with RN, RT, neurology, and ED MD and IR .    The patient remains critically ill.  Critical care time = 62 minutes in directly providing and coordinating critical care and extensive data  review.  No time overlap and excludes procedures.    Ken Tejeda MD FACP FCCP  Pulmonary/Critical Care

## 2023-10-27 NOTE — CARE PLAN
The patient is Watcher - Medium risk of patient condition declining or worsening    Shift Goals  Clinical Goals: post thrombectomy protocol, repeat CT this am, stable neuros, 3% @ 30  Patient Goals: shantel  Family Goals: updates    Progress made toward(s) clinical / shift goals:    Problem: Pain - Standard  Goal: Alleviation of pain or a reduction in pain to the patient’s comfort goal  Outcome: Progressing     Problem: Optimal Care of the Stroke Patient  Goal: Optimal acute care for the stroke patient  Outcome: Progressing     Problem: Neuro Status  Goal: Neuro status will remain stable or improve  Outcome: Progressing     Problem: Fall Risk  Goal: Patient will remain free from falls  Outcome: Progressing

## 2023-10-27 NOTE — DISCHARGE PLANNING
[facility] Transitional Care Coordination    Referral from:  Dr. Ken Tejeda  Insurance Provider on Facesheet:  [facility] Medicare HMO  Potential Rehab diagnosis:  Stroke    Chart review indicates patient has ongoing medical management and may have therapy needs to possibly meet inpatient rehab facility criteria with the goal of returning to community.      D/C Support:  TBD    Physiatry to consult per protocol.  Stroke - NIH [17].  Left ICA occlusion, s/p IR thrombectomy.  PT/OT pending as clinically appropriate.      Last Covid test:    Thank you for the referral.

## 2023-10-27 NOTE — CONSULTS
Neurology STROKE H&P  Neurohospitalist Service, [facility]    Referring Physician: Kiet Brown M.D.    STROKE:   Chief Complaint   Patient presents with    Possible Stroke     As per EMS pt room mate, said that pt is having aphasia, right sided body weakness  Pt had history of stroke but no deficit as per EMS, pt normally can talk and walk    NIH: 15    LKW: 10 pm  Brought by Care flight with the above complaints       To obtain the most accurate data regarding the time called, and time patient seen, refer to the stroke run-sheet and chart.  For time of CT, refer to the radiology report. See A&P below for TPA Decision and door to needle time if and when applicable.    HPI: Roslyn Kilgore is a 58 y.o. right-handed female with recent ICA flow diverter stent placement on 10/19/2023 who apparently stated she is allergic to Plavix and was placed on Brilinta, was brought to emergency room for alteration of mental status and right-sided weakness and neglect.  Last seen normal was around 10 PM tonight.  She has been profoundly aphasic with left gaze preference and right-sided weakness although, she moves her right lower extremity spontaneously.  It is questionable whether she was taking her Brilinta.    Review of systems: In addition to what is detailed in the HPI above, all other systems reviewed and are negative.    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    FHx:  family  "history includes Stroke in her mother.    SHx:   reports that she quit smoking about 24 years ago. Her smoking use included cigarettes. She started smoking about 43 years ago. She has a 19.0 pack-year smoking history. She has never used smokeless tobacco. She reports that she does not currently use drugs after having used the following drugs: Marijuana and Oral. She reports that she does not drink alcohol.    Allergies:  Allergies   Allergen Reactions    Alcohol Unspecified     REQUESTS NO ALCOHOL CONTENT IN HER MEDICATIONS (per pt, pt is an alcoholic)    Trazodone Hives     Caused breat hyperplasia. Pt then underwent a breast reduction and attempted to take again, new reaction: Hives.    Lisinopril Cough       Medications:  No current facility-administered medications for this encounter.    Current Outpatient Medications:     ticagrelor (BRILINTA) 90 MG Tab tablet, Take 1 Tablet by mouth 2 times a day., Disp: 60 Tablet, Rfl: 3    amLODIPine (NORVASC) 10 MG Tab, TAKE 1 TABLET BY MOUTH DAILY, Disp: 30 Tablet, Rfl: 0    losartan (COZAAR) 25 MG Tab, Take 1 Tablet by mouth every day., Disp: 30 Tablet, Rfl: 2    aspirin 81 MG EC tablet, Take 81 mg by mouth every day., Disp: , Rfl:     atorvastatin (LIPITOR) 40 MG Tab, Take 1 Tablet by mouth every day., Disp: 30 Tablet, Rfl: 3    albuterol 108 (90 Base) MCG/ACT Aero Soln inhalation aerosol, Inhale 2 Puffs every 6 hours as needed for Shortness of Breath., Disp: 8.5 g, Rfl: 3    montelukast (SINGULAIR) 10 MG Tab, Take 1 Tablet by mouth every day., Disp: 30 Tablet, Rfl: 2    Physical Examination:    Vitals:    10/26/23 2350 10/27/23 0010 10/27/23 0015 10/27/23 0020   BP: (!) 169/79 (!) 154/82  (!) 155/73   Pulse: 81 76  81   Resp: 19 18  18   Temp: 36.7 °C (98.1 °F)      TempSrc: Temporal      SpO2: 92% 91% 89% 95%   Weight: 68 kg (150 lb)      Height: 1.6 m (5' 3\")          General:   Patient is obtunded.  Neck: Full range of motion  Eyes: Left gaze preference, Pupils " reactive to light.  CV: RRR  Lungs: No respiratory distress  Extremities: No cyanosis, warm, no significant edema.    NEUROLOGICAL EXAM:   Mental status: She is obtunded and not able to follow commands.  Speech and language: Nonverbal.  Cranial nerve exam: Pupils are equal, round and reactive to light bilaterally. Visual fields with right homonymous hemianopsia to visual threat. Extraocular muscles: Left gaze preference.  She has left right weakness.  Facial sensation cannot be tested.  Motor exam: She moves left upper and lower extremities spontaneously with antigravity, minimal movement of the right upper extremity and some movement of right lower extremity spontaneously.  Sensory exam: She reacts to physical and tactile stimulation.  Coordination: no gross ataxia noted on exam  Plantar reflexes: Upgoing on the right  Gait: deferred    NIH Stroke Scale:    1a. Level of Consciousness (Alert, drowsy, etc): 2= Stuporous    1b. LOC Questions (Month, age): 2= Incorrect    1c. LOC Commands (Open/close eyes make fist/let go): 2= Incorrect    2.   Best Gaze (Eyes open - patient follows examiner's finger on face): 1= Partial gaze palay    3.   Visual Fields (introduce visual stimulus/threat to patient's field quadrants): 1= Partial Hemiania  4.   Facial Paresis (Show teeth, raise eyebrows and squeeze eyes shut): 1= Minor     5a. Motor Arm - Left (Elevate arm to 90 degrees if patient is sitting, 45 degrees if  supine): 0= No drift    5b. Motor Arm - Right (Elevate arm to 90 degrees if patient is sitting, 45 degrees if supine): 2= Can't resist gravity    6a. Motor Leg - Left (Elevate leg 30 degrees with patient supine): 0= No drift    6b. Motor Leg - Right  (Elevate leg 30 degrees with patient supine): 1= Drift    7.   Limb Ataxia (Finger-nose, heel down shin): 0= No ataxia    8.   Sensory (Pin prick to face, arm, trunk and leg - compare side to side): 0= Normal    9.  Best Language (Name item, describe a picture and read  sentences): 2= Severe aphasia    10. Dysarthria (Evaluate speech clarity by patient repeating listed words): 2= Near to unintelligible or worse    11. Extinction and Inattention (Use information from prior testing to identify neglect or  double simultaneous stimuli testing): 1= Partial neglect    Total NIH Score: 17    Baseline modified Raul Scale (MRS): 0 = No symptoms    Objective Data:    Labs:  Lab Results   Component Value Date/Time    PROTHROMBTM 13.3 10/26/2023 11:45 PM    INR 1.00 10/26/2023 11:45 PM      Lab Results   Component Value Date/Time    WBC 9.5 10/26/2023 11:45 PM    RBC 5.18 10/26/2023 11:45 PM    HEMOGLOBIN 14.8 10/26/2023 11:45 PM    HEMATOCRIT 44.9 10/26/2023 11:45 PM    MCV 86.7 10/26/2023 11:45 PM    MCH 28.6 10/26/2023 11:45 PM    MCHC 33.0 10/26/2023 11:45 PM    MPV 9.3 10/26/2023 11:45 PM    NEUTSPOLYS 51.10 10/26/2023 11:45 PM    LYMPHOCYTES 24.40 10/26/2023 11:45 PM    MONOCYTES 13.60 (H) 10/26/2023 11:45 PM    EOSINOPHILS 9.50 (H) 10/26/2023 11:45 PM    BASOPHILS 1.10 10/26/2023 11:45 PM    HYPOCHROMIA 1+ 02/19/2011 04:20 PM    ANISOCYTOSIS 1+ 06/29/2010 04:10 AM      Lab Results   Component Value Date/Time    SODIUM 140 10/26/2023 11:45 PM    POTASSIUM 3.7 10/26/2023 11:45 PM    CHLORIDE 107 10/26/2023 11:45 PM    CO2 23 10/26/2023 11:45 PM    GLUCOSE 129 (H) 10/26/2023 11:45 PM    BUN 17 10/26/2023 11:45 PM    CREATININE 1.04 10/26/2023 11:45 PM    CREATININE 0.8 06/12/2006 05:00 AM    GLOMRATE 66 05/15/2023 02:32 AM      Lab Results   Component Value Date/Time    CHOLSTRLTOT 153 05/18/2023 12:31 AM    LDL 84 05/18/2023 12:31 AM    HDL 47 05/18/2023 12:31 AM    TRIGLYCERIDE 110 05/18/2023 12:31 AM       Lab Results   Component Value Date/Time    ALKPHOSPHAT 104 (H) 10/26/2023 11:45 PM    ASTSGOT 21 10/26/2023 11:45 PM    ALTSGPT 19 10/26/2023 11:45 PM    TBILIRUBIN 0.3 10/26/2023 11:45 PM        Imaging/Testing:    I interpreted and/or reviewed the patient's  neuroimaging    CT-CEREBRAL PERFUSION ANALYSIS   Final Result         1.  Cerebral blood flow less than 30% likely representing completed infarct = 183 mL.      2.  T Max more than 6 seconds likely representing combination of completed infarct and ischemia = 280 mL.      3.  Mismatched volume likely representing ischemic brain/penumbra = 97 mL      4.  Please note that the cerebral perfusion was performed on the limited brain tissue around the basal ganglia region. Infarct/ischemia outside the CT perfusion sections can be missed in this study.      CT-HEAD W/O   Final Result         1.  No acute intracranial abnormality is identified, there are nonspecific white matter changes, commonly associated with small vessel ischemic disease.  Associated mild cerebral atrophy is noted.   2.  Left maxillary sinusitis changes      Note: Artifacts are present limiting diagnostic sensitivity of this exam.      DX-CHEST-PORTABLE (1 VIEW)    (Results Pending)   CT-CTA HEAD WITH & W/O-POST PROCESS    (Results Pending)   CT-CTA NECK WITH & W/O-POST PROCESSING    (Results Pending)   IR-THROMBO MECHANICAL ARTERY,INIT    (Results Pending)       Assessment:  Roslny Kilgore is a 58 y.o. right-handed female with recent ICA flow diverter stent placement on 10/19/2023 who apparently stated she is allergic to Plavix and subsequently was placed on Brilinta, was brought to emergency room for alteration of mental status and right-sided weakness and neglect.  Last seen normal was around 10 PM tonight.  Brain CT is suboptimal but does not reveal any evidence of ischemia.  CT angiogram of the neck revealed occlusion of mid left internal carotid artery through intracranial stent at the distal left ICA.  CTA of the head revealed no large vessel occlusion.  CT perfusion revealed 97 mL of ischemic penumbra with 183 mL of ischemic core.  This is very complicated case.  Discussed with Dr. Hyman who will take the patient to Cath Lab for possible  thrombectomy with a plan to start low-dose Integrilin drip for stent patency.    Plan:  -Admit to neuro ICU  -q1h and PRN neuro assessment. VS per nursing/unit protocol.   After the endovascular intervention, please follow the following recommendations regarding blood pressure parameters    If TICI 2c/3: maintain systolic -140  If TICI 2b: maintain systolic 120-160  If TICI 2a or less, maintain systolic -180     This is in an effort to minimize reperfusion injury and/or hemorrhagic conversion.     -Obtain MRI Brain wo contrast.   -Start hypertonic saline with a goal of sodium 150-160  -Plan for low-dose Integrilin drip following thrombectomy.  -All other medical management per primary team.   -DVT PPX: SCDs.      The plan of care above has been discussed with Kiet Brown M.D. and Dr. Hyman.    Upon my evaluation, this patient had a high probability of imminent or life-threatening deterioration due to cerebrovascular accident which required my direct attention, intervention, and personal management.  I personally provided 50 minutes of total critical care time outside of time spent on separately billable/documented procedures. Time includes: review of laboratory data, review of radiology studies, discussion with consultants, discussion with family/patient, monitoring for potential decompensation.  Interventions were performed as documented in the chart.      Please note that this dictation was created using voice recognition software. I have made every reasonable attempt to correct obvious errors, but I expect that there are errors of grammar and possibly content that I did not discover before finalizing the note.       Mary Lou Akbar MD  [facility]

## 2023-10-27 NOTE — THERAPY
Occupational Therapy Contact Note    Patient Name: Roslyn Kilgore  Age:  58 y.o., Sex:  female  Medical Record #: [id number]  Today's Date: 10/27/2023       10/27/23 0801   Interdisciplinary Plan of Care Collaboration   Collaboration Comments OT orders received, pt w/ strict bedrest orders. Will hold until orders have been discontinued per policy.

## 2023-10-27 NOTE — DISCHARGE PLANNING
Case Management Discharge Planning    Admission Date: 10/26/2023  GMLOS:    ALOS: 0    6-Clicks ADL Score: 6  6-Clicks Mobility Score: 6  PT and/or OT Eval ordered: Yes  Post-acute Referrals Ordered: Yes  Post-acute Choice Obtained: No  Has referral(s) been sent to post-acute provider:  Yes ([facility], per protocol)      Anticipated Discharge Disposition: D/T to SNF with Medicare cert in anticipation of skilled care (03)    DME Needed: No    Action(s) Taken: Patient non-verbal.   CM called patient's daughter-in-law, Peace #[phone], to complete assessment and discuss DCP.   Per Peace, her /patient's only son passed away in June of last year.   Patient lives with a roommate in single level house @ [address] [place] [id number].   Peace lives in [place] and is very much involved in patient's life.   Prior to admission patient was independent with ADLs and ambulating w/o assistive devices.   Only DME reported is nebulizer.   Patient is an  who works full-time.    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  She does use marijuana frequently.    Peace provided patient's brother's name & phone # for decision making:  Chi Kilgore #[phone] (lives in [place]).    CM called brother/Chi who stated that he does not have a POA however, patient had always said that she would like for him to make decisions on her behalf if she became incapacitated.   Chi stated that he is willing to be the decision maker; Dr. Gerardo informed of the latter & Demographics/contact info updated.    PT/OT eval pending; Anticipating needs for post-acute placement post hospital DC.    No referrals sent; CM to f/u & submit referrals when appropriate.    Escalations Completed: None    Medically Clear: No    Next Steps: CM will f/u on medical clearance & DC needs    Barriers to Discharge:  Medical clearance, Pending PT/OT eval    Is the patient up for  discharge tomorrow: No      Care Transition Team Assessment    Information Source  Orientation Level: Unable to assess  Information Given By: Relative  Informant's Name: Daughter in law, Peace Mark  Who is responsible for making decisions for patient? : Legal next of kin  Name(s) of Primary Decision Maker: Chi Johnson #[phone]    Readmission Evaluation  Is this a readmission?: Yes - unplanned readmission    Elopement Risk  Legal Hold: No  Ambulatory or Self Mobile in Wheelchair: No-Not an Elopement Risk  Elopement Risk: Not at Risk for Elopement     Discharge Preparedness  What is your plan after discharge?: Uncertain - pending medical team collaboration  What are your discharge supports?: Other (comment) (Daughter-in-law, brother)  Prior Functional Level: Independent with Activities of Daily Living, Ambulatory, Drives Self    Functional Assesment  Prior Functional Level: Independent with Activities of Daily Living, Ambulatory, Drives Self    Finances  Financial Barriers to Discharge: No  Prescription Coverage: Yes    Vision / Hearing Impairment  Right Eye Vision: Nystagmus, Wears Glasses  Left Eye Vision: Wears Glasses, Nystagmus     Advance Directive  Advance Directive?: None    Domestic Abuse  Have you ever been the victim of abuse or violence?: No    Psychological Assessment  History of Substance Abuse: Alcohol, Marijuana  Date Last Used - Alcohol: More than 20 years ago  History of Psychiatric Problems: No  Non-compliant with Treatment: No  Newly Diagnosed Illness: Yes    Discharge Risks or Barriers  Discharge risks or barriers?: Complex medical needs  Patient risk factors: Complex medical needs, Cognitive / sensory / physical deficit    Anticipated Discharge Information  Discharge Disposition: D/T to SNF with Medicare cert in anticipation of skilled care (03)

## 2023-10-27 NOTE — THERAPY
Physical Therapy Contact Note    Patient Name: Roslyn Kilgore  Age:  58 y.o., Sex:  female  Medical Record #: [id number]  Today's Date: 10/27/2023    PT consult received. Pt currently with strict bed rest orders. Will monitor pt status and follow up as appropriate.    Eugene Siddiqi, PT, DPT

## 2023-10-27 NOTE — ASSESSMENT & PLAN NOTE
Recent left ICA aneurysm status post stent and flow diverter, subsequent treatment with Brilinta, allergic to Plavix  Developed in-stent thrombosis, initial concern for noncompliance with Brilinta, however TEG showed therapeutic ADP inhibition, underwent thrombectomy, integrilin bridged to ASA/Brilinta  MRI 10/28 acute left MCA distribution infarct, and old right posterior frontal infarct    SBP goal for TICI 2c/3: 100-140  ASA 81/Brillinta NGT transitioned to integrelin in anticipation for the PEG  Na goal 135-145, decrease by 4 meq q 24 hrs a day   q4H neurochecks  Atorvastatin 80  euglycemia <180mg/dL, TF   PT/OT  SLP- ok for ice chips  IR PICC placed 10/28  Family has agreed to PEG- needs to be laprascopic as IR and GI unable due to anatomy  Surgery consulted

## 2023-10-27 NOTE — ED NOTES
Report received by GABO Bennett. Pt attached to monitor. Changed in to gown.   Pt NIH 15, VSS on RA.  Second piv initiated.

## 2023-10-27 NOTE — PROGRESS NOTES
Updated Chi Kilgore (DPOA, brother), Greta (sister in law) over the phone. They live in [place]. They plan to come out to visit over the weekend. All questions answered    __________  Shiv Gerardo MD  Pulmonary and Critical Care Medicine  [facility]

## 2023-10-27 NOTE — PROGRESS NOTES
Chart reviewed, patient admitted overnight and signed out by to/Dr. Tejeda    Brought in by EMS for aphasia, right-sided weakness, LKW 10 PM 10/26/2023  PMH left ICA aneurysm status post stents with diversion of flow and subsequent treatment with Brilinta.  Allergic to Plavix    CTA of the neck revealed occlusion of mid left ICA through intracranial stent at the distal left ICA with large penumbra. CTA of the head revealed no LVO    Neuro consulted, poor candidate for thrombolytics due to high risk of bleeding given recent neurosurgical intervention  Taken to [facility] suite, found to have left ICA in-stent thrombosis s/p thrombectomy with TICI 3 flow and started on low-dose Integrilin drip.      Exam:  Occasional PACs on ECG  Somnolent but opens eyes briefly to vocal stimulus  Pupils equal  Withdraws on L > R side, stiffness on R side  CPOT negative  Strength exam limited due to somnolence  NIHSS 24    Post thrombectomy CTH reassuring.  Closely monitor for reperfusion injury/hemorrhagic conversion.    Plan:  -- TICI 3: goal -140  -- low dose integrillin gtt with plan to bridge to ASA and brillinta today  -- HTS for goal Na 150-160  -- q1H neurochecks  -- MRI w/o pending  -- SCDs only  -- euglycemia, normothermia  -- statin  -- PT/OT/SLP    The patient remains critically ill.  Critical care time = 110 additional minutes in directly providing and coordinating critical care and extensive data review. No time overlap and excludes procedures.  __________  Shiv Gerardo MD  Pulmonary and Critical Care Medicine  [facility]

## 2023-10-27 NOTE — ED PROVIDER NOTES
ED Provider Note        CHIEF COMPLAINT  Chief Complaint   Patient presents with    Possible Stroke     As per EMS pt room mate, said that pt is having aphasia, right sided body weakness  Pt had history of stroke but no deficit as per EMS, pt normally can talk and walk    NIH: 15    LKW: 10 pm  Brought by [facility] flight with the above complaints         HPI  LIMITATION TO HISTORY   Select: Altered mental status / Confusion  OUTSIDE HISTORIAN(S):  EMS provide entirety of history    Roslyn Kilgore is a 58 y.o. female who presents to the Emergency Department with altered mental status.  The patient was a report from EMS was last seen normal at approximately 10 PM at her house.  The roommate states that she is normally without any neurodeficits from prior stroke.  EMS found the patient to have initial right-sided weakness and left gaze deviation, has been nonverbal the entire time.  Blood sugar and blood pressure within normal limits.  They report that she is on Brilinta but denies any anticoagulants.    REVIEW OF SYSTEMS  See HPI for further details. All other systems are negative.     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)     Arthritis     osteo- right hip    ASTHMA     Bronchitis     Cancer (HCC) 10 years ago    pre cancerous s/p leep    Carcinoma in situ of respiratory system     per pt early 70's, early 80's    Dental disorder     full dentures    DEPRESSION     anxiety    Heart burn     High cholesterol     Hypertension     Hyponatremia 02/20/2022    Indigestion     Other specified symptom associated with female genital organs     see cat scan    Personal history of venous thrombosis and embolism     Pneumonia 01/1996    Sepsis (HCC) 02/20/2022    Stroke (HCC)     4 strokes, 3 aneurysms       SURGICAL HISTORY  Past Surgical History:   Procedure  Laterality Date    ENDARTERECTOMY Right 11/18/2015    Procedure: ENDARTERECTOMY BRACHIAL ARTERY W/VEIN PATCH REPAIR;  Surgeon: Greta Wilhelm M.D.;  Location: SURGERY [facility];  Service:     DEBRIDEMENT Right 11/11/2015    Procedure: FINGER AMPUTATION 2ND;  Surgeon: Greta Wilhelm M.D.;  Location: SURGERY [facility];  Service:     ANGIOGRAM Right 11/05/2015    Procedure: ANGIOGRAM- ARM;  Surgeon: Greta Wilhelm M.D.;  Location: SURGERY [facility];  Service:     ANGIOPLASTY UPPER Right 11/05/2015    Procedure: ANGIOPLASTY UPPER;  Surgeon: Greta Wilhelm M.D.;  Location: SURGERY [facility];  Service:     HYSTERECTOMY LAPAROSCOPY  2015    THROMBECTOMY  01/09/2011    Performed by TREY TRAN at SURGERY [facility]    ANGIOGRAM  01/09/2011    Performed by TREY TRAN at SURGERY [facility]    HYSTEROSCOPY NOVASURE-2  09/27/2010    Performed by JEANIE DE LOS SANTOS at SURGERY [facility] ORS    MAMMOPLASTY REDUCTION  08/24/2010    Performed by SIRI MOORE at SURGERY [facility] ORS    FEMORAL ARTERY REPAIR  06/28/2010    Performed by GRETA WILHELM at SURGERY [facility] ORS    ANGIOGRAM  04/15/2010    Performed by GRETA WILHELM at SURGERY [facility] ORS    ANGIOPLASTY BALLOON  04/15/2010    Performed by GRETA WILHELM at SURGERY [facility] ORS    ANGIOGRAM  02/02/2010    Performed by GRETA WILHELM at SURGERY [facility] ORS    THROMBECTOMY  12/21/2009    Performed by GRETA WILHELM at SURGERY [facility] ORS    ANGIOGRAM  08/17/2009    Performed by GRETA WILHELM at SURGERY [facility] ORS    OTHER      MULTIPLE BYPASS SURGERIES TO RUE FOR CLOTS    OTHER      LUE CLOT REMOVAL    OTHER ORTHOPEDIC SURGERY      RIGHT HIP ARTHROPLASTY 2014    CT U/S LEIOMYOMATA ABLATE <200 CC      PRIMARY C SECTION      TUBAL LIGATION, ECTOPIC PREGNANCY       FAMILY HISTORY  Family History   Problem Relation Age of Onset    Stroke Mother     Lung  "Disease Neg Hx     Cancer Neg Hx     Diabetes Neg Hx     Heart Disease Neg Hx        SOCIAL HISTORY    reports that she quit smoking about 24 years ago. Her smoking use included cigarettes. She started smoking about 43 years ago. She has a 19.0 pack-year smoking history. She has never used smokeless tobacco. She reports that she does not currently use drugs after having used the following drugs: Marijuana and Oral. She reports that she does not drink alcohol.    CURRENT MEDICATIONS  Home Medications       Reviewed by Rossana Groves R.N. (Registered Nurse) on 10/26/23 at 2353  Med List Status: Partial     Medication Last Dose Status   albuterol 108 (90 Base) MCG/ACT Aero Soln inhalation aerosol  Active   amLODIPine (NORVASC) 10 MG Tab  Active   aspirin 81 MG EC tablet  Active   atorvastatin (LIPITOR) 40 MG Tab  Active   losartan (COZAAR) 25 MG Tab  Active   montelukast (SINGULAIR) 10 MG Tab  Active   ticagrelor (BRILINTA) 90 MG Tab tablet  Active                    ALLERGIES  Allergies   Allergen Reactions    Alcohol Unspecified     REQUESTS NO ALCOHOL CONTENT IN HER MEDICATIONS (per pt, pt is an alcoholic)    Trazodone Hives     Caused breat hyperplasia. Pt then underwent a breast reduction and attempted to take again, new reaction: Hives.    Lisinopril Cough       PHYSICAL EXAM  VITAL SIGNS: BP (!) 155/73   Pulse 81   Temp 36.7 °C (98.1 °F) (Temporal)   Resp 18   Ht 1.6 m (5' 3\")   Wt 68 kg (150 lb)   LMP 01/04/2011   SpO2 95%   BMI 26.57 kg/m²   Gen: Alert  HEENT: ATNC  Eyes: PERRL, EOMI, normal conjunctiva  Neck: trachea midline  Resp: no respiratory distress, clear to auscultation bilaterally  CV: No JVD, regular rate and rhythm  Abd: non-distended, soft  Ext: No deformities  Psych: normal mood  Neuro: Left gaze deviation, right homonymous hemianopsia, moves all extremities nonpurposefully, mute.  Stroke scale: 15    DIAGNOSTIC STUDIES / PROCEDURES  EKG  Results for orders placed or performed during " the hospital encounter of 23   EKG (NOW)   Result Value Ref Range    Report       [facility]    Test Date:  2023  Pt Name:    OSMANI MORENO              Department: ER  MRN:        [id number]                      Room:        20  Gender:     Female                       Technician: [id number]  :        1965                   Requested By:MANUEL VO  Order #:    [id number]                    Reading MD: MANUEL VO MD    Measurements  Intervals                                Axis  Rate:       70                           P:          63  PA:         110                          QRS:        60  QRSD:       102                          T:          228  QT:         364  QTc:        393    Interpretive Statements  Sinus rhythm  Borderline short PA interval  Borderline repolarization abnormality  Compared to ECG 2022 01:01:59  No significant changes  Electronically Signed On 2023 14:24:17 PDT by MANUEL VO MD       I independently interpreted this EKG    LABS  Labs Reviewed   CBC WITH DIFFERENTIAL - Abnormal; Notable for the following components:       Result Value    Monocytes 13.60 (*)     Eosinophils 9.50 (*)     Monos (Absolute) 1.29 (*)     Eos (Absolute) 0.90 (*)     All other components within normal limits    Narrative:     Indicate which anticoagulants the patient is on:->UNKNOWN  Biotin intake of greater than 5 mg per day may interfere with  troponin levels, causing false low values.   COMP METABOLIC PANEL - Abnormal; Notable for the following components:    Glucose 129 (*)     Alkaline Phosphatase 104 (*)     All other components within normal limits    Narrative:     Indicate which anticoagulants the patient is on:->UNKNOWN  Biotin intake of greater than 5 mg per day may interfere with  troponin levels, causing false low values.   PROTHROMBIN TIME    Narrative:     Indicate which anticoagulants the patient is on:->UNKNOWN  Biotin  intake of greater than 5 mg per day may interfere with  troponin levels, causing false low values.   APTT    Narrative:     Indicate which anticoagulants the patient is on:->UNKNOWN  Biotin intake of greater than 5 mg per day may interfere with  troponin levels, causing false low values.   COD (ADULT)   TROPONIN    Narrative:     Indicate which anticoagulants the patient is on:->UNKNOWN  Biotin intake of greater than 5 mg per day may interfere with  troponin levels, causing false low values.   ESTIMATED GFR    Narrative:     Indicate which anticoagulants the patient is on:->UNKNOWN  Biotin intake of greater than 5 mg per day may interfere with  troponin levels, causing false low values.         RADIOLOGY  I have independently interpreted the diagnostic imaging associated with this visit:  CT head: No intracranial bleed    DX-CHEST-PORTABLE (1 VIEW)   Final Result         1.  Left basilar atelectasis, no focal infiltrate   2.  Atherosclerosis      CT-CTA NECK WITH & W/O-POST PROCESSING   Final Result         1.  Occlusion from the mid left internal carotid artery through intracranial stent at the distal left ICA.      These findings were discussed with the patient's clinician, Kiet Brown, on 10/27/2023 12:37 AM.      CT-CEREBRAL PERFUSION ANALYSIS   Final Result         1.  Cerebral blood flow less than 30% likely representing completed infarct = 183 mL.      2.  T Max more than 6 seconds likely representing combination of completed infarct and ischemia = 280 mL.      3.  Mismatched volume likely representing ischemic brain/penumbra = 97 mL      4.  Please note that the cerebral perfusion was performed on the limited brain tissue around the basal ganglia region. Infarct/ischemia outside the CT perfusion sections can be missed in this study.      CT-HEAD W/O   Final Result         1.  No acute intracranial abnormality is identified, there are nonspecific white matter changes, commonly associated with small vessel  ischemic disease.  Associated mild cerebral atrophy is noted.   2.  Left maxillary sinusitis changes      Note: Artifacts are present limiting diagnostic sensitivity of this exam.      CT-CTA HEAD WITH & W/O-POST PROCESS    (Results Pending)   IR-THROMBO MECHANICAL ARTERY,INIT    (Results Pending)       COURSE & MEDICAL DECISION MAKING  Pertinent Labs & Imaging studies were reviewed. (See chart for details)      EXTERNAL RECORDS REVIEWED  Inpatient Notes patient had stent placed for left ICA aneurysm      INITIAL ASSESSMENT AND PLAN  Care Narrative: Patient arrives with dense stroke symptoms.  She was activated as a code stroke and evaluated by both myself and Dr. Akbar, neurology, the time of stroke activation.  My review of noncontrast CAT scan of the head: No intracranial bleed.  My interpretation of the CTA shows no flow through the left ICA and no evidence of contrast in the large area of the left frontal lobe.  This was discussed with Dr. Akbar, who will arrange for the patient to go for emergent thrombectomy.  In keeping with studies and the patient's recent neurosurgical intervention, she is deemed a poor candidate for thrombolytics given high risk of bleeding.  Patient will be admitted to the ICU.  She is currently maintaining her airway, no indication for intubation at the time of initial assessment.    ADDITIONAL PROBLEM LIST AND DISPOSITION    I have discussed management of the patient with the following physicians and LEORA's: See above and below    Discussion of management with other QHP or appropriate source(s): Pharmacy regarding decision for thrombolytics        Decision tools and prescription drugs considered including, but not limited to: NIH Stroke Scale 15 .          FINAL IMPRESSION  1. Cerebrovascular accident (CVA), unspecified mechanism (HCC)             Case discussed with Dr. Tejeda , who will evaluate the patient for hospitalization. Patient will be hospitalized in critical  condition.        CRITICAL CARE  The very real possibilty of a deterioration of this patient's condition required the highest level of my preparedness for sudden, emergent intervention.  I provided critical care services, which included medication orders, frequent reevaluations of the patient's condition and response to treatment, ordering and reviewing test results, and discussing the case with various consultants.  The critical care time associated with the care of the patient was 33 minutes. Review chart for interventions. This time is exclusive of any other billable procedures.         This dictation was created using voice recognition software. The accuracy of the dictation is limited to the abilities of the software. I expect there may be some errors of grammar and possibly content. The nursing notes were reviewed and certain aspects of this information were incorporated into this note.

## 2023-10-27 NOTE — ED TRIAGE NOTES
Chief Complaint   Patient presents with    Possible Stroke     As per EMS pt room mate, said that pt is having aphasia, right sided body weakness  Pt had history of stroke but no deficit as per EMS, pt normally can talk and walk    NIH: 15    LKW: 10 pm  Brought by Care flight with the above complaints     Blood Glucose en route: 94 mg/dL    Sent to CT  Transferred to [facility]  Endorsed to assigned RN

## 2023-10-28 ENCOUNTER — APPOINTMENT (OUTPATIENT)
Dept: RADIOLOGY | Facility: MEDICAL CENTER | Age: 58
DRG: 981 | End: 2023-10-28
Attending: PSYCHIATRY & NEUROLOGY
Payer: COMMERCIAL

## 2023-10-28 ENCOUNTER — APPOINTMENT (OUTPATIENT)
Dept: RADIOLOGY | Facility: MEDICAL CENTER | Age: 58
DRG: 981 | End: 2023-10-28
Attending: INTERNAL MEDICINE
Payer: COMMERCIAL

## 2023-10-28 ENCOUNTER — APPOINTMENT (OUTPATIENT)
Dept: RADIOLOGY | Facility: MEDICAL CENTER | Age: 58
DRG: 981 | End: 2023-10-28
Attending: RADIOLOGY
Payer: COMMERCIAL

## 2023-10-28 LAB
ANION GAP SERPL CALC-SCNC: 10 MMOL/L (ref 7–16)
ANION GAP SERPL CALC-SCNC: 12 MMOL/L (ref 7–16)
ANION GAP SERPL CALC-SCNC: 12 MMOL/L (ref 7–16)
ANION GAP SERPL CALC-SCNC: 9 MMOL/L (ref 7–16)
BASOPHILS # BLD AUTO: 0.6 % (ref 0–1.8)
BASOPHILS # BLD: 0.07 K/UL (ref 0–0.12)
BUN SERPL-MCNC: 8 MG/DL (ref 8–22)
BUN SERPL-MCNC: 9 MG/DL (ref 8–22)
CALCIUM SERPL-MCNC: 8.3 MG/DL (ref 8.5–10.5)
CALCIUM SERPL-MCNC: 8.5 MG/DL (ref 8.5–10.5)
CHLORIDE SERPL-SCNC: 113 MMOL/L (ref 96–112)
CHLORIDE SERPL-SCNC: 114 MMOL/L (ref 96–112)
CHLORIDE SERPL-SCNC: 114 MMOL/L (ref 96–112)
CHLORIDE SERPL-SCNC: 117 MMOL/L (ref 96–112)
CHOLEST SERPL-MCNC: 135 MG/DL (ref 100–199)
CO2 SERPL-SCNC: 19 MMOL/L (ref 20–33)
CO2 SERPL-SCNC: 19 MMOL/L (ref 20–33)
CO2 SERPL-SCNC: 20 MMOL/L (ref 20–33)
CO2 SERPL-SCNC: 20 MMOL/L (ref 20–33)
CREAT SERPL-MCNC: 0.58 MG/DL (ref 0.5–1.4)
CREAT SERPL-MCNC: 0.61 MG/DL (ref 0.5–1.4)
CREAT SERPL-MCNC: 0.62 MG/DL (ref 0.5–1.4)
CREAT SERPL-MCNC: 0.63 MG/DL (ref 0.5–1.4)
EOSINOPHIL # BLD AUTO: 0.04 K/UL (ref 0–0.51)
EOSINOPHIL NFR BLD: 0.3 % (ref 0–6.9)
ERYTHROCYTE [DISTWIDTH] IN BLOOD BY AUTOMATED COUNT: 42.9 FL (ref 35.9–50)
GFR SERPLBLD CREATININE-BSD FMLA CKD-EPI: 102 ML/MIN/1.73 M 2
GFR SERPLBLD CREATININE-BSD FMLA CKD-EPI: 104 ML/MIN/1.73 M 2
GLUCOSE BLD STRIP.AUTO-MCNC: 110 MG/DL (ref 65–99)
GLUCOSE BLD STRIP.AUTO-MCNC: 115 MG/DL (ref 65–99)
GLUCOSE SERPL-MCNC: 115 MG/DL (ref 65–99)
GLUCOSE SERPL-MCNC: 124 MG/DL (ref 65–99)
GLUCOSE SERPL-MCNC: 126 MG/DL (ref 65–99)
GLUCOSE SERPL-MCNC: 142 MG/DL (ref 65–99)
HCT VFR BLD AUTO: 42.4 % (ref 37–47)
HDLC SERPL-MCNC: 62 MG/DL
HGB BLD-MCNC: 14 G/DL (ref 12–16)
IMM GRANULOCYTES # BLD AUTO: 0.06 K/UL (ref 0–0.11)
IMM GRANULOCYTES NFR BLD AUTO: 0.5 % (ref 0–0.9)
LDLC SERPL CALC-MCNC: 61 MG/DL
LYMPHOCYTES # BLD AUTO: 1.36 K/UL (ref 1–4.8)
LYMPHOCYTES NFR BLD: 11 % (ref 22–41)
MAGNESIUM SERPL-MCNC: 2.2 MG/DL (ref 1.5–2.5)
MCH RBC QN AUTO: 28.9 PG (ref 27–33)
MCHC RBC AUTO-ENTMCNC: 33 G/DL (ref 32.2–35.5)
MCV RBC AUTO: 87.6 FL (ref 81.4–97.8)
MONOCYTES # BLD AUTO: 1.36 K/UL (ref 0–0.85)
MONOCYTES NFR BLD AUTO: 11 % (ref 0–13.4)
NEUTROPHILS # BLD AUTO: 9.5 K/UL (ref 1.82–7.42)
NEUTROPHILS NFR BLD: 76.6 % (ref 44–72)
NRBC # BLD AUTO: 0 K/UL
NRBC BLD-RTO: 0 /100 WBC (ref 0–0.2)
PLATELET # BLD AUTO: 325 K/UL (ref 164–446)
PMV BLD AUTO: 9.5 FL (ref 9–12.9)
POTASSIUM SERPL-SCNC: 3.4 MMOL/L (ref 3.6–5.5)
POTASSIUM SERPL-SCNC: 3.6 MMOL/L (ref 3.6–5.5)
POTASSIUM SERPL-SCNC: 3.6 MMOL/L (ref 3.6–5.5)
POTASSIUM SERPL-SCNC: 3.7 MMOL/L (ref 3.6–5.5)
RBC # BLD AUTO: 4.84 M/UL (ref 4.2–5.4)
SODIUM SERPL-SCNC: 143 MMOL/L (ref 135–145)
SODIUM SERPL-SCNC: 145 MMOL/L (ref 135–145)
SODIUM SERPL-SCNC: 145 MMOL/L (ref 135–145)
SODIUM SERPL-SCNC: 146 MMOL/L (ref 135–145)
TRIGL SERPL-MCNC: 58 MG/DL (ref 0–149)
WBC # BLD AUTO: 12.4 K/UL (ref 4.8–10.8)

## 2023-10-28 PROCEDURE — 70551 MRI BRAIN STEM W/O DYE: CPT

## 2023-10-28 PROCEDURE — 92610 EVALUATE SWALLOWING FUNCTION: CPT

## 2023-10-28 PROCEDURE — 700102 HCHG RX REV CODE 250 W/ 637 OVERRIDE(OP): Performed by: RADIOLOGY

## 2023-10-28 PROCEDURE — 83735 ASSAY OF MAGNESIUM: CPT

## 2023-10-28 PROCEDURE — 770022 HCHG ROOM/CARE - ICU (200)

## 2023-10-28 PROCEDURE — A9270 NON-COVERED ITEM OR SERVICE: HCPCS | Performed by: RADIOLOGY

## 2023-10-28 PROCEDURE — 02HV33Z INSERTION OF INFUSION DEVICE INTO SUPERIOR VENA CAVA, PERCUTANEOUS APPROACH: ICD-10-PCS | Performed by: INTERNAL MEDICINE

## 2023-10-28 PROCEDURE — 700105 HCHG RX REV CODE 258: Performed by: RADIOLOGY

## 2023-10-28 PROCEDURE — 700102 HCHG RX REV CODE 250 W/ 637 OVERRIDE(OP): Performed by: INTERNAL MEDICINE

## 2023-10-28 PROCEDURE — 700101 HCHG RX REV CODE 250: Performed by: RADIOLOGY

## 2023-10-28 PROCEDURE — 700111 HCHG RX REV CODE 636 W/ 250 OVERRIDE (IP): Performed by: INTERNAL MEDICINE

## 2023-10-28 PROCEDURE — A9270 NON-COVERED ITEM OR SERVICE: HCPCS | Performed by: INTERNAL MEDICINE

## 2023-10-28 PROCEDURE — 85025 COMPLETE CBC W/AUTO DIFF WBC: CPT

## 2023-10-28 PROCEDURE — 80061 LIPID PANEL: CPT

## 2023-10-28 PROCEDURE — 99291 CRITICAL CARE FIRST HOUR: CPT | Performed by: INTERNAL MEDICINE

## 2023-10-28 PROCEDURE — C1751 CATH, INF, PER/CENT/MIDLINE: HCPCS

## 2023-10-28 PROCEDURE — 82962 GLUCOSE BLOOD TEST: CPT

## 2023-10-28 PROCEDURE — 99233 SBSQ HOSP IP/OBS HIGH 50: CPT | Performed by: PSYCHIATRY & NEUROLOGY

## 2023-10-28 PROCEDURE — 80048 BASIC METABOLIC PNL TOTAL CA: CPT | Mod: 91

## 2023-10-28 PROCEDURE — 700105 HCHG RX REV CODE 258: Performed by: INTERNAL MEDICINE

## 2023-10-28 RX ORDER — POLYETHYLENE GLYCOL 3350 17 G/17G
1 POWDER, FOR SOLUTION ORAL
Status: DISCONTINUED | OUTPATIENT
Start: 2023-10-28 | End: 2023-11-02

## 2023-10-28 RX ORDER — AMOXICILLIN 250 MG
2 CAPSULE ORAL 2 TIMES DAILY
Status: DISCONTINUED | OUTPATIENT
Start: 2023-10-28 | End: 2023-11-02

## 2023-10-28 RX ORDER — BISACODYL 10 MG
10 SUPPOSITORY, RECTAL RECTAL
Status: DISCONTINUED | OUTPATIENT
Start: 2023-10-28 | End: 2023-11-02

## 2023-10-28 RX ORDER — ATORVASTATIN CALCIUM 40 MG/1
40 TABLET, FILM COATED ORAL DAILY
Status: DISCONTINUED | OUTPATIENT
Start: 2023-10-29 | End: 2023-10-28

## 2023-10-28 RX ORDER — MONTELUKAST SODIUM 10 MG/1
10 TABLET ORAL DAILY
Status: DISCONTINUED | OUTPATIENT
Start: 2023-10-29 | End: 2023-11-06

## 2023-10-28 RX ORDER — SODIUM CHLORIDE 1 G/1
2 TABLET ORAL
Status: DISCONTINUED | OUTPATIENT
Start: 2023-10-28 | End: 2023-10-29

## 2023-10-28 RX ORDER — FLUDROCORTISONE ACETATE 0.1 MG/1
0.1 TABLET ORAL 2 TIMES DAILY
Status: DISCONTINUED | OUTPATIENT
Start: 2023-10-28 | End: 2023-11-01

## 2023-10-28 RX ORDER — ACETAMINOPHEN 325 MG/1
650 TABLET ORAL EVERY 6 HOURS PRN
Status: DISCONTINUED | OUTPATIENT
Start: 2023-10-28 | End: 2023-11-06

## 2023-10-28 RX ORDER — ENOXAPARIN SODIUM 100 MG/ML
40 INJECTION SUBCUTANEOUS DAILY
Status: DISCONTINUED | OUTPATIENT
Start: 2023-10-28 | End: 2023-11-06 | Stop reason: HOSPADM

## 2023-10-28 RX ORDER — POTASSIUM CHLORIDE 7.45 MG/ML
10 INJECTION INTRAVENOUS
Status: COMPLETED | OUTPATIENT
Start: 2023-10-28 | End: 2023-10-28

## 2023-10-28 RX ORDER — ATORVASTATIN CALCIUM 40 MG/1
80 TABLET, FILM COATED ORAL DAILY
Status: DISCONTINUED | OUTPATIENT
Start: 2023-10-29 | End: 2023-11-03

## 2023-10-28 RX ADMIN — POTASSIUM CHLORIDE 10 MEQ: 7.46 INJECTION, SOLUTION INTRAVENOUS at 12:00

## 2023-10-28 RX ADMIN — SODIUM CHLORIDE 10 MG/HR: 9 INJECTION, SOLUTION INTRAVENOUS at 14:15

## 2023-10-28 RX ADMIN — ENOXAPARIN SODIUM 40 MG: 100 INJECTION SUBCUTANEOUS at 17:10

## 2023-10-28 RX ADMIN — SODIUM CHLORIDE 7.5 MG/HR: 9 INJECTION, SOLUTION INTRAVENOUS at 19:24

## 2023-10-28 RX ADMIN — FLUDROCORTISONE ACETATE 0.1 MG: 0.1 TABLET ORAL at 19:58

## 2023-10-28 RX ADMIN — POTASSIUM CHLORIDE 10 MEQ: 7.46 INJECTION, SOLUTION INTRAVENOUS at 10:00

## 2023-10-28 RX ADMIN — LABETALOL HYDROCHLORIDE 10 MG: 5 INJECTION INTRAVENOUS at 13:34

## 2023-10-28 RX ADMIN — DOCUSATE SODIUM 50 MG AND SENNOSIDES 8.6 MG 2 TABLET: 8.6; 5 TABLET, FILM COATED ORAL at 05:20

## 2023-10-28 RX ADMIN — SODIUM CHLORIDE 2 G: 1 TABLET ORAL at 17:09

## 2023-10-28 RX ADMIN — SODIUM CHLORIDE 50 ML/HR: 3 INJECTION, SOLUTION INTRAVENOUS at 11:32

## 2023-10-28 RX ADMIN — ATORVASTATIN CALCIUM 40 MG: 40 TABLET, FILM COATED ORAL at 05:20

## 2023-10-28 RX ADMIN — ASPIRIN 81 MG 81 MG: 81 TABLET ORAL at 05:20

## 2023-10-28 RX ADMIN — POTASSIUM CHLORIDE 10 MEQ: 7.46 INJECTION, SOLUTION INTRAVENOUS at 11:00

## 2023-10-28 RX ADMIN — LABETALOL HYDROCHLORIDE 10 MG: 5 INJECTION INTRAVENOUS at 20:12

## 2023-10-28 RX ADMIN — TICAGRELOR 90 MG: 90 TABLET ORAL at 05:20

## 2023-10-28 RX ADMIN — MONTELUKAST 10 MG: 10 TABLET, FILM COATED ORAL at 05:20

## 2023-10-28 RX ADMIN — SODIUM CHLORIDE 5 MG/HR: 9 INJECTION, SOLUTION INTRAVENOUS at 04:50

## 2023-10-28 RX ADMIN — POTASSIUM CHLORIDE 10 MEQ: 7.46 INJECTION, SOLUTION INTRAVENOUS at 09:04

## 2023-10-28 RX ADMIN — SODIUM CHLORIDE 7.5 MG/HR: 9 INJECTION, SOLUTION INTRAVENOUS at 22:40

## 2023-10-28 RX ADMIN — TICAGRELOR 90 MG: 90 TABLET ORAL at 17:09

## 2023-10-28 RX ADMIN — SODIUM CHLORIDE 5 MG/HR: 9 INJECTION, SOLUTION INTRAVENOUS at 09:37

## 2023-10-28 RX ADMIN — DOCUSATE SODIUM 50 MG AND SENNOSIDES 8.6 MG 2 TABLET: 8.6; 5 TABLET, FILM COATED ORAL at 17:09

## 2023-10-28 RX ADMIN — SODIUM CHLORIDE 60 ML/HR: 3 INJECTION, SOLUTION INTRAVENOUS at 20:20

## 2023-10-28 RX ADMIN — SODIUM CHLORIDE 2 G: 1 TABLET ORAL at 11:01

## 2023-10-28 ASSESSMENT — PAIN DESCRIPTION - PAIN TYPE
TYPE: ACUTE PAIN

## 2023-10-28 ASSESSMENT — FIBROSIS 4 INDEX: FIB4 SCORE: 0.87

## 2023-10-28 NOTE — PROGRESS NOTES
Neurology Progress Note  Neurohospitalist Service, [facility]    Referring Physician: Shiv Gerardo M.D.    Chief Complaint   Patient presents with    Possible Stroke     As per EMS pt room mate, said that pt is having aphasia, right sided body weakness  Pt had history of stroke but no deficit as per EMS, pt normally can talk and walk    NIH: 15    LKW: 10 pm  Brought by Care flight with the above complaints       HPI: Refer to initial documented Neurology H&P, as detailed in the patient's chart.    Interval History: No acute event overnight, she appears more alert and interactive.  Continue with profound right-sided weakness.    Review of systems: In addition to what is detailed in the HPI and/or updated in the interval history, all other systems reviewed and are negative.    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    FHx:  family history includes Stroke in her mother.    SHx:   reports that she quit smoking about 24 years ago. Her smoking use included cigarettes. She started smoking about 43 years ago. She has a 19.0 pack-year smoking history. She has never used smokeless tobacco. She reports that she does not currently use drugs after having used the following drugs: Marijuana and Oral. She reports that she does not drink alcohol.    Medications:    Current Facility-Administered Medications:     Pharmacy Consult: Enteral tube insertion - review meds/change  route/product selection, 1 Each, Other, PHARMACY TO DOSE, Shiv Gerardo M.D.    sodium chloride (Salt) tablet 2 g, 2 g, Enteral Tube, TID WITH MEALS, Shiv Gerardo M.D., 2 g at 10/28/23 1101    [START ON 10/29/2023] atorvastatin (Lipitor) tablet 40 mg, 40 mg, Enteral Tube, DAILY, Shiv Gerardo M.D.    [START ON 10/29/2023] montelukast (Singulair) tablet 10 mg, 10 mg, Enteral Tube, DAILY, Shiv Gerardo M.D.    senna-docusate (Pericolace Or Senokot S) 8.6-50 MG per tablet 2 Tablet, 2 Tablet, Enteral Tube, BID **AND** polyethylene glycol/lytes (Miralax) PACKET 1 Packet, 1 Packet, Enteral Tube, QDAY PRN **AND** magnesium hydroxide (Milk Of Magnesia) suspension 30 mL, 30 mL, Enteral Tube, QDAY PRN **AND** bisacodyl (Dulcolax) suppository 10 mg, 10 mg, Rectal, QDAY PRN, Shiv Gerardo M.D.    acetaminophen (Tylenol) tablet 650 mg, 650 mg, Enteral Tube, Q6HRS PRN, Shiv Gerardo M.D.    enoxaparin (Lovenox) inj 40 mg, 40 mg, Subcutaneous, DAILY AT 1800, Shiv Gerardo M.D.    insulin regular (HumuLIN R,NovoLIN R) injection, 1-6 Units, Subcutaneous, Q6HRS **AND** POC blood glucose manual result, , , Q6H **AND** NOTIFY MD and PharmD, , , Once **AND** Administer 20 grams of glucose (approximately 8 ounces of fruit juice) every 15 minutes PRN FSBG less than 70 mg/dL, , , PRN **AND** dextrose 10 % BOLUS 25 g, 25 g, Intravenous, Q15 MIN PRN, Ken Tejeda M.D.    niCARdipine (Cardene) 25 mg in  mL Standard Infusion, 0-15 mg/hr, Intravenous, Continuous, Mauri Montes M.D., Last Rate: 100 mL/hr at 10/28/23 1415, 10 mg/hr at 10/28/23 1415    labetalol (Normodyne/Trandate) injection 10 mg, 10 mg, Intravenous, Q4HRS PRN, Ken Tejeda M.D., 10 mg at 10/28/23 1334    hydrALAZINE (Apresoline) injection 20 mg, 20 mg, Intravenous, Q6HRS PRN, Ken Tejeda M.D., 20 mg at 10/27/23 1718    MD Alert...ICU Electrolyte Replacement per Pharmacy, , Other, PHARMACY TO DOSE, Shiv Gerardo,  M.D.    aspirin (Asa) chewable tab 81 mg, 81 mg, Enteral Tube, DAILY, Mauri Montes M.D., 81 mg at 10/28/23 0520    ticagrelor (Brilinta) tablet 90 mg, 90 mg, Enteral Tube, BID, Mauri Montes M.D., 90 mg at 10/28/23 0520    3% sodium chloride (Hypertonic Saline) 500mL infusion, 0-60 mL/hr, Intravenous, Continuous, Shiv Gerardo M.D., Last Rate: 60 mL/hr at 10/28/23 1429, 60 mL/hr at 10/28/23 1429    Physical Examination:    Vitals:    10/28/23 1145 10/28/23 1200 10/28/23 1215 10/28/23 1230   BP: 128/66 138/69 126/65 136/68   Pulse: (!) 104 84 93 85   Resp: 19 17 (!) 21 18   Temp:       TempSrc:       SpO2: 92% 93% 93% 94%   Weight:       Height:           General:   Patient is awake but nonverbal  Neck: Full range of motion  Eyes: Left gaze preference, Pupils reactive to light.  CV: RRR  Lungs: No respiratory distress  Extremities: No cyanosis, warm, no significant edema.     NEUROLOGICAL EXAM:   Mental status: She is awake, drowsy and nonverbal.  Followed briefly with closing her eyes on command.  Speech and language: Nonverbal.  Cranial nerve exam: Pupils are equal, round and reactive to light bilaterally. Visual fields with right homonymous hemianopsia to visual threat. Extraocular muscles: Mild left gaze preference, although able to pass midline briefly.  She has left right weakness.  Facial sensation cannot be tested.  Motor exam: She moves left upper and lower extremities spontaneously with antigravity, withdraws minimally to physical stimulation in right upper and lower extremity.  Sensory exam: She reacts to physical and tactile stimulation.  Coordination: no gross ataxia noted on exam  Plantar reflexes: Upgoing on the right  Gait: deferred    Objective Data:    Labs:  Lab Results   Component Value Date/Time    PROTHROMBTM 13.3 10/26/2023 11:45 PM    INR 1.00 10/26/2023 11:45 PM      Lab Results   Component Value Date/Time    WBC 12.4 (H) 10/28/2023 02:30 AM    RBC 4.84 10/28/2023 02:30 AM     HEMOGLOBIN 14.0 10/28/2023 02:30 AM    HEMATOCRIT 42.4 10/28/2023 02:30 AM    MCV 87.6 10/28/2023 02:30 AM    MCH 28.9 10/28/2023 02:30 AM    MCHC 33.0 10/28/2023 02:30 AM    MPV 9.5 10/28/2023 02:30 AM    NEUTSPOLYS 76.60 (H) 10/28/2023 02:30 AM    LYMPHOCYTES 11.00 (L) 10/28/2023 02:30 AM    MONOCYTES 11.00 10/28/2023 02:30 AM    EOSINOPHILS 0.30 10/28/2023 02:30 AM    BASOPHILS 0.60 10/28/2023 02:30 AM    HYPOCHROMIA 1+ 02/19/2011 04:20 PM    ANISOCYTOSIS 1+ 06/29/2010 04:10 AM      Lab Results   Component Value Date/Time    SODIUM 143 10/28/2023 12:25 PM    POTASSIUM 3.7 10/28/2023 12:25 PM    CHLORIDE 114 (H) 10/28/2023 12:25 PM    CO2 19 (L) 10/28/2023 12:25 PM    GLUCOSE 142 (H) 10/28/2023 12:25 PM    BUN 8 10/28/2023 12:25 PM    CREATININE 0.58 10/28/2023 12:25 PM    CREATININE 0.8 06/12/2006 05:00 AM    GLOMRATE 66 05/15/2023 02:32 AM      Lab Results   Component Value Date/Time    CHOLSTRLTOT 135 10/28/2023 02:30 AM    LDL 61 10/28/2023 02:30 AM    HDL 62 10/28/2023 02:30 AM    TRIGLYCERIDE 58 10/28/2023 02:30 AM       Lab Results   Component Value Date/Time    ALKPHOSPHAT 104 (H) 10/26/2023 11:45 PM    ASTSGOT 21 10/26/2023 11:45 PM    ALTSGPT 19 10/26/2023 11:45 PM    TBILIRUBIN 0.3 10/26/2023 11:45 PM        Imaging/Testing:    I interpreted and/or reviewed the patient's neuroimaging    IR-PICC LINE PLACEMENT W/ GUIDANCE > AGE 5   Final Result                  Ultrasound-guided PICC placement performed by qualified nursing staff as    above.          MR-BRAIN-W/O   Final Result         1. Acute left MCA distribution infarct, as above.   2. 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   3. Mild chronic ischemic white matter migration.      CT-HEAD W/O   Final Result      1.  Possible mild left frontotemporal edema and a present significantly improved      2.  Negative for hemorrhage or mass effect      3.  Right frontotemporal and encephalomalacia         DX-ABDOMEN FOR TUBE PLACEMENT   Final Result       1.  Enteric tube overlies the stomach.      CT-HEAD W/O   Final Result         1.  No acute intracranial abnormality is identified, there are nonspecific white matter changes, commonly associated with small vessel ischemic disease.  Associated mild cerebral atrophy is noted.   2.  Left maxillary sinusitis changes   3.  Atherosclerosis.         IR-THROMBO MECHANICAL ARTERY,INIT   Final Result      1.  Occluded LEFT internal carotid artery-LEFT internal carotid flow diverter.   2.  Successful mechanical thrombectomy.   3.  TICI 2c      DX-CHEST-PORTABLE (1 VIEW)   Final Result         1.  Left basilar atelectasis, no focal infiltrate   2.  Atherosclerosis      CT-CTA NECK WITH & W/O-POST PROCESSING   Final Result         1.  Occlusion from the mid left internal carotid artery through intracranial stent at the distal left ICA.      These findings were discussed with the patient's clinician, Kiet Brown, on 10/27/2023 12:37 AM.      CT-CTA HEAD WITH & W/O-POST PROCESS   Final Result         1.  Left internal carotid artery occlusion extending up to distal carotid arterial stent.   2.  Decreased density within the left anterior cerebral artery, appearance suggesting slow flow or spasm.   3.  Small caliber of the left middle cerebral artery branches which taper and are not visualized most distally, changes most compatible with diminished flow due to left internal carotid artery occlusion.   4.  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   5.  3 mm basilar artery tip aneurysm   6.  2.3 mm aneurysm inferiorly at the right M1/M2 junction      CT-CEREBRAL PERFUSION ANALYSIS   Final Result         1.  Cerebral blood flow less than 30% likely representing completed infarct = 183 mL.      2.  T Max more than 6 seconds likely representing combination of completed infarct and ischemia = 280 mL.      3.  Mismatched volume likely representing ischemic brain/penumbra = 97 mL      4.  Please note  that the cerebral perfusion was performed on the limited brain tissue around the basal ganglia region. Infarct/ischemia outside the CT perfusion sections can be missed in this study.      CT-HEAD W/O   Final Result         1.  No acute intracranial abnormality is identified, there are nonspecific white matter changes, commonly associated with small vessel ischemic disease.  Associated mild cerebral atrophy is noted.   2.  Left maxillary sinusitis changes      Note: Artifacts are present limiting diagnostic sensitivity of this exam.          Assessment and Plan:  Roslyn Kilgore is a 58 y.o. right-handed female with recent ICA flow diverter stent placement on 10/19/2023 who apparently stated she is allergic to Plavix and subsequently was placed on Brilinta, was brought to emergency room for alteration of mental status and right-sided weakness and neglect.    Brain CT is suboptimal but does not reveal any evidence of ischemia.  CT angiogram of the neck revealed occlusion of mid left internal carotid artery through intracranial stent at the distal left ICA.  CTA of the head revealed no large vessel occlusion.  CT perfusion revealed 97 mL of ischemic penumbra with 183 mL of ischemic core.  Underwent successful thrombectomy with TICI 3 result, however no significant improvement in her symptoms.  Brain MRI with acute left MCA distribution infarct mainly involving cortex and gray matter with involvement of basal ganglia and caudate head.     Plan:  -q1h and PRN neuro assessment. VS per nursing/unit protocol.   -TICI 2c/3: maintain systolic -140     -Continue dual antiplatelet therapy with aspirin 81 mg daily and Brilinta 90 mg twice a day  -Continue hypertonic saline with a goal of sodium 150-160, current sodium 143.  -PT, OT and SLP as tolerated.  -All other medical management per primary team.   -DVT PPX: Okay to start Lovenox     The evaluation of the patient, and recommended management, was discussed with  Shiv Gerardo M.D.    I spent a total of 52 minutes face-to-face time and non-face-to-face time reviewing medical records, neuroimaging, labs, discussing with bedside RN and ICU team and documenting in the EMR.    Please note that this dictation was created using voice recognition software. I have made every reasonable attempt to correct obvious errors, but I expect that there are errors of grammar and possibly content that I did not discover before finalizing the note.      Mary Lou Akbar MD  Acute Care Neurology Services

## 2023-10-28 NOTE — PROGRESS NOTES
"Critical Care Progress Note    Date of admission  10/26/2023    Chief Complaint  58 y.o. female admitted 10/26/2023 with aphasia and right-sided body weakness    Hospital Course  Edited HPI from Dr. Tejeda note from previous date of service:  \" 58 y.o. female who presented 10/26/2023 with a stroke alert.  Patient was brought by emergency medical services to the emergency department after her roommate noticed the patient having aphasia, right-sided body weakness (upper and lower limbs) earlier today.  At initial evaluation in the emergency department she was found to be confused with altered mental status, last time known at her normal was around 10 PM on 10/26/2023.  Has remained nonverbal in the ED, blood pressure was in the 130s over 80s, glucose was within normal limits.  The patient has a past medical history of left ICA aneurysm status post stent placement with diversion of flow with subsequent treatment with Brilinta.  Per notes it seems that she was not tolerating well the Brilinta?  But was advised to continue taking it.  Diagnostic work-up in the ED with a CTA shows no flow through the left ICA for what neurology Dr. Akbar was consulted.  Given recent neurosurgical intervention she was deemed poor candidate for thrombolytics due to high risk of bleeding.  IR was consulted for emergent thrombectomy.\"    Brought in by EMS for aphasia, right-sided weakness, LKW 10 PM 10/26/2023  PMH left ICA aneurysm status post stents with diversion of flow and subsequent treatment with Brilinta.  Allergic to Plavix     CTA of the neck revealed occlusion of mid left ICA through intracranial stent at the distal left ICA with large penumbra. CTA of the head revealed no LVO     Neuro consulted, poor candidate for thrombolytics due to high risk of bleeding given recent neurosurgical intervention  Taken to [facility] suite, found to have left ICA in-stent thrombosis s/p thrombectomy with TICI 3 flow and started on low-dose Integrilin " drip.      Initial concern for noncompliance with brillinta, however TEG showed therapeutic ADP inhibition suggesting compliance    Interval Problem Update  Chart review from the past 24 hours includes imaging, laboratory studies, vital signs and notes available.  Pertinent data for today's visit includes    No acute events overnight   MRI 10/28 acute left MCA distribution infarct, and old right posterior frontal infarct    Cardiac: PACs less, SR/ST, -140s, TICI 3: goal -140, on nicardipine gtt @ 5  Pulm: 3LPM  Neuro: Global aphasia, able to follow simple commands, somnolent but opens eyes briefly to vocal stimulus. Pupils equal 3mm sluggish symmetrical, gaze crossing midline now, good cough, clearing secretions, L 4/4 uppers/lowers, R 2/2 uppers/lowers.   Heme: Mild leukocytosis  Renal/volume status: Cr < 1  ID:  AF, no abx  GI/endo: BMP glucose at goal, tube feeds started  Labs/Imaging: Na 145, goal 150-160  Lines: RADHA, mathieu    Review of Systems  Review of Systems   Unable to perform ROS: Medical condition      Vital Signs for last 24 hours   Temp:  [36.1 °C (96.9 °F)-36.6 °C (97.9 °F)] 36.3 °C (97.4 °F)  Pulse:  [] 112  Resp:  [12-28] 19  BP: (118-152)/(56-87) 135/64  SpO2:  [90 %-96 %] 91 %    Physical Exam   Physical Exam  Vitals and nursing note reviewed.   Constitutional:       General: She is not in acute distress.     Appearance: She is ill-appearing. She is not toxic-appearing.   HENT:      Head: Normocephalic and atraumatic.      Nose: Nose normal.      Mouth/Throat:      Mouth: Mucous membranes are moist.   Eyes:      Pupils: Pupils are equal, round, and reactive to light.      Comments: Horizontal nistagmus noted   Cardiovascular:      Rate and Rhythm: Normal rate and regular rhythm.      Pulses: Normal pulses.      Heart sounds: Normal heart sounds. No murmur heard.     No gallop.   Pulmonary:      Effort: Pulmonary effort is normal. No respiratory distress.      Breath sounds:  Normal breath sounds. No stridor. No wheezing or rales.   Abdominal:      General: There is no distension.      Tenderness: There is no abdominal tenderness. There is no guarding.   Musculoskeletal:         General: No swelling. Normal range of motion.      Right lower leg: No edema.      Left lower leg: No edema.   Lymphadenopathy:      Cervical: No cervical adenopathy.   Skin:     General: Skin is warm.      Capillary Refill: Capillary refill takes less than 2 seconds.      Findings: No rash.   Neurological:      Comments: Global aphasia, able to follow simple commands, somnolent but opens eyes briefly to vocal stimulus. Pupils equal 3mm sluggish symmetrical, gaze crossing midline now, good cough, clearing secretions, L 4/4 uppers/lowers, R 2/2 uppers/lowers.        Medications  Current Facility-Administered Medications   Medication Dose Route Frequency Provider Last Rate Last Admin    Pharmacy Consult: Enteral tube insertion - review meds/change route/product selection  1 Each Other PHARMACY TO DOSE Shiv Gerardo M.D.        sodium chloride (Salt) tablet 2 g  2 g Enteral Tube TID WITH MEALS Shiv Gerardo M.D.   2 g at 10/28/23 1709    [START ON 10/29/2023] montelukast (Singulair) tablet 10 mg  10 mg Enteral Tube DAILY Shiv Gerardo M.D.        senna-docusate (Pericolace Or Senokot S) 8.6-50 MG per tablet 2 Tablet  2 Tablet Enteral Tube BID Shiv Gerardo M.D.   2 Tablet at 10/28/23 1709    And    polyethylene glycol/lytes (Miralax) PACKET 1 Packet  1 Packet Enteral Tube QDAY PRN Shiv Gerardo M.D.        And    magnesium hydroxide (Milk Of Magnesia) suspension 30 mL  30 mL Enteral Tube QDAY PRN Shiv Gerardo M.D.        And    bisacodyl (Dulcolax) suppository 10 mg  10 mg Rectal QDAY PRN Shiv Gerardo M.D.        acetaminophen (Tylenol) tablet 650 mg  650 mg Enteral Tube Q6HRS PRN Shiv Gerardo M.D.        enoxaparin (Lovenox) inj 40 mg  40 mg Subcutaneous DAILY AT 1800 Shiv VENTURA  SUE Gerardo   40 mg at 10/28/23 1710    [START ON 10/29/2023] atorvastatin (Lipitor) tablet 80 mg  80 mg Enteral Tube DAILY Shiv Gerardo M.D.        fludrocortisone (Florinef) tablet 0.1 mg  0.1 mg Enteral Tube BID Shiv Gerardo M.D.        insulin regular (HumuLIN R,NovoLIN R) injection  1-6 Units Subcutaneous Q6HRS Ken Tejeda M.D.        And    dextrose 10 % BOLUS 25 g  25 g Intravenous Q15 MIN PRN Kne Tejeda M.D.        niCARdipine (Cardene) 25 mg in  mL Standard Infusion  0-15 mg/hr Intravenous Continuous Mauri Montes M.D. 100 mL/hr at 10/28/23 1415 10 mg/hr at 10/28/23 1415    labetalol (Normodyne/Trandate) injection 10 mg  10 mg Intravenous Q4HRS PRN Ken Tejeda M.D.   10 mg at 10/28/23 1334    hydrALAZINE (Apresoline) injection 20 mg  20 mg Intravenous Q6HRS PRN Ken Tejeda M.D.   20 mg at 10/27/23 1718    MD Alert...ICU Electrolyte Replacement per Pharmacy   Other PHARMACY TO DOSE Shiv Gerardo M.D.        aspirin (Asa) chewable tab 81 mg  81 mg Enteral Tube DAILY Mauri Montes M.D.   81 mg at 10/28/23 0520    ticagrelor (Brilinta) tablet 90 mg  90 mg Enteral Tube BID Mauri Montes M.D.   90 mg at 10/28/23 1709    3% sodium chloride (Hypertonic Saline) 500mL infusion  0-60 mL/hr Intravenous Continuous Shiv Gerardo M.D. 60 mL/hr at 10/28/23 1821 60 mL/hr at 10/28/23 1821       Fluids    Intake/Output Summary (Last 24 hours) at 10/28/2023 1842  Last data filed at 10/28/2023 1731  Gross per 24 hour   Intake 1813.06 ml   Output 2275 ml   Net -461.94 ml       Laboratory          Recent Labs     10/28/23  0230 10/28/23  0800 10/28/23  1225 10/28/23  1720   SODIUM 145 145 143 146*   POTASSIUM 3.4* 3.6 3.7 3.6   CHLORIDE 113* 114* 114* 117*   CO2 20 19* 19* 20   BUN 9 8 8 8   CREATININE 0.63 0.62 0.58 0.61   MAGNESIUM 2.2  --   --   --    CALCIUM 8.5 8.5 8.3* 8.5     Recent Labs     10/26/23  2345 10/27/23  0234 10/28/23  0800 10/28/23  1225  10/28/23  1720   ALTSGPT 19  --   --   --   --    ASTSGOT 21  --   --   --   --    ALKPHOSPHAT 104*  --   --   --   --    TBILIRUBIN 0.3  --   --   --   --    GLUCOSE 129*   < > 115* 142* 126*    < > = values in this interval not displayed.     Recent Labs     10/26/23  2345 10/28/23  0230   WBC 9.5 12.4*   NEUTSPOLYS 51.10 76.60*   LYMPHOCYTES 24.40 11.00*   MONOCYTES 13.60* 11.00   EOSINOPHILS 9.50* 0.30   BASOPHILS 1.10 0.60   ASTSGOT 21  --    ALTSGPT 19  --    ALKPHOSPHAT 104*  --    TBILIRUBIN 0.3  --      Recent Labs     10/26/23  2345 10/28/23  0230   RBC 5.18 4.84   HEMOGLOBIN 14.8 14.0   HEMATOCRIT 44.9 42.4   PLATELETCT 320 325   PROTHROMBTM 13.3  --    APTT 28.1  --    INR 1.00  --      Imaging  MRI:   Reviewed  10/28 acute left MCA distribution infarct, and old right posterior frontal infarct    Assessment/Plan    * Acute ischemic left internal carotid artery (ICA) stroke (HCC)- (present on admission)  Assessment & Plan  Recent left ICA aneurysm status post stent and flow diverter, subsequent treatment with Brilinta, allergic to Plavix  Developed in-stent thrombosis, initial concern for noncompliance with Brilinta, however TEG showed therapeutic ADP inhibition, underwent thrombectomy, integrilin bridged to ASA/Brilinta  MRI 10/28 acute left MCA distribution infarct, and old right posterior frontal infarct    SBP goal for TICI 2c/3: 100-140  ASA 81/Brillinta NGT  Na goal 150-160, 3% HTS, salt tabs, add florinef  q2H neurochecks  Atorvastatin 40 -> 80mg NGT  euglycemia <180mg/dL, TF started  Start DVT ppx with lovenox  PT/OT  SLP- ok for ice chips  IR PICC placed 10/28    Extrinsic asthma without complication- (present on admission)  Assessment & Plan  duonebs prn  Continue montelukast home dose    Dyslipidemia- (present on admission)  Assessment & Plan  Lipitor 40 home dose, increase to 80 for high intensity therapy    Primary hypertension- (present on admission)  Assessment & Plan  TICI 2c/3: SBP  100-140  Cardene gtt and IV PRNs  Home amlodipine and losartan once stabilized      VTE:  Lovenox starting  Ulcer: Not Indicated  Lines:  PICC, purewick     I have performed a physical exam and reviewed and updated ROS and Plan today (10/28/2023). In review of yesterday's note (10/27/2023), there are no changes except as documented above.     Discussed patient condition and risk of morbidity and/or mortality with RN, Therapies, Pharmacy, Charge nurse / hot rounds, Patient, and neurology    The patient remains critically ill.  Critical care time = 51 minutes in directly providing and coordinating critical care and extensive data review.  No time overlap and excludes procedures.    This note was generated using voice recognition software which has a chance of producing errors of grammar and content.  I have made every reasonable attempt to find and correct any errors, but it should be expected that some may not be found prior to finalization of this note.  __________  Shiv Gerardo MD  Pulmonary and Critical Care Medicine  [facility]

## 2023-10-28 NOTE — CARE PLAN
The patient is Watcher - Medium risk of patient condition declining or worsening    Shift Goals  Clinical Goals: Stable Neuros, SBP <140, Titrate 3%  Patient Goals: BAKARI  Family Goals: BAKARI    Progress made toward(s) clinical / shift goals:      Patient updated regarding plan of care, and her condition. Patient's sister updated via telephone. Q1 neuro checks performed, patient's neuro status remains stable. Patient's remains without pain at this time. Patient's SBP managed with use of Cardene gtt. F/U CT completed. MRI completed per order.     Problem: Knowledge Deficit - Stroke Education  Goal: Patient's knowledge of stroke and risk factors will improve  Outcome: Progressing    Problem: Pain - Standard  Goal: Alleviation of pain or a reduction in pain to the patient’s comfort goal  Outcome: Progressing    Problem: Neuro Status  Goal: Neuro status will remain stable or improve  Outcome: Progressing     Problem: Hemodynamic Monitoring  Goal: Patient's hemodynamics, fluid balance and neurologic status will be stable or improve  Outcome: Progressing    Problem: Safety - Medical Restraint  Goal: Remains free of injury from restraints (Restraint for Interference with Medical Device)  Outcome: Progressing    Patient is not progressing towards the following goals:    Patient continues to require medical restraints for safety at this time.     Problem: Safety - Medical Restraint  Goal: Free from restraint(s) (Restraint for Interference with Medical Device)  Outcome: Not Progressing

## 2023-10-28 NOTE — PROCEDURES
Vascular Access Team     Date of Insertion: 10/28/2023  Arm Circumference: 26  Internal length: 42  External Length: 0  Vein Occupancy %: 40   Reason for PICC: vasopressor and hypertonic solution  Labs: WBC 12.4, , BUN 9, Cr 0.63, , INR 1.00     Consents confirmed, vessel patency confirmed with ultrasound. Risks and benefits of procedure explained to patient and education regarding central line associated bloodstream infections provided. Questions answered.      PICC placed in LUE per licensed provider order with ultrasound guidance. 5Fr, double lumen PICC placed in Basilic vein after 1 attempt(s). 2 mL of 1% lidocaine injected intradermally at the insertion site. A 21 gauge microintroducer needle was visualized entering the vein and modified Seldinger technique was used to obtain access to the vein. 42 cm catheter inserted and brisk blood return was observed from each lumen upon aspiration. Line secured at the 0 cm marker. TCS stylet removed and observed to be fully intact. Each lumen flushed using pulsatile method without resistance with 10 mL 0.9% normal saline. PICC line secured with Biopatch and Tegaderm.     PICC tip placement location is confirmed by nurse to be in the Superior Vena Cava (SVC) utilizing 3CG technology. PICC line is appropriate for use at this time. Patient tolerated procedure well, without complications.  Patient condition relayed to primary RN or ordering physician via this post procedure note in the EMR.      Ultrasound images uploaded to PACS and viewable in the EMR - yes  Ultrasound imaged printed and placed in paper chart - no     [employer] PICC ref # [id number], Lot # [id number], Expiration Date 07/31/2024

## 2023-10-28 NOTE — DIETARY
"Nutrition Support Assessment   Day 1 of admit.  Roslyn Kilgore is a 58 y.o. 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      Current problem list:  Extrinsic asthma without complication  Dyslipidemia   HTN     Assessment:  Estimated Nutritional Needs: based on:   Height: 160 cm (5' 3\")  Weight: 63.4 kg (139 lb 12.4 oz) via bed scale   Weight to Use in Calculations: 63.4 kg (139 lb 12.4 oz)  Body mass index is 24.76 kg/m²., BMI classification: normal     Calculation/Equation:   REE per JGK=0749 kcals/day (x1.1=5471 kcals/day)  Total Calories / day: 951 - 1268 (Calories / kg: 15 - 20)  Total Grams Protein / day: 63 - 76 (Grams Protein / k.0 - 1.2)     Evaluation:   RD consulted for tube feeding assessment. Pt seen and assessed by SLP 10/28. Found to have oropharyngeal dysphagia. SLP recommending NPO with TF. Nutrition support indicated to meet estimated needs.   NGT placed 10/27, placement confirmed via diagnostic imaging.   Current clinical picture and MD progress notes reviewed. Pt admitted with acute right sided weakness and aphasia. Recent ICA aneurysm s/p stent diverter placement 10/19. CTA showed left ICA occlusion up to stent and R ICA aneurysm. Pt taken to IR 10/27 for left ICA stent thrombectomy  Labs reviewed   Meds: NaCL @ 50 mL/hr, KCL, salt tabs  Skin: no staged wounds, pressure injuries or edema noted   +BM pta      Malnutrition Risk: Criteria not assessed      Recommendations/Plan:  Initiate Promote with fiber @ 55 mL/hr providing 1320 kcals, 83 grams protein and 1097 mL free water.   Fluids per MD   Diet advancements as medically feasible per MD and SLP  Monitor weight     RD following               "

## 2023-10-28 NOTE — DISCHARGE PLANNING
Physiatry consult complete , per consult Patient is anticipated to be a good candidate for inpatient rehab based on right-sided deficits.  However patient is currently on bedrest, therapy eval's pending.  TCC continues to follow.

## 2023-10-28 NOTE — THERAPY
"Speech Language Pathology   Clinical Swallow Evaluation     Patient Name: Roslyn Kilgore  AGE:  58 y.o., SEX:  female  Medical Record #: [id number]  Date of Service: 10/28/2023      History of Present Illness  59 YO female admitted 10/27 2/2 altered mental status.     PMHx: intracranial aneurysms, prior stoke, arterial thrombosis, essential HTN, DLD, diverticulitis. Admitted 10/19-10/20 for scheduled endovascular repair of L ICA aneurysm.    CMHx: acute ischemic L internal carotid artery stroke, DLD, extrinsic asthma w/o complication, primary HTN.     Head CT 10/27: \"1.  Possible mild left frontotemporal edema and a present significantly improved     2.  Negative for hemorrhage or mass effect     3.  Right frontotemporal and encephalomalacia\"    Brain MRI 10/28 \"1. Acute left MCA distribution infarct, as above.  2. 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  3. Mild chronic ischemic white matter migration.\"    General Information:  Vitals  O2 (LPM): 3  O2 Delivery Device: Nasal Cannula  Level of Consciousness: Awake  Patient Behaviors:  (suspect aphasia, no direction following)  Orientation: Disoriented x 4  Follows Directives: No      Prior Living Situation & Level of Function:  Housing / Facility: [address]  Lives with - Patient's Self Care Capacity: Friends     Communication: unable to determine  Swallowing: unable to determine       Oral Mechanism Evaluation:  Dentition: Upper denture, Scattered dentition, Poor (minimal lower teeth of poor quality remaining)   Facial Symmetry: Central right facial droop  Facial Sensation: Pt did not follow commands to assess     Labial Observations: Pt did not follow commands to assess   Lingual Observations: Pt did not follow commands to assess  Motor Speech: nonverbal, unable to assess            Laryngeal Function:  Secretion Management: Adequate  Voice Quality: Pt did not follow commands to assess  Cough: Pt did not follow commands to assess     Subjective  Pt " alert, eye tracking, nonverbal, no direction following    Assessment  Current Method of Nutrition: Other (see comments) (has NGT, no TF order, RN to request during rounds)  Positioning: Melvin's (60-90 degrees)  Bolus Administration: SLP  O2 (LPM): 3 O2 Delivery Device: Nasal Cannula  Factor(s) Affecting Performance: Impaired endurance, Impaired mental status, Impaired command following  Tracheostomy : No     Swallowing Trials:  Swallowing Trials  Ice: WFL  Thin Liquid (TN0): Impaired  Liquidised (LQ3): Impaired      Comments: Oral care with suctioning provided prior to PO trials. Impaired oral acceptance and containment characterized by limited jaw opening to receive utensil anterior spillage. Delayed swallow initiation, presumed complete hyolaryngeal elevation to palpation. Wet quality noted with inhalation/exhalation with trials of thins, and increasing audible upper airway congestion with applesauce, concerning for airway invasion. Pt did not follow direction to cough/throat clear despite max verbal, visual and tactile cues.       Clinical Impressions  Pt presenting with s/sx of oropharyngeal dysphagia, evidenced by impaired oral acceptance and containment as well as audible upper airway congestion with PO. Given overt s/sx of aspiration and impaired direction following, pt does not appear appropriate for PO diet or diagnostic swallow study at this time. Recommend consideration of NPO/TF with single ice chips after oral care to reduce xerostomia and maintain the integrity of the swallow musculature.    Recommendations  Diet Consistency: NPO/TF with single ice chips after oral care 1:1 with nursing to reduce xerostomia and maintain the integrity of the swallow musculature.  Instrumentation: Instrumental swallow study pending clinical progress  Medication: Non Oral  Supervision: 1:1 feeding with constant supervision  Positioning: Fully upright and midline during oral intake, Meals sitting upright in a chair, as  tolerated  Risk Management : Small bites/sips, Slow rate of intake  Oral Care: Q2h  2. Aphasia Evaluation          SLP Treatment Plan  Treatment Plan: Dysphagia Treatment, Patient/Family/Caregiver Training  SLP Frequency: 3x Per Week  Estimated Duration: Until Therapy Goals Met      Anticipated Discharge Needs  Discharge Recommendations: Recommend post-acute placement for additional speech therapy services prior to discharge home   Therapy Recommendations Upon DC: Dysphagia Training, Patient / Family / Caregiver Education        Patient / Family Goals  Patient / Family Goal #1: none stated  Short Term Goals  Short Term Goal # 1: Pt will participate in prefeeding trials 1:1 with SLP with no s/sx of aspiration and min cues.      Mateo Tate, SLP

## 2023-10-29 ENCOUNTER — APPOINTMENT (OUTPATIENT)
Dept: RADIOLOGY | Facility: MEDICAL CENTER | Age: 58
DRG: 981 | End: 2023-10-29
Attending: NURSE PRACTITIONER
Payer: COMMERCIAL

## 2023-10-29 ENCOUNTER — APPOINTMENT (OUTPATIENT)
Dept: RADIOLOGY | Facility: MEDICAL CENTER | Age: 58
DRG: 981 | End: 2023-10-29
Attending: INTERNAL MEDICINE
Payer: COMMERCIAL

## 2023-10-29 PROBLEM — J96.01 ACUTE HYPOXEMIC RESPIRATORY FAILURE (HCC): Status: ACTIVE | Noted: 2023-10-29

## 2023-10-29 LAB
ALBUMIN SERPL BCP-MCNC: 3.3 G/DL (ref 3.2–4.9)
ALBUMIN/GLOB SERPL: 1.2 G/DL
ALP SERPL-CCNC: 80 U/L (ref 30–99)
ALT SERPL-CCNC: 14 U/L (ref 2–50)
ANION GAP SERPL CALC-SCNC: 10 MMOL/L (ref 7–16)
ANION GAP SERPL CALC-SCNC: 11 MMOL/L (ref 7–16)
ANION GAP SERPL CALC-SCNC: 11 MMOL/L (ref 7–16)
AST SERPL-CCNC: 24 U/L (ref 12–45)
BASOPHILS # BLD AUTO: 0.4 % (ref 0–1.8)
BASOPHILS # BLD: 0.06 K/UL (ref 0–0.12)
BILIRUB SERPL-MCNC: 0.3 MG/DL (ref 0.1–1.5)
BUN SERPL-MCNC: 10 MG/DL (ref 8–22)
BUN SERPL-MCNC: 10 MG/DL (ref 8–22)
BUN SERPL-MCNC: 8 MG/DL (ref 8–22)
BUN SERPL-MCNC: 8 MG/DL (ref 8–22)
BUN SERPL-MCNC: 9 MG/DL (ref 8–22)
CALCIUM ALBUM COR SERPL-MCNC: 9.1 MG/DL (ref 8.5–10.5)
CALCIUM SERPL-MCNC: 8.2 MG/DL (ref 8.5–10.5)
CALCIUM SERPL-MCNC: 8.3 MG/DL (ref 8.5–10.5)
CALCIUM SERPL-MCNC: 8.3 MG/DL (ref 8.5–10.5)
CALCIUM SERPL-MCNC: 8.4 MG/DL (ref 8.5–10.5)
CALCIUM SERPL-MCNC: 8.5 MG/DL (ref 8.5–10.5)
CHLORIDE SERPL-SCNC: 119 MMOL/L (ref 96–112)
CHLORIDE SERPL-SCNC: 119 MMOL/L (ref 96–112)
CHLORIDE SERPL-SCNC: 120 MMOL/L (ref 96–112)
CHLORIDE SERPL-SCNC: 120 MMOL/L (ref 96–112)
CHLORIDE SERPL-SCNC: 122 MMOL/L (ref 96–112)
CO2 SERPL-SCNC: 20 MMOL/L (ref 20–33)
CO2 SERPL-SCNC: 21 MMOL/L (ref 20–33)
CO2 SERPL-SCNC: 22 MMOL/L (ref 20–33)
CREAT SERPL-MCNC: 0.59 MG/DL (ref 0.5–1.4)
CREAT SERPL-MCNC: 0.62 MG/DL (ref 0.5–1.4)
CREAT SERPL-MCNC: 0.62 MG/DL (ref 0.5–1.4)
CREAT SERPL-MCNC: 0.64 MG/DL (ref 0.5–1.4)
CREAT SERPL-MCNC: 0.66 MG/DL (ref 0.5–1.4)
CRP SERPL HS-MCNC: 1.74 MG/DL (ref 0–0.75)
EOSINOPHIL # BLD AUTO: 0.01 K/UL (ref 0–0.51)
EOSINOPHIL NFR BLD: 0.1 % (ref 0–6.9)
ERYTHROCYTE [DISTWIDTH] IN BLOOD BY AUTOMATED COUNT: 44.8 FL (ref 35.9–50)
GFR SERPLBLD CREATININE-BSD FMLA CKD-EPI: 101 ML/MIN/1.73 M 2
GFR SERPLBLD CREATININE-BSD FMLA CKD-EPI: 101 ML/MIN/1.73 M 2
GFR SERPLBLD CREATININE-BSD FMLA CKD-EPI: 102 ML/MIN/1.73 M 2
GFR SERPLBLD CREATININE-BSD FMLA CKD-EPI: 102 ML/MIN/1.73 M 2
GFR SERPLBLD CREATININE-BSD FMLA CKD-EPI: 103 ML/MIN/1.73 M 2
GLOBULIN SER CALC-MCNC: 2.8 G/DL (ref 1.9–3.5)
GLUCOSE BLD STRIP.AUTO-MCNC: 127 MG/DL (ref 65–99)
GLUCOSE BLD STRIP.AUTO-MCNC: 129 MG/DL (ref 65–99)
GLUCOSE BLD STRIP.AUTO-MCNC: 129 MG/DL (ref 65–99)
GLUCOSE BLD STRIP.AUTO-MCNC: 130 MG/DL (ref 65–99)
GLUCOSE SERPL-MCNC: 112 MG/DL (ref 65–99)
GLUCOSE SERPL-MCNC: 131 MG/DL (ref 65–99)
GLUCOSE SERPL-MCNC: 141 MG/DL (ref 65–99)
GLUCOSE SERPL-MCNC: 148 MG/DL (ref 65–99)
GLUCOSE SERPL-MCNC: 153 MG/DL (ref 65–99)
HCT VFR BLD AUTO: 38.6 % (ref 37–47)
HGB BLD-MCNC: 12.7 G/DL (ref 12–16)
IMM GRANULOCYTES # BLD AUTO: 0.08 K/UL (ref 0–0.11)
IMM GRANULOCYTES NFR BLD AUTO: 0.6 % (ref 0–0.9)
LYMPHOCYTES # BLD AUTO: 0.98 K/UL (ref 1–4.8)
LYMPHOCYTES NFR BLD: 7.2 % (ref 22–41)
MAGNESIUM SERPL-MCNC: 2.1 MG/DL (ref 1.5–2.5)
MCH RBC QN AUTO: 29.1 PG (ref 27–33)
MCHC RBC AUTO-ENTMCNC: 32.9 G/DL (ref 32.2–35.5)
MCV RBC AUTO: 88.5 FL (ref 81.4–97.8)
MONOCYTES # BLD AUTO: 1.59 K/UL (ref 0–0.85)
MONOCYTES NFR BLD AUTO: 11.7 % (ref 0–13.4)
NEUTROPHILS # BLD AUTO: 10.86 K/UL (ref 1.82–7.42)
NEUTROPHILS NFR BLD: 80 % (ref 44–72)
NRBC # BLD AUTO: 0 K/UL
NRBC BLD-RTO: 0 /100 WBC (ref 0–0.2)
PHOSPHATE SERPL-MCNC: 1.3 MG/DL (ref 2.5–4.5)
PLATELET # BLD AUTO: 278 K/UL (ref 164–446)
PMV BLD AUTO: 9.6 FL (ref 9–12.9)
POTASSIUM SERPL-SCNC: 3 MMOL/L (ref 3.6–5.5)
POTASSIUM SERPL-SCNC: 3.2 MMOL/L (ref 3.6–5.5)
POTASSIUM SERPL-SCNC: 3.4 MMOL/L (ref 3.6–5.5)
POTASSIUM SERPL-SCNC: 3.4 MMOL/L (ref 3.6–5.5)
POTASSIUM SERPL-SCNC: 3.6 MMOL/L (ref 3.6–5.5)
PREALB SERPL-MCNC: 14.9 MG/DL (ref 18–38)
PROT SERPL-MCNC: 6.1 G/DL (ref 6–8.2)
RBC # BLD AUTO: 4.36 M/UL (ref 4.2–5.4)
SODIUM SERPL-SCNC: 150 MMOL/L (ref 135–145)
SODIUM SERPL-SCNC: 150 MMOL/L (ref 135–145)
SODIUM SERPL-SCNC: 151 MMOL/L (ref 135–145)
SODIUM SERPL-SCNC: 152 MMOL/L (ref 135–145)
SODIUM SERPL-SCNC: 152 MMOL/L (ref 135–145)
WBC # BLD AUTO: 13.6 K/UL (ref 4.8–10.8)

## 2023-10-29 PROCEDURE — 99292 CRITICAL CARE ADDL 30 MIN: CPT | Performed by: INTERNAL MEDICINE

## 2023-10-29 PROCEDURE — 700105 HCHG RX REV CODE 258: Performed by: RADIOLOGY

## 2023-10-29 PROCEDURE — 82962 GLUCOSE BLOOD TEST: CPT

## 2023-10-29 PROCEDURE — 84134 ASSAY OF PREALBUMIN: CPT

## 2023-10-29 PROCEDURE — 99232 SBSQ HOSP IP/OBS MODERATE 35: CPT | Performed by: PSYCHIATRY & NEUROLOGY

## 2023-10-29 PROCEDURE — 85025 COMPLETE CBC W/AUTO DIFF WBC: CPT

## 2023-10-29 PROCEDURE — A9270 NON-COVERED ITEM OR SERVICE: HCPCS | Performed by: INTERNAL MEDICINE

## 2023-10-29 PROCEDURE — 700111 HCHG RX REV CODE 636 W/ 250 OVERRIDE (IP): Mod: JZ | Performed by: INTERNAL MEDICINE

## 2023-10-29 PROCEDURE — 70450 CT HEAD/BRAIN W/O DYE: CPT

## 2023-10-29 PROCEDURE — 700101 HCHG RX REV CODE 250: Performed by: RADIOLOGY

## 2023-10-29 PROCEDURE — 700117 HCHG RX CONTRAST REV CODE 255: Performed by: INTERNAL MEDICINE

## 2023-10-29 PROCEDURE — 80053 COMPREHEN METABOLIC PANEL: CPT

## 2023-10-29 PROCEDURE — 700111 HCHG RX REV CODE 636 W/ 250 OVERRIDE (IP): Performed by: INTERNAL MEDICINE

## 2023-10-29 PROCEDURE — 700102 HCHG RX REV CODE 250 W/ 637 OVERRIDE(OP): Performed by: NURSE PRACTITIONER

## 2023-10-29 PROCEDURE — 86140 C-REACTIVE PROTEIN: CPT

## 2023-10-29 PROCEDURE — 80048 BASIC METABOLIC PNL TOTAL CA: CPT | Mod: 91

## 2023-10-29 PROCEDURE — 71275 CT ANGIOGRAPHY CHEST: CPT

## 2023-10-29 PROCEDURE — 770022 HCHG ROOM/CARE - ICU (200)

## 2023-10-29 PROCEDURE — 83735 ASSAY OF MAGNESIUM: CPT

## 2023-10-29 PROCEDURE — A9270 NON-COVERED ITEM OR SERVICE: HCPCS | Performed by: RADIOLOGY

## 2023-10-29 PROCEDURE — A9270 NON-COVERED ITEM OR SERVICE: HCPCS | Performed by: NURSE PRACTITIONER

## 2023-10-29 PROCEDURE — 99291 CRITICAL CARE FIRST HOUR: CPT | Performed by: INTERNAL MEDICINE

## 2023-10-29 PROCEDURE — 700102 HCHG RX REV CODE 250 W/ 637 OVERRIDE(OP): Performed by: RADIOLOGY

## 2023-10-29 PROCEDURE — 700101 HCHG RX REV CODE 250: Performed by: INTERNAL MEDICINE

## 2023-10-29 PROCEDURE — 700111 HCHG RX REV CODE 636 W/ 250 OVERRIDE (IP): Performed by: STUDENT IN AN ORGANIZED HEALTH CARE EDUCATION/TRAINING PROGRAM

## 2023-10-29 PROCEDURE — 94640 AIRWAY INHALATION TREATMENT: CPT

## 2023-10-29 PROCEDURE — 700102 HCHG RX REV CODE 250 W/ 637 OVERRIDE(OP): Performed by: INTERNAL MEDICINE

## 2023-10-29 PROCEDURE — 84100 ASSAY OF PHOSPHORUS: CPT

## 2023-10-29 PROCEDURE — 700105 HCHG RX REV CODE 258: Performed by: INTERNAL MEDICINE

## 2023-10-29 PROCEDURE — 71045 X-RAY EXAM CHEST 1 VIEW: CPT

## 2023-10-29 RX ORDER — POTASSIUM CHLORIDE 7.45 MG/ML
10 INJECTION INTRAVENOUS ONCE
Status: COMPLETED | OUTPATIENT
Start: 2023-10-29 | End: 2023-10-30

## 2023-10-29 RX ORDER — IPRATROPIUM BROMIDE AND ALBUTEROL SULFATE 2.5; .5 MG/3ML; MG/3ML
3 SOLUTION RESPIRATORY (INHALATION)
Status: DISCONTINUED | OUTPATIENT
Start: 2023-10-29 | End: 2023-11-05

## 2023-10-29 RX ORDER — GUAIFENESIN 200 MG/10ML
10 LIQUID ORAL EVERY 4 HOURS
Status: DISCONTINUED | OUTPATIENT
Start: 2023-10-29 | End: 2023-11-06

## 2023-10-29 RX ORDER — POTASSIUM CHLORIDE 14.9 MG/ML
20 INJECTION INTRAVENOUS ONCE
Status: COMPLETED | OUTPATIENT
Start: 2023-10-29 | End: 2023-10-29

## 2023-10-29 RX ORDER — SODIUM CHLORIDE FOR INHALATION 3 %
3 VIAL, NEBULIZER (ML) INHALATION
Status: DISCONTINUED | OUTPATIENT
Start: 2023-10-29 | End: 2023-10-30

## 2023-10-29 RX ORDER — SODIUM CHLORIDE 1 G/1
2 TABLET ORAL EVERY 8 HOURS
Status: DISCONTINUED | OUTPATIENT
Start: 2023-10-29 | End: 2023-11-03

## 2023-10-29 RX ORDER — AMLODIPINE BESYLATE 5 MG/1
5 TABLET ORAL
Status: DISCONTINUED | OUTPATIENT
Start: 2023-10-29 | End: 2023-10-30

## 2023-10-29 RX ADMIN — ASPIRIN 81 MG 81 MG: 81 TABLET ORAL at 05:35

## 2023-10-29 RX ADMIN — POTASSIUM PHOSPHATE, MONOBASIC POTASSIUM PHOSPHATE, DIBASIC 30 MMOL: 224; 236 INJECTION, SOLUTION, CONCENTRATE INTRAVENOUS at 08:08

## 2023-10-29 RX ADMIN — DOCUSATE SODIUM 50 MG AND SENNOSIDES 8.6 MG 2 TABLET: 8.6; 5 TABLET, FILM COATED ORAL at 05:35

## 2023-10-29 RX ADMIN — AMLODIPINE BESYLATE 5 MG: 5 TABLET ORAL at 11:51

## 2023-10-29 RX ADMIN — POTASSIUM CHLORIDE 20 MEQ: 14.9 INJECTION, SOLUTION INTRAVENOUS at 21:58

## 2023-10-29 RX ADMIN — POTASSIUM BICARBONATE 50 MEQ: 978 TABLET, EFFERVESCENT ORAL at 03:09

## 2023-10-29 RX ADMIN — FLUDROCORTISONE ACETATE 0.1 MG: 0.1 TABLET ORAL at 17:07

## 2023-10-29 RX ADMIN — GUAIFENESIN 200 MG: 100 SOLUTION ORAL at 16:24

## 2023-10-29 RX ADMIN — ENOXAPARIN SODIUM 40 MG: 100 INJECTION SUBCUTANEOUS at 17:07

## 2023-10-29 RX ADMIN — LABETALOL HYDROCHLORIDE 10 MG: 5 INJECTION INTRAVENOUS at 12:36

## 2023-10-29 RX ADMIN — IOHEXOL 54 ML: 350 INJECTION, SOLUTION INTRAVENOUS at 14:00

## 2023-10-29 RX ADMIN — GUAIFENESIN 200 MG: 100 SOLUTION ORAL at 21:55

## 2023-10-29 RX ADMIN — SODIUM CHLORIDE 3 MG/HR: 9 INJECTION, SOLUTION INTRAVENOUS at 06:03

## 2023-10-29 RX ADMIN — MONTELUKAST 10 MG: 10 TABLET, FILM COATED ORAL at 05:35

## 2023-10-29 RX ADMIN — SODIUM CHLORIDE 60 ML/HR: 3 INJECTION, SOLUTION INTRAVENOUS at 14:04

## 2023-10-29 RX ADMIN — TICAGRELOR 90 MG: 90 TABLET ORAL at 17:08

## 2023-10-29 RX ADMIN — SODIUM CHLORIDE 2 G: 1 TABLET ORAL at 14:25

## 2023-10-29 RX ADMIN — SODIUM CHLORIDE 60 ML/HR: 3 INJECTION, SOLUTION INTRAVENOUS at 22:34

## 2023-10-29 RX ADMIN — FLUDROCORTISONE ACETATE 0.1 MG: 0.1 TABLET ORAL at 05:35

## 2023-10-29 RX ADMIN — TICAGRELOR 90 MG: 90 TABLET ORAL at 05:36

## 2023-10-29 RX ADMIN — HYDRALAZINE HYDROCHLORIDE 20 MG: 20 INJECTION, SOLUTION INTRAMUSCULAR; INTRAVENOUS at 23:04

## 2023-10-29 RX ADMIN — ATORVASTATIN CALCIUM 80 MG: 40 TABLET, FILM COATED ORAL at 05:35

## 2023-10-29 RX ADMIN — SODIUM CHLORIDE 7.5 MG/HR: 9 INJECTION, SOLUTION INTRAVENOUS at 02:12

## 2023-10-29 RX ADMIN — SODIUM CHLORIDE 2 G: 1 TABLET ORAL at 21:55

## 2023-10-29 RX ADMIN — Medication 3 ML: at 21:00

## 2023-10-29 RX ADMIN — SODIUM CHLORIDE 2 G: 1 TABLET ORAL at 07:51

## 2023-10-29 RX ADMIN — IPRATROPIUM BROMIDE AND ALBUTEROL SULFATE 3 ML: 2.5; .5 SOLUTION RESPIRATORY (INHALATION) at 21:00

## 2023-10-29 RX ADMIN — LABETALOL HYDROCHLORIDE 10 MG: 5 INJECTION INTRAVENOUS at 19:37

## 2023-10-29 RX ADMIN — SODIUM CHLORIDE 60 ML/HR: 3 INJECTION, SOLUTION INTRAVENOUS at 04:52

## 2023-10-29 ASSESSMENT — PAIN DESCRIPTION - PAIN TYPE
TYPE: ACUTE PAIN

## 2023-10-29 ASSESSMENT — FIBROSIS 4 INDEX: FIB4 SCORE: 1.36

## 2023-10-29 NOTE — CARE PLAN
The patient is Unstable - High likelihood or risk of patient condition declining or worsening    Shift Goals  Clinical Goals: Q2 neuros stable, SBP < 140  Patient Goals: BAKARI  Family Goals: BAKARI, none present    Progress made toward(s) clinical / shift goals:      Patient updated regarding her condition, and plan of care. Evidence of learning not noted at this time. Patient's neuro status remains stable at this time. Cardene gtt titrated as needed per MAR to manage SBP. APRN notified of patient's increased supplemental oxygen needs. Chest X-Ray performed, and patient switched to NRB.     Problem: Pain - Standard  Goal: Alleviation of pain or a reduction in pain to the patient’s comfort goal  Outcome: Progressing     Problem: Knowledge Deficit - Stroke Education  Goal: Patient's knowledge of stroke and risk factors will improve  Outcome: Progressing     Problem: Neuro Status  Goal: Neuro status will remain stable or improve  Outcome: Progressing     Problem: Safety - Medical Restraint  Goal: Remains free of injury from restraints (Restraint for Interference with Medical Device)  Outcome: Progressing       Patient is not progressing towards the following goals:    Patient continues to require medical restraints to maintain safety at this time.     Problem: Safety - Medical Restraint  Goal: Free from restraint(s) (Restraint for Interference with Medical Device)  Outcome: Not Progressing

## 2023-10-29 NOTE — PROGRESS NOTES
Critical Care Cross Cover Overnight    Chart reviewed.  This patient was admitted 10/27 with aphasia.  She recently had a left ICA aneurysm stenting with flow diverter and was discharged 10/20 on Brilinta, but it appears she was not tolerating it and likely not taking it.    She went to IR 10/27 for thrombectomy of left ICA thrombosis with post procedure TICI 3 flow.    Throughout the shift she has continued to require increase in FiO2.  I have been to the bedside multiple times for evaluation.  RT and RN have NTS her without much sputum produced.  On my assessment, she is globally aphasic and not following commands. Her lungs are clear, however she has some intermittent upper airway obstructive sounds (not stridor).  She currently has been increased to 100% nonrebreather with O2 sats 93%.  She appears in no acute distress.  We just again suctioned her for minimal secretions.    I have attempted to call her NOK/brother, Chi, who went straight to voicemail.  I left a simple, no medical information voicemail to call back the unit.    Currently the patient is a full code.  Should she continue to decompensate and require intubation we will do this for her.  I have updated my attending, Dr. Tejeda.    KURTIS oClón.

## 2023-10-29 NOTE — PROGRESS NOTES
"Critical Care Progress Note    Date of admission  10/26/2023    Chief Complaint  58 y.o. female admitted 10/26/2023 with aphasia and right-sided body weakness    Hospital Course  Edited HPI from Dr. Tejeda note from previous date of service:  \" 58 y.o. female who presented 10/26/2023 with a stroke alert.  Patient was brought by emergency medical services to the emergency department after her roommate noticed the patient having aphasia, right-sided body weakness (upper and lower limbs) earlier today.  At initial evaluation in the emergency department she was found to be confused with altered mental status, last time known at her normal was around 10 PM on 10/26/2023.  Has remained nonverbal in the ED, blood pressure was in the 130s over 80s, glucose was within normal limits.  The patient has a past medical history of left ICA aneurysm status post stent placement with diversion of flow with subsequent treatment with Brilinta.  Per notes it seems that she was not tolerating well the Brilinta?  But was advised to continue taking it.  Diagnostic work-up in the ED with a CTA shows no flow through the left ICA for what neurology Dr. Akbar was consulted.  Given recent neurosurgical intervention she was deemed poor candidate for thrombolytics due to high risk of bleeding.  IR was consulted for emergent thrombectomy.\"    Brought in by EMS for aphasia, right-sided weakness, LKW 10 PM 10/26/2023  PMH left ICA aneurysm status post stents with diversion of flow and subsequent treatment with Brilinta.  Allergic to Plavix     CTA of the neck revealed occlusion of mid left ICA through intracranial stent at the distal left ICA with large penumbra. CTA of the head revealed no LVO     Neuro consulted, poor candidate for thrombolytics due to high risk of bleeding given recent neurosurgical intervention  Taken to [facility] suite, found to have left ICA in-stent thrombosis s/p thrombectomy with TICI 3 flow and started on low-dose Integrilin " drip.      Initial concern for noncompliance with brillinta, however TEG showed therapeutic ADP inhibition suggesting compliance    10/28: acute left MCA distribution infarct, and old right posterior frontal infarct    Interval Problem Update  Chart review from the past 24 hours includes imaging, laboratory studies, vital signs and notes available.  Pertinent data for today's visit includes    Rapidly worsening hypoxia overnight from 3LPm -> %, CXR unremarkable  CT head showing no changes  CTA chest no PE, complete atelectasis/collaspse of the LLL and mucous plugging of the RLL as well    Cardiac: ST, -1500s, TICI 3: goal -140, on nicardipine gtt @ 7.5  Pulm: 15L NRB -> now HFNC  Neuro: Global aphasia, not able to follow simple commands, somnolent but opens eyes briefly to vocal stimulus. Pupils equal 3mm sluggish symmetrical, gaze crossing midline now, good cough, clearing secretions, L 4/4 uppers/lowers and purposeful, R w/d to pain  Heme: Mild leukocytosis, unchanged  Renal/volume status: Cr < 1, net -500cc  ID:  AF, no abx  GI/endo: BMP glucose at goal, tube feeds going and advancing  Labs/Imaging: Na 151, goal 150-160 on florinef, HTS 3%  Lines: PICC, purewick    Review of Systems  Review of Systems   Unable to perform ROS: Medical condition      Vital Signs for last 24 hours   Temp:  [36.1 °C (97 °F)-37.1 °C (98.8 °F)] 36.3 °C (97.4 °F)  Pulse:  [] 105  Resp:  [17-36] 26  BP: (101-155)/(49-89) 145/66  SpO2:  [89 %-98 %] 89 %    Physical Exam   Physical Exam  Vitals and nursing note reviewed.   Constitutional:       General: She is in acute distress.      Appearance: She is ill-appearing. She is not toxic-appearing.   HENT:      Head: Normocephalic and atraumatic.      Nose: Nose normal.      Mouth/Throat:      Mouth: Mucous membranes are moist.   Eyes:      Pupils: Pupils are equal, round, and reactive to light.      Comments: gaze crossing midline now,   Cardiovascular:      Rate  and Rhythm: Normal rate and regular rhythm.      Pulses: Normal pulses.      Heart sounds: Normal heart sounds. No murmur heard.     No gallop.   Pulmonary:      Effort: Respiratory distress present.      Breath sounds: No stridor. No wheezing or rales.      Comments: Decreased breath sounds bilateral bases  Abdominal:      General: There is no distension.      Tenderness: There is no abdominal tenderness. There is no guarding.   Musculoskeletal:         General: No swelling. Normal range of motion.      Right lower leg: No edema.      Left lower leg: No edema.   Lymphadenopathy:      Cervical: No cervical adenopathy.   Skin:     General: Skin is warm.      Capillary Refill: Capillary refill takes less than 2 seconds.      Findings: No rash.   Neurological:      Comments: Global aphasia, able to follow simple commands, somnolent but opens eyes briefly to vocal stimulus. Pupils equal 3mm sluggish symmetrical, gaze crossing midline now, good cough, clearing secretions, L 4/4 uppers/lowers, R 2/2 uppers/lowers.        Medications  Current Facility-Administered Medications   Medication Dose Route Frequency Provider Last Rate Last Admin    sodium chloride (Salt) tablet 2 g  2 g Enteral Tube Q8HRS Shiv Gerardo M.D.   2 g at 10/29/23 1425    amLODIPine (Norvasc) tablet 5 mg  5 mg Enteral Tube Q DAY Shiv Gerardo M.D.   5 mg at 10/29/23 1151    guaiFENesin (Robitussin) 100 MG/5ML liquid 200 mg  10 mL Enteral Tube Q4HRS Shiv Gerardo M.D.   200 mg at 10/29/23 1624    sodium chloride 3% nebulizer solution 3 mL  3 mL Nebulization 4X/DAY (RT) Shiv Gerardo M.D.        ipratropium-albuterol (DUONEB) nebulizer solution  3 mL Nebulization 4X/DAY (RT) Shiv Gerardo M.D.        Pharmacy Consult: Enteral tube insertion - review meds/change route/product selection  1 Each Other PHARMACY TO DOSE Shiv Gerardo M.D.        montelukast (Singulair) tablet 10 mg  10 mg Enteral Tube DAILY Shiv Gerardo M.D.   10  mg at 10/29/23 0535    senna-docusate (Pericolace Or Senokot S) 8.6-50 MG per tablet 2 Tablet  2 Tablet Enteral Tube BID Shiv Gerardo M.D.   2 Tablet at 10/29/23 0535    And    polyethylene glycol/lytes (Miralax) PACKET 1 Packet  1 Packet Enteral Tube QDAY PRN Shiv Gerardo M.D.        And    magnesium hydroxide (Milk Of Magnesia) suspension 30 mL  30 mL Enteral Tube QDAY PRN Shiv Gerardo M.D.        And    bisacodyl (Dulcolax) suppository 10 mg  10 mg Rectal QDAY PRN Shiv Gerardo M.D.        acetaminophen (Tylenol) tablet 650 mg  650 mg Enteral Tube Q6HRS PRN Shiv Gerardo M.D.        enoxaparin (Lovenox) inj 40 mg  40 mg Subcutaneous DAILY AT 1800 Shiv Gerardo M.D.   40 mg at 10/29/23 1707    atorvastatin (Lipitor) tablet 80 mg  80 mg Enteral Tube DAILY Shiv Gerardo M.D.   80 mg at 10/29/23 0535    fludrocortisone (Florinef) tablet 0.1 mg  0.1 mg Enteral Tube BID Shiv Gerardo M.D.   0.1 mg at 10/29/23 1707    insulin regular (HumuLIN R,NovoLIN R) injection  1-6 Units Subcutaneous Q6HRS Ken Tejeda M.D.        And    dextrose 10 % BOLUS 25 g  25 g Intravenous Q15 MIN PRN Ken Tejeda M.D.        niCARdipine (Cardene) 25 mg in  mL Standard Infusion  0-15 mg/hr Intravenous Continuous Mauri Montes M.D.   Stopped at 10/29/23 1036    labetalol (Normodyne/Trandate) injection 10 mg  10 mg Intravenous Q4HRS PRN Ken Tejeda M.D.   10 mg at 10/29/23 1937    hydrALAZINE (Apresoline) injection 20 mg  20 mg Intravenous Q6HRS PRN Ken Tejeda M.D.   20 mg at 10/27/23 1718    MD Alert...ICU Electrolyte Replacement per Pharmacy   Other PHARMACY TO DOSE Shiv Gerardo M.D.        aspirin (Asa) chewable tab 81 mg  81 mg Enteral Tube DAILY Mauri Montes M.D.   81 mg at 10/29/23 0535    ticagrelor (Brilinta) tablet 90 mg  90 mg Enteral Tube BID Mauri Montes M.D.   90 mg at 10/29/23 1700    3% sodium chloride (Hypertonic Saline) 500mL infusion   0-60 mL/hr Intravenous Continuous Shiv Gerardo M.D. 60 mL/hr at 10/29/23 1903 60 mL/hr at 10/29/23 1903       Fluids    Intake/Output Summary (Last 24 hours) at 10/29/2023 1944  Last data filed at 10/29/2023 1800  Gross per 24 hour   Intake 5120.14 ml   Output 3450 ml   Net 1670.14 ml       Laboratory          Recent Labs     10/28/23  0230 10/28/23  0800 10/29/23  0440 10/29/23  0811 10/29/23  1430   SODIUM 145   < > 151* 152* 150*   POTASSIUM 3.4*   < > 3.6 3.4* 3.4*   CHLORIDE 113*   < > 120* 122* 119*   CO2 20   < > 21 20 20   BUN 9   < > 10 10 8   CREATININE 0.63   < > 0.66 0.62 0.64   MAGNESIUM 2.2  --  2.1  --   --    PHOSPHORUS  --   --  1.3*  --   --    CALCIUM 8.5   < > 8.5 8.3* 8.3*    < > = values in this interval not displayed.     Recent Labs     10/26/23  2345 10/27/23  0234 10/29/23  0440 10/29/23  0811 10/29/23  1430   ALTSGPT 19  --  14  --   --    ASTSGOT 21  --  24  --   --    ALKPHOSPHAT 104*  --  80  --   --    TBILIRUBIN 0.3  --  0.3  --   --    PREALBUMIN  --   --  14.9*  --   --    GLUCOSE 129*   < > 153* 141* 112*    < > = values in this interval not displayed.     Recent Labs     10/26/23  2345 10/28/23  0230 10/29/23  0440   WBC 9.5 12.4* 13.6*   NEUTSPOLYS 51.10 76.60* 80.00*   LYMPHOCYTES 24.40 11.00* 7.20*   MONOCYTES 13.60* 11.00 11.70   EOSINOPHILS 9.50* 0.30 0.10   BASOPHILS 1.10 0.60 0.40   ASTSGOT 21  --  24   ALTSGPT 19  --  14   ALKPHOSPHAT 104*  --  80   TBILIRUBIN 0.3  --  0.3     Recent Labs     10/26/23  2345 10/28/23  0230 10/29/23  0440   RBC 5.18 4.84 4.36   HEMOGLOBIN 14.8 14.0 12.7   HEMATOCRIT 44.9 42.4 38.6   PLATELETCT 320 325 278   PROTHROMBTM 13.3  --   --    APTT 28.1  --   --    INR 1.00  --   --      Imaging  X-Ray:  I have personally reviewed the images and compared with prior images.  CT:    Reviewed  MRI:   Reviewed  10/28 acute left MCA distribution infarct, and old right posterior frontal infarct    Assessment/Plan    * Acute ischemic left internal  carotid artery (ICA) stroke (HCC)- (present on admission)  Assessment & Plan  Recent left ICA aneurysm status post stent and flow diverter, subsequent treatment with Brilinta, allergic to Plavix  Developed in-stent thrombosis, initial concern for noncompliance with Brilinta, however TEG showed therapeutic ADP inhibition, underwent thrombectomy, integrilin bridged to ASA/Brilinta  MRI 10/28 acute left MCA distribution infarct, and old right posterior frontal infarct    SBP goal for TICI 2c/3: 100-140  ASA 81/Brillinta NGT  Na goal 150-160, 3% HTS, salt tabs, add florinef  q2H neurochecks  Atorvastatin 40 -> 80mg NGT  euglycemia <180mg/dL, TF started  Start DVT ppx with lovenox  PT/OT  SLP- ok for ice chips  IR PICC placed 10/28    Acute hypoxemic respiratory failure (HCC)  Assessment & Plan  CTA chest no PE, complete atelectasis/collaspse of the LLL and mucous plugging of the RLL as well  Hypoxia due to shunting from subtotal right lower lobe and total left lower lobe atelectasis/collapse from retention of secretions  Afebrile, no antibiotics  Likely due to inability to protect airway and poor effort from catastrophic CVA  Aggressive pulmonary toileting with IPV/DuoNeb/hypertonic saline/PEP  Continue HFNC titrated to maintain saturation in the low 90s  DNR/DNI, If work of breathing increases despite above interventions, transition to comfort care    Extrinsic asthma without complication- (present on admission)  Assessment & Plan  duonebs prn  Continue montelukast home dose    Goals of care, counseling/discussion  Assessment & Plan  See associated advance care planning note from 10/29  In summary, due to recent respiratory decline family meeting was held with her brother over the phone [place] in [place] and her Sister Eusebia at bedside.  All are in agreement that the patient would want change of CODE STATUS to DNR/DNI.    Dyslipidemia- (present on admission)  Assessment & Plan  Lipitor 40 home dose, increase to 80 for high  intensity therapy    Primary hypertension- (present on admission)  Assessment & Plan  TICI 2c/3: -140  Cardene gtt and IV PRNs  Home amlodipine and losartan once stabilized      VTE:  Lovenox starting  Ulcer: Not Indicated  Lines:  PICC, purewick     I have performed a physical exam and reviewed and updated ROS and Plan today (10/29/2023). In review of yesterday's note (10/28/2023), there are no changes except as documented above.     Discussed patient condition and risk of morbidity and/or mortality with RN, Therapies, Pharmacy, Code status disscussed, Charge nurse / hot rounds, Patient, and neurology    The patient remains critically ill.  Critical care time = 110 minutes in directly providing and coordinating critical care and extensive data review.  No time overlap and excludes procedures.    This note was generated using voice recognition software which has a chance of producing errors of grammar and content.  I have made every reasonable attempt to find and correct any errors, but it should be expected that some may not be found prior to finalization of this note.  __________  Shiv Gerardo MD  Pulmonary and Critical Care Medicine  [facility]

## 2023-10-29 NOTE — PROGRESS NOTES
Neurology Progress Note  Neurohospitalist Service, [facility]    Referring Physician: Shiv Gerardo M.D.    Chief Complaint   Patient presents with    Possible Stroke     As per EMS pt room mate, said that pt is having aphasia, right sided body weakness  Pt had history of stroke but no deficit as per EMS, pt normally can talk and walk    NIH: 15    LKW: 10 pm  Brought by Care flight with the above complaints       HPI: Refer to initial documented Neurology H&P, as detailed in the patient's chart.    Interval History:   No acute event overnight, remains nonverbal, however able to transiently close her eyes to verbal command, she is not following any other simple commands such as squeezing finger or wiggling her toes.  Still with some left gaze preference and profound right-sided weakness.  Goal of care discussion was held by Dr. Gerardo and her siblings home made her DNR/DNI per patient's previous wishes.    Review of systems: In addition to what is detailed in the HPI and/or updated in the interval history, all other systems reviewed and are negative.    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    FHx:  family history includes Stroke in her mother.    SHx:   reports that she quit smoking about 24 years ago. Her smoking use included cigarettes. She started smoking about 43 years ago. She has a 19.0 pack-year smoking history. She has never used smokeless tobacco. She  reports that she does not currently use drugs after having used the following drugs: Marijuana and Oral. She reports that she does not drink alcohol.    Medications:    Current Facility-Administered Medications:     potassium phosphate IVPB 30 mmol in 500 mL D5W (premix), 30 mmol, Intravenous, Once, Sihv Gerardo M.D., Last Rate: 83.3 mL/hr at 10/29/23 0808, 30 mmol at 10/29/23 0808    sodium chloride (Salt) tablet 2 g, 2 g, Enteral Tube, Q8HRS, Shiv Gerardo M.D.    amLODIPine (Norvasc) tablet 5 mg, 5 mg, Enteral Tube, Q DAY, Shiv Gerardo M.D., 5 mg at 10/29/23 1151    Pharmacy Consult: Enteral tube insertion - review meds/change route/product selection, 1 Each, Other, PHARMACY TO DOSE, Shiv Gerardo M.D.    montelukast (Singulair) tablet 10 mg, 10 mg, Enteral Tube, DAILY, Shiv Gerardo M.D., 10 mg at 10/29/23 0535    senna-docusate (Pericolace Or Senokot S) 8.6-50 MG per tablet 2 Tablet, 2 Tablet, Enteral Tube, BID, 2 Tablet at 10/29/23 0535 **AND** polyethylene glycol/lytes (Miralax) PACKET 1 Packet, 1 Packet, Enteral Tube, QDAY PRN **AND** magnesium hydroxide (Milk Of Magnesia) suspension 30 mL, 30 mL, Enteral Tube, QDAY PRN **AND** bisacodyl (Dulcolax) suppository 10 mg, 10 mg, Rectal, QDAY PRN, Shiv Gerardo M.D.    acetaminophen (Tylenol) tablet 650 mg, 650 mg, Enteral Tube, Q6HRS PRN, Shiv Gerardo M.D.    enoxaparin (Lovenox) inj 40 mg, 40 mg, Subcutaneous, DAILY AT 1800, Shiv Gerardo M.D., 40 mg at 10/28/23 1710    atorvastatin (Lipitor) tablet 80 mg, 80 mg, Enteral Tube, DAILY, Shiv Gerardo M.D., 80 mg at 10/29/23 0535    fludrocortisone (Florinef) tablet 0.1 mg, 0.1 mg, Enteral Tube, BID, Shiv Gerardo M.D., 0.1 mg at 10/29/23 0535    insulin regular (HumuLIN R,NovoLIN R) injection, 1-6 Units, Subcutaneous, Q6HRS **AND** POC blood glucose manual result, , , Q6H **AND** NOTIFY MD and PharmD, , , Once **AND** Administer 20 grams of glucose (approximately 8 ounces  of fruit juice) every 15 minutes PRN FSBG less than 70 mg/dL, , , PRN **AND** dextrose 10 % BOLUS 25 g, 25 g, Intravenous, Q15 MIN PRN, Ken Tejeda M.D.    niCARdipine (Cardene) 25 mg in  mL Standard Infusion, 0-15 mg/hr, Intravenous, Continuous, Mauri Montes M.D., Stopped at 10/29/23 1036    labetalol (Normodyne/Trandate) injection 10 mg, 10 mg, Intravenous, Q4HRS PRN, Ken Tejeda M.D., 10 mg at 10/29/23 1236    hydrALAZINE (Apresoline) injection 20 mg, 20 mg, Intravenous, Q6HRS PRN, Ken Tejeda M.D., 20 mg at 10/27/23 1718    MD Alert...ICU Electrolyte Replacement per Pharmacy, , Other, PHARMACY TO DOSE, Shiv Gerardo M.D.    aspirin (Asa) chewable tab 81 mg, 81 mg, Enteral Tube, DAILY, Mauri Montes M.D., 81 mg at 10/29/23 0535    ticagrelor (Brilinta) tablet 90 mg, 90 mg, Enteral Tube, BID, Mauri Montes M.D., 90 mg at 10/29/23 0536    3% sodium chloride (Hypertonic Saline) 500mL infusion, 0-60 mL/hr, Intravenous, Continuous, Shiv Gerardo M.D., Last Rate: 60 mL/hr at 10/29/23 0918, 60 mL/hr at 10/29/23 0918    Physical Examination:    Vitals:    10/29/23 1116 10/29/23 1130 10/29/23 1145 10/29/23 1200   BP: 136/66 133/68 128/67 (!) 140/70   Pulse: (!) 109 90 89 (!) 108   Resp: 20 (!) 30 20 (!) 23   Temp:    36.7 °C (98 °F)   TempSrc:    Temporal   SpO2: 92%   91%   Weight:       Height:           General:   Patient is awake but nonverbal  Neck: Full range of motion  Eyes: Left gaze preference, Pupils reactive to light.  CV: RRR  Lungs: No respiratory distress  Extremities: No cyanosis, warm, no significant edema.     NEUROLOGICAL EXAM:   Mental status: She is awake, drowsy and nonverbal.  Followed briefly with closing her eyes on command.  No other command following such as squeezing finger or wiggling toes.  Speech and language: Nonverbal.  Cranial nerve exam: Pupils are equal, round and reactive to light bilaterally. Visual fields with right homonymous  hemianopsia to visual threat. Extraocular muscles: Mild left gaze preference, although able to pass midline briefly.  She has left right weakness.  Facial sensation cannot be tested.  Motor exam: She moves left upper and lower extremities spontaneously with antigravity, withdraws minimally to physical stimulation in right upper and lower extremity.  Sensory exam: She reacts to physical and tactile stimulation.  Coordination: no gross ataxia noted on exam  Plantar reflexes: Upgoing on the right  Gait: deferred    Objective Data:    Labs:  Lab Results   Component Value Date/Time    PROTHROMBTM 13.3 10/26/2023 11:45 PM    INR 1.00 10/26/2023 11:45 PM      Lab Results   Component Value Date/Time    WBC 13.6 (H) 10/29/2023 04:40 AM    RBC 4.36 10/29/2023 04:40 AM    HEMOGLOBIN 12.7 10/29/2023 04:40 AM    HEMATOCRIT 38.6 10/29/2023 04:40 AM    MCV 88.5 10/29/2023 04:40 AM    MCH 29.1 10/29/2023 04:40 AM    MCHC 32.9 10/29/2023 04:40 AM    MPV 9.6 10/29/2023 04:40 AM    NEUTSPOLYS 80.00 (H) 10/29/2023 04:40 AM    LYMPHOCYTES 7.20 (L) 10/29/2023 04:40 AM    MONOCYTES 11.70 10/29/2023 04:40 AM    EOSINOPHILS 0.10 10/29/2023 04:40 AM    BASOPHILS 0.40 10/29/2023 04:40 AM    HYPOCHROMIA 1+ 02/19/2011 04:20 PM    ANISOCYTOSIS 1+ 06/29/2010 04:10 AM      Lab Results   Component Value Date/Time    SODIUM 152 (H) 10/29/2023 08:11 AM    POTASSIUM 3.4 (L) 10/29/2023 08:11 AM    CHLORIDE 122 (H) 10/29/2023 08:11 AM    CO2 20 10/29/2023 08:11 AM    GLUCOSE 141 (H) 10/29/2023 08:11 AM    BUN 10 10/29/2023 08:11 AM    CREATININE 0.62 10/29/2023 08:11 AM    CREATININE 0.8 06/12/2006 05:00 AM    GLOMRATE 66 05/15/2023 02:32 AM      Lab Results   Component Value Date/Time    CHOLSTRLTOT 135 10/28/2023 02:30 AM    LDL 61 10/28/2023 02:30 AM    HDL 62 10/28/2023 02:30 AM    TRIGLYCERIDE 58 10/28/2023 02:30 AM       Lab Results   Component Value Date/Time    ALKPHOSPHAT 80 10/29/2023 04:40 AM    ASTSGOT 24 10/29/2023 04:40 AM    ALTSGPT 14  10/29/2023 04:40 AM    TBILIRUBIN 0.3 10/29/2023 04:40 AM        Imaging/Testing:    I interpreted and/or reviewed the patient's neuroimaging    DX-CHEST-PORTABLE (1 VIEW)   Final Result      No acute process.      IR-PICC LINE PLACEMENT W/ GUIDANCE > AGE 5   Final Result                  Ultrasound-guided PICC placement performed by qualified nursing staff as    above.          MR-BRAIN-W/O   Final Result         1. Acute left MCA distribution infarct, as above.   2. 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   3. Mild chronic ischemic white matter migration.      CT-HEAD W/O   Final Result      1.  Possible mild left frontotemporal edema and a present significantly improved      2.  Negative for hemorrhage or mass effect      3.  Right frontotemporal and encephalomalacia         DX-ABDOMEN FOR TUBE PLACEMENT   Final Result      1.  Enteric tube overlies the stomach.      CT-HEAD W/O   Final Result         1.  No acute intracranial abnormality is identified, there are nonspecific white matter changes, commonly associated with small vessel ischemic disease.  Associated mild cerebral atrophy is noted.   2.  Left maxillary sinusitis changes   3.  Atherosclerosis.         IR-THROMBO MECHANICAL ARTERY,INIT   Final Result      1.  Occluded LEFT internal carotid artery-LEFT internal carotid flow diverter.   2.  Successful mechanical thrombectomy.   3.  TICI 2c      DX-CHEST-PORTABLE (1 VIEW)   Final Result         1.  Left basilar atelectasis, no focal infiltrate   2.  Atherosclerosis      CT-CTA NECK WITH & W/O-POST PROCESSING   Final Result         1.  Occlusion from the mid left internal carotid artery through intracranial stent at the distal left ICA.      These findings were discussed with the patient's clinician, Kiet Brown, on 10/27/2023 12:37 AM.      CT-CTA HEAD WITH & W/O-POST PROCESS   Final Result         1.  Left internal carotid artery occlusion extending up to distal carotid arterial stent.   2.   Decreased density within the left anterior cerebral artery, appearance suggesting slow flow or spasm.   3.  Small caliber of the left middle cerebral artery branches which taper and are not visualized most distally, changes most compatible with diminished flow due to left internal carotid artery occlusion.   4.  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   5.  3 mm basilar artery tip aneurysm   6.  2.3 mm aneurysm inferiorly at the right M1/M2 junction      CT-CEREBRAL PERFUSION ANALYSIS   Final Result         1.  Cerebral blood flow less than 30% likely representing completed infarct = 183 mL.      2.  T Max more than 6 seconds likely representing combination of completed infarct and ischemia = 280 mL.      3.  Mismatched volume likely representing ischemic brain/penumbra = 97 mL      4.  Please note that the cerebral perfusion was performed on the limited brain tissue around the basal ganglia region. Infarct/ischemia outside the CT perfusion sections can be missed in this study.      CT-HEAD W/O   Final Result         1.  No acute intracranial abnormality is identified, there are nonspecific white matter changes, commonly associated with small vessel ischemic disease.  Associated mild cerebral atrophy is noted.   2.  Left maxillary sinusitis changes      Note: Artifacts are present limiting diagnostic sensitivity of this exam.      CT-HEAD W/O    (Results Pending)   CT-CTA CHEST PULMONARY ARTERY W/ RECONS    (Results Pending)       Assessment and Plan:  Roslyn Kilgore is a 58 y.o. right-handed female with recent ICA flow diverter stent placement on 10/19/2023 who apparently stated she is allergic to Plavix and subsequently was placed on Brilinta, was brought to emergency room for alteration of mental status and right-sided weakness and neglect.    Brain CT is suboptimal but does not reveal any evidence of ischemia.  CT angiogram of the neck revealed occlusion of mid left  internal carotid artery through intracranial stent at the distal left ICA.  CTA of the head revealed no large vessel occlusion.  CT perfusion revealed 97 mL of ischemic penumbra with 183 mL of ischemic core.  Underwent successful thrombectomy with TICI 3 result, however no significant improvement in her symptoms.  Brain MRI with acute left MCA distribution infarct mainly involving cortex and gray matter with involvement of basal ganglia and caudate head.  Goal of care discussion was held by Dr. Gerardo and her siblings home made her DNR/DNI per patient's previous wishes.     Plan:  -q2h and PRN neuro assessment. VS per nursing/unit protocol.   -Status post TICI 2c/3: maintain systolic -140     -Continue dual antiplatelet therapy with aspirin 81 mg daily and Brilinta 90 mg twice a day  -Continue hypertonic saline with a goal of sodium 150-155, current sodium 152.  -PT, OT and SLP as tolerated.  -All other medical management per primary team.   -DVT PPX: Okay to start Lovenox  -The patient is DNR/DNI.     The evaluation of the patient, and recommended management, was discussed with Shiv Gerardo M.D.        I spent a total of 40 minutes face-to-face time and non-face-to-face time reviewing medical records, neuroimaging, labs, discussing with bedside RN and ICU team and documenting in the EMR.    Please note that this dictation was created using voice recognition software. I have made every reasonable attempt to correct obvious errors, but I expect that there are errors of grammar and possibly content that I did not discover before finalizing the note.      Mary Lou Akbar MD  [facility] Services

## 2023-10-29 NOTE — PROGRESS NOTES
Patient's SPO2 84% on 10L oxymask. APRN notified. RT to bedside. NT/oral suctioning performed. SPO2 increased to 87%. Patient switched to 15L NRB.

## 2023-10-29 NOTE — PROGRESS NOTES
*Late Entry*    Patient has required increases in supplemental O2 overnight. NT/oral suctioning performed. At approximately 0200, patient's supplemental oxygen needs increased to 10L oxymask. APRN notified. APRN to bedside to see patient. Order received for chest X-Ray. X-Ray called and notified of new order.

## 2023-10-29 NOTE — ACP (ADVANCE CARE PLANNING)
Advance Care Planning Note    Discussion date:  10/29/2023  Discussion participants:  sister and brother    The patient wishes to discuss Advanced Care Planning today and the following is a brief summary of our discussion.    Patient does not have  capacity to make their own medical decisions.  Health Care Agent/Surrogate Decision Maker not documented in chart.    Documents of Advance Directives:  None    Communication of relevant diagnoses: CVA    Communication of prognosis: guarded    Communication of treatment goals/options: continue current plan and change of code status    Treatment decisions:  [place] (brother, over phone in [place]) and sister Eusebia (at bedside) family meeting, they made it clear that pt has exporessed desire for DNR/DNI in past on several occasisions, would not want even short-term trial of mechanical ventilation if needed    Code status:   DNR/DNI     Time statement:  I discussed advance care planning with the patient's family for at least 20 minutes, including diagnosis, prognosis, plan of care, risks and benefits of any therapies that could be offered, as well as alternatives including palliation and hospice, as appropriate, exclusive of evaluation and management or other separately billable procedures.    Shiv Gerardo M.D.

## 2023-10-30 ENCOUNTER — APPOINTMENT (OUTPATIENT)
Dept: RADIOLOGY | Facility: MEDICAL CENTER | Age: 58
DRG: 981 | End: 2023-10-30
Attending: NURSE PRACTITIONER
Payer: COMMERCIAL

## 2023-10-30 LAB
ALBUMIN SERPL BCP-MCNC: 3.7 G/DL (ref 3.2–4.9)
ALBUMIN/GLOB SERPL: 1.4 G/DL
ALP SERPL-CCNC: 90 U/L (ref 30–99)
ALT SERPL-CCNC: 25 U/L (ref 2–50)
ANION GAP SERPL CALC-SCNC: 10 MMOL/L (ref 7–16)
ANION GAP SERPL CALC-SCNC: 10 MMOL/L (ref 7–16)
ANION GAP SERPL CALC-SCNC: 11 MMOL/L (ref 7–16)
ANION GAP SERPL CALC-SCNC: 8 MMOL/L (ref 7–16)
AST SERPL-CCNC: 36 U/L (ref 12–45)
BASOPHILS # BLD AUTO: 0.4 % (ref 0–1.8)
BASOPHILS # BLD: 0.07 K/UL (ref 0–0.12)
BILIRUB SERPL-MCNC: 0.6 MG/DL (ref 0.1–1.5)
BUN SERPL-MCNC: 10 MG/DL (ref 8–22)
BUN SERPL-MCNC: 11 MG/DL (ref 8–22)
BUN SERPL-MCNC: 11 MG/DL (ref 8–22)
BUN SERPL-MCNC: 12 MG/DL (ref 8–22)
CALCIUM ALBUM COR SERPL-MCNC: 8.9 MG/DL (ref 8.5–10.5)
CALCIUM SERPL-MCNC: 8.3 MG/DL (ref 8.5–10.5)
CALCIUM SERPL-MCNC: 8.4 MG/DL (ref 8.5–10.5)
CALCIUM SERPL-MCNC: 8.5 MG/DL (ref 8.5–10.5)
CALCIUM SERPL-MCNC: 8.7 MG/DL (ref 8.5–10.5)
CFT BLD TEG: 4.6 MIN (ref 4.6–9.1)
CFT P HPASE BLD TEG: 4.6 MIN (ref 4.3–8.3)
CHLORIDE SERPL-SCNC: 123 MMOL/L (ref 96–112)
CHLORIDE SERPL-SCNC: 124 MMOL/L (ref 96–112)
CHLORIDE SERPL-SCNC: 125 MMOL/L (ref 96–112)
CHLORIDE SERPL-SCNC: 125 MMOL/L (ref 96–112)
CLOT ANGLE BLD TEG: 78.3 DEGREES (ref 63–78)
CLOT LYSIS 30M P MA LENFR BLD TEG: 0.9 % (ref 0–2.6)
CO2 SERPL-SCNC: 20 MMOL/L (ref 20–33)
CO2 SERPL-SCNC: 20 MMOL/L (ref 20–33)
CO2 SERPL-SCNC: 21 MMOL/L (ref 20–33)
CO2 SERPL-SCNC: 22 MMOL/L (ref 20–33)
CREAT SERPL-MCNC: 0.56 MG/DL (ref 0.5–1.4)
CREAT SERPL-MCNC: 0.6 MG/DL (ref 0.5–1.4)
CREAT SERPL-MCNC: 0.6 MG/DL (ref 0.5–1.4)
CREAT SERPL-MCNC: 0.62 MG/DL (ref 0.5–1.4)
CRP SERPL HS-MCNC: 4.25 MG/DL (ref 0–0.75)
CT.EXTRINSIC BLD ROTEM: 0.8 MIN (ref 0.8–2.1)
EOSINOPHIL # BLD AUTO: 0.02 K/UL (ref 0–0.51)
EOSINOPHIL NFR BLD: 0.1 % (ref 0–6.9)
ERYTHROCYTE [DISTWIDTH] IN BLOOD BY AUTOMATED COUNT: 45.1 FL (ref 35.9–50)
GFR SERPLBLD CREATININE-BSD FMLA CKD-EPI: 102 ML/MIN/1.73 M 2
GFR SERPLBLD CREATININE-BSD FMLA CKD-EPI: 103 ML/MIN/1.73 M 2
GFR SERPLBLD CREATININE-BSD FMLA CKD-EPI: 103 ML/MIN/1.73 M 2
GFR SERPLBLD CREATININE-BSD FMLA CKD-EPI: 105 ML/MIN/1.73 M 2
GLOBULIN SER CALC-MCNC: 2.6 G/DL (ref 1.9–3.5)
GLUCOSE BLD STRIP.AUTO-MCNC: 118 MG/DL (ref 65–99)
GLUCOSE BLD STRIP.AUTO-MCNC: 118 MG/DL (ref 65–99)
GLUCOSE BLD STRIP.AUTO-MCNC: 131 MG/DL (ref 65–99)
GLUCOSE BLD STRIP.AUTO-MCNC: 134 MG/DL (ref 65–99)
GLUCOSE SERPL-MCNC: 130 MG/DL (ref 65–99)
GLUCOSE SERPL-MCNC: 132 MG/DL (ref 65–99)
GLUCOSE SERPL-MCNC: 141 MG/DL (ref 65–99)
GLUCOSE SERPL-MCNC: 151 MG/DL (ref 65–99)
HCT VFR BLD AUTO: 40.9 % (ref 37–47)
HGB BLD-MCNC: 13.3 G/DL (ref 12–16)
IMM GRANULOCYTES # BLD AUTO: 0.21 K/UL (ref 0–0.11)
IMM GRANULOCYTES NFR BLD AUTO: 1.2 % (ref 0–0.9)
LYMPHOCYTES # BLD AUTO: 0.97 K/UL (ref 1–4.8)
LYMPHOCYTES NFR BLD: 5.7 % (ref 22–41)
MAGNESIUM SERPL-MCNC: 1.9 MG/DL (ref 1.5–2.5)
MCF BLD TEG: 65.5 MM (ref 52–69)
MCF.PLATELET INHIB BLD ROTEM: 34.2 MM (ref 15–32)
MCH RBC QN AUTO: 28.6 PG (ref 27–33)
MCHC RBC AUTO-ENTMCNC: 32.5 G/DL (ref 32.2–35.5)
MCV RBC AUTO: 88 FL (ref 81.4–97.8)
MONOCYTES # BLD AUTO: 1.58 K/UL (ref 0–0.85)
MONOCYTES NFR BLD AUTO: 9.3 % (ref 0–13.4)
NEUTROPHILS # BLD AUTO: 14.17 K/UL (ref 1.82–7.42)
NEUTROPHILS NFR BLD: 83.3 % (ref 44–72)
NRBC # BLD AUTO: 0.02 K/UL
NRBC BLD-RTO: 0.1 /100 WBC (ref 0–0.2)
PA AA BLD-ACNC: 53.9 % (ref 0–11)
PA ADP BLD-ACNC: 20.4 % (ref 0–17)
PHOSPHATE SERPL-MCNC: 2.2 MG/DL (ref 2.5–4.5)
PLATELET # BLD AUTO: 261 K/UL (ref 164–446)
PMV BLD AUTO: 9.6 FL (ref 9–12.9)
POTASSIUM SERPL-SCNC: 3 MMOL/L (ref 3.6–5.5)
POTASSIUM SERPL-SCNC: 3 MMOL/L (ref 3.6–5.5)
POTASSIUM SERPL-SCNC: 3.2 MMOL/L (ref 3.6–5.5)
POTASSIUM SERPL-SCNC: 3.3 MMOL/L (ref 3.6–5.5)
PREALB SERPL-MCNC: 10.4 MG/DL (ref 18–38)
PROT SERPL-MCNC: 6.3 G/DL (ref 6–8.2)
RBC # BLD AUTO: 4.65 M/UL (ref 4.2–5.4)
SODIUM SERPL-SCNC: 153 MMOL/L (ref 135–145)
SODIUM SERPL-SCNC: 154 MMOL/L (ref 135–145)
SODIUM SERPL-SCNC: 156 MMOL/L (ref 135–145)
SODIUM SERPL-SCNC: 156 MMOL/L (ref 135–145)
TEG ALGORITHM TGALG: ABNORMAL
WBC # BLD AUTO: 17 K/UL (ref 4.8–10.8)

## 2023-10-30 PROCEDURE — 97167 OT EVAL HIGH COMPLEX 60 MIN: CPT

## 2023-10-30 PROCEDURE — 85576 BLOOD PLATELET AGGREGATION: CPT

## 2023-10-30 PROCEDURE — A9270 NON-COVERED ITEM OR SERVICE: HCPCS | Performed by: RADIOLOGY

## 2023-10-30 PROCEDURE — 700105 HCHG RX REV CODE 258: Performed by: INTERNAL MEDICINE

## 2023-10-30 PROCEDURE — 96105 ASSESSMENT OF APHASIA: CPT

## 2023-10-30 PROCEDURE — 700111 HCHG RX REV CODE 636 W/ 250 OVERRIDE (IP): Performed by: STUDENT IN AN ORGANIZED HEALTH CARE EDUCATION/TRAINING PROGRAM

## 2023-10-30 PROCEDURE — 700105 HCHG RX REV CODE 258: Performed by: RADIOLOGY

## 2023-10-30 PROCEDURE — 31720 CLEARANCE OF AIRWAYS: CPT

## 2023-10-30 PROCEDURE — 86140 C-REACTIVE PROTEIN: CPT

## 2023-10-30 PROCEDURE — 700101 HCHG RX REV CODE 250: Performed by: INTERNAL MEDICINE

## 2023-10-30 PROCEDURE — 84100 ASSAY OF PHOSPHORUS: CPT

## 2023-10-30 PROCEDURE — 85025 COMPLETE CBC W/AUTO DIFF WBC: CPT

## 2023-10-30 PROCEDURE — 82962 GLUCOSE BLOOD TEST: CPT | Mod: 91

## 2023-10-30 PROCEDURE — 94640 AIRWAY INHALATION TREATMENT: CPT

## 2023-10-30 PROCEDURE — A9270 NON-COVERED ITEM OR SERVICE: HCPCS | Performed by: INTERNAL MEDICINE

## 2023-10-30 PROCEDURE — 700111 HCHG RX REV CODE 636 W/ 250 OVERRIDE (IP): Mod: JZ | Performed by: INTERNAL MEDICINE

## 2023-10-30 PROCEDURE — 770022 HCHG ROOM/CARE - ICU (200)

## 2023-10-30 PROCEDURE — 700111 HCHG RX REV CODE 636 W/ 250 OVERRIDE (IP): Performed by: INTERNAL MEDICINE

## 2023-10-30 PROCEDURE — 84134 ASSAY OF PREALBUMIN: CPT

## 2023-10-30 PROCEDURE — 700102 HCHG RX REV CODE 250 W/ 637 OVERRIDE(OP): Performed by: INTERNAL MEDICINE

## 2023-10-30 PROCEDURE — 80048 BASIC METABOLIC PNL TOTAL CA: CPT | Mod: 91

## 2023-10-30 PROCEDURE — 99233 SBSQ HOSP IP/OBS HIGH 50: CPT | Performed by: PSYCHIATRY & NEUROLOGY

## 2023-10-30 PROCEDURE — 80053 COMPREHEN METABOLIC PANEL: CPT

## 2023-10-30 PROCEDURE — 97163 PT EVAL HIGH COMPLEX 45 MIN: CPT

## 2023-10-30 PROCEDURE — 700111 HCHG RX REV CODE 636 W/ 250 OVERRIDE (IP): Performed by: NURSE PRACTITIONER

## 2023-10-30 PROCEDURE — 94669 MECHANICAL CHEST WALL OSCILL: CPT

## 2023-10-30 PROCEDURE — 83735 ASSAY OF MAGNESIUM: CPT

## 2023-10-30 PROCEDURE — 700102 HCHG RX REV CODE 250 W/ 637 OVERRIDE(OP): Performed by: RADIOLOGY

## 2023-10-30 PROCEDURE — 85384 FIBRINOGEN ACTIVITY: CPT

## 2023-10-30 PROCEDURE — 700101 HCHG RX REV CODE 250: Performed by: RADIOLOGY

## 2023-10-30 PROCEDURE — 99291 CRITICAL CARE FIRST HOUR: CPT | Performed by: INTERNAL MEDICINE

## 2023-10-30 PROCEDURE — 92526 ORAL FUNCTION THERAPY: CPT

## 2023-10-30 PROCEDURE — 85347 COAGULATION TIME ACTIVATED: CPT

## 2023-10-30 RX ORDER — POTASSIUM CHLORIDE 7.45 MG/ML
10 INJECTION INTRAVENOUS ONCE
Status: COMPLETED | OUTPATIENT
Start: 2023-10-30 | End: 2023-10-30

## 2023-10-30 RX ORDER — ACETYLCYSTEINE 200 MG/ML
3-5 SOLUTION ORAL; RESPIRATORY (INHALATION)
Status: DISCONTINUED | OUTPATIENT
Start: 2023-10-30 | End: 2023-11-05

## 2023-10-30 RX ORDER — LOSARTAN POTASSIUM 50 MG/1
25 TABLET ORAL
Status: DISCONTINUED | OUTPATIENT
Start: 2023-10-30 | End: 2023-10-31

## 2023-10-30 RX ORDER — AMLODIPINE BESYLATE 5 MG/1
10 TABLET ORAL
Status: DISCONTINUED | OUTPATIENT
Start: 2023-10-31 | End: 2023-11-06

## 2023-10-30 RX ORDER — POTASSIUM CHLORIDE 14.9 MG/ML
20 INJECTION INTRAVENOUS ONCE
Status: COMPLETED | OUTPATIENT
Start: 2023-10-30 | End: 2023-10-30

## 2023-10-30 RX ORDER — POTASSIUM CHLORIDE 7.45 MG/ML
10 INJECTION INTRAVENOUS
Status: COMPLETED | OUTPATIENT
Start: 2023-10-30 | End: 2023-10-31

## 2023-10-30 RX ORDER — MAGNESIUM SULFATE HEPTAHYDRATE 40 MG/ML
2 INJECTION, SOLUTION INTRAVENOUS ONCE
Status: COMPLETED | OUTPATIENT
Start: 2023-10-30 | End: 2023-10-30

## 2023-10-30 RX ORDER — AMLODIPINE BESYLATE 5 MG/1
5 TABLET ORAL ONCE
Status: COMPLETED | OUTPATIENT
Start: 2023-10-30 | End: 2023-10-30

## 2023-10-30 RX ADMIN — IPRATROPIUM BROMIDE AND ALBUTEROL SULFATE 3 ML: 2.5; .5 SOLUTION RESPIRATORY (INHALATION) at 18:56

## 2023-10-30 RX ADMIN — POTASSIUM CHLORIDE 10 MEQ: 7.46 INJECTION, SOLUTION INTRAVENOUS at 00:05

## 2023-10-30 RX ADMIN — DOCUSATE SODIUM 50 MG AND SENNOSIDES 8.6 MG 2 TABLET: 8.6; 5 TABLET, FILM COATED ORAL at 18:02

## 2023-10-30 RX ADMIN — GUAIFENESIN 200 MG: 100 SOLUTION ORAL at 10:22

## 2023-10-30 RX ADMIN — POTASSIUM CHLORIDE 10 MEQ: 7.46 INJECTION, SOLUTION INTRAVENOUS at 23:13

## 2023-10-30 RX ADMIN — SODIUM CHLORIDE 5 MG/HR: 9 INJECTION, SOLUTION INTRAVENOUS at 02:09

## 2023-10-30 RX ADMIN — LABETALOL HYDROCHLORIDE 10 MG: 5 INJECTION INTRAVENOUS at 01:07

## 2023-10-30 RX ADMIN — GUAIFENESIN 200 MG: 100 SOLUTION ORAL at 13:59

## 2023-10-30 RX ADMIN — FLUDROCORTISONE ACETATE 0.1 MG: 0.1 TABLET ORAL at 18:02

## 2023-10-30 RX ADMIN — POTASSIUM CHLORIDE 20 MEQ: 14.9 INJECTION, SOLUTION INTRAVENOUS at 15:50

## 2023-10-30 RX ADMIN — ATORVASTATIN CALCIUM 80 MG: 40 TABLET, FILM COATED ORAL at 05:25

## 2023-10-30 RX ADMIN — POTASSIUM CHLORIDE 10 MEQ: 7.46 INJECTION, SOLUTION INTRAVENOUS at 07:45

## 2023-10-30 RX ADMIN — GUAIFENESIN 200 MG: 100 SOLUTION ORAL at 22:00

## 2023-10-30 RX ADMIN — AMLODIPINE BESYLATE 5 MG: 5 TABLET ORAL at 05:25

## 2023-10-30 RX ADMIN — TICAGRELOR 90 MG: 90 TABLET ORAL at 18:02

## 2023-10-30 RX ADMIN — ACETYLCYSTEINE 4 ML: 200 SOLUTION ORAL; RESPIRATORY (INHALATION) at 15:08

## 2023-10-30 RX ADMIN — IPRATROPIUM BROMIDE AND ALBUTEROL SULFATE 3 ML: 2.5; .5 SOLUTION RESPIRATORY (INHALATION) at 15:08

## 2023-10-30 RX ADMIN — Medication 3 ML: at 07:28

## 2023-10-30 RX ADMIN — LOSARTAN POTASSIUM 25 MG: 50 TABLET, FILM COATED ORAL at 10:21

## 2023-10-30 RX ADMIN — SODIUM PHOSPHATE, MONOBASIC, MONOHYDRATE AND SODIUM PHOSPHATE, DIBASIC, ANHYDROUS 15 MMOL: 142; 276 INJECTION, SOLUTION INTRAVENOUS at 09:25

## 2023-10-30 RX ADMIN — ENOXAPARIN SODIUM 40 MG: 100 INJECTION SUBCUTANEOUS at 18:02

## 2023-10-30 RX ADMIN — GUAIFENESIN 200 MG: 100 SOLUTION ORAL at 18:02

## 2023-10-30 RX ADMIN — MAGNESIUM SULFATE HEPTAHYDRATE 2 G: 2 INJECTION, SOLUTION INTRAVENOUS at 09:10

## 2023-10-30 RX ADMIN — ASPIRIN 81 MG 81 MG: 81 TABLET ORAL at 05:25

## 2023-10-30 RX ADMIN — ACETYLCYSTEINE 4 ML: 200 SOLUTION ORAL; RESPIRATORY (INHALATION) at 11:35

## 2023-10-30 RX ADMIN — ACETYLCYSTEINE 4 ML: 200 SOLUTION ORAL; RESPIRATORY (INHALATION) at 18:55

## 2023-10-30 RX ADMIN — SODIUM CHLORIDE 2 G: 1 TABLET ORAL at 13:59

## 2023-10-30 RX ADMIN — IPRATROPIUM BROMIDE AND ALBUTEROL SULFATE 3 ML: 2.5; .5 SOLUTION RESPIRATORY (INHALATION) at 11:35

## 2023-10-30 RX ADMIN — SODIUM CHLORIDE 2 G: 1 TABLET ORAL at 05:25

## 2023-10-30 RX ADMIN — POTASSIUM CHLORIDE 10 MEQ: 7.46 INJECTION, SOLUTION INTRAVENOUS at 22:11

## 2023-10-30 RX ADMIN — AMLODIPINE BESYLATE 5 MG: 5 TABLET ORAL at 10:21

## 2023-10-30 RX ADMIN — SODIUM CHLORIDE 5 MG/HR: 9 INJECTION, SOLUTION INTRAVENOUS at 06:37

## 2023-10-30 RX ADMIN — GUAIFENESIN 200 MG: 100 SOLUTION ORAL at 02:34

## 2023-10-30 RX ADMIN — SODIUM CHLORIDE 2 G: 1 TABLET ORAL at 21:09

## 2023-10-30 RX ADMIN — SODIUM CHLORIDE 5 MG/HR: 9 INJECTION, SOLUTION INTRAVENOUS at 16:34

## 2023-10-30 RX ADMIN — SODIUM CHLORIDE 60 ML/HR: 3 INJECTION, SOLUTION INTRAVENOUS at 15:51

## 2023-10-30 RX ADMIN — GUAIFENESIN 200 MG: 100 SOLUTION ORAL at 05:26

## 2023-10-30 RX ADMIN — SODIUM CHLORIDE 5 MG/HR: 9 INJECTION, SOLUTION INTRAVENOUS at 12:26

## 2023-10-30 RX ADMIN — POTASSIUM CHLORIDE 10 MEQ: 7.46 INJECTION, SOLUTION INTRAVENOUS at 18:13

## 2023-10-30 RX ADMIN — SODIUM CHLORIDE 60 ML/HR: 3 INJECTION, SOLUTION INTRAVENOUS at 07:02

## 2023-10-30 RX ADMIN — MONTELUKAST 10 MG: 10 TABLET, FILM COATED ORAL at 05:25

## 2023-10-30 RX ADMIN — IPRATROPIUM BROMIDE AND ALBUTEROL SULFATE 3 ML: 2.5; .5 SOLUTION RESPIRATORY (INHALATION) at 07:28

## 2023-10-30 RX ADMIN — TICAGRELOR 90 MG: 90 TABLET ORAL at 06:09

## 2023-10-30 RX ADMIN — POTASSIUM CHLORIDE 20 MEQ: 14.9 INJECTION, SOLUTION INTRAVENOUS at 05:42

## 2023-10-30 RX ADMIN — FLUDROCORTISONE ACETATE 0.1 MG: 0.1 TABLET ORAL at 05:25

## 2023-10-30 RX ADMIN — POTASSIUM CHLORIDE 10 MEQ: 7.46 INJECTION, SOLUTION INTRAVENOUS at 21:08

## 2023-10-30 RX ADMIN — SODIUM CHLORIDE 5 MG/HR: 9 INJECTION, SOLUTION INTRAVENOUS at 21:43

## 2023-10-30 RX ADMIN — POTASSIUM CHLORIDE 20 MEQ: 14.9 INJECTION, SOLUTION INTRAVENOUS at 10:30

## 2023-10-30 ASSESSMENT — COGNITIVE AND FUNCTIONAL STATUS - GENERAL
SUGGESTED CMS G CODE MODIFIER DAILY ACTIVITY: CN
MOBILITY SCORE: 7
DRESSING REGULAR UPPER BODY CLOTHING: TOTAL
TOILETING: TOTAL
WALKING IN HOSPITAL ROOM: TOTAL
HELP NEEDED FOR BATHING: TOTAL
STANDING UP FROM CHAIR USING ARMS: A LOT
PERSONAL GROOMING: TOTAL
MOVING FROM LYING ON BACK TO SITTING ON SIDE OF FLAT BED: UNABLE
DAILY ACTIVITIY SCORE: 6
DRESSING REGULAR LOWER BODY CLOTHING: TOTAL
CLIMB 3 TO 5 STEPS WITH RAILING: TOTAL
SUGGESTED CMS G CODE MODIFIER MOBILITY: CM
MOVING TO AND FROM BED TO CHAIR: UNABLE
TURNING FROM BACK TO SIDE WHILE IN FLAT BAD: UNABLE
EATING MEALS: TOTAL

## 2023-10-30 ASSESSMENT — PAIN DESCRIPTION - PAIN TYPE
TYPE: ACUTE PAIN

## 2023-10-30 ASSESSMENT — GAIT ASSESSMENTS: GAIT LEVEL OF ASSIST: UNABLE TO PARTICIPATE

## 2023-10-30 ASSESSMENT — ACTIVITIES OF DAILY LIVING (ADL): TOILETING: INDEPENDENT

## 2023-10-30 ASSESSMENT — FIBROSIS 4 INDEX: FIB4 SCORE: 1.63

## 2023-10-30 NOTE — CARE PLAN
The patient is Watcher - Medium risk of patient condition declining or worsening    Shift Goals  Clinical Goals: -140, monitor Na and K  Patient Goals: rest, comfort  Family Goals: BAKARI    Progress made toward(s) clinical / shift goals:      Problem: Optimal Care of the Stroke Patient  Goal: Optimal acute care for the stroke patient  Outcome: Progressing     Problem: Discharge Planning - Stroke  Goal: Patient’s continuum of care needs will be met  Outcome: Progressing   DC needs and plan to be discussed with family.     Patient is not progressing towards the following goals:      Problem: Hemodynamic Monitoring  Goal: Patient's hemodynamics, fluid balance and neurologic status will be stable or improve  Outcome: Not Progressing   Goal for SBP < 140. On nicardipine drip with PRNs in use     Problem: Mobility - Stroke  Goal: Patient's capacity to carry out activities will improve  Outcome: Not Progressing   Patient has limited mobility due to inability to participate. PT/OT to work with patient today.

## 2023-10-30 NOTE — DISCHARGE PLANNING
Unfortunately, Roslyn does not have any benefits with [facility]. Recommend a referral to [facility] for post acute services.  [facility] will no longer follow.  Please reach out to myself with any questions.

## 2023-10-30 NOTE — PROGRESS NOTES
"Critical Care Progress Note    Date of admission  10/26/2023    Chief Complaint  58 y.o. female admitted 10/26/2023 with aphasia and right-sided body weakness    Hospital Course  Edited HPI from Dr. Tejeda note from previous date of service:  \" 58 y.o. female who presented 10/26/2023 with a stroke alert.  Patient was brought by emergency medical services to the emergency department after her roommate noticed the patient having aphasia, right-sided body weakness (upper and lower limbs) earlier today.  At initial evaluation in the emergency department she was found to be confused with altered mental status, last time known at her normal was around 10 PM on 10/26/2023.  Has remained nonverbal in the ED, blood pressure was in the 130s over 80s, glucose was within normal limits.  The patient has a past medical history of left ICA aneurysm status post stent placement with diversion of flow with subsequent treatment with Brilinta.  Per notes it seems that she was not tolerating well the Brilinta?  But was advised to continue taking it.  Diagnostic work-up in the ED with a CTA shows no flow through the left ICA for what neurology Dr. Akbar was consulted.  Given recent neurosurgical intervention she was deemed poor candidate for thrombolytics due to high risk of bleeding.  IR was consulted for emergent thrombectomy.\"    Brought in by EMS for aphasia, right-sided weakness, LKW 10 PM 10/26/2023  PMH left ICA aneurysm status post stents with diversion of flow and subsequent treatment with Brilinta.  Allergic to Plavix     CTA of the neck revealed occlusion of mid left ICA through intracranial stent at the distal left ICA with large penumbra. CTA of the head revealed no LVO     Neuro consulted, poor candidate for thrombolytics due to high risk of bleeding given recent neurosurgical intervention  Taken to [facility] suite, found to have left ICA in-stent thrombosis s/p thrombectomy with TICI 3 flow and started on low-dose Integrilin " drip.      Initial concern for noncompliance with brillinta, however TEG showed therapeutic ADP inhibition suggesting compliance    10/28: acute left MCA distribution infarct, and old right posterior frontal infarct  10/29: Rapidly worsening hypoxia overnight from 3LPm -> %, CXR unremarkable  CT head showing no changes  CTA chest no PE, complete atelectasis/collaspse of the LLL and mucous plugging of the RLL as well  10/30: d/w neurology - at this time prognosis is not clear and will likely need prolonged time to assess recovery; suggest PEG  Was able to work with PT today    Interval Problem Update  Chart review from the past 24 hours includes imaging, laboratory studies, vital signs and notes available.  Pertinent data for today's visit includes      Cardiac: ST, -1500s, TICI 3: goal -140, on nicardipine gtt @ 7.5  Pulm: Hiflow 30 l 60% weaning down as sats 99%; IPV tid  Neuro: Global aphasia, ?able to follow simple commands, somnolent but opens eyes briefly to vocal stimulus. Pupils equal 3mm sluggish symmetrical, gaze crossing midline now, good cough, clearing secretions, L 4/4 uppers/lowers and purposeful, R w/d to pain  Heme: Mild leukocytosis, unchanged  Renal/volume status: Cr < 1   ID:  AF, no abx  GI/endo: BMP glucose at goal, tube feeds going and advancing  Labs/Imaging: Na 154, goal 150-160 on florinef, HTS 3%  Lines: PICC, purewick    Review of Systems  Review of Systems   Unable to perform ROS: Medical condition      Vital Signs for last 24 hours   Temp:  [36.3 °C (97.4 °F)-36.7 °C (98.1 °F)] 36.3 °C (97.4 °F)  Pulse:  [] 102  Resp:  [19-45] 23  BP: (125-157)/(49-86) 129/71  SpO2:  [89 %-98 %] 94 %    Physical Exam   Physical Exam  Vitals and nursing note reviewed.   Constitutional:       General: She is in acute distress.      Appearance: She is ill-appearing. She is not toxic-appearing.   HENT:      Head: Normocephalic and atraumatic.      Nose: Nose normal.       Mouth/Throat:      Mouth: Mucous membranes are moist.   Eyes:      Pupils: Pupils are equal, round, and reactive to light.      Comments: gaze crossing midline now,   Cardiovascular:      Rate and Rhythm: Normal rate and regular rhythm.      Pulses: Normal pulses.      Heart sounds: Normal heart sounds. No murmur heard.     No gallop.   Pulmonary:      Effort: Pulmonary effort is normal. No respiratory distress.      Breath sounds: Normal breath sounds. No stridor. No wheezing or rales.   Abdominal:      General: There is no distension.      Tenderness: There is no abdominal tenderness. There is no guarding.   Musculoskeletal:         General: No swelling. Normal range of motion.      Right lower leg: No edema.      Left lower leg: No edema.   Lymphadenopathy:      Cervical: No cervical adenopathy.   Skin:     General: Skin is warm.      Capillary Refill: Capillary refill takes less than 2 seconds.      Findings: No rash.   Neurological:      Comments: Global aphasia, able to follow simple commands, somnolent but opens eyes briefly to vocal stimulus. Pupils equal 3mm sluggish symmetrical, gaze crossing midline now, good cough, clearing secretions, L 4/4 uppers/lowers, R 2/2 uppers/lowers.        Medications  Current Facility-Administered Medications   Medication Dose Route Frequency Provider Last Rate Last Admin    acetylcysteine (Mucomyst) 20 % solution 3-5 mL  3-5 mL Inhalation 4X/DAY (RT) Anna Marie Evans M.D.   4 mL at 10/30/23 1135    [START ON 10/31/2023] amLODIPine (Norvasc) tablet 10 mg  10 mg Enteral Tube Q DAY Anna Marie Evans M.D.        losartan (Cozaar) tablet 25 mg  25 mg Enteral Tube Q DAY Anna Marie Evans M.D.   25 mg at 10/30/23 1021    sodium chloride (Salt) tablet 2 g  2 g Enteral Tube Q8HRS Shiv Gerardo M.D.   2 g at 10/30/23 0525    guaiFENesin (Robitussin) 100 MG/5ML liquid 200 mg  10 mL Enteral Tube Q4HRS Shiv Gerardo M.D.   200 mg at 10/30/23 1022     ipratropium-albuterol (DUONEB) nebulizer solution  3 mL Nebulization 4X/DAY (RT) Shiv Gerardo M.D.   3 mL at 10/30/23 1135    K+ Scale: Goal of 4.5  1 Each Intravenous Q6HR Shiv Gerardo M.D.   1 Each at 10/30/23 0947    Pharmacy Consult: Enteral tube insertion - review meds/change route/product selection  1 Each Other PHARMACY TO DOSE Shiv Gerardo M.D.        montelukast (Singulair) tablet 10 mg  10 mg Enteral Tube DAILY Shiv Gerardo M.D.   10 mg at 10/30/23 0525    senna-docusate (Pericolace Or Senokot S) 8.6-50 MG per tablet 2 Tablet  2 Tablet Enteral Tube BID Shiv Gerardo M.D.   2 Tablet at 10/29/23 0535    And    polyethylene glycol/lytes (Miralax) PACKET 1 Packet  1 Packet Enteral Tube QDAY PRN Shiv Gerardo M.D.        And    magnesium hydroxide (Milk Of Magnesia) suspension 30 mL  30 mL Enteral Tube QDAY PRN Shiv Gerardo M.D.        And    bisacodyl (Dulcolax) suppository 10 mg  10 mg Rectal QDAY PRN Shiv Gerardo M.D.        acetaminophen (Tylenol) tablet 650 mg  650 mg Enteral Tube Q6HRS PRN Shiv Gerardo M.D.        enoxaparin (Lovenox) inj 40 mg  40 mg Subcutaneous DAILY AT 1800 Shiv Gerardo M.D.   40 mg at 10/29/23 1707    atorvastatin (Lipitor) tablet 80 mg  80 mg Enteral Tube DAILY Shiv Gerardo M.D.   80 mg at 10/30/23 0525    fludrocortisone (Florinef) tablet 0.1 mg  0.1 mg Enteral Tube BID Shiv Gerardo M.D.   0.1 mg at 10/30/23 0525    insulin regular (HumuLIN R,NovoLIN R) injection  1-6 Units Subcutaneous Q6HRS Ken Tejeda M.D.        And    dextrose 10 % BOLUS 25 g  25 g Intravenous Q15 MIN PRN eKn Tejeda M.D.        niCARdipine (Cardene) 25 mg in  mL Standard Infusion  0-15 mg/hr Intravenous Continuous Mauri Montes M.D. 50 mL/hr at 10/30/23 1226 5 mg/hr at 10/30/23 1226    labetalol (Normodyne/Trandate) injection 10 mg  10 mg Intravenous Q4HRS PRN Ken Tejeda M.D.   10 mg at 10/30/23 0107    hydrALAZINE  (Apresoline) injection 20 mg  20 mg Intravenous Q6HRS PRN Ken Tejeda M.D.   20 mg at 10/29/23 2304    MD Alert...ICU Electrolyte Replacement per Pharmacy   Other PHARMACY TO DOSE Shiv Gerardo M.D.        aspirin (Asa) chewable tab 81 mg  81 mg Enteral Tube DAILY Mauri Montes M.D.   81 mg at 10/30/23 0525    ticagrelor (Brilinta) tablet 90 mg  90 mg Enteral Tube BID Mauri Montes M.D.   90 mg at 10/30/23 0609    3% sodium chloride (Hypertonic Saline) 500mL infusion  0-60 mL/hr Intravenous Continuous Shiv Gerardo M.D. 60 mL/hr at 10/30/23 0702 60 mL/hr at 10/30/23 0702       Fluids    Intake/Output Summary (Last 24 hours) at 10/30/2023 1338  Last data filed at 10/30/2023 1200  Gross per 24 hour   Intake 4365.65 ml   Output 3500 ml   Net 865.65 ml       Laboratory          Recent Labs     10/28/23  0230 10/28/23  0800 10/29/23  0440 10/29/23  0811 10/29/23  2045 10/30/23  0330 10/30/23  0904   SODIUM 145   < > 151*   < > 152* 153* 154*   POTASSIUM 3.4*   < > 3.6   < > 3.0* 3.0* 3.3*   CHLORIDE 113*   < > 120*   < > 120* 123* 124*   CO2 20   < > 21   < > 22 22 20   BUN 9   < > 10   < > 8 10 12   CREATININE 0.63   < > 0.66   < > 0.62 0.60 0.60   MAGNESIUM 2.2  --  2.1  --   --  1.9  --    PHOSPHORUS  --   --  1.3*  --   --  2.2*  --    CALCIUM 8.5   < > 8.5   < > 8.4* 8.7 8.5    < > = values in this interval not displayed.     Recent Labs     10/29/23  0440 10/29/23  0811 10/29/23  2045 10/30/23  0330 10/30/23  0904 10/30/23  1224   ALTSGPT 14  --   --  25  --   --    ASTSGOT 24  --   --  36  --   --    ALKPHOSPHAT 80  --   --  90  --   --    TBILIRUBIN 0.3  --   --  0.6  --   --    PREALBUMIN 14.9*  --   --   --   --  10.4*   GLUCOSE 153*   < > 148* 151* 141*  --     < > = values in this interval not displayed.     Recent Labs     10/28/23  0230 10/29/23  0440 10/30/23  0330   WBC 12.4* 13.6* 17.0*   NEUTSPOLYS 76.60* 80.00* 83.30*   LYMPHOCYTES 11.00* 7.20* 5.70*   MONOCYTES 11.00 11.70  9.30   EOSINOPHILS 0.30 0.10 0.10   BASOPHILS 0.60 0.40 0.40   ASTSGOT  --  24 36   ALTSGPT  --  14 25   ALKPHOSPHAT  --  80 90   TBILIRUBIN  --  0.3 0.6     Recent Labs     10/28/23  0230 10/29/23  0440 10/30/23  0330   RBC 4.84 4.36 4.65   HEMOGLOBIN 14.0 12.7 13.3   HEMATOCRIT 42.4 38.6 40.9   PLATELETCT 325 278 261     Imaging  X-Ray:  I have personally reviewed the images and compared with prior images.  CT:    Reviewed  MRI:   Reviewed  10/28 acute left MCA distribution infarct, and old right posterior frontal infarct    Assessment/Plan    * Acute ischemic left internal carotid artery (ICA) stroke (HCC)- (present on admission)  Assessment & Plan  Recent left ICA aneurysm status post stent and flow diverter, subsequent treatment with Brilinta, allergic to Plavix  Developed in-stent thrombosis, initial concern for noncompliance with Brilinta, however TEG showed therapeutic ADP inhibition, underwent thrombectomy, integrilin bridged to ASA/Brilinta  MRI 10/28 acute left MCA distribution infarct, and old right posterior frontal infarct    SBP goal for TICI 2c/3: 100-140  ASA 81/Brillinta NGT  Na goal 150-160, 3% HTS, salt tabs, add florinef  q2H neurochecks  Atorvastatin 40 -> 80mg NGT  euglycemia <180mg/dL, TF started  Start DVT ppx with lovenox  PT/OT  SLP- ok for ice chips  IR PICC placed 10/28  Will discuss PEG with family as likely prolonged recovery    Acute hypoxemic respiratory failure (HCC)  Assessment & Plan  CTA chest no PE, complete atelectasis/collaspse of the LLL and mucous plugging of the RLL as well  Hypoxia due to shunting from  total left lower lobe atelectasis/collapse from retention of secretions  Afebrile, no antibiotics  Likely due to inability to protect airway and poor effort from catastrophic CVA  Mobilization is helping and sats improved and fio2 is weaning down  Aggressive pulmonary toileting with IPV/DuoNeb/hypertonic saline/PEP    Extrinsic asthma without complication- (present on  admission)  Assessment & Plan  duonebs prn  Continue montelukast home dose    Goals of care, counseling/discussion  Assessment & Plan  See associated advance care planning note from 10/29  In summary, due to recent respiratory decline family meeting was held with her brother over the phone [place] in [place] and her Sister Eusebia at bedside.  All are in agreement that the patient would want change of CODE STATUS to DNR/DNI.    Dyslipidemia- (present on admission)  Assessment & Plan  Lipitor 40 home dose, increase to 80 for high intensity therapy    Primary hypertension- (present on admission)  Assessment & Plan  TICI 2c/3: -140  Cardene gtt and IV PRNs  amlodipine and losartan once stabilized      VTE:  Lovenox starting  Ulcer: Not Indicated  Lines:  PICC, purewick     I have performed a physical exam and reviewed and updated ROS and Plan today (10/30/2023). In review of yesterday's note (10/29/2023), there are no changes except as documented above.     Discussed patient condition and risk of morbidity and/or mortality with RN, Therapies, Pharmacy, Code status disscussed, Charge nurse / hot rounds, Patient, and neurology    The patient remains critically ill.  Critical care time = 34 minutes in directly providing and coordinating critical care and extensive data review.  No time overlap and excludes procedures.    This note was generated using voice recognition software which has a chance of producing errors of grammar and content.  I have made every reasonable attempt to find and correct any errors, but it should be expected that some may not be found prior to finalization of this note.

## 2023-10-30 NOTE — THERAPY
Speech Language Pathology   Communication Evaluation     Patient Name: Roslyn Kilgore  AGE:  58 y.o., SEX:  female  Medical Record #: [id number]  Date of Service: 10/30/2023      History of Present Illness  59 YO female admitted 10/27 2/2 altered mental status found to have L CVA    PMHx: intracranial aneurysms, prior stoke, arterial thrombosis, essential HTN, DLD, diverticulitis. Admitted 10/19-10/20 for scheduled endovascular repair of L ICA aneurysm, history stroke, Carcinoma in situ of respiratory system, asthma, bronchitis, dental distorder    CMHx: acute ischemic L internal carotid artery stroke, DLD, extrinsic asthma w/o complication, primary HTN,     Brain MRI 10/28: Acute left MCA distribution infarct,There are also smaller foci of restricted diffusion within the left caudate head, left insular cortex, left mesial temporal lobe, and left posterior temporal lobe, Old right posterior frontal infarct again noted, Mild chronic ischemic white matter migration    MRI Brain 5/17/23: Acute right thalamic lacunar infarct, Remote bilateral thalamic infarcts. Remote right frontal infarct.      General Information  Vitals  O2 Delivery Device: Heated High Flow Nasal Cannula  Vitals Comments: 20L at 50%, RT reports plan to wean to NC later today  Level of Consciousness: Drowsy  Patient Behaviors: Flat Affect  Orientation: Disoriented x 4 (no response to orientation questions)  Follows Directives: No      Prior Living Situation & Level of Function  Housing / Facility: [address]  Lives with - Patient's Self Care Capacity: Friends  Comments: patient unreliable historian at this time, unable to provide prior living situation information, information obtained throught chart review     Communication: patient unreliable historian at this time, unable to provide information regarding prior level of function  Swallowing: patient unreliable historian at this time, unable to provide information regarding prior level of  function       Oral Mechanism Evaluation  Dentition: Poor, Scattered dentition  Facial Symmetry: Pt did not follow commands to assess  Facial Sensation: Pt did not follow commands to assess  Labial Observations: Pt did not follow commands to assess  Lingual Observations: Pt did not follow commands to assess  Motor Speech: nonverbal, unable to assess      Laryngeal Function Exam  Secretion Management: Excess secretions, suctioning required (deep oral suction, thick clear secretions in oral cavity and dry white secretions caked to lips and teeth)  Voice Quality: Pt did not follow commands to assess  Cough: Perceptually weak (1x cough elicted with thin liquid trials, cough is productive)      Subjective  patient drowsy, requires cues to maintain CASSIE, tracks to midline, L gaze preference      Communication Domain(s)  Expressive Language: Severe impairment  -Egocentric questions: does not provide egocentric information such as name  -Automatic sequences: does not participate in counting    Receptive Language: Severe impairment  -egocentric yes/no questions: no response elicited despite max cues  -1 step command following: no response elicited despite max cues    Motor speech:  Comment: no vocalizations elicited for  communication tasks, productive cough noted      Observations: flat affect, no intent to communicate demonstrated, flat affect noted, eyes track to midline with left gaze preference noted.       Clinical Impressions  Patient presents with severe global aphasia. No attempts to communicate elicited this date. Patient is not reliable to follow simple commands, answer simple yes/no questions, or communicate basic wants and needs. Patient is dependent on caregiver to anticipate all wants and needs as able. Anticipate improvement in communication ability with ongoing improvements in neuro status. Anticipate patient will benefit from discharge to an environment that can provide ongoing SLP intervention for swallow  and communication and assist for all ADLs. Will follow.      NOTE: It is not within the scope of practice of Speech-Language Pathologists to determine patient capacity. Please defer to the physician or psych to complete this assessment.       Recommendations  Supervision Needs Upons Discharge: Direct assistance with IADLs (see below) (dependent for ADLs)  IADLs:  (Dependent for all ADLs and IALDs)         SLP Treatment Plan  Treatment Plan: Dysphagia Treatment, Speech-Language Treatment  SLP Frequency: 3x Per Week  Estimated Duration: Until Therapy Goals Met      Anticipated Discharge Needs  Discharge Recommendations: Recommend post-acute placement for additional speech therapy services prior to discharge home  Therapy Recommendations Upon DC: Dysphagia Training, Comprehension Training, Expression Training, Reading Training, Writing Training, AAC Training / Development, Patient / Family / Caregiver Education      Patient / Family Goals  Patient / Family Goal #1: unable to state  Short Term Goal # 1: Pt will participate in oral feeding trials 1:1 with SLP with no s/sx of aspiration and min cues.  Goal Outcome # 1: Progressing as expected  Short Term Goal # 2: Patient will follow 1 part commands with 90% accuray with no cues  Short Term Goal # 3: Patient will name simple objects with 90% accuracy with no cues  Short Term Goal # 4: Patient will provide egocentric information with 90% accuracy with no cues      Nessa Valdes, SLP

## 2023-10-30 NOTE — DIETARY
Nutrition Services Brief Update:    Problem: Nutritional:  Goal: Nutrition support tolerated and meeting greater than 85% of estimated needs  Outcome: MET    Pt is receiving TF Promote with Fiber at goal rate 55 ml/hr. Pt is on 30 L HFNc. K 3.0 on Kscale and Phos 2.2 with NaPhos on MAR. 3%NaCl and cardene IV meds.     RD following.

## 2023-10-30 NOTE — ASSESSMENT & PLAN NOTE
CTA chest no PE, complete atelectasis/collaspse of the LLL and mucous plugging of the RLL as well  Hypoxia due to shunting from total left lower lobe atelectasis/collapse from retention of secretions  Afebrile, no antibiotics  Likely due to inability to protect airway and poor effort from catastrophic CVA  Mobilization is helping and sats improved and maintained on NC  Aggressive pulmonary toileting with IPV/DuoNeb/hypertonic saline/PEP

## 2023-10-30 NOTE — ASSESSMENT & PLAN NOTE
See associated advance care planning note from 10/29  In summary, due to recent respiratory decline family meeting was held with her brother over the phone [place] in [place] and her Sister Eusebia at bedside.  All are in agreement that the patient would want change of CODE STATUS to DNR/DNI.

## 2023-10-30 NOTE — PROGRESS NOTES
Neurology Progress Note  Neurohospitalist Service, [facility]    Referring Physician: DAISY Cole*    Chief Complaint   Patient presents with    Possible Stroke     As per EMS pt room mate, said that pt is having aphasia, right sided body weakness  Pt had history of stroke but no deficit as per EMS, pt normally can talk and walk    NIH: 15    LKW: 10 pm  Brought by Care flight with the above complaints       HPI: Refer to initial documented Neurology H&P, as detailed in the patient's chart.    Interval History 10/30: No new events overnight.    Review of systems: In addition to what is detailed in the HPI and/or updated in the interval history, all other systems reviewed and are negative.    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    FHx:  family history includes Stroke in her mother.    SHx:   reports that she quit smoking about 24 years ago. Her smoking use included cigarettes. She started smoking about 43 years ago. She has a 19.0 pack-year smoking history. She has never used smokeless tobacco. She reports that she does not currently use drugs after having used the following drugs: Marijuana and Oral. She reports that she does not drink alcohol.    Medications:    Current Facility-Administered Medications:     [COMPLETED] potassium chloride in water (Kcl) ivpb **Administer in ICU only** 20 mEq, 20 mEq, Intravenous, Once, Stopped at 10/30/23 0742 **FOLLOWED  BY** potassium chloride (Kcl) ivpb 10 mEq, 10 mEq, Intravenous, Once, JUANCHO Colón, Last Rate: 100 mL/hr at 10/30/23 0745, 10 mEq at 10/30/23 0745    sodium chloride (Salt) tablet 2 g, 2 g, Enteral Tube, Q8HRS, Shiv Gerardo M.D., 2 g at 10/30/23 0525    amLODIPine (Norvasc) tablet 5 mg, 5 mg, Enteral Tube, Q DAY, Shiv Gerardo M.D., 5 mg at 10/30/23 0525    guaiFENesin (Robitussin) 100 MG/5ML liquid 200 mg, 10 mL, Enteral Tube, Q4HRS, Shiv Gerardo M.D., 200 mg at 10/30/23 0526    sodium chloride 3% nebulizer solution 3 mL, 3 mL, Nebulization, 4X/DAY (RT), Shiv Gerardo M.D., 3 mL at 10/30/23 0728    ipratropium-albuterol (DUONEB) nebulizer solution, 3 mL, Nebulization, 4X/DAY (RT), Shiv Gerardo M.D., 3 mL at 10/30/23 0728    K+ Scale: Goal of 4.5, 1 Each, Intravenous, Q6HR, Shiv Gerardo M.D., 1 Each at 10/30/23 0443    Pharmacy Consult: Enteral tube insertion - review meds/change route/product selection, 1 Each, Other, PHARMACY TO DOSE, Shiv Gerardo M.D.    montelukast (Singulair) tablet 10 mg, 10 mg, Enteral Tube, DAILY, Shiv Gerardo M.D., 10 mg at 10/30/23 0525    senna-docusate (Pericolace Or Senokot S) 8.6-50 MG per tablet 2 Tablet, 2 Tablet, Enteral Tube, BID, 2 Tablet at 10/29/23 0535 **AND** polyethylene glycol/lytes (Miralax) PACKET 1 Packet, 1 Packet, Enteral Tube, QDAY PRN **AND** magnesium hydroxide (Milk Of Magnesia) suspension 30 mL, 30 mL, Enteral Tube, QDAY PRN **AND** bisacodyl (Dulcolax) suppository 10 mg, 10 mg, Rectal, QDAY PRN, Shiv Gerardo M.D.    acetaminophen (Tylenol) tablet 650 mg, 650 mg, Enteral Tube, Q6HRS PRN, Shiv Gerardo M.D.    enoxaparin (Lovenox) inj 40 mg, 40 mg, Subcutaneous, DAILY AT 1800, Shiv Gerardo M.D., 40 mg at 10/29/23 1707    atorvastatin (Lipitor) tablet 80 mg, 80 mg, Enteral Tube, DAILY, Shiv Gerardo M.D., 80 mg at 10/30/23 0525    fludrocortisone (Florinef) tablet 0.1 mg, 0.1 mg, Enteral Tube, BID,  Shiv Gerardo M.D., 0.1 mg at 10/30/23 0525    insulin regular (HumuLIN R,NovoLIN R) injection, 1-6 Units, Subcutaneous, Q6HRS **AND** POC blood glucose manual result, , , Q6H **AND** NOTIFY MD and PharmD, , , Once **AND** Administer 20 grams of glucose (approximately 8 ounces of fruit juice) every 15 minutes PRN FSBG less than 70 mg/dL, , , PRN **AND** dextrose 10 % BOLUS 25 g, 25 g, Intravenous, Q15 MIN PRN, Ken Tejeda M.D.    niCARdipine (Cardene) 25 mg in  mL Standard Infusion, 0-15 mg/hr, Intravenous, Continuous, Mauri Montes M.D., Last Rate: 50 mL/hr at 10/30/23 0637, 5 mg/hr at 10/30/23 0637    labetalol (Normodyne/Trandate) injection 10 mg, 10 mg, Intravenous, Q4HRS PRN, Ken Tejeda M.D., 10 mg at 10/30/23 0107    hydrALAZINE (Apresoline) injection 20 mg, 20 mg, Intravenous, Q6HRS PRN, Ken Tejeda M.D., 20 mg at 10/29/23 2304    MD Alert...ICU Electrolyte Replacement per Pharmacy, , Other, PHARMACY TO DOSE, Shiv Gerardo M.D.    aspirin (Asa) chewable tab 81 mg, 81 mg, Enteral Tube, DAILY, Mauri Montes M.D., 81 mg at 10/30/23 0525    ticagrelor (Brilinta) tablet 90 mg, 90 mg, Enteral Tube, BID, Mauri Montes M.D., 90 mg at 10/30/23 0609    3% sodium chloride (Hypertonic Saline) 500mL infusion, 0-60 mL/hr, Intravenous, Continuous, Shiv Gerardo M.D., Last Rate: 60 mL/hr at 10/30/23 0702, 60 mL/hr at 10/30/23 0702    Physical Examination:     Vitals:    10/30/23 0530 10/30/23 0545 10/30/23 0600 10/30/23 0745   BP: 130/60 132/64 (!) 142/69    Pulse: (!) 107 (!) 101 94 (!) 112   Resp: (!) 24 (!) 25 (!) 22 (!) 23   Temp:   36.4 °C (97.6 °F)    TempSrc:   Temporal    SpO2:   97% 94%   Weight:       Height:               NEUROLOGICAL EXAM:     Patient is awake but nonverbal.  Not following any commands.  Tracks me in the room.  Cranial nerves shows the pupils are equal reactive.  Slight left gaze preference.  Face appears grossly intact.  Does not follow  for formal testing.  Please at the [place] less still on the right compared to left.  Motor examination she moves the left and lower extremity spontaneously antigravity.  Minimal movement to the right arm and leg with noxious stimuli.  Upgoing toe on the right.      Objective Data:    Labs:  Lab Results   Component Value Date/Time    PROTHROMBTM 13.3 10/26/2023 11:45 PM    INR 1.00 10/26/2023 11:45 PM      Lab Results   Component Value Date/Time    WBC 17.0 (H) 10/30/2023 03:30 AM    RBC 4.65 10/30/2023 03:30 AM    HEMOGLOBIN 13.3 10/30/2023 03:30 AM    HEMATOCRIT 40.9 10/30/2023 03:30 AM    MCV 88.0 10/30/2023 03:30 AM    MCH 28.6 10/30/2023 03:30 AM    MCHC 32.5 10/30/2023 03:30 AM    MPV 9.6 10/30/2023 03:30 AM    NEUTSPOLYS 83.30 (H) 10/30/2023 03:30 AM    LYMPHOCYTES 5.70 (L) 10/30/2023 03:30 AM    MONOCYTES 9.30 10/30/2023 03:30 AM    EOSINOPHILS 0.10 10/30/2023 03:30 AM    BASOPHILS 0.40 10/30/2023 03:30 AM    HYPOCHROMIA 1+ 02/19/2011 04:20 PM    ANISOCYTOSIS 1+ 06/29/2010 04:10 AM      Lab Results   Component Value Date/Time    SODIUM 153 (H) 10/30/2023 03:30 AM    POTASSIUM 3.0 (L) 10/30/2023 03:30 AM    CHLORIDE 123 (H) 10/30/2023 03:30 AM    CO2 22 10/30/2023 03:30 AM    GLUCOSE 151 (H) 10/30/2023 03:30 AM    BUN 10 10/30/2023 03:30 AM    CREATININE 0.60 10/30/2023 03:30 AM    CREATININE 0.8 06/12/2006 05:00 AM    GLOMRATE 66 05/15/2023 02:32 AM      Lab Results   Component Value Date/Time    CHOLSTRLTOT 135 10/28/2023 02:30 AM    LDL 61 10/28/2023 02:30 AM    HDL 62 10/28/2023 02:30 AM    TRIGLYCERIDE 58 10/28/2023 02:30 AM       Lab Results   Component Value Date/Time    ALKPHOSPHAT 90 10/30/2023 03:30 AM    ASTSGOT 36 10/30/2023 03:30 AM    ALTSGPT 25 10/30/2023 03:30 AM    TBILIRUBIN 0.6 10/30/2023 03:30 AM        Imaging/Testing:  DX-ABDOMEN FOR TUBE PLACEMENT   Final Result         1.  Nonspecific bowel gas pattern in the upper abdomen.   2.  Nasogastric tube tip terminates overlying the expected  location of the gastric body.   3.  Hazy left lower lobe infiltrates and trace left pleural effusion      CT-CTA CHEST PULMONARY ARTERY W/ RECONS   Final Result         1.  No evidence of pulmonary embolism.   2.  Left lower lobe atelectasis/collapse.   3.  Mucous plugging of the right lower lobe bronchi and some mild dependent atelectasis in the right lower lobe.   4.  Small hiatal hernia.      Fleischner Society pulmonary nodule recommendations:   Not applicable      CT-HEAD W/O   Final Result      Evolving acute large left cerebral infarct.      No acute hemorrhage.      Chronic ischemic changes.      DX-CHEST-PORTABLE (1 VIEW)   Final Result      No acute process.      IR-PICC LINE PLACEMENT W/ GUIDANCE > AGE 5   Final Result                  Ultrasound-guided PICC placement performed by qualified nursing staff as    above.          MR-BRAIN-W/O   Final Result         1. Acute left MCA distribution infarct, as above.   2. 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   3. Mild chronic ischemic white matter migration.      CT-HEAD W/O   Final Result      1.  Possible mild left frontotemporal edema and a present significantly improved      2.  Negative for hemorrhage or mass effect      3.  Right frontotemporal and encephalomalacia         DX-ABDOMEN FOR TUBE PLACEMENT   Final Result      1.  Enteric tube overlies the stomach.      CT-HEAD W/O   Final Result         1.  No acute intracranial abnormality is identified, there are nonspecific white matter changes, commonly associated with small vessel ischemic disease.  Associated mild cerebral atrophy is noted.   2.  Left maxillary sinusitis changes   3.  Atherosclerosis.         IR-THROMBO MECHANICAL ARTERY,INIT   Final Result      1.  Occluded LEFT internal carotid artery-LEFT internal carotid flow diverter.   2.  Successful mechanical thrombectomy.   3.  TICI 2c      DX-CHEST-PORTABLE (1 VIEW)   Final Result         1.  Left basilar atelectasis, no focal  infiltrate   2.  Atherosclerosis      CT-CTA NECK WITH & W/O-POST PROCESSING   Final Result         1.  Occlusion from the mid left internal carotid artery through intracranial stent at the distal left ICA.      These findings were discussed with the patient's clinician, Kiet Brown, on 10/27/2023 12:37 AM.      CT-CTA HEAD WITH & W/O-POST PROCESS   Final Result         1.  Left internal carotid artery occlusion extending up to distal carotid arterial stent.   2.  Decreased density within the left anterior cerebral artery, appearance suggesting slow flow or spasm.   3.  Small caliber of the left middle cerebral artery branches which taper and are not visualized most distally, changes most compatible with diminished flow due to left internal carotid artery occlusion.   4.  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   5.  3 mm basilar artery tip aneurysm   6.  2.3 mm aneurysm inferiorly at the right M1/M2 junction      CT-CEREBRAL PERFUSION ANALYSIS   Final Result         1.  Cerebral blood flow less than 30% likely representing completed infarct = 183 mL.      2.  T Max more than 6 seconds likely representing combination of completed infarct and ischemia = 280 mL.      3.  Mismatched volume likely representing ischemic brain/penumbra = 97 mL      4.  Please note that the cerebral perfusion was performed on the limited brain tissue around the basal ganglia region. Infarct/ischemia outside the CT perfusion sections can be missed in this study.      CT-HEAD W/O   Final Result         1.  No acute intracranial abnormality is identified, there are nonspecific white matter changes, commonly associated with small vessel ischemic disease.  Associated mild cerebral atrophy is noted.   2.  Left maxillary sinusitis changes      Note: Artifacts are present limiting diagnostic sensitivity of this exam.            Assessment and Plan:    58-year-old female recently had a right ICA aneurysm  intervene with a flow diverter on 19 October.  Few days later patient presented with right-sided weakness and global aphasic.  Found to have a occluded right ICA under went thrombectomy with a TICI score of 3.  MRI brain shows acute infarct over the left MCA territory however mostly in the cortical region.  Unfortunately appears to be in eloquent territory.  Low risk of edema forming given the amount of infarct.  Today's day #3 post thrombectomy.  For now I recommend continuing supportive therapies.  Given the location infarct burden I think there is a potential the patient may improved however this will take weeks to months with neuro rehab.    Plan:  1.  Continue every 2 hours neurochecks  2.  Maintain the blood pressure between 100-140 systolic.  3.  Can continue with hypertonic therapy for the next 2 to 3 days.  After which start weaning off.  4.  Continue with DAPT therapy with aspirin and Brilinta.  5.  PT/OT/speech therapy      Spent over 56 minutes reviewing the CTA, MRI, notes, labs and going over the care plan with the primary physician.        This chart was partially generated using voice recognition technology and sound alike word replacement may be present, best efforts were made to make the chart accurate.    Paramjit Childers MD  Board Certified Neurology, ABPN  (t) [phone]

## 2023-10-30 NOTE — THERAPY
Physical Therapy   Initial Evaluation     Patient Name: Roslyn Kilgore  Age:  58 y.o., Sex:  female  Medical Record #: [id number]  Today's Date: 10/30/2023     Precautions  Precautions: Fall Risk;Swallow Precautions;Nasogastric Tube    Assessment  Patient is 58 y.o. female with admitted for possible stroke with R sided weakness and aphasia; found to have Acute L MCA infact on MRI. PMH of HTN, HLD, history of RUE arterial thrombosis, recent left ICA aneurysm s/p stent diverter placement on 10/19 on brilinta and prior arrhythmias and CVA (R posterior frontal). Pt underwent L ICA stent thrombosis.     Pt presents to physical therapy with impairments in functional transfers, strength (R > L), activity tolerance and balance which impact pt's ability to complete mobility at PLOF baseline. Pt non verbal throughout session, poor command following but initiate LE movement to cue (x2 trials) only AFTER PT demonstrated activity first and guided pt's LE through movement. Able to stand at EOB without overt LOB or R knee buckling despite no volitional movement observed through R side. Pt will benefit from acute PT interventions to address impairments noted below.     Plan    Physical Therapy Initial Treatment Plan   Treatment Plan : Bed Mobility, Equipment, Gait Training, Neuro Re-Education / Balance, Self Care / Home Evaluation, Stair Training, Therapeutic Activities, Therapeutic Exercise  Treatment Frequency: 4 Times per Week  Duration: Until Therapy Goals Met       Discharge Recommendations: Recommend post-acute placement for additional physical therapy services prior to discharge home        10/30/23 6642   Precautions   Precautions Fall Risk;Swallow Precautions;Nasogastric Tube   Vitals   O2 Delivery Device Heated High Flow Nasal Cannula   Pain 0 - 10 Group   Therapist Pain Assessment During Activity;Nurse Notified  (no observation of pain during therapy session)   Prior Living Situation   Housing / Facility [facility]    Steps Into Home 2  (-3)   Steps In Home 0   Equipment Owned None   Lives with - Patient's Self Care Capacity Friends   Comments From [email or web address]. NELSY provided home set up / PLOF hx. Reports her roommate is very supportive and able/ willing to assist as needed. 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   Strength Lower Body   Lower Body Strength  X   Comments L LE grossly asssessed at 3/5, while no volitional movement noted of R LE   Sensation Lower Body   Lower Extremity Sensation   Not Tested   Comments limited assessment ability as pt non verbal and no consistently following for yes/no   Other Treatments   Other Treatments Provided discussion with NELSY regarding how she can suppport pt via PROM to B LE and allowing pt time to process commands   Balance Assessment   Sitting Balance (Static) Fair   Sitting Balance (Dynamic) Fair   Standing Balance (Static) Fair -   Standing Balance (Dynamic) Poor   Weight Shift Sitting Fair   Weight Shift Standing Poor   Comments w/ HHA   Bed Mobility    Supine to Sit Maximal Assist   Sit to Supine Moderate Assist   Scooting Moderate Assist   Gait Analysis   Gait Level Of Assist Unable to Participate   Functional Mobility   Sit to Stand Moderate Assist   Comments able to maintain balance once standing, no overt LOB or R knee buckling. Unable to sequence side stepping or transfers at this time   ICU  Target Mobility Level   ICU Mobility - Targeted Level Level 3A   How much difficulty does the patient currently have...   Turning over in bed (including adjusting bedclothes, sheets and blankets)? 1   Sitting down on and standing up from a chair with arms (e.g., wheelchair, bedside commode, etc.) 1   Moving from lying on back to sitting on the side of the bed? 1   How much help from another person does the patient currently need...   Moving to and from a bed to a chair (including a wheelchair)? 2   Need to walk in a hospital room? 1   Climbing 3-5 steps with a railing? 1   6 clicks Mobility Score 7   Activity Tolerance   Sitting Edge of Bed 10 min   Standing 2 min   Short Term Goals    Short Term Goal # 1 pt will perform supine <> sit without bed features and Min A in 6 visits   Short Term Goal # 2 pt will perform sit <> stand and functional transfers with LRAD and Min A to improve moblity independence in 6 visits   Short Term Goal # 3 pt will ambulate > 25 ft with LRAD and Mod A to improve function in 6 visits   Education Group   Education Provided Role of Physical Therapist   Role of Physical Therapist Patient Response Patient;Acceptance;Explanation;Verbal Demonstration   Physical Therapy Initial Treatment Plan    Treatment Plan  Bed Mobility;Equipment;Gait Training;Neuro Re-Education / Balance;Self Care / Home Evaluation;Stair Training;Therapeutic Activities;Therapeutic Exercise   Treatment Frequency 4 Times per Week   Duration Until Therapy Goals Met   Problem List    Problems Impaired Bed Mobility;Impaired Transfers;Impaired Ambulation;Functional Strength Deficit;Impaired Balance;Decreased Activity Tolerance;Motor Planning / Sequencing   Anticipated Discharge Equipment and Recommendations   Discharge Recommendations Recommend post-acute placement for additional physical therapy services prior to discharge home

## 2023-10-30 NOTE — CARE PLAN
The patient is Watcher - Medium risk of patient condition declining or worsening    Shift Goals  Clinical Goals: -140, stable Q2 neuros  Patient Goals: BAKARI  Family Goals: BAKARI, none present    Progress made toward(s) clinical / shift goals:      Patient updated regarding her plan of care, and her condition. Evidence of learning not noted at this time. Patient's neuro status remains stable at this time. PRN anti-HTN medications administered per MAR to manage SBP, and Cardene gtt initiated. Cardene gtt titrated as needed. Patient's oxygen weaned as able.     Problem: Pain - Standard  Goal: Alleviation of pain or a reduction in pain to the patient’s comfort goal  Outcome: Progressing     Problem: Knowledge Deficit - Stroke Education  Goal: Patient's knowledge of stroke and risk factors will improve  Outcome: Progressing     Problem: Neuro Status  Goal: Neuro status will remain stable or improve  Outcome: Progressing     Problem: Hemodynamic Monitoring  Goal: Patient's hemodynamics, fluid balance and neurologic status will be stable or improve  Outcome: Progressing     Problem: Safety - Medical Restraint  Goal: Remains free of injury from restraints (Restraint for Interference with Medical Device)  Outcome: Progressing       Patient is not progressing towards the following goals:    Problem: Safety - Medical Restraint  Goal: Free from restraint(s) (Restraint for Interference with Medical Device)  Outcome: Not Progressing

## 2023-10-30 NOTE — THERAPY
Occupational Therapy   Initial Evaluation     Patient Name: Roslyn Kilgore  Age:  58 y.o., Sex:  female  Medical Record #: [id number]  Today's Date: 10/30/2023     Precautions  Precautions: Fall Risk, Swallow Precautions, Nasogastric Tube    Assessment    Patient is 58 y.o. female admitted for possible stroke with R sided weakness and aphasia; found to have Acute L MCA infact on MRI. PMH of HTN, HLD, history of RUE arterial thrombosis, recent left ICA aneurysm s/p stent diverter placement on 10/19 on brilinta and prior arrhythmias and CVA (R posterior frontal). Pt underwent L ICA stent thrombosis. Pt unable to provide PLOF/history due to current cognitive status, pts daughter able to provide that patient lives in a [facility] with roommates (one able to assist at discharge). Pt required maxA for bed mobility and lower body ADLs, modA for STS transfer no buckling noted. Pt would benefit from continued skilled therapy while admitted as well as recommend post-acute placement.       Plan    Occupational Therapy Initial Treatment Plan   Treatment Interventions: (P) Self Care / Activities of Daily Living, Adaptive Equipment, Manual Therapy Techniques, Neuro Re-Education / Balance, Therapeutic Exercises, Therapeutic Activity  Treatment Frequency: (P) 3 Times per Week  Duration: (P) Until Therapy Goals Met    DC Equipment Recommendations: (P) Unable to determine at this time  Discharge Recommendations: (P) Recommend post-acute placement for additional occupational therapy services prior to discharge home     Objective       10/30/23 0924   Prior Living Situation   Prior Services None   Housing / Facility 1 Story House   Steps Into Home 2   Steps In Home 0   Bathroom Set up Walk In Shower;Bathtub / Shower Combination   Equipment Owned None   Lives with - Patient's Self Care Capacity Friends   Prior Level of ADL Function   Self Feeding Independent   Grooming / Hygiene Independent   Bathing Independent   Dressing Independent    Toileting Independent   Comments per dtr   Prior Level of IADL Function   Medication Management Independent   Laundry Independent   Kitchen Mobility Independent   Finances Independent   Home Management Independent   Shopping Independent   Prior Level Of Mobility Independent Without Device in Community   Driving / Transportation Driving Independent   Occupation (Pre-Hospital Vocational) Employed Full Time   Precautions   Precautions Fall Risk;Swallow Precautions   Pain 0 - 10 Group   Therapist Pain Assessment Post Activity Pain Same as Prior to Activity;Nurse Notified   Cognition    Cognition / Consciousness X   Speech/ Communication Unable to Communicate;Word Finding Impairment   Level of Consciousness Alert   Ability To Follow Commands 1 Step  (50% only on L side)   Attention Impaired   Initiation Impaired   Active ROM Upper Body   Active ROM Upper Body  X   Dominant Hand Right   Flaccid Upper Extremity Left Upper Extremity Flaccid   Strength Upper Body   Upper Body Strength  X   Comments RUE WFL, LUE no purposeful movement   Sensation Upper Body   Upper Extremity Sensation  Not Tested   Upper Body Muscle Tone   Upper Body Muscle Tone  X   Lt Upper Extremity Muscle Tone Hypotonic   Neurological Concerns   Neurological Concerns Yes   Lt Upper Extremity Functional Use Impaired   Balance Assessment   Sitting Balance (Static) Fair   Sitting Balance (Dynamic) Fair   Standing Balance (Static) Fair -   Standing Balance (Dynamic) Poor   Weight Shift Sitting Fair   Weight Shift Standing Poor   Comments w/ HHA   Bed Mobility    Supine to Sit Maximal Assist   Sit to Supine Moderate Assist   Scooting Moderate Assist   ADL Assessment   Grooming Maximal Assist;Seated   Upper Body Dressing Total Assist   Lower Body Dressing Total Assist   How much help from another person does the patient currently need...   Putting on and taking off regular lower body clothing? 1   Bathing (including washing, rinsing, and drying)? 1    Toileting, which includes using a toilet, bedpan, or urinal? 1   Putting on and taking off regular upper body clothing? 1   Taking care of personal grooming such as brushing teeth? 1   Eating meals? 1   6 Clicks Daily Activity Score 6   mRS Prior to admission   Prior to admission mRS 0   Modified [place] (mRS)   Modified Raul Score 4   Functional Mobility   Sit to Stand Moderate Assist   Mobility bed mobility, STS at EOB, returned to supine   ICU Target Mobility Level   ICU Mobility - Targeted Level Level 3A   Visual Perception   Visual Perception  X   Neglect Severe Right    Activity Tolerance   Sitting Edge of Bed 10 min   Standing 2 min   Short Term Goals   Short Term Goal # 1 Pt will complete ADL transfers with modA   Short Term Goal # 2 Pt will complete LB dressing with modA   Short Term Goal # 3 Pt will complete toileting with modA   Education Group   Education Provided Role of Occupational Therapist   Role of Occupational Therapist Patient Response Patient;Family;Acceptance;Explanation   Occupational Therapy Initial Treatment Plan    Treatment Interventions Self Care / Activities of Daily Living;Adaptive Equipment;Manual Therapy Techniques;Neuro Re-Education / Balance;Therapeutic Exercises;Therapeutic Activity   Treatment Frequency 3 Times per Week   Duration Until Therapy Goals Met   Problem List   Problem List Decreased Active Daily Living Skills;Decreased Homemaking Skills;Decreased Upper Extremity Strength Right;Decreased Upper Extremity AROM Right;Decreased Functional Mobility;Decreased Activity Tolerance;Impaired Cognitive Function;Safety Awareness Deficits / Cognition;Impaired Postural Control / Balance   Anticipated Discharge Equipment and Recommendations   DC Equipment Recommendations Unable to determine at this time   Discharge Recommendations Recommend post-acute placement for additional occupational therapy services prior to discharge home   Interdisciplinary Plan of Care Collaboration   IDT  Collaboration with  Nursing   Patient Position at End of Therapy In Bed;Bed Alarm On;Wrist Restraints Applied;Call Light within Reach;Tray Table within Reach;Phone within Reach   Collaboration Comments RN updated

## 2023-10-30 NOTE — THERAPY
Speech Language Pathology   Daily Treatment     Patient Name: Roslyn Kilgore  AGE:  58 y.o., SEX:  female  Medical Record #: [id number]  Date of Service: 10/30/2023      Precautions:  Precautions: Fall Risk, Swallow Precautions, Nasogastric Tube     Subjective  Patient upright in bed, RN present, RT reports doing well on high flow NC and plan to wean as able to nasal cannula later today.       Assessment  Trials Administered: thick, clear oral secretions suctioned prior to administration of trials. Administered small 1/4 tsp trials thin liquids, ice chips rubbed on lips    Oral Phase: delayed response to oral stimulation, opens mouth to accept small sips water from tsp, oral prep and transit appears delayed with reduced coordination.     Pharyngeal Phase: suspect swallow to be weak and delayed, delayed productive cough elicited after trial.    Clinical Impressions  Patient presents with high risk for aspiration due to severe swallow impairment in setting of acute neurologic injury with medical history significant for previous bilateral neurologic infarcts. Acute respiratory compromise and impaired secretion management further elevates aspiration risk. Recommend patient continue NPO with frequent oral care. Anticipate improvements in swallow with ongoing improvements in neurologic and respiratory status. Plan for instrumental evaluation as respiratory status and ability to participate in evaluation improve with evaluation likely being completed in coming days.      Recommendations  Treatment Completed: Dysphagia Treatment, Speech-Language Treatment       Dysphagia Treatment  Diet Consistency: NPO with frequent oral care, allow moist oral swabs  Instrumentation: FEES  Medication: Non Oral  Oral Care: Q4h      Cognitive Treatment  Supervision Needs Upons Discharge: Direct assistance with IADLs (see below)  IADLs:  (dependent for all ADLs and IADLs)         SLP Treatment Plan  Treatment Plan: Dysphagia Treatment,  Speech-Language Treatment  SLP Frequency: 3x Per Week  Estimated Duration: Until Therapy Goals Met      Anticipated Discharge Needs  Discharge Recommendations: Recommend post-acute placement for additional speech therapy services prior to discharge home  Therapy Recommendations Upon DC: Dysphagia Training, Comprehension Training, Expression Training, Reading Training, Writing Training, AAC Training / Development, Patient / Family / Caregiver Education      Patient / Family Goals  Patient / Family Goal #1: unable to state  Short Term Goals  Short Term Goal # 1: Pt will participate in oral feeding trials 1:1 with SLP with no s/sx of aspiration and min cues.  Goal Outcome # 1: Progressing as expected  Short Term Goal # 2: Patient will follow 1 part commands with 90% accuray with no cues  Short Term Goal # 3: Patient will name simple objects with 90% accuracy with no cues  Short Term Goal # 4: Patient will provide egocentric information with 90% accuracy with no cues      Nessa Valdes, SLP

## 2023-10-30 NOTE — PROGRESS NOTES
Radiology Progress Note   Author: JUANCHO Johnston Date & Time created: 10/30/2023  4:14 PM   Date of admission  10/26/2023  Note to reader: this note follows the APSO format rather than the historical SOAP format. Assessment and plan located at the top of the note for ease of use.    Chief Complaint  58 y.o. 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 Montes on 10/19/23 now on Brilinta returned to the ED 10/26/23 with aphasia and right-sided weakness.  CT head showed left internal carotid artery occlusion extending to distal carotid flow diverter.  Patient underwent a left ICA stent thrombectomy with TICI 3 recanalization with IR Dr. Montes on 10/27/23.  It is unclear whether patient missed any of her Brilinta prior to the flow diverter occlusion.    Interval history:  10/30/23 - patient was seen with DINH Damon.  MRI shows acute left MCA territory infarct.  Patient awake but nonresponsive.  No movement of right upper and lower extremities..       Assessment/Plan     Principal Problem:    Acute ischemic left internal carotid artery (ICA) stroke (HCC)  Active Problems:    Primary hypertension    Dyslipidemia    Goals of care, counseling/discussion    Extrinsic asthma without complication    Acute hypoxemic respiratory failure (HCC)      Plan IR  - Continue Brilinta 90 mg twice daily and aspirin 81 mg  daily.  - Q 2 hour neuro checks per neurology recommendation.  - Maintain SBP between 100-140.     -Thank you for allowing Interventional Radiology team to participate in the patients care, if any additional care or requests are needed in the future please do not hesitate call or place IR order   [phone]           Review of Systems  Physical Exam   Review of Systems   Reason unable to perform ROS: Unresponsive.      Vitals:    10/30/23 1508   BP:    Pulse: (!) 108   Resp: (!) 22   Temp:    SpO2: 95%        Physical Exam  Constitutional:       General: She is not in acute distress.  Cardiovascular:      Rate and Rhythm: Normal rate.      Pulses: Normal pulses.   Pulmonary:      Effort: Pulmonary effort is normal.   Abdominal:      Palpations: Abdomen is soft.   Skin:     General: Skin is warm and dry.      Comments: Right groin access site soft, nontender, dressing CDI   Neurological:      Motor: Weakness present.      Comments: awake, nonverbal, opens eyes when name is called  PERRLA; Mild left gaze preference  Moves left upper and lower extremities spontaneously   withdraws minimally to noxious stimuli in right upper and lower extremity.              Labs    Recent Labs     10/28/23  0230 10/29/23  0440 10/30/23  0330   WBC 12.4* 13.6* 17.0*   RBC 4.84 4.36 4.65   HEMOGLOBIN 14.0 12.7 13.3   HEMATOCRIT 42.4 38.6 40.9   MCV 87.6 88.5 88.0   MCH 28.9 29.1 28.6   MCHC 33.0 32.9 32.5   RDW 42.9 44.8 45.1   PLATELETCT 325 278 261   MPV 9.5 9.6 9.6     Recent Labs     10/30/23  0330 10/30/23  0904 10/30/23  1345   SODIUM 153* 154* 156*   POTASSIUM 3.0* 3.3* 3.0*   CHLORIDE 123* 124* 125*   CO2 22 20 20   GLUCOSE 151* 141* 132*   BUN 10 12 11   CREATININE 0.60 0.60 0.62   CALCIUM 8.7 8.5 8.3*     Recent Labs     10/29/23  0440 10/29/23  0811 10/30/23  0330 10/30/23  0904 10/30/23  1345   ALBUMIN 3.3  --  3.7  --   --    TBILIRUBIN 0.3  --  0.6  --   --    ALKPHOSPHAT 80  --  90  --   --    TOTPROTEIN 6.1  --  6.3  --    --    ALTSGPT 14  --  25  --   --    ASTSGOT 24  --  36  --   --    CREATININE 0.66   < > 0.60 0.60 0.62    < > = values in this interval not displayed.     DX-ABDOMEN FOR TUBE PLACEMENT   Final Result         1.  Nonspecific bowel gas pattern in the upper abdomen.   2.  Nasogastric tube tip terminates overlying the expected location of the gastric body.   3.  Hazy left lower lobe infiltrates and trace left pleural effusion      CT-CTA CHEST PULMONARY ARTERY W/ RECONS   Final Result         1.  No evidence of pulmonary embolism.   2.  Left lower lobe atelectasis/collapse.   3.  Mucous plugging of the right lower lobe bronchi and some mild dependent atelectasis in the right lower lobe.   4.  Small hiatal hernia.      Fleischner Society pulmonary nodule recommendations:   Not applicable      CT-HEAD W/O   Final Result      Evolving acute large left cerebral infarct.      No acute hemorrhage.      Chronic ischemic changes.      DX-CHEST-PORTABLE (1 VIEW)   Final Result      No acute process.      IR-PICC LINE PLACEMENT W/ GUIDANCE > AGE 5   Final Result                  Ultrasound-guided PICC placement performed by qualified nursing staff as    above.          MR-BRAIN-W/O   Final Result         1. Acute left MCA distribution infarct, as above.   2. 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   3. Mild chronic ischemic white matter migration.      CT-HEAD W/O   Final Result      1.  Possible mild left frontotemporal edema and a present significantly improved      2.  Negative for hemorrhage or mass effect      3.  Right frontotemporal and encephalomalacia         DX-ABDOMEN FOR TUBE PLACEMENT   Final Result      1.  Enteric tube overlies the stomach.      CT-HEAD W/O   Final Result         1.  No acute intracranial abnormality is identified, there are nonspecific white matter changes, commonly associated with small vessel ischemic disease.  Associated mild cerebral atrophy is noted.   2.  Left maxillary sinusitis  changes   3.  Atherosclerosis.         IR-THROMBO MECHANICAL ARTERY,INIT   Final Result      1.  Occluded LEFT internal carotid artery-LEFT internal carotid flow diverter.   2.  Successful mechanical thrombectomy.   3.  TICI 2c      DX-CHEST-PORTABLE (1 VIEW)   Final Result         1.  Left basilar atelectasis, no focal infiltrate   2.  Atherosclerosis      CT-CTA NECK WITH & W/O-POST PROCESSING   Final Result         1.  Occlusion from the mid left internal carotid artery through intracranial stent at the distal left ICA.      These findings were discussed with the patient's clinician, Kiet Brown, on 10/27/2023 12:37 AM.      CT-CTA HEAD WITH & W/O-POST PROCESS   Final Result         1.  Left internal carotid artery occlusion extending up to distal carotid arterial stent.   2.  Decreased density within the left anterior cerebral artery, appearance suggesting slow flow or spasm.   3.  Small caliber of the left middle cerebral artery branches which taper and are not visualized most distally, changes most compatible with diminished flow due to left internal carotid artery occlusion.   4.  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   5.  3 mm basilar artery tip aneurysm   6.  2.3 mm aneurysm inferiorly at the right M1/M2 junction      CT-CEREBRAL PERFUSION ANALYSIS   Final Result         1.  Cerebral blood flow less than 30% likely representing completed infarct = 183 mL.      2.  T Max more than 6 seconds likely representing combination of completed infarct and ischemia = 280 mL.      3.  Mismatched volume likely representing ischemic brain/penumbra = 97 mL      4.  Please note that the cerebral perfusion was performed on the limited brain tissue around the basal ganglia region. Infarct/ischemia outside the CT perfusion sections can be missed in this study.      CT-HEAD W/O   Final Result         1.  No acute intracranial abnormality is identified, there are nonspecific white matter  "changes, commonly associated with small vessel ischemic disease.  Associated mild cerebral atrophy is noted.   2.  Left maxillary sinusitis changes      Note: Artifacts are present limiting diagnostic sensitivity of this exam.          INR   Date Value Ref Range Status   10/26/2023 1.00 0.87 - 1.13 Final     Comment:     INR - Non-therapeutic Reference Range: 0.87-1.13  INR - Therapeutic Reference Range: 2.0-4.0       No results found for: \"POCINR\"     Intake/Output Summary (Last 24 hours) at 10/30/2023 1614  Last data filed at 10/30/2023 1400  Gross per 24 hour   Intake 4433.53 ml   Output 3350 ml   Net 1083.53 ml      Labs not explicitly included in this progress note were reviewed by the author. Radiology/imaging not explicitly included in this progress note was reviewed by the author.     I have performed a physical exam and reviewed and updated ROS and Plan today (10/30/2023).     45 minutes in directly providing and coordinating care and extensive data review.  No time overlap and excludes procedures.   "

## 2023-10-31 LAB
ALBUMIN SERPL BCP-MCNC: 2.7 G/DL (ref 3.2–4.9)
ALBUMIN/GLOB SERPL: 1 G/DL
ALP SERPL-CCNC: 75 U/L (ref 30–99)
ALT SERPL-CCNC: 34 U/L (ref 2–50)
ANION GAP SERPL CALC-SCNC: 8 MMOL/L (ref 7–16)
ANION GAP SERPL CALC-SCNC: 9 MMOL/L (ref 7–16)
AST SERPL-CCNC: 35 U/L (ref 12–45)
BASOPHILS # BLD AUTO: 0.5 % (ref 0–1.8)
BASOPHILS # BLD: 0.07 K/UL (ref 0–0.12)
BILIRUB SERPL-MCNC: 0.4 MG/DL (ref 0.1–1.5)
BUN SERPL-MCNC: 12 MG/DL (ref 8–22)
BUN SERPL-MCNC: 13 MG/DL (ref 8–22)
BUN SERPL-MCNC: 13 MG/DL (ref 8–22)
BUN SERPL-MCNC: 14 MG/DL (ref 8–22)
CALCIUM ALBUM COR SERPL-MCNC: 8.6 MG/DL (ref 8.5–10.5)
CALCIUM SERPL-MCNC: 7.6 MG/DL (ref 8.5–10.5)
CALCIUM SERPL-MCNC: 7.8 MG/DL (ref 8.5–10.5)
CALCIUM SERPL-MCNC: 8 MG/DL (ref 8.5–10.5)
CALCIUM SERPL-MCNC: 8.2 MG/DL (ref 8.5–10.5)
CHLORIDE SERPL-SCNC: 129 MMOL/L (ref 96–112)
CHLORIDE SERPL-SCNC: 131 MMOL/L (ref 96–112)
CHLORIDE SERPL-SCNC: 132 MMOL/L (ref 96–112)
CHLORIDE SERPL-SCNC: 133 MMOL/L (ref 96–112)
CO2 SERPL-SCNC: 18 MMOL/L (ref 20–33)
CO2 SERPL-SCNC: 19 MMOL/L (ref 20–33)
CO2 SERPL-SCNC: 20 MMOL/L (ref 20–33)
CO2 SERPL-SCNC: 21 MMOL/L (ref 20–33)
CREAT SERPL-MCNC: 0.57 MG/DL (ref 0.5–1.4)
CREAT SERPL-MCNC: 0.58 MG/DL (ref 0.5–1.4)
CREAT SERPL-MCNC: 0.62 MG/DL (ref 0.5–1.4)
CREAT SERPL-MCNC: 0.65 MG/DL (ref 0.5–1.4)
EOSINOPHIL # BLD AUTO: 0.03 K/UL (ref 0–0.51)
EOSINOPHIL NFR BLD: 0.2 % (ref 0–6.9)
ERYTHROCYTE [DISTWIDTH] IN BLOOD BY AUTOMATED COUNT: 47.2 FL (ref 35.9–50)
GFR SERPLBLD CREATININE-BSD FMLA CKD-EPI: 101 ML/MIN/1.73 M 2
GFR SERPLBLD CREATININE-BSD FMLA CKD-EPI: 102 ML/MIN/1.73 M 2
GFR SERPLBLD CREATININE-BSD FMLA CKD-EPI: 104 ML/MIN/1.73 M 2
GFR SERPLBLD CREATININE-BSD FMLA CKD-EPI: 104 ML/MIN/1.73 M 2
GLOBULIN SER CALC-MCNC: 2.8 G/DL (ref 1.9–3.5)
GLUCOSE BLD STRIP.AUTO-MCNC: 118 MG/DL (ref 65–99)
GLUCOSE BLD STRIP.AUTO-MCNC: 132 MG/DL (ref 65–99)
GLUCOSE SERPL-MCNC: 131 MG/DL (ref 65–99)
GLUCOSE SERPL-MCNC: 133 MG/DL (ref 65–99)
GLUCOSE SERPL-MCNC: 143 MG/DL (ref 65–99)
GLUCOSE SERPL-MCNC: 151 MG/DL (ref 65–99)
HCT VFR BLD AUTO: 37.2 % (ref 37–47)
HGB BLD-MCNC: 11.8 G/DL (ref 12–16)
IMM GRANULOCYTES # BLD AUTO: 0.11 K/UL (ref 0–0.11)
IMM GRANULOCYTES NFR BLD AUTO: 0.7 % (ref 0–0.9)
LYMPHOCYTES # BLD AUTO: 1.12 K/UL (ref 1–4.8)
LYMPHOCYTES NFR BLD: 7.3 % (ref 22–41)
MAGNESIUM SERPL-MCNC: 1.9 MG/DL (ref 1.5–2.5)
MCH RBC QN AUTO: 28.9 PG (ref 27–33)
MCHC RBC AUTO-ENTMCNC: 31.7 G/DL (ref 32.2–35.5)
MCV RBC AUTO: 91 FL (ref 81.4–97.8)
MONOCYTES # BLD AUTO: 1.28 K/UL (ref 0–0.85)
MONOCYTES NFR BLD AUTO: 8.3 % (ref 0–13.4)
NEUTROPHILS # BLD AUTO: 12.8 K/UL (ref 1.82–7.42)
NEUTROPHILS NFR BLD: 83 % (ref 44–72)
NRBC # BLD AUTO: 0.02 K/UL
NRBC BLD-RTO: 0.1 /100 WBC (ref 0–0.2)
PHOSPHATE SERPL-MCNC: 2 MG/DL (ref 2.5–4.5)
PLATELET # BLD AUTO: 241 K/UL (ref 164–446)
PMV BLD AUTO: 9.8 FL (ref 9–12.9)
POTASSIUM SERPL-SCNC: 2.9 MMOL/L (ref 3.6–5.5)
POTASSIUM SERPL-SCNC: 2.9 MMOL/L (ref 3.6–5.5)
POTASSIUM SERPL-SCNC: 3.2 MMOL/L (ref 3.6–5.5)
POTASSIUM SERPL-SCNC: 4.2 MMOL/L (ref 3.6–5.5)
PROT SERPL-MCNC: 5.5 G/DL (ref 6–8.2)
RBC # BLD AUTO: 4.09 M/UL (ref 4.2–5.4)
SODIUM SERPL-SCNC: 156 MMOL/L (ref 135–145)
SODIUM SERPL-SCNC: 159 MMOL/L (ref 135–145)
SODIUM SERPL-SCNC: 160 MMOL/L (ref 135–145)
SODIUM SERPL-SCNC: 161 MMOL/L (ref 135–145)
SODIUM SERPL-SCNC: 162 MMOL/L (ref 135–145)
WBC # BLD AUTO: 15.4 K/UL (ref 4.8–10.8)

## 2023-10-31 PROCEDURE — 94640 AIRWAY INHALATION TREATMENT: CPT

## 2023-10-31 PROCEDURE — 700105 HCHG RX REV CODE 258: Performed by: INTERNAL MEDICINE

## 2023-10-31 PROCEDURE — 99233 SBSQ HOSP IP/OBS HIGH 50: CPT | Performed by: PSYCHIATRY & NEUROLOGY

## 2023-10-31 PROCEDURE — A9270 NON-COVERED ITEM OR SERVICE: HCPCS | Performed by: INTERNAL MEDICINE

## 2023-10-31 PROCEDURE — 700101 HCHG RX REV CODE 250: Performed by: INTERNAL MEDICINE

## 2023-10-31 PROCEDURE — 80053 COMPREHEN METABOLIC PANEL: CPT

## 2023-10-31 PROCEDURE — A9270 NON-COVERED ITEM OR SERVICE: HCPCS | Performed by: RADIOLOGY

## 2023-10-31 PROCEDURE — 94669 MECHANICAL CHEST WALL OSCILL: CPT

## 2023-10-31 PROCEDURE — 84100 ASSAY OF PHOSPHORUS: CPT

## 2023-10-31 PROCEDURE — 84295 ASSAY OF SERUM SODIUM: CPT

## 2023-10-31 PROCEDURE — 82962 GLUCOSE BLOOD TEST: CPT

## 2023-10-31 PROCEDURE — 700111 HCHG RX REV CODE 636 W/ 250 OVERRIDE (IP): Performed by: INTERNAL MEDICINE

## 2023-10-31 PROCEDURE — 83735 ASSAY OF MAGNESIUM: CPT

## 2023-10-31 PROCEDURE — L4398 FOOT DROP SPLINT PRE OTS: HCPCS

## 2023-10-31 PROCEDURE — 92526 ORAL FUNCTION THERAPY: CPT

## 2023-10-31 PROCEDURE — 85025 COMPLETE CBC W/AUTO DIFF WBC: CPT

## 2023-10-31 PROCEDURE — 770022 HCHG ROOM/CARE - ICU (200)

## 2023-10-31 PROCEDURE — 700102 HCHG RX REV CODE 250 W/ 637 OVERRIDE(OP): Performed by: RADIOLOGY

## 2023-10-31 PROCEDURE — 99291 CRITICAL CARE FIRST HOUR: CPT | Performed by: INTERNAL MEDICINE

## 2023-10-31 PROCEDURE — 700102 HCHG RX REV CODE 250 W/ 637 OVERRIDE(OP): Performed by: INTERNAL MEDICINE

## 2023-10-31 PROCEDURE — 700111 HCHG RX REV CODE 636 W/ 250 OVERRIDE (IP): Mod: JZ | Performed by: INTERNAL MEDICINE

## 2023-10-31 PROCEDURE — 92507 TX SP LANG VOICE COMM INDIV: CPT

## 2023-10-31 PROCEDURE — 700105 HCHG RX REV CODE 258: Performed by: RADIOLOGY

## 2023-10-31 PROCEDURE — 700101 HCHG RX REV CODE 250: Performed by: RADIOLOGY

## 2023-10-31 PROCEDURE — 80048 BASIC METABOLIC PNL TOTAL CA: CPT | Mod: 91

## 2023-10-31 RX ORDER — POTASSIUM CHLORIDE 7.45 MG/ML
10 INJECTION INTRAVENOUS ONCE
Status: COMPLETED | OUTPATIENT
Start: 2023-10-31 | End: 2023-10-31

## 2023-10-31 RX ORDER — MAGNESIUM SULFATE HEPTAHYDRATE 40 MG/ML
2 INJECTION, SOLUTION INTRAVENOUS ONCE
Status: COMPLETED | OUTPATIENT
Start: 2023-10-31 | End: 2023-10-31

## 2023-10-31 RX ORDER — LOSARTAN POTASSIUM 50 MG/1
25 TABLET ORAL ONCE
Status: COMPLETED | OUTPATIENT
Start: 2023-10-31 | End: 2023-10-31

## 2023-10-31 RX ORDER — POTASSIUM CHLORIDE 14.9 MG/ML
20 INJECTION INTRAVENOUS ONCE
Status: COMPLETED | OUTPATIENT
Start: 2023-10-31 | End: 2023-10-31

## 2023-10-31 RX ORDER — LOSARTAN POTASSIUM 50 MG/1
50 TABLET ORAL
Status: DISCONTINUED | OUTPATIENT
Start: 2023-11-01 | End: 2023-11-06

## 2023-10-31 RX ORDER — EPTIFIBATIDE 0.75 MG/ML
1 INJECTION, SOLUTION INTRAVENOUS CONTINUOUS
Status: DISPENSED | OUTPATIENT
Start: 2023-10-31 | End: 2023-11-03

## 2023-10-31 RX ADMIN — SODIUM CHLORIDE 7.5 MG/HR: 9 INJECTION, SOLUTION INTRAVENOUS at 10:01

## 2023-10-31 RX ADMIN — ACETYLCYSTEINE 4 ML: 200 SOLUTION ORAL; RESPIRATORY (INHALATION) at 07:41

## 2023-10-31 RX ADMIN — GUAIFENESIN 200 MG: 100 SOLUTION ORAL at 17:33

## 2023-10-31 RX ADMIN — ENOXAPARIN SODIUM 40 MG: 100 INJECTION SUBCUTANEOUS at 17:33

## 2023-10-31 RX ADMIN — LOSARTAN POTASSIUM 25 MG: 50 TABLET, FILM COATED ORAL at 05:24

## 2023-10-31 RX ADMIN — GUAIFENESIN 200 MG: 100 SOLUTION ORAL at 02:57

## 2023-10-31 RX ADMIN — ATORVASTATIN CALCIUM 80 MG: 40 TABLET, FILM COATED ORAL at 05:24

## 2023-10-31 RX ADMIN — POTASSIUM PHOSPHATE, MONOBASIC POTASSIUM PHOSPHATE, DIBASIC 15 MMOL: 224; 236 INJECTION, SOLUTION, CONCENTRATE INTRAVENOUS at 09:58

## 2023-10-31 RX ADMIN — IPRATROPIUM BROMIDE AND ALBUTEROL SULFATE 3 ML: 2.5; .5 SOLUTION RESPIRATORY (INHALATION) at 07:41

## 2023-10-31 RX ADMIN — SODIUM CHLORIDE 7.5 MG/HR: 9 INJECTION, SOLUTION INTRAVENOUS at 06:36

## 2023-10-31 RX ADMIN — FLUDROCORTISONE ACETATE 0.1 MG: 0.1 TABLET ORAL at 17:32

## 2023-10-31 RX ADMIN — ACETYLCYSTEINE 4 ML: 200 SOLUTION ORAL; RESPIRATORY (INHALATION) at 18:34

## 2023-10-31 RX ADMIN — MONTELUKAST 10 MG: 10 TABLET, FILM COATED ORAL at 05:24

## 2023-10-31 RX ADMIN — AMLODIPINE BESYLATE 10 MG: 10 TABLET ORAL at 05:24

## 2023-10-31 RX ADMIN — SODIUM CHLORIDE 60 ML/HR: 3 INJECTION, SOLUTION INTRAVENOUS at 00:32

## 2023-10-31 RX ADMIN — GUAIFENESIN 200 MG: 100 SOLUTION ORAL at 13:30

## 2023-10-31 RX ADMIN — SODIUM CHLORIDE 7.5 MG/HR: 9 INJECTION, SOLUTION INTRAVENOUS at 03:01

## 2023-10-31 RX ADMIN — ACETYLCYSTEINE 4 ML: 200 SOLUTION ORAL; RESPIRATORY (INHALATION) at 10:35

## 2023-10-31 RX ADMIN — POTASSIUM CHLORIDE 20 MEQ: 14.9 INJECTION, SOLUTION INTRAVENOUS at 17:37

## 2023-10-31 RX ADMIN — SODIUM CHLORIDE 2 G: 1 TABLET ORAL at 22:14

## 2023-10-31 RX ADMIN — POTASSIUM CHLORIDE 10 MEQ: 7.46 INJECTION, SOLUTION INTRAVENOUS at 08:33

## 2023-10-31 RX ADMIN — SODIUM CHLORIDE 60 ML/HR: 3 INJECTION, SOLUTION INTRAVENOUS at 09:21

## 2023-10-31 RX ADMIN — POTASSIUM CHLORIDE 10 MEQ: 7.46 INJECTION, SOLUTION INTRAVENOUS at 03:20

## 2023-10-31 RX ADMIN — ASPIRIN 81 MG 81 MG: 81 TABLET ORAL at 05:23

## 2023-10-31 RX ADMIN — POTASSIUM CHLORIDE 10 MEQ: 7.46 INJECTION, SOLUTION INTRAVENOUS at 20:54

## 2023-10-31 RX ADMIN — GUAIFENESIN 200 MG: 100 SOLUTION ORAL at 10:00

## 2023-10-31 RX ADMIN — IPRATROPIUM BROMIDE AND ALBUTEROL SULFATE 3 ML: 2.5; .5 SOLUTION RESPIRATORY (INHALATION) at 18:34

## 2023-10-31 RX ADMIN — SODIUM CHLORIDE 10 MG/HR: 9 INJECTION, SOLUTION INTRAVENOUS at 22:05

## 2023-10-31 RX ADMIN — TICAGRELOR 90 MG: 90 TABLET ORAL at 05:24

## 2023-10-31 RX ADMIN — SODIUM CHLORIDE 2 G: 1 TABLET ORAL at 05:23

## 2023-10-31 RX ADMIN — GUAIFENESIN 200 MG: 100 SOLUTION ORAL at 06:27

## 2023-10-31 RX ADMIN — IPRATROPIUM BROMIDE AND ALBUTEROL SULFATE 3 ML: 2.5; .5 SOLUTION RESPIRATORY (INHALATION) at 10:35

## 2023-10-31 RX ADMIN — SODIUM CHLORIDE 2 G: 1 TABLET ORAL at 13:29

## 2023-10-31 RX ADMIN — GUAIFENESIN 200 MG: 100 SOLUTION ORAL at 22:14

## 2023-10-31 RX ADMIN — MAGNESIUM SULFATE HEPTAHYDRATE 2 G: 2 INJECTION, SOLUTION INTRAVENOUS at 09:45

## 2023-10-31 RX ADMIN — FLUDROCORTISONE ACETATE 0.1 MG: 0.1 TABLET ORAL at 05:24

## 2023-10-31 RX ADMIN — SODIUM CHLORIDE 7.5 MG/HR: 9 INJECTION, SOLUTION INTRAVENOUS at 13:28

## 2023-10-31 RX ADMIN — LOSARTAN POTASSIUM 25 MG: 50 TABLET, FILM COATED ORAL at 10:00

## 2023-10-31 RX ADMIN — EPTIFIBATIDE 1 MCG/KG/MIN: 0.75 INJECTION, SOLUTION INTRAVENOUS at 18:50

## 2023-10-31 RX ADMIN — SODIUM CHLORIDE 10 MG/HR: 9 INJECTION, SOLUTION INTRAVENOUS at 16:31

## 2023-10-31 ASSESSMENT — LIFESTYLE VARIABLES
REASON UNABLE TO ASSESS: PATIENT NONVERBAL
DOES PATIENT WANT TO STOP DRINKING: CANNOT ASSESS

## 2023-10-31 ASSESSMENT — PAIN DESCRIPTION - PAIN TYPE
TYPE: ACUTE PAIN

## 2023-10-31 ASSESSMENT — FIBROSIS 4 INDEX: FIB4 SCORE: 1.47

## 2023-10-31 NOTE — CARE PLAN
The patient is Watcher - Medium risk of patient condition declining or worsening    Shift Goals  Clinical Goals: -140  Patient Goals: Rest  Family Goals: BAKARI    Progress made toward(s) clinical / shift goals:    Problem: Pain - Standard  Goal: Alleviation of pain or a reduction in pain to the patient’s comfort goal  Outcome: Progressing     Problem: Optimal Care of the Stroke Patient  Goal: Optimal emergency care for the stroke patient  Outcome: Progressing     Problem: Knowledge Deficit - Stroke Education  Goal: Patient's knowledge of stroke and risk factors will improve  Outcome: Progressing     Problem: Psychosocial - Patient Condition  Goal: Patient's ability to verbalize feelings about condition will improve  Outcome: Progressing

## 2023-10-31 NOTE — THERAPY
Speech Language Pathology   Daily Treatment - Swallow and Communication     Patient Name: Roslyn Kilgore  AGE:  58 y.o., SEX:  female  Medical Record #: [id number]  Date of Service: 10/31/2023      Precautions:  Precautions: Fall Risk, Swallow Precautions, Nasogastric Tube       Subjective  Patient brother at bedside. Patient alerts with cues and participates in session.      Objective    Swallow:  Trials Administered: deep oral suctioning completed prior to PO, no gag response elicited.Ice chips rubbed on lips, 1/4 tsp sip thin liquid from spoon, moist toothettes    Oral Phase: uncoordinated and delayed response to bolus presentation, uncoordinated bolus prep and transit.    Pharyngeal Phase: Audible swallow suggesting reduced coordination, multiple swallows suspicious for weakness. Immediate wet cough with minimal trials.    Observations: poor secretion management, thick dry secretions caked on palate, teeth, and lips. Benefits from moist toothettes and suction.    Communication    Expressive Language: Severe impairment  -Egocentric questions: does not provide egocentric information such as name  -Automatic sequences: does not participate in counting     Receptive Language: Severe impairment  -Egocentric yes/no questions: with max cues, imitates head nod/shake to answer questions regarding name  -1 step command following: Follows 3/6 commands with mod-max cues     Motor speech:  Comment: no vocalizations elicited for communication tasks, productive cough noted        Observations: flat affect, no intent to communicate demonstrated, flat affect noted, eyes track to left with reduced attention to R    Clinical Impressions  Patient presents with high risk for aspiration due to severe swallow impairment in setting of acute neurologic injury with medical history significant for previous bilateral, subcortical neurologic infarcts. Impaired secretion management further elevates aspiration risk. Recommend patient  continue NPO with frequent oral care. Anticipate improvements in swallow with ongoing improvements in neurologic and respiratory status. Plan for instrumental evaluation as respiratory status and ability to participate in evaluation improve.  Patient also presents with severe global aphasia. Noted improvement in performance with emerging command following and communicative intent observed this date. Patient remains dependent on caregiver to anticipate all wants and needs as basic communication skills remain unreliable/inconsistent. Anticipate improvement in communication ability with ongoing improvements in neuro status. Anticipate patient will benefit from discharge to an environment that can provide ongoing SLP intervention for swallow and communication and assist for all ADLs. Will follow.      Recommendations  Treatment Completed: Dysphagia Treatment, Speech-Language Treatment       Dysphagia Treatment  Diet Consistency: NPO with frequent oral care, moist oral swabs after oral care and suction  Instrumentation: FEES  Medication: Non Oral  Oral Care: Q4h      Communication Treatment  Recommend ongoing SLP intervention for expressive and receptive language              SLP Treatment Plan  Treatment Plan: (P) Dysphagia Treatment, Speech-Language Treatment  SLP Frequency: (P) 3x Per Week  Estimated Duration: (P) Until Therapy Goals Met      Anticipated Discharge Needs  Discharge Recommendations: (P) Recommend post-acute placement for additional speech therapy services prior to discharge home  Therapy Recommendations Upon DC: (P) Dysphagia Training, Expression Training, Comprehension Training, Patient / Family / Caregiver Education      Patient / Family Goals  Patient / Family Goal #1: (P) unable to state  Short Term Goals  Short Term Goal # 1: (P) Pt will participate in oral feeding trials 1:1 with SLP with no s/sx of aspiration and min cues.  Goal Outcome # 1: (P) Progressing as expected  Short Term Goal # 2: (P)  Patient will follow 1 part commands with 90% accuray with no cues  Goal Outcome # 2 : (P) Progressing as expected  Short Term Goal # 3: (P) Patient will name simple objects with 90% accuracy with no cues  Goal Outcome  # 3: (P) Progressing as expected  Short Term Goal # 4: (P) Patient will provide egocentric information with 90% accuracy with no cues  Goal Outcome  # 4: (P) Progressing as expected      Nessa Valdes, SLP

## 2023-10-31 NOTE — CONSULTS
Gastroenterology Initial Consult Note               Author:  Saray Davalos M.D. Date & Time Created: 10/31/2023 1:44 PM       Patient ID:  Name:             Roslyn Kilgore  YOB: 1965  Age:                 58 y.o.  female  MRN:               [id number]      Referring Provider:  CARLOS Evans MD        Presenting Chief Complaint:  OP dysphagia      History of Present Illness:    This is a  58 y.o. female with acute ischemic left internal carotid artery stroke on 10/26/23.  She had a stent with diversion of flow placed 10/19/23, allergy to Plavix and started on Brilinta.  She was deemed poor candidate for thrombolytic due to recent neurosurgical intervention and underwent thrombectomy of a left ICA in stent thrombosis.  She has been on ASA and Brilinta and DVT prophylaxis with Lovenox.  She also experienced acute respiratory failure likely related to inability to protect airway.  She is DNR/DNI unless increased work of breathing and then discussion for transition to comfort care per 10/29 ICU note. As of 10/30, prognosis not clear and will need prolonged time to assess recovery.    Evaluated by SLP yesterday and high risk for aspiration due to severe swallowing impairment.  Recommend NPO.      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)     Arthritis     osteo- right hip    ASTHMA     Bronchitis     Cancer (HCC) 10 years ago    pre cancerous s/p leep    Carcinoma in situ of respiratory system     per pt early 70's, early 80's    Dental disorder     full dentures    DEPRESSION     anxiety    Heart burn     High cholesterol     Hypertension     Hyponatremia 02/20/2022    Indigestion     Other specified symptom associated with female  genital organs     see cat scan    Personal history of venous thrombosis and embolism     Pneumonia 01/1996    Sepsis (Formerly [facility]) 02/20/2022    Stroke (Formerly [facility])     4 strokes, 3 aneurysms     Active Hospital Problems    Diagnosis     Acute hypoxemic respiratory failure (Formerly [facility]) [J96.01]     Acute ischemic left internal carotid artery (ICA) stroke (Formerly [facility]) [I63.232]     Extrinsic asthma without complication [J45.909]     Goals of care, counseling/discussion [Z71.89]     Dyslipidemia [E78.5]     Primary hypertension [I10]          Past Surgical History:  Past Surgical History:   Procedure Laterality Date    ENDARTERECTOMY Right 11/18/2015    Procedure: ENDARTERECTOMY BRACHIAL ARTERY W/VEIN PATCH REPAIR;  Surgeon: Greta Wilhelm M.D.;  Location: SURGERY [facility];  Service:     DEBRIDEMENT Right 11/11/2015    Procedure: FINGER AMPUTATION 2ND;  Surgeon: Greta Wilhelm M.D.;  Location: SURGERY [facility];  Service:     ANGIOGRAM Right 11/05/2015    Procedure: ANGIOGRAM- ARM;  Surgeon: Greta Wilhelm M.D.;  Location: SURGERY [facility];  Service:     ANGIOPLASTY UPPER Right 11/05/2015    Procedure: ANGIOPLASTY UPPER;  Surgeon: Greta Wilhelm M.D.;  Location: SURGERY [facility];  Service:     HYSTERECTOMY LAPAROSCOPY  2015    THROMBECTOMY  01/09/2011    Performed by TREY TRAN at SURGERY [facility] ORS    ANGIOGRAM  01/09/2011    Performed by TREY TRAN at SURGERY [facility] ORS    HYSTEROSCOPY NOVASURE-2  09/27/2010    Performed by JEANIE DE LOS SANTOS at SURGERY [facility] ORS    MAMMOPLASTY REDUCTION  08/24/2010    Performed by SIRI MOORE at SURGERY [facility] ORS    FEMORAL ARTERY REPAIR  06/28/2010    Performed by GRETA WILHELM at SURGERY [facility] ORS    ANGIOGRAM  04/15/2010    Performed by GRETA WILHELM at SURGERY [facility] ORS    ANGIOPLASTY BALLOON  04/15/2010    Performed by GRETA WILHELM at SURGERY [facility] ORS    ANGIOGRAM  02/02/2010     Performed by JACK SOLO at SURGERY [facility] ORS    THROMBECTOMY  12/21/2009    Performed by JACK SOLO at SURGERY [facility] ORS    ANGIOGRAM  08/17/2009    Performed by JACK SOLO at SURGERY [facility] ORS    OTHER      MULTIPLE BYPASS SURGERIES TO RUE FOR CLOTS    OTHER      LUE CLOT REMOVAL    OTHER ORTHOPEDIC SURGERY      RIGHT HIP ARTHROPLASTY 2014    AK U/S LEIOMYOMATA ABLATE <200 CC      PRIMARY C SECTION      TUBAL LIGATION, ECTOPIC PREGNANCY           Hospital Medications:  Current Facility-Administered Medications   Medication Dose Frequency Provider Last Rate Last Admin    [START ON 11/1/2023] losartan (Cozaar) tablet 50 mg  50 mg Q DAY Anna Marie Evans M.D.        acetylcysteine (Mucomyst) 20 % solution 3-5 mL  3-5 mL 4X/DAY (RT) Anna Marie Evans M.D.   4 mL at 10/31/23 1035    amLODIPine (Norvasc) tablet 10 mg  10 mg Q DAY Anna Marie Evans M.D.   10 mg at 10/31/23 0524    sodium chloride (Salt) tablet 2 g  2 g Q8HRS Shiv Gerardo M.D.   2 g at 10/31/23 1329    guaiFENesin (Robitussin) 100 MG/5ML liquid 200 mg  10 mL Q4HRS Shiv Gerardo M.D.   200 mg at 10/31/23 1330    ipratropium-albuterol (DUONEB) nebulizer solution  3 mL 4X/DAY (RT) Shiv Gerardo M.D.   3 mL at 10/31/23 1035    K+ Scale: Goal of 4.5  1 Each Q6HR Shiv Gerardo M.D.   1 Each at 10/31/23 0300    Pharmacy Consult: Enteral tube insertion - review meds/change route/product selection  1 Each PHARMACY TO DOSE Shiv Gerardo M.D.        montelukast (Singulair) tablet 10 mg  10 mg DAILY Shiv Gerardo M.D.   10 mg at 10/31/23 0524    senna-docusate (Pericolace Or Senokot S) 8.6-50 MG per tablet 2 Tablet  2 Tablet BID Shiv Gerardo M.D.   2 Tablet at 10/30/23 1802    And    polyethylene glycol/lytes (Miralax) PACKET 1 Packet  1 Packet QDAY PRN Shiv Gerardo M.D.        And    magnesium hydroxide (Milk Of Magnesia) suspension 30 mL  30 mL QDAY PRN Shiv Gerardo M.D.         And    bisacodyl (Dulcolax) suppository 10 mg  10 mg QDAY PRN Shiv Gerardo M.D.        acetaminophen (Tylenol) tablet 650 mg  650 mg Q6HRS PRN Shiv Gerardo M.D.        enoxaparin (Lovenox) inj 40 mg  40 mg DAILY AT 1800 Shiv Gerardo M.D.   40 mg at 10/30/23 1802    atorvastatin (Lipitor) tablet 80 mg  80 mg DAILY Shiv Gerardo M.D.   80 mg at 10/31/23 0524    fludrocortisone (Florinef) tablet 0.1 mg  0.1 mg BID Shiv Gerardo M.D.   0.1 mg at 10/31/23 0524    niCARdipine (Cardene) 25 mg in  mL Standard Infusion  0-15 mg/hr Continuous Mauri Montes M.D. 75 mL/hr at 10/31/23 1328 7.5 mg/hr at 10/31/23 1328    labetalol (Normodyne/Trandate) injection 10 mg  10 mg Q4HRS PRN Ken Tejeda M.D.   10 mg at 10/30/23 0107    hydrALAZINE (Apresoline) injection 20 mg  20 mg Q6HRS PRN Ken Tejeda M.D.   20 mg at 10/29/23 2304    MD Alert...ICU Electrolyte Replacement per Pharmacy   PHARMACY TO DOSE Shiv Gerardo M.D.        aspirin (Asa) chewable tab 81 mg  81 mg DAILY Mauri Montes M.D.   81 mg at 10/31/23 0523    ticagrelor (Brilinta) tablet 90 mg  90 mg BID Mauri Montes M.D.   90 mg at 10/31/23 0524    3% sodium chloride (Hypertonic Saline) 500mL infusion  0-60 mL/hr Continuous Shiv Gerardo M.D.   Stopped at 10/31/23 1301   Last reviewed on 10/26/2023 11:53 PM by Rossana Groves R.N.       Current Outpatient Medications:  Medications Prior to Admission   Medication Sig Dispense Refill Last Dose    ticagrelor (BRILINTA) 90 MG Tab tablet Take 1 Tablet by mouth 2 times a day. 60 Tablet 3     amLODIPine (NORVASC) 10 MG Tab TAKE 1 TABLET BY MOUTH DAILY 30 Tablet 0     losartan (COZAAR) 25 MG Tab Take 1 Tablet by mouth every day. 30 Tablet 2     aspirin 81 MG EC tablet Take 81 mg by mouth every day.       albuterol 108 (90 Base) MCG/ACT Aero Soln inhalation aerosol Inhale 2 Puffs every 6 hours as needed for Shortness of Breath. 8.5 g 3     montelukast  (SINGULAIR) 10 MG Tab Take 1 Tablet by mouth every day. 30 Tablet 2          Medication Allergies:  Allergies   Allergen Reactions    Alcohol Unspecified     REQUESTS NO ALCOHOL CONTENT IN HER MEDICATIONS (per pt, pt is an alcoholic)    Trazodone Hives     Caused breat hyperplasia. Pt then underwent a breast reduction and attempted to take again, new reaction: Hives.    Lisinopril Cough         Family Medical History:  Family History   Problem Relation Age of Onset    Stroke Mother     Lung Disease Neg Hx     Cancer Neg Hx     Diabetes Neg Hx     Heart Disease Neg Hx          Social History:  Social History     Socioeconomic History    Marital status:      Spouse name: Not on file    Number of children: Not on file    Years of education: Not on file    Highest education level: Not on file   Occupational History    Not on file   Tobacco Use    Smoking status: Former     Current packs/day: 0.00     Average packs/day: 1 pack/day for 19.0 years (19.0 ttl pk-yrs)     Types: Cigarettes     Start date:      Quit date:      Years since quittin.8    Smokeless tobacco: Never    Tobacco comments:     1 pk a day for 6 yrs, Quit    Vaping Use    Vaping Use: Some days    Substances: THC    Devices: Pre-filled or refillable cartridge   Substance and Sexual Activity    Alcohol use: No     Comment: quit in     Drug use: Not Currently     Types: Marijuana, Oral     Comment: THC, sober from other drugs since     Sexual activity: Yes     Partners: Male   Other Topics Concern    Not on file   Social History Narrative    retired     Social Determinants of Health     Financial Resource Strain: Not on file   Food Insecurity: Not on file   Transportation Needs: Not on file   Physical Activity: Not on file   Stress: Not on file   Social Connections: Not on file   Intimate Partner Violence: Not on file   Housing Stability: Not on file         Vital signs:  Weight/BMI: Body mass index is 25.97 kg/m².  /64  "  Pulse (!) 101   Temp 36.5 °C (97.7 °F) (Temporal)   Resp (!) 29   Ht 1.6 m (5' 3\")   Wt 66.5 kg (146 lb 9.7 oz)   SpO2 92%   Vitals:    10/31/23 0630 10/31/23 0741 10/31/23 0900 10/31/23 1035   BP: 137/64      Pulse: (!) 105 (!) 105  (!) 101   Resp: (!) 24 (!) 23  (!) 29   Temp:       TempSrc:       SpO2: 95% 93%  92%   Weight:   66.5 kg (146 lb 9.7 oz)    Height:         Oxygen Therapy:  Pulse Oximetry: 92 %, O2 (LPM): 4, O2 Delivery Device: Silicone Nasal Cannula    Intake/Output Summary (Last 24 hours) at 10/31/2023 1344  Last data filed at 10/31/2023 1007  Gross per 24 hour   Intake 4035.03 ml   Output 2500 ml   Net 1535.03 ml         Physical Exam:  Physical Exam  Constitutional:       Comments: Tracks me around bedside.  Non verbal   HENT:      Head: Normocephalic and atraumatic.      Nose: Nose normal.      Mouth/Throat:      Mouth: Mucous membranes are moist.   Eyes:      General: No scleral icterus.  Cardiovascular:      Rate and Rhythm: Regular rhythm.      Heart sounds: Normal heart sounds.   Pulmonary:      Effort: Pulmonary effort is normal.      Breath sounds: Normal breath sounds.   Abdominal:      General: Bowel sounds are normal. There is no distension.      Palpations: Abdomen is soft.      Tenderness: There is no abdominal tenderness.   Musculoskeletal:      Cervical back: Neck supple.   Skin:     General: Skin is warm and dry.   Neurological:      Mental Status: She is alert.      Comments: No movement right arm.  Tries to hold up 2 fingers on left.  Spontaneously moves left arm and leg                 Labs:  Recent Labs     10/29/23  0440 10/29/23  0811 10/30/23  0330 10/30/23  0904 10/31/23  0028 10/31/23  0554 10/31/23  1155   SODIUM 151*   < > 153*   < > 156* 159* 162*   POTASSIUM 3.6   < > 3.0*   < > 4.2 2.9* 3.2*   CHLORIDE 120*   < > 123*   < > 129* 132* 133*   CO2 21   < > 22   < > 18* 19* 20   BUN 10   < > 10   < > 12 13 14   CREATININE 0.66   < > 0.60   < > 0.58 0.62 0.65 "   MAGNESIUM 2.1  --  1.9  --   --  1.9  --    PHOSPHORUS 1.3*  --  2.2*  --   --  2.0*  --    CALCIUM 8.5   < > 8.7   < > 7.8* 7.6* 8.0*    < > = values in this interval not displayed.     Recent Labs     10/29/23  0440 10/29/23  0811 10/30/23  0330 10/30/23  0904 10/30/23  1224 10/30/23  1345 10/31/23  0028 10/31/23  0554 10/31/23  1155   ALTSGPT 14  --  25  --   --   --   --  34  --    ASTSGOT 24  --  36  --   --   --   --  35  --    ALKPHOSPHAT 80  --  90  --   --   --   --  75  --    TBILIRUBIN 0.3  --  0.6  --   --   --   --  0.4  --    PREALBUMIN 14.9*  --   --   --  10.4*  --   --   --   --    GLUCOSE 153*   < > 151*   < >  --    < > 143* 131* 133*    < > = values in this interval not displayed.     Recent Labs     10/29/23  0440 10/30/23  0330 10/31/23  0554   WBC 13.6* 17.0* 15.4*   NEUTSPOLYS 80.00* 83.30* 83.00*   LYMPHOCYTES 7.20* 5.70* 7.30*   MONOCYTES 11.70 9.30 8.30   EOSINOPHILS 0.10 0.10 0.20   BASOPHILS 0.40 0.40 0.50   ASTSGOT 24 36 35   ALTSGPT 14 25 34   ALKPHOSPHAT 80 90 75   TBILIRUBIN 0.3 0.6 0.4     Recent Labs     10/29/23  0440 10/30/23  0330 10/31/23  0554   RBC 4.36 4.65 4.09*   HEMOGLOBIN 12.7 13.3 11.8*   HEMATOCRIT 38.6 40.9 37.2   PLATELETCT 278 261 241     Recent Results (from the past 24 hour(s))   Basic Metabolic Panel    Collection Time: 10/30/23  1:45 PM   Result Value Ref Range    Sodium 156 (HH) 135 - 145 mmol/L    Potassium 3.0 (L) 3.6 - 5.5 mmol/L    Chloride 125 (H) 96 - 112 mmol/L    Co2 20 20 - 33 mmol/L    Glucose 132 (H) 65 - 99 mg/dL    Bun 11 8 - 22 mg/dL    Creatinine 0.62 0.50 - 1.40 mg/dL    Calcium 8.3 (L) 8.5 - 10.5 mg/dL    Anion Gap 11.0 7.0 - 16.0   ESTIMATED GFR    Collection Time: 10/30/23  1:45 PM   Result Value Ref Range    GFR (CKD-EPI) 102 >60 mL/min/1.73 m 2   PLATELET MAPPING WITH BASIC TEG    Collection Time: 10/30/23  1:51 PM   Result Value Ref Range    Reaction Time Initial-R 4.6 4.6 - 9.1 min    React Time Initial Hep 4.6 4.3 - 8.3 min    Clot  Kinetics-K 0.8 0.8 - 2.1 min    Clot Angle-Angle 78.3 (H) 63.0 - 78.0 degrees    Maximum Clot Strength-MA 65.5 52.0 - 69.0 mm    TEG Functional Fibrinogen(MA) 34.2 (H) 15.0 - 32.0 mm    Lysis 30 minutes-LY30 0.9 0.0 - 2.6 %    % Inhibition ADP 20.4 (H) 0.0 - 17.0 %    % Inhibition AA 53.9 (H) 0.0 - 11.0 %    TEG Algorithm Link Algorithm    POCT glucose device results    Collection Time: 10/30/23  6:11 PM   Result Value Ref Range    POC Glucose, Blood 118 (H) 65 - 99 mg/dL   Basic Metabolic Panel    Collection Time: 10/30/23  6:14 PM   Result Value Ref Range    Sodium 156 (HH) 135 - 145 mmol/L    Potassium 3.2 (L) 3.6 - 5.5 mmol/L    Chloride 125 (H) 96 - 112 mmol/L    Co2 21 20 - 33 mmol/L    Glucose 130 (H) 65 - 99 mg/dL    Bun 11 8 - 22 mg/dL    Creatinine 0.56 0.50 - 1.40 mg/dL    Calcium 8.4 (L) 8.5 - 10.5 mg/dL    Anion Gap 10.0 7.0 - 16.0   ESTIMATED GFR    Collection Time: 10/30/23  6:14 PM   Result Value Ref Range    GFR (CKD-EPI) 105 >60 mL/min/1.73 m 2   POCT glucose device results    Collection Time: 10/31/23 12:25 AM   Result Value Ref Range    POC Glucose, Blood 132 (H) 65 - 99 mg/dL   Basic Metabolic Panel    Collection Time: 10/31/23 12:28 AM   Result Value Ref Range    Sodium 156 (HH) 135 - 145 mmol/L    Potassium 4.2 3.6 - 5.5 mmol/L    Chloride 129 (H) 96 - 112 mmol/L    Co2 18 (L) 20 - 33 mmol/L    Glucose 143 (H) 65 - 99 mg/dL    Bun 12 8 - 22 mg/dL    Creatinine 0.58 0.50 - 1.40 mg/dL    Calcium 7.8 (L) 8.5 - 10.5 mg/dL    Anion Gap 9.0 7.0 - 16.0   ESTIMATED GFR    Collection Time: 10/31/23 12:28 AM   Result Value Ref Range    GFR (CKD-EPI) 104 >60 mL/min/1.73 m 2   CBC with Differential    Collection Time: 10/31/23  5:54 AM   Result Value Ref Range    WBC 15.4 (H) 4.8 - 10.8 K/uL    RBC 4.09 (L) 4.20 - 5.40 M/uL    Hemoglobin 11.8 (L) 12.0 - 16.0 g/dL    Hematocrit 37.2 37.0 - 47.0 %    MCV 91.0 81.4 - 97.8 fL    MCH 28.9 27.0 - 33.0 pg    MCHC 31.7 (L) 32.2 - 35.5 g/dL    RDW 47.2 35.9 - 50.0  fL    Platelet Count 241 164 - 446 K/uL    MPV 9.8 9.0 - 12.9 fL    Neutrophils-Polys 83.00 (H) 44.00 - 72.00 %    Lymphocytes 7.30 (L) 22.00 - 41.00 %    Monocytes 8.30 0.00 - 13.40 %    Eosinophils 0.20 0.00 - 6.90 %    Basophils 0.50 0.00 - 1.80 %    Immature Granulocytes 0.70 0.00 - 0.90 %    Nucleated RBC 0.10 0.00 - 0.20 /100 WBC    Neutrophils (Absolute) 12.80 (H) 1.82 - 7.42 K/uL    Lymphs (Absolute) 1.12 1.00 - 4.80 K/uL    Monos (Absolute) 1.28 (H) 0.00 - 0.85 K/uL    Eos (Absolute) 0.03 0.00 - 0.51 K/uL    Baso (Absolute) 0.07 0.00 - 0.12 K/uL    Immature Granulocytes (abs) 0.11 0.00 - 0.11 K/uL    NRBC (Absolute) 0.02 K/uL   Magnesium    Collection Time: 10/31/23  5:54 AM   Result Value Ref Range    Magnesium 1.9 1.5 - 2.5 mg/dL   Comp Metabolic Panel    Collection Time: 10/31/23  5:54 AM   Result Value Ref Range    Sodium 159 (HH) 135 - 145 mmol/L    Potassium 2.9 (L) 3.6 - 5.5 mmol/L    Chloride 132 (H) 96 - 112 mmol/L    Co2 19 (L) 20 - 33 mmol/L    Anion Gap 8.0 7.0 - 16.0    Glucose 131 (H) 65 - 99 mg/dL    Bun 13 8 - 22 mg/dL    Creatinine 0.62 0.50 - 1.40 mg/dL    Calcium 7.6 (L) 8.5 - 10.5 mg/dL    Correct Calcium 8.6 8.5 - 10.5 mg/dL    AST(SGOT) 35 12 - 45 U/L    ALT(SGPT) 34 2 - 50 U/L    Alkaline Phosphatase 75 30 - 99 U/L    Total Bilirubin 0.4 0.1 - 1.5 mg/dL    Albumin 2.7 (L) 3.2 - 4.9 g/dL    Total Protein 5.5 (L) 6.0 - 8.2 g/dL    Globulin 2.8 1.9 - 3.5 g/dL    A-G Ratio 1.0 g/dL   PHOSPHORUS    Collection Time: 10/31/23  5:54 AM   Result Value Ref Range    Phosphorus 2.0 (L) 2.5 - 4.5 mg/dL   ESTIMATED GFR    Collection Time: 10/31/23  5:54 AM   Result Value Ref Range    GFR (CKD-EPI) 102 >60 mL/min/1.73 m 2   POCT glucose device results    Collection Time: 10/31/23  5:57 AM   Result Value Ref Range    POC Glucose, Blood 118 (H) 65 - 99 mg/dL   Basic Metabolic Panel    Collection Time: 10/31/23 11:55 AM   Result Value Ref Range    Sodium 162 (HH) 135 - 145 mmol/L    Potassium 3.2 (L)  3.6 - 5.5 mmol/L    Chloride 133 (H) 96 - 112 mmol/L    Co2 20 20 - 33 mmol/L    Glucose 133 (H) 65 - 99 mg/dL    Bun 14 8 - 22 mg/dL    Creatinine 0.65 0.50 - 1.40 mg/dL    Calcium 8.0 (L) 8.5 - 10.5 mg/dL    Anion Gap 9.0 7.0 - 16.0   ESTIMATED GFR    Collection Time: 10/31/23 11:55 AM   Result Value Ref Range    GFR (CKD-EPI) 101 >60 mL/min/1.73 m 2         Radiology Review:  DX-ABDOMEN FOR TUBE PLACEMENT   Final Result         1.  Nonspecific bowel gas pattern in the upper abdomen.   2.  Nasogastric tube tip terminates overlying the expected location of the gastric body.   3.  Hazy left lower lobe infiltrates and trace left pleural effusion      CT-CTA CHEST PULMONARY ARTERY W/ RECONS   Final Result         1.  No evidence of pulmonary embolism.   2.  Left lower lobe atelectasis/collapse.   3.  Mucous plugging of the right lower lobe bronchi and some mild dependent atelectasis in the right lower lobe.   4.  Small hiatal hernia.      Fleischner Society pulmonary nodule recommendations:   Not applicable      CT-HEAD W/O   Final Result      Evolving acute large left cerebral infarct.      No acute hemorrhage.      Chronic ischemic changes.      DX-CHEST-PORTABLE (1 VIEW)   Final Result      No acute process.      IR-PICC LINE PLACEMENT W/ GUIDANCE > AGE 5   Final Result                  Ultrasound-guided PICC placement performed by qualified nursing staff as    above.          MR-BRAIN-W/O   Final Result         1. Acute left MCA distribution infarct, as above.   2. 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   3. Mild chronic ischemic white matter migration.      CT-HEAD W/O   Final Result      1.  Possible mild left frontotemporal edema and a present significantly improved      2.  Negative for hemorrhage or mass effect      3.  Right frontotemporal and encephalomalacia         DX-ABDOMEN FOR TUBE PLACEMENT   Final Result      1.  Enteric tube overlies the stomach.      CT-HEAD W/O   Final Result         1.   No acute intracranial abnormality is identified, there are nonspecific white matter changes, commonly associated with small vessel ischemic disease.  Associated mild cerebral atrophy is noted.   2.  Left maxillary sinusitis changes   3.  Atherosclerosis.         IR-THROMBO MECHANICAL ARTERY,INIT   Final Result      1.  Occluded LEFT internal carotid artery-LEFT internal carotid flow diverter.   2.  Successful mechanical thrombectomy.   3.  TICI 2c      DX-CHEST-PORTABLE (1 VIEW)   Final Result         1.  Left basilar atelectasis, no focal infiltrate   2.  Atherosclerosis      CT-CTA NECK WITH & W/O-POST PROCESSING   Final Result         1.  Occlusion from the mid left internal carotid artery through intracranial stent at the distal left ICA.      These findings were discussed with the patient's clinician, Kiet Brown, on 10/27/2023 12:37 AM.      CT-CTA HEAD WITH & W/O-POST PROCESS   Final Result         1.  Left internal carotid artery occlusion extending up to distal carotid arterial stent.   2.  Decreased density within the left anterior cerebral artery, appearance suggesting slow flow or spasm.   3.  Small caliber of the left middle cerebral artery branches which taper and are not visualized most distally, changes most compatible with diminished flow due to left internal carotid artery occlusion.   4.  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   5.  3 mm basilar artery tip aneurysm   6.  2.3 mm aneurysm inferiorly at the right M1/M2 junction      CT-CEREBRAL PERFUSION ANALYSIS   Final Result         1.  Cerebral blood flow less than 30% likely representing completed infarct = 183 mL.      2.  T Max more than 6 seconds likely representing combination of completed infarct and ischemia = 280 mL.      3.  Mismatched volume likely representing ischemic brain/penumbra = 97 mL      4.  Please note that the cerebral perfusion was performed on the limited brain tissue around the basal  ganglia region. Infarct/ischemia outside the CT perfusion sections can be missed in this study.      CT-HEAD W/O   Final Result         1.  No acute intracranial abnormality is identified, there are nonspecific white matter changes, commonly associated with small vessel ischemic disease.  Associated mild cerebral atrophy is noted.   2.  Left maxillary sinusitis changes      Note: Artifacts are present limiting diagnostic sensitivity of this exam.            MDM (Data Review):   -Records reviewed and summarized in current documentation  -I personally reviewed and interpreted the laboratory results  -I personally reviewed the radiology images    Assessment/Recommendations:    Severe oropharyngeal dysphagia  Acute ischemic left internal carotid artery stroke on DAPT   Leukocytosis  Hypernatremia, Na 162--hypertonic therapy for reducing ICP  Hyperchloremia, Cl 133  Hypokalemia  Protein calorie malnutrition  History of multiple intracranial aneurysms  History of arterial thromboses, s/p mult surgeries    Recs:  --Discussed with Dr. Stanton and will hold Brilinta x 2 days for PEG  --Instead, Integrilin will be started and will stop 6 hours before PEG  --OK to continue ASA  --Hold Lovenox day of procedure  --NPO at [facility] on Nov 1  --Will schedule procedure tomorrow and then will determine timing of holding Integrilin  --Anesthesia to give Ancef pre procedure      Saray Davalos M.D.          Core Quality Measures   Reviewed items:  Labs, Medications and Radiology reports reviewed

## 2023-10-31 NOTE — PROGRESS NOTES
Size medium prafo foot drop boot has been applied and fitted to pt's R LE. Patient is tolerating fitting of boot well at this time. Patient presents positive CMS both pre and post application of foot drop boot to R LE.

## 2023-10-31 NOTE — PROGRESS NOTES
"Critical Care Progress Note    Date of admission  10/26/2023    Chief Complaint  58 y.o. female admitted 10/26/2023 with aphasia and right-sided body weakness    Hospital Course  Edited HPI from Dr. Tejeda note from previous date of service:  \" 58 y.o. female who presented 10/26/2023 with a stroke alert.  Patient was brought by emergency medical services to the emergency department after her roommate noticed the patient having aphasia, right-sided body weakness (upper and lower limbs) earlier today.  At initial evaluation in the emergency department she was found to be confused with altered mental status, last time known at her normal was around 10 PM on 10/26/2023.  Has remained nonverbal in the ED, blood pressure was in the 130s over 80s, glucose was within normal limits.  The patient has a past medical history of left ICA aneurysm status post stent placement with diversion of flow with subsequent treatment with Brilinta.  Per notes it seems that she was not tolerating well the Brilinta?  But was advised to continue taking it.  Diagnostic work-up in the ED with a CTA shows no flow through the left ICA for what neurology Dr. Akbar was consulted.  Given recent neurosurgical intervention she was deemed poor candidate for thrombolytics due to high risk of bleeding.  IR was consulted for emergent thrombectomy.\"    Brought in by EMS for aphasia, right-sided weakness, LKW 10 PM 10/26/2023  PMH left ICA aneurysm status post stents with diversion of flow and subsequent treatment with Brilinta.  Allergic to Plavix     CTA of the neck revealed occlusion of mid left ICA through intracranial stent at the distal left ICA with large penumbra. CTA of the head revealed no LVO     Neuro consulted, poor candidate for thrombolytics due to high risk of bleeding given recent neurosurgical intervention  Taken to [facility] suite, found to have left ICA in-stent thrombosis s/p thrombectomy with TICI 3 flow and started on low-dose Integrilin " drip.      Initial concern for noncompliance with brillinta, however TEG showed therapeutic ADP inhibition suggesting compliance    10/28: acute left MCA distribution infarct, and old right posterior frontal infarct  10/29: Rapidly worsening hypoxia overnight from 3LPm -> %, CXR unremarkable  CT head showing no changes  CTA chest no PE, complete atelectasis/collaspse of the LLL and mucous plugging of the RLL as well  10/30: d/w neurology - at this time prognosis is not clear and will likely need prolonged time to assess recovery; suggest PEG  Was able to work with PT today  10/31: GI consult; family had agreed to PEG.    Interval Problem Update  Chart review from the past 24 hours includes imaging, laboratory studies, vital signs and notes available.  Pertinent data for today's visit includes      Cardiac: ST, -1500s, TICI 3: goal -140, on nicardipine gtt @ 7.5 increasing oral meds  Pulm: NC 3 l; IPV tid  Neuro: Global aphasia, ?able to follow simple commands, somnolent but opens eyes briefly to vocal stimulus. Pupils equal 3mm sluggish symmetrical, gaze crossing midline now, good cough, clearing secretions, L 4/4 uppers/lowers and purposeful, R w/d to pain  Heme: Mild leukocytosis, unchanged  Renal/volume status: Cr < 1   ID:  AF, no abx  GI/endo: BMP glucose at goal, tube feeds going and advancing  Labs/Imaging: Na 154, goal 150-160 on florinef, HTS 3% at 60 cc per protocol  Lines: PICC, purewick    Review of Systems  Review of Systems   Unable to perform ROS: Medical condition      Vital Signs for last 24 hours   Temp:  [36.3 °C (97.4 °F)-36.6 °C (97.8 °F)] 36.5 °C (97.7 °F)  Pulse:  [] 101  Resp:  [18-51] 29  BP: (118-155)/(54-77) 137/64  SpO2:  [89 %-98 %] 92 %    Physical Exam   Physical Exam  Vitals and nursing note reviewed.   Constitutional:       General: She is not in acute distress.     Appearance: She is ill-appearing. She is not toxic-appearing.   HENT:      Head:  Normocephalic and atraumatic.      Nose: Nose normal.      Mouth/Throat:      Mouth: Mucous membranes are moist.   Eyes:      Pupils: Pupils are equal, round, and reactive to light.   Cardiovascular:      Rate and Rhythm: Normal rate and regular rhythm.      Pulses: Normal pulses.      Heart sounds: Normal heart sounds. No murmur heard.     No gallop.   Pulmonary:      Effort: Pulmonary effort is normal. No respiratory distress.      Breath sounds: Normal breath sounds. No stridor. No wheezing or rales.   Abdominal:      General: There is no distension.      Tenderness: There is no abdominal tenderness. There is no guarding.   Musculoskeletal:         General: No swelling. Normal range of motion.      Right lower leg: No edema.      Left lower leg: No edema.   Lymphadenopathy:      Cervical: No cervical adenopathy.   Skin:     General: Skin is warm.      Capillary Refill: Capillary refill takes less than 2 seconds.      Findings: No rash.   Neurological:      Comments: Global aphasia, able to follow simple commands, somnolent but opens eyes  to vocal stimulus. Pupils equal 3mm sluggish symmetrical, gaze crossing midline now, good cough, clearing secretions, L 4/4 uppers/lowers, R 2/2 uppers/lowers.        Medications  Current Facility-Administered Medications   Medication Dose Route Frequency Provider Last Rate Last Admin    [START ON 11/1/2023] losartan (Cozaar) tablet 50 mg  50 mg Enteral Tube Q DAY Anna Marie Evans M.D.        acetylcysteine (Mucomyst) 20 % solution 3-5 mL  3-5 mL Inhalation 4X/DAY (RT) Anna Marie Evans M.D.   4 mL at 10/31/23 1035    amLODIPine (Norvasc) tablet 10 mg  10 mg Enteral Tube Q DAY Anna Marie Evans M.D.   10 mg at 10/31/23 0524    sodium chloride (Salt) tablet 2 g  2 g Enteral Tube Q8HRS Shiv Gerardo M.D.   2 g at 10/31/23 0523    guaiFENesin (Robitussin) 100 MG/5ML liquid 200 mg  10 mL Enteral Tube Q4HRS Shiv Gerardo M.D.   200 mg at 10/31/23 1000     ipratropium-albuterol (DUONEB) nebulizer solution  3 mL Nebulization 4X/DAY (RT) Shiv Gerardo M.D.   3 mL at 10/31/23 1035    K+ Scale: Goal of 4.5  1 Each Intravenous Q6HR Shiv Gerardo M.D.   1 Each at 10/31/23 0300    Pharmacy Consult: Enteral tube insertion - review meds/change route/product selection  1 Each Other PHARMACY TO DOSE Shiv Gerardo M.D.        montelukast (Singulair) tablet 10 mg  10 mg Enteral Tube DAILY Shiv Gerardo M.D.   10 mg at 10/31/23 0524    senna-docusate (Pericolace Or Senokot S) 8.6-50 MG per tablet 2 Tablet  2 Tablet Enteral Tube BID Shiv Gerardo M.D.   2 Tablet at 10/30/23 1802    And    polyethylene glycol/lytes (Miralax) PACKET 1 Packet  1 Packet Enteral Tube QDAY PRN Shiv Gerardo M.D.        And    magnesium hydroxide (Milk Of Magnesia) suspension 30 mL  30 mL Enteral Tube QDAY PRN Shiv Gerardo M.D.        And    bisacodyl (Dulcolax) suppository 10 mg  10 mg Rectal QDAY PRN Shiv Gerardo M.D.        acetaminophen (Tylenol) tablet 650 mg  650 mg Enteral Tube Q6HRS PRN Shiv Gerardo M.D.        enoxaparin (Lovenox) inj 40 mg  40 mg Subcutaneous DAILY AT 1800 Shiv Gerardo M.D.   40 mg at 10/30/23 1802    atorvastatin (Lipitor) tablet 80 mg  80 mg Enteral Tube DAILY Shiv Gerardo M.D.   80 mg at 10/31/23 0524    fludrocortisone (Florinef) tablet 0.1 mg  0.1 mg Enteral Tube BID Shiv Gerardo M.D.   0.1 mg at 10/31/23 0524    niCARdipine (Cardene) 25 mg in  mL Standard Infusion  0-15 mg/hr Intravenous Continuous Mauri Montes M.D. 75 mL/hr at 10/31/23 1001 7.5 mg/hr at 10/31/23 1001    labetalol (Normodyne/Trandate) injection 10 mg  10 mg Intravenous Q4HRS PRN Ken Tejeda M.D.   10 mg at 10/30/23 0107    hydrALAZINE (Apresoline) injection 20 mg  20 mg Intravenous Q6HRS PRN Ken Tejeda M.D.   20 mg at 10/29/23 2301    MD Alert...ICU Electrolyte Replacement per Pharmacy   Other PHARMACY TO DOSE Shiv VENTURA  SUE Gerardo        aspirin (Asa) chewable tab 81 mg  81 mg Enteral Tube DAILY Mauri Montes M.D.   81 mg at 10/31/23 0523    ticagrelor (Brilinta) tablet 90 mg  90 mg Enteral Tube BID Mauri Montes M.D.   90 mg at 10/31/23 0524    3% sodium chloride (Hypertonic Saline) 500mL infusion  0-60 mL/hr Intravenous Continuous Shiv Gerardo M.D. 60 mL/hr at 10/31/23 0921 60 mL/hr at 10/31/23 0921       Fluids    Intake/Output Summary (Last 24 hours) at 10/31/2023 1210  Last data filed at 10/31/2023 1007  Gross per 24 hour   Intake 4035.03 ml   Output 2500 ml   Net 1535.03 ml       Laboratory          Recent Labs     10/29/23  0440 10/29/23  0811 10/30/23  0330 10/30/23  0904 10/30/23  1814 10/31/23  0028 10/31/23  0554   SODIUM 151*   < > 153*   < > 156* 156* 159*   POTASSIUM 3.6   < > 3.0*   < > 3.2* 4.2 2.9*   CHLORIDE 120*   < > 123*   < > 125* 129* 132*   CO2 21   < > 22   < > 21 18* 19*   BUN 10   < > 10   < > 11 12 13   CREATININE 0.66   < > 0.60   < > 0.56 0.58 0.62   MAGNESIUM 2.1  --  1.9  --   --   --  1.9   PHOSPHORUS 1.3*  --  2.2*  --   --   --  2.0*   CALCIUM 8.5   < > 8.7   < > 8.4* 7.8* 7.6*    < > = values in this interval not displayed.     Recent Labs     10/29/23  0440 10/29/23  0811 10/30/23  0330 10/30/23  0904 10/30/23  1224 10/30/23  1345 10/30/23  1814 10/31/23  0028 10/31/23  0554   ALTSGPT 14  --  25  --   --   --   --   --  34   ASTSGOT 24  --  36  --   --   --   --   --  35   ALKPHOSPHAT 80  --  90  --   --   --   --   --  75   TBILIRUBIN 0.3  --  0.6  --   --   --   --   --  0.4   PREALBUMIN 14.9*  --   --   --  10.4*  --   --   --   --    GLUCOSE 153*   < > 151*   < >  --    < > 130* 143* 131*    < > = values in this interval not displayed.     Recent Labs     10/29/23  0440 10/30/23  0330 10/31/23  0554   WBC 13.6* 17.0* 15.4*   NEUTSPOLYS 80.00* 83.30* 83.00*   LYMPHOCYTES 7.20* 5.70* 7.30*   MONOCYTES 11.70 9.30 8.30   EOSINOPHILS 0.10 0.10 0.20   BASOPHILS 0.40 0.40 0.50    ASTSGOT 24 36 35   ALTSGPT 14 25 34   ALKPHOSPHAT 80 90 75   TBILIRUBIN 0.3 0.6 0.4     Recent Labs     10/29/23  0440 10/30/23  0330 10/31/23  0554   RBC 4.36 4.65 4.09*   HEMOGLOBIN 12.7 13.3 11.8*   HEMATOCRIT 38.6 40.9 37.2   PLATELETCT 278 261 241     Imaging  X-Ray:  I have personally reviewed the images and compared with prior images.  CT:    Reviewed  MRI:   Reviewed  10/28 acute left MCA distribution infarct, and old right posterior frontal infarct    Assessment/Plan    * Acute ischemic left internal carotid artery (ICA) stroke (HCC)- (present on admission)  Assessment & Plan  Recent left ICA aneurysm status post stent and flow diverter, subsequent treatment with Brilinta, allergic to Plavix  Developed in-stent thrombosis, initial concern for noncompliance with Brilinta, however TEG showed therapeutic ADP inhibition, underwent thrombectomy, integrilin bridged to ASA/Brilinta  MRI 10/28 acute left MCA distribution infarct, and old right posterior frontal infarct    SBP goal for TICI 2c/3: 100-140  ASA 81/Brillinta NGT  Na goal 150-160, 3% HTS, salt tabs, add florinef  q2H neurochecks  Atorvastatin 40 -> 80mg NGT  euglycemia <180mg/dL, TF started  Start DVT ppx with lovenox  PT/OT  SLP- ok for ice chips  IR PICC placed 10/28  Family has agreed to PEG - GI consult    Acute hypoxemic respiratory failure (HCC)  Assessment & Plan  CTA chest no PE, complete atelectasis/collaspse of the LLL and mucous plugging of the RLL as well  Hypoxia due to shunting from total left lower lobe atelectasis/collapse from retention of secretions  Afebrile, no antibiotics  Likely due to inability to protect airway and poor effort from catastrophic CVA  Mobilization is helping and sats improved and now on NC  Aggressive pulmonary toileting with IPV/DuoNeb/hypertonic saline/PEP    Extrinsic asthma without complication- (present on admission)  Assessment & Plan  duonebs prn  Continue montelukast home dose    Goals of care,  counseling/discussion  Assessment & Plan  See associated advance care planning note from 10/29  In summary, due to recent respiratory decline family meeting was held with her brother over the phone [place] in [place] and her Sister Eusebia at bedside.  All are in agreement that the patient would want change of CODE STATUS to DNR/DNI.    Dyslipidemia- (present on admission)  Assessment & Plan  Lipitor 40 home dose, increase to 80 for high intensity therapy    Primary hypertension- (present on admission)  Assessment & Plan  TICI 2c/3: -140  Cardene gtt and IV PRNs  amlodipine and losartan       VTE:  Lovenox starting  Ulcer: Not Indicated  Lines:  PICC, purewick     I have performed a physical exam and reviewed and updated ROS and Plan today (10/31/2023). In review of yesterday's note (10/30/2023), there are no changes except as documented above.     Discussed patient condition and risk of morbidity and/or mortality with RN, Therapies, Pharmacy, Code status disscussed, Charge nurse / hot rounds, Patient, and neurology    The patient remains critically ill.  Critical care time = 31 minutes in directly providing and coordinating critical care and extensive data review.  No time overlap and excludes procedures.    This note was generated using voice recognition software which has a chance of producing errors of grammar and content.  I have made every reasonable attempt to find and correct any errors, but it should be expected that some may not be found prior to finalization of this note.

## 2023-10-31 NOTE — CARE PLAN
Respiratory Update    Treatment modality: IPV w/ Duo & Mucomyst  Frequency: QID     Pt tolerating current treatments well with no adverse reactions.     Problem: Bronchopulmonary Hygiene  Goal: Increase mobilization of retained secretions  Description: Target End Date:  2 to 3 days    1.  Perform bronchopulmonary therapy as indicated by assessment  2.  Perform airway suctioning  3.  Perform actions to maintain patient airway  Outcome: Progressing     Problem: Humidified High Flow Nasal Cannula  Goal: Maintain adequate oxygenation dependent on patient condition  Description: Target End Date:  resolve prior to discharge or when underlying condition is resolved/stabilized    1.  Implement humidified high flow oxygen therapy  2.  Titrate high flow oxygen to maintain appropriate SpO2  Outcome: Met

## 2023-10-31 NOTE — PROGRESS NOTES
Radiology Progress Note   Author: JUANCHO Johnston Date & Time created: 10/31/2023  9:42 AM   Date of admission  10/26/2023  Note to reader: this note follows the APSO format rather than the historical SOAP format. Assessment and plan located at the top of the note for ease of use.    Chief Complaint  58 y.o. 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 Montes on 10/19/23 now on Brilinta returned to the ED 10/26/23 with aphasia and right-sided weakness.  CT head showed left internal carotid artery occlusion extending to distal carotid flow diverter.  Patient underwent a left ICA stent thrombectomy with TICI 3 recanalization with IR Dr. Montes on 10/27/23.  It is unclear whether patient missed any of her Brilinta prior to the flow diverter occlusion.    Interval history:  10/30/23 - patient was seen with [facility].  MRI shows acute left MCA territory infarct.  Patient awake but nonresponsive.  No movement of right upper and lower extremities.    0/31/23 - patient more lethargic this morning but still opens eyes when her name is called.  Bedside nurse reports patient waxes and wanes.       Assessment/Plan     Principal Problem:    Acute ischemic left internal carotid artery (ICA) stroke (HCC)  Active Problems:    Primary hypertension    Dyslipidemia    Goals of care, counseling/discussion    Extrinsic  asthma without complication    Acute hypoxemic respiratory failure (HCC)      Plan IR  - Continue Brilinta 90 mg twice daily and aspirin 81 mg daily.  - Q 2 hour neuro checks per neurology recommendation.  - Maintain SBP between 100-140.     -Thank you for allowing Interventional Radiology team to participate in the patients care, if any additional care or requests are needed in the future please do not hesitate call or place IR order   [phone]           Review of Systems  Physical Exam   Review of Systems   Reason unable to perform ROS: Unresponsive.      Vitals:    10/31/23 0741   BP:    Pulse: (!) 105   Resp: (!) 23   Temp:    SpO2: 93%        Physical Exam  Constitutional:       General: She is not in acute distress.  Cardiovascular:      Rate and Rhythm: Normal rate.      Pulses: Normal pulses.   Pulmonary:      Effort: Pulmonary effort is normal.   Abdominal:      Palpations: Abdomen is soft.   Skin:     General: Skin is warm and dry.      Comments: Right groin access site soft, nontender, dressing CDI   Neurological:      Motor: Weakness present.      Comments: awake, nonverbal, opens eyes when name is called  PERRLA; Mild left gaze preference  Moves left upper and lower extremities spontaneously   withdraws minimally to noxious stimuli in right upper and lower extremity.              Labs    Recent Labs     10/29/23  0440 10/30/23  0330 10/31/23  0554   WBC 13.6* 17.0* 15.4*   RBC 4.36 4.65 4.09*   HEMOGLOBIN 12.7 13.3 11.8*   HEMATOCRIT 38.6 40.9 37.2   MCV 88.5 88.0 91.0   MCH 29.1 28.6 28.9   MCHC 32.9 32.5 31.7*   RDW 44.8 45.1 47.2   PLATELETCT 278 261 241   MPV 9.6 9.6 9.8       Recent Labs     10/30/23  1814 10/31/23  0028 10/31/23  0554   SODIUM 156* 156* 159*   POTASSIUM 3.2* 4.2 2.9*   CHLORIDE 125* 129* 132*   CO2 21 18* 19*   GLUCOSE 130* 143* 131*   BUN 11 12 13   CREATININE 0.56 0.58 0.62   CALCIUM 8.4* 7.8* 7.6*       Recent Labs     10/29/23  0440 10/29/23  0811 10/30/23  0330 10/30/23  0904  10/30/23  1814 10/31/23  0028 10/31/23  0554   ALBUMIN 3.3  --  3.7  --   --   --  2.7*   TBILIRUBIN 0.3  --  0.6  --   --   --  0.4   ALKPHOSPHAT 80  --  90  --   --   --  75   TOTPROTEIN 6.1  --  6.3  --   --   --  5.5*   ALTSGPT 14  --  25  --   --   --  34   ASTSGOT 24  --  36  --   --   --  35   CREATININE 0.66   < > 0.60   < > 0.56 0.58 0.62    < > = values in this interval not displayed.       DX-ABDOMEN FOR TUBE PLACEMENT   Final Result         1.  Nonspecific bowel gas pattern in the upper abdomen.   2.  Nasogastric tube tip terminates overlying the expected location of the gastric body.   3.  Hazy left lower lobe infiltrates and trace left pleural effusion      CT-CTA CHEST PULMONARY ARTERY W/ RECONS   Final Result         1.  No evidence of pulmonary embolism.   2.  Left lower lobe atelectasis/collapse.   3.  Mucous plugging of the right lower lobe bronchi and some mild dependent atelectasis in the right lower lobe.   4.  Small hiatal hernia.      Fleischner Society pulmonary nodule recommendations:   Not applicable      CT-HEAD W/O   Final Result      Evolving acute large left cerebral infarct.      No acute hemorrhage.      Chronic ischemic changes.      DX-CHEST-PORTABLE (1 VIEW)   Final Result      No acute process.      IR-PICC LINE PLACEMENT W/ GUIDANCE > AGE 5   Final Result                  Ultrasound-guided PICC placement performed by qualified nursing staff as    above.          MR-BRAIN-W/O   Final Result         1. Acute left MCA distribution infarct, as above.   2. 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   3. Mild chronic ischemic white matter migration.      CT-HEAD W/O   Final Result      1.  Possible mild left frontotemporal edema and a present significantly improved      2.  Negative for hemorrhage or mass effect      3.  Right frontotemporal and encephalomalacia         DX-ABDOMEN FOR TUBE PLACEMENT   Final Result      1.  Enteric tube overlies the stomach.      CT-HEAD W/O    Final Result         1.  No acute intracranial abnormality is identified, there are nonspecific white matter changes, commonly associated with small vessel ischemic disease.  Associated mild cerebral atrophy is noted.   2.  Left maxillary sinusitis changes   3.  Atherosclerosis.         IR-THROMBO MECHANICAL ARTERY,INIT   Final Result      1.  Occluded LEFT internal carotid artery-LEFT internal carotid flow diverter.   2.  Successful mechanical thrombectomy.   3.  TICI 2c      DX-CHEST-PORTABLE (1 VIEW)   Final Result         1.  Left basilar atelectasis, no focal infiltrate   2.  Atherosclerosis      CT-CTA NECK WITH & W/O-POST PROCESSING   Final Result         1.  Occlusion from the mid left internal carotid artery through intracranial stent at the distal left ICA.      These findings were discussed with the patient's clinician, Kiet Brown, on 10/27/2023 12:37 AM.      CT-CTA HEAD WITH & W/O-POST PROCESS   Final Result         1.  Left internal carotid artery occlusion extending up to distal carotid arterial stent.   2.  Decreased density within the left anterior cerebral artery, appearance suggesting slow flow or spasm.   3.  Small caliber of the left middle cerebral artery branches which taper and are not visualized most distally, changes most compatible with diminished flow due to left internal carotid artery occlusion.   4.  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   5.  3 mm basilar artery tip aneurysm   6.  2.3 mm aneurysm inferiorly at the right M1/M2 junction      CT-CEREBRAL PERFUSION ANALYSIS   Final Result         1.  Cerebral blood flow less than 30% likely representing completed infarct = 183 mL.      2.  T Max more than 6 seconds likely representing combination of completed infarct and ischemia = 280 mL.      3.  Mismatched volume likely representing ischemic brain/penumbra = 97 mL      4.  Please note that the cerebral perfusion was performed on the limited  "brain tissue around the basal ganglia region. Infarct/ischemia outside the CT perfusion sections can be missed in this study.      CT-HEAD W/O   Final Result         1.  No acute intracranial abnormality is identified, there are nonspecific white matter changes, commonly associated with small vessel ischemic disease.  Associated mild cerebral atrophy is noted.   2.  Left maxillary sinusitis changes      Note: Artifacts are present limiting diagnostic sensitivity of this exam.          INR   Date Value Ref Range Status   10/26/2023 1.00 0.87 - 1.13 Final     Comment:     INR - Non-therapeutic Reference Range: 0.87-1.13  INR - Therapeutic Reference Range: 2.0-4.0       No results found for: \"POCINR\"     Intake/Output Summary (Last 24 hours) at 10/30/2023 1614  Last data filed at 10/30/2023 1400  Gross per 24 hour   Intake 4433.53 ml   Output 3350 ml   Net 1083.53 ml      Labs not explicitly included in this progress note were reviewed by the author. Radiology/imaging not explicitly included in this progress note was reviewed by the author.     I have performed a physical exam and reviewed and updated ROS and Plan today (10/31/2023).     30 minutes in directly providing and coordinating care and extensive data review.  No time overlap and excludes procedures.   "

## 2023-10-31 NOTE — PROGRESS NOTES
Neurology Progress Note  Neurohospitalist Service, [facility]    Referring Physician: DAISY Cole*    Chief Complaint   Patient presents with    Possible Stroke     As per EMS pt room mate, said that pt is having aphasia, right sided body weakness  Pt had history of stroke but no deficit as per EMS, pt normally can talk and walk    NIH: 15    LKW: 10 pm  Brought by Care flight with the above complaints       HPI: Refer to initial documented Neurology H&P, as detailed in the patient's chart.    Interval History 10/31: No new events overnight.    Review of systems: In addition to what is detailed in the HPI and/or updated in the interval history, all other systems reviewed and are negative.    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    FHx:  family history includes Stroke in her mother.    SHx:   reports that she quit smoking about 24 years ago. Her smoking use included cigarettes. She started smoking about 43 years ago. She has a 19.0 pack-year smoking history. She has never used smokeless tobacco. She reports that she does not currently use drugs after having used the following drugs: Marijuana and Oral. She reports that she does not drink alcohol.    Medications:    Current Facility-Administered Medications:     potassium phosphate 15 mmol in  mL ivpb, 15 mmol, Intravenous, Once, Anna Marie Evans M.D.    potassium chloride (Kcl) ivpb 10 mEq, 10 mEq,  Intravenous, Once, Anna Marie Evans M.D.    acetylcysteine (Mucomyst) 20 % solution 3-5 mL, 3-5 mL, Inhalation, 4X/DAY (RT), Anna Marie Evans M.D., 4 mL at 10/31/23 0741    amLODIPine (Norvasc) tablet 10 mg, 10 mg, Enteral Tube, Q DAY, Anna Marie Evans M.D., 10 mg at 10/31/23 0524    losartan (Cozaar) tablet 25 mg, 25 mg, Enteral Tube, Q DAY, Anna Marie Evans M.D., 25 mg at 10/31/23 0524    sodium chloride (Salt) tablet 2 g, 2 g, Enteral Tube, Q8HRS, Shiv Gerardo M.D., 2 g at 10/31/23 0523    guaiFENesin (Robitussin) 100 MG/5ML liquid 200 mg, 10 mL, Enteral Tube, Q4HRS, Shiv Gerardo M.D., 200 mg at 10/31/23 0627    ipratropium-albuterol (DUONEB) nebulizer solution, 3 mL, Nebulization, 4X/DAY (RT), Shiv Gerardo M.D., 3 mL at 10/31/23 0741    K+ Scale: Goal of 4.5, 1 Each, Intravenous, Q6HR, Shiv Gerardo M.D., 1 Each at 10/31/23 0300    Pharmacy Consult: Enteral tube insertion - review meds/change route/product selection, 1 Each, Other, PHARMACY TO DOSE, Shiv Gerardo M.D.    montelukast (Singulair) tablet 10 mg, 10 mg, Enteral Tube, DAILY, Shiv Gerardo M.D., 10 mg at 10/31/23 0524    senna-docusate (Pericolace Or Senokot S) 8.6-50 MG per tablet 2 Tablet, 2 Tablet, Enteral Tube, BID, 2 Tablet at 10/30/23 1802 **AND** polyethylene glycol/lytes (Miralax) PACKET 1 Packet, 1 Packet, Enteral Tube, QDAY PRN **AND** magnesium hydroxide (Milk Of Magnesia) suspension 30 mL, 30 mL, Enteral Tube, QDAY PRN **AND** bisacodyl (Dulcolax) suppository 10 mg, 10 mg, Rectal, QDAY PRN, Shiv Gerardo M.D.    acetaminophen (Tylenol) tablet 650 mg, 650 mg, Enteral Tube, Q6HRS PRN, Shiv Gerardo M.D.    enoxaparin (Lovenox) inj 40 mg, 40 mg, Subcutaneous, DAILY AT 1800, Shiv Gerardo M.D., 40 mg at 10/30/23 1802    atorvastatin (Lipitor) tablet 80 mg, 80 mg, Enteral Tube, DAILY, Shiv Gerardo M.D., 80 mg at 10/31/23 0524    fludrocortisone (Florinef) tablet 0.1 mg, 0.1 mg,  Enteral Tube, BID, Shiv Gerardo M.D., 0.1 mg at 10/31/23 0524    insulin regular (HumuLIN R,NovoLIN R) injection, 1-6 Units, Subcutaneous, Q6HRS **AND** POC blood glucose manual result, , , Q6H **AND** NOTIFY MD and PharmD, , , Once **AND** Administer 20 grams of glucose (approximately 8 ounces of fruit juice) every 15 minutes PRN FSBG less than 70 mg/dL, , , PRN **AND** dextrose 10 % BOLUS 25 g, 25 g, Intravenous, Q15 MIN PRN, Ken Tejeda M.D.    niCARdipine (Cardene) 25 mg in  mL Standard Infusion, 0-15 mg/hr, Intravenous, Continuous, Mauri Montes M.D., Last Rate: 75 mL/hr at 10/31/23 0636, 7.5 mg/hr at 10/31/23 0636    labetalol (Normodyne/Trandate) injection 10 mg, 10 mg, Intravenous, Q4HRS PRN, Ken Tejeda M.D., 10 mg at 10/30/23 0107    hydrALAZINE (Apresoline) injection 20 mg, 20 mg, Intravenous, Q6HRS PRN, Ken Tejeda M.D., 20 mg at 10/29/23 2304    MD Alert...ICU Electrolyte Replacement per Pharmacy, , Other, PHARMACY TO DOSE, Shiv Gerardo M.D.    aspirin (Asa) chewable tab 81 mg, 81 mg, Enteral Tube, DAILY, Mauri Montes M.D., 81 mg at 10/31/23 0523    ticagrelor (Brilinta) tablet 90 mg, 90 mg, Enteral Tube, BID, Mauri Montes M.D., 90 mg at 10/31/23 0524    3% sodium chloride (Hypertonic Saline) 500mL infusion, 0-60 mL/hr, Intravenous, Continuous, Shiv Gerardo M.D., Last Rate: 60 mL/hr at 10/31/23 0713, 60 mL/hr at 10/31/23 0713    Physical Examination:     Vitals:    10/31/23 0600 10/31/23 0615 10/31/23 0630 10/31/23 0741   BP: 135/63 131/63 137/64    Pulse: (!) 105 (!) 105 (!) 105 (!) 105   Resp: (!) 24 (!) 23 (!) 24 (!) 23   Temp: 36.5 °C (97.7 °F)      TempSrc: Temporal      SpO2: 92% 91% 95% 93%   Weight:       Height:               NEUROLOGICAL EXAM:     Patient is awake but nonverbal.  Following some some commands today..  Tracks me in the room.  Cranial nerves shows the pupils are equal reactive.  No gaze preference..  Face appears  grossly intact.  Does not follow for formal testing.   Motor examination she moves the left and lower extremity spontaneously antigravity.  Minimal movement to the right arm and leg with noxious stimuli.  Upgoing toe on the right.      Objective Data:    Labs:  Lab Results   Component Value Date/Time    PROTHROMBTM 13.3 10/26/2023 11:45 PM    INR 1.00 10/26/2023 11:45 PM      Lab Results   Component Value Date/Time    WBC 15.4 (H) 10/31/2023 05:54 AM    RBC 4.09 (L) 10/31/2023 05:54 AM    HEMOGLOBIN 11.8 (L) 10/31/2023 05:54 AM    HEMATOCRIT 37.2 10/31/2023 05:54 AM    MCV 91.0 10/31/2023 05:54 AM    MCH 28.9 10/31/2023 05:54 AM    MCHC 31.7 (L) 10/31/2023 05:54 AM    MPV 9.8 10/31/2023 05:54 AM    NEUTSPOLYS 83.00 (H) 10/31/2023 05:54 AM    LYMPHOCYTES 7.30 (L) 10/31/2023 05:54 AM    MONOCYTES 8.30 10/31/2023 05:54 AM    EOSINOPHILS 0.20 10/31/2023 05:54 AM    BASOPHILS 0.50 10/31/2023 05:54 AM    HYPOCHROMIA 1+ 02/19/2011 04:20 PM    ANISOCYTOSIS 1+ 06/29/2010 04:10 AM      Lab Results   Component Value Date/Time    SODIUM 159 (HH) 10/31/2023 05:54 AM    POTASSIUM 2.9 (L) 10/31/2023 05:54 AM    CHLORIDE 132 (H) 10/31/2023 05:54 AM    CO2 19 (L) 10/31/2023 05:54 AM    GLUCOSE 131 (H) 10/31/2023 05:54 AM    BUN 13 10/31/2023 05:54 AM    CREATININE 0.62 10/31/2023 05:54 AM    CREATININE 0.8 06/12/2006 05:00 AM    GLOMRATE 66 05/15/2023 02:32 AM      Lab Results   Component Value Date/Time    CHOLSTRLTOT 135 10/28/2023 02:30 AM    LDL 61 10/28/2023 02:30 AM    HDL 62 10/28/2023 02:30 AM    TRIGLYCERIDE 58 10/28/2023 02:30 AM       Lab Results   Component Value Date/Time    ALKPHOSPHAT 75 10/31/2023 05:54 AM    ASTSGOT 35 10/31/2023 05:54 AM    ALTSGPT 34 10/31/2023 05:54 AM    TBILIRUBIN 0.4 10/31/2023 05:54 AM        Imaging/Testing:  DX-ABDOMEN FOR TUBE PLACEMENT   Final Result         1.  Nonspecific bowel gas pattern in the upper abdomen.   2.  Nasogastric tube tip terminates overlying the expected location of the  gastric body.   3.  Hazy left lower lobe infiltrates and trace left pleural effusion      CT-CTA CHEST PULMONARY ARTERY W/ RECONS   Final Result         1.  No evidence of pulmonary embolism.   2.  Left lower lobe atelectasis/collapse.   3.  Mucous plugging of the right lower lobe bronchi and some mild dependent atelectasis in the right lower lobe.   4.  Small hiatal hernia.      Fleischner Society pulmonary nodule recommendations:   Not applicable      CT-HEAD W/O   Final Result      Evolving acute large left cerebral infarct.      No acute hemorrhage.      Chronic ischemic changes.      DX-CHEST-PORTABLE (1 VIEW)   Final Result      No acute process.      IR-PICC LINE PLACEMENT W/ GUIDANCE > AGE 5   Final Result                  Ultrasound-guided PICC placement performed by qualified nursing staff as    above.          MR-BRAIN-W/O   Final Result         1. Acute left MCA distribution infarct, as above.   2. 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   3. Mild chronic ischemic white matter migration.      CT-HEAD W/O   Final Result      1.  Possible mild left frontotemporal edema and a present significantly improved      2.  Negative for hemorrhage or mass effect      3.  Right frontotemporal and encephalomalacia         DX-ABDOMEN FOR TUBE PLACEMENT   Final Result      1.  Enteric tube overlies the stomach.      CT-HEAD W/O   Final Result         1.  No acute intracranial abnormality is identified, there are nonspecific white matter changes, commonly associated with small vessel ischemic disease.  Associated mild cerebral atrophy is noted.   2.  Left maxillary sinusitis changes   3.  Atherosclerosis.         IR-THROMBO MECHANICAL ARTERY,INIT   Final Result      1.  Occluded LEFT internal carotid artery-LEFT internal carotid flow diverter.   2.  Successful mechanical thrombectomy.   3.  TICI 2c      DX-CHEST-PORTABLE (1 VIEW)   Final Result         1.  Left basilar atelectasis, no focal infiltrate   2.   Atherosclerosis      CT-CTA NECK WITH & W/O-POST PROCESSING   Final Result         1.  Occlusion from the mid left internal carotid artery through intracranial stent at the distal left ICA.      These findings were discussed with the patient's clinician, Kiet Brown, on 10/27/2023 12:37 AM.      CT-CTA HEAD WITH & W/O-POST PROCESS   Final Result         1.  Left internal carotid artery occlusion extending up to distal carotid arterial stent.   2.  Decreased density within the left anterior cerebral artery, appearance suggesting slow flow or spasm.   3.  Small caliber of the left middle cerebral artery branches which taper and are not visualized most distally, changes most compatible with diminished flow due to left internal carotid artery occlusion.   4.  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   5.  3 mm basilar artery tip aneurysm   6.  2.3 mm aneurysm inferiorly at the right M1/M2 junction      CT-CEREBRAL PERFUSION ANALYSIS   Final Result         1.  Cerebral blood flow less than 30% likely representing completed infarct = 183 mL.      2.  T Max more than 6 seconds likely representing combination of completed infarct and ischemia = 280 mL.      3.  Mismatched volume likely representing ischemic brain/penumbra = 97 mL      4.  Please note that the cerebral perfusion was performed on the limited brain tissue around the basal ganglia region. Infarct/ischemia outside the CT perfusion sections can be missed in this study.      CT-HEAD W/O   Final Result         1.  No acute intracranial abnormality is identified, there are nonspecific white matter changes, commonly associated with small vessel ischemic disease.  Associated mild cerebral atrophy is noted.   2.  Left maxillary sinusitis changes      Note: Artifacts are present limiting diagnostic sensitivity of this exam.            Assessment and Plan:    58-year-old female recently had a right ICA aneurysm intervene with a flow  diverter on 19 October.  Few days later patient presented with right-sided weakness and global aphasic.  Found to have a occluded right ICA under went thrombectomy with a TICI score of 3.  MRI brain shows acute infarct over the left MCA territory however mostly in the cortical region.  Unfortunately appears to be in eloquent territory.  Low risk of edema forming given the amount of infarct.  Today's day #3 post thrombectomy.  For now I recommend continuing supportive therapies.  Given the location infarct burden I think there is a potential the patient may improved however this will take weeks to months with neuro rehab.    Update 10/31  Patient appears to be more awake and alert this morning.  She is following some simple commands such as opening closing her eyes.  Able to work with PT yesterday.  They were able to stand her briefly.  Today's post lobectomy day #4.  Continue with supportive therapies.  Planning on PEG tube placement which I agree with.  Continue with DAPT therapy with Brilinta and aspirin.    Plan:  1.  Can change neurochecks to every 4 hours.  2.  Maintain the blood pressure between 100-140 systolic.  3.  Can continue with hypertonic therapy for today plan on start weaning off tomorrow.    4.  Continue with DAPT therapy with aspirin and Brilinta.  5.  PT/OT/speech therapy      Spent over 53 minutes reviewing notes, examining patient, going over the care plan with the primary team.      This chart was partially generated using voice recognition technology and sound alike word replacement may be present, best efforts were made to make the chart accurate.    Paramjit Childers MD  Board Certified Neurology, ABPN  t) [phone]

## 2023-10-31 NOTE — DISCHARGE PLANNING
Case Management Discharge Planning    PT/OT recommending post-acute placement for additional therapy services prior to DC home.   CM submitted [facility] SNF referrals via Epic.      **1148 Hrs - Case discussed in ICU rounds this morning:  Plan is for PEG placement.   Patient intermittently following commands, still non-verbal.  CM met at bedside with patient's brother, Chi, and updated him on DCP/SNF placement.   Chi agreeable to DC plan.  Only accepting SNF is [facility]; Pending response from [facility].  Declined by all other [facility] SNFs; Reasons for declination:  Care exceeding capacity, concerns about DC plan, and non-contracted insurance provider.     Per Chi, patient's parents are both .  Patient has three siblings (Eusebia, Katarzyna, and Chi).    PASRR completed; PASRR [id number].

## 2023-11-01 PROBLEM — E87.6 HYPOKALEMIA: Status: ACTIVE | Noted: 2023-11-01

## 2023-11-01 LAB
ALBUMIN SERPL BCP-MCNC: 2.8 G/DL (ref 3.2–4.9)
ALBUMIN/GLOB SERPL: 0.9 G/DL
ALP SERPL-CCNC: 85 U/L (ref 30–99)
ALT SERPL-CCNC: 32 U/L (ref 2–50)
ANION GAP SERPL CALC-SCNC: 10 MMOL/L (ref 7–16)
ANION GAP SERPL CALC-SCNC: 7 MMOL/L (ref 7–16)
ANION GAP SERPL CALC-SCNC: 8 MMOL/L (ref 7–16)
ANION GAP SERPL CALC-SCNC: 9 MMOL/L (ref 7–16)
AST SERPL-CCNC: 37 U/L (ref 12–45)
BASOPHILS # BLD AUTO: 0.5 % (ref 0–1.8)
BASOPHILS # BLD: 0.07 K/UL (ref 0–0.12)
BILIRUB SERPL-MCNC: 0.5 MG/DL (ref 0.1–1.5)
BUN SERPL-MCNC: 13 MG/DL (ref 8–22)
BUN SERPL-MCNC: 14 MG/DL (ref 8–22)
BUN SERPL-MCNC: 14 MG/DL (ref 8–22)
BUN SERPL-MCNC: 15 MG/DL (ref 8–22)
CALCIUM ALBUM COR SERPL-MCNC: 9.2 MG/DL (ref 8.5–10.5)
CALCIUM SERPL-MCNC: 8.1 MG/DL (ref 8.5–10.5)
CALCIUM SERPL-MCNC: 8.2 MG/DL (ref 8.5–10.5)
CALCIUM SERPL-MCNC: 8.3 MG/DL (ref 8.5–10.5)
CALCIUM SERPL-MCNC: 8.4 MG/DL (ref 8.5–10.5)
CHLORIDE SERPL-SCNC: 125 MMOL/L (ref 96–112)
CHLORIDE SERPL-SCNC: 126 MMOL/L (ref 96–112)
CHLORIDE SERPL-SCNC: 127 MMOL/L (ref 96–112)
CHLORIDE SERPL-SCNC: 129 MMOL/L (ref 96–112)
CO2 SERPL-SCNC: 20 MMOL/L (ref 20–33)
CO2 SERPL-SCNC: 21 MMOL/L (ref 20–33)
CREAT SERPL-MCNC: 0.61 MG/DL (ref 0.5–1.4)
CREAT SERPL-MCNC: 0.62 MG/DL (ref 0.5–1.4)
CREAT SERPL-MCNC: 0.7 MG/DL (ref 0.5–1.4)
CREAT SERPL-MCNC: 0.73 MG/DL (ref 0.5–1.4)
EKG IMPRESSION: NORMAL
EOSINOPHIL # BLD AUTO: 0.2 K/UL (ref 0–0.51)
EOSINOPHIL NFR BLD: 1.4 % (ref 0–6.9)
ERYTHROCYTE [DISTWIDTH] IN BLOOD BY AUTOMATED COUNT: 48.4 FL (ref 35.9–50)
GFR SERPLBLD CREATININE-BSD FMLA CKD-EPI: 102 ML/MIN/1.73 M 2
GFR SERPLBLD CREATININE-BSD FMLA CKD-EPI: 102 ML/MIN/1.73 M 2
GFR SERPLBLD CREATININE-BSD FMLA CKD-EPI: 94 ML/MIN/1.73 M 2
GFR SERPLBLD CREATININE-BSD FMLA CKD-EPI: 99 ML/MIN/1.73 M 2
GLOBULIN SER CALC-MCNC: 3 G/DL (ref 1.9–3.5)
GLUCOSE SERPL-MCNC: 113 MG/DL (ref 65–99)
GLUCOSE SERPL-MCNC: 119 MG/DL (ref 65–99)
GLUCOSE SERPL-MCNC: 144 MG/DL (ref 65–99)
GLUCOSE SERPL-MCNC: 152 MG/DL (ref 65–99)
HCT VFR BLD AUTO: 36.5 % (ref 37–47)
HGB BLD-MCNC: 11.6 G/DL (ref 12–16)
IMM GRANULOCYTES # BLD AUTO: 0.11 K/UL (ref 0–0.11)
IMM GRANULOCYTES NFR BLD AUTO: 0.8 % (ref 0–0.9)
LYMPHOCYTES # BLD AUTO: 1.27 K/UL (ref 1–4.8)
LYMPHOCYTES NFR BLD: 8.8 % (ref 22–41)
MAGNESIUM SERPL-MCNC: 1.9 MG/DL (ref 1.5–2.5)
MCH RBC QN AUTO: 28.9 PG (ref 27–33)
MCHC RBC AUTO-ENTMCNC: 31.8 G/DL (ref 32.2–35.5)
MCV RBC AUTO: 91 FL (ref 81.4–97.8)
MONOCYTES # BLD AUTO: 1.16 K/UL (ref 0–0.85)
MONOCYTES NFR BLD AUTO: 8 % (ref 0–13.4)
NEUTROPHILS # BLD AUTO: 11.6 K/UL (ref 1.82–7.42)
NEUTROPHILS NFR BLD: 80.5 % (ref 44–72)
NRBC # BLD AUTO: 0.03 K/UL
NRBC BLD-RTO: 0.2 /100 WBC (ref 0–0.2)
PHOSPHATE SERPL-MCNC: 3.1 MG/DL (ref 2.5–4.5)
PLATELET # BLD AUTO: 245 K/UL (ref 164–446)
PMV BLD AUTO: 10.3 FL (ref 9–12.9)
POTASSIUM SERPL-SCNC: 3.1 MMOL/L (ref 3.6–5.5)
POTASSIUM SERPL-SCNC: 3.2 MMOL/L (ref 3.6–5.5)
POTASSIUM SERPL-SCNC: 3.3 MMOL/L (ref 3.6–5.5)
POTASSIUM SERPL-SCNC: 3.5 MMOL/L (ref 3.6–5.5)
PROT SERPL-MCNC: 5.8 G/DL (ref 6–8.2)
RBC # BLD AUTO: 4.01 M/UL (ref 4.2–5.4)
SODIUM SERPL-SCNC: 154 MMOL/L (ref 135–145)
SODIUM SERPL-SCNC: 155 MMOL/L (ref 135–145)
SODIUM SERPL-SCNC: 157 MMOL/L (ref 135–145)
SODIUM SERPL-SCNC: 158 MMOL/L (ref 135–145)
WBC # BLD AUTO: 14.4 K/UL (ref 4.8–10.8)

## 2023-11-01 PROCEDURE — 97535 SELF CARE MNGMENT TRAINING: CPT

## 2023-11-01 PROCEDURE — 99232 SBSQ HOSP IP/OBS MODERATE 35: CPT | Performed by: PSYCHIATRY & NEUROLOGY

## 2023-11-01 PROCEDURE — 93005 ELECTROCARDIOGRAM TRACING: CPT | Performed by: NURSE PRACTITIONER

## 2023-11-01 PROCEDURE — 700102 HCHG RX REV CODE 250 W/ 637 OVERRIDE(OP): Performed by: RADIOLOGY

## 2023-11-01 PROCEDURE — 80048 BASIC METABOLIC PNL TOTAL CA: CPT | Mod: 91

## 2023-11-01 PROCEDURE — 770022 HCHG ROOM/CARE - ICU (200)

## 2023-11-01 PROCEDURE — 700101 HCHG RX REV CODE 250: Performed by: INTERNAL MEDICINE

## 2023-11-01 PROCEDURE — 700102 HCHG RX REV CODE 250 W/ 637 OVERRIDE(OP): Performed by: INTERNAL MEDICINE

## 2023-11-01 PROCEDURE — 93010 ELECTROCARDIOGRAM REPORT: CPT | Performed by: INTERNAL MEDICINE

## 2023-11-01 PROCEDURE — 700105 HCHG RX REV CODE 258: Performed by: INTERNAL MEDICINE

## 2023-11-01 PROCEDURE — 700111 HCHG RX REV CODE 636 W/ 250 OVERRIDE (IP): Performed by: INTERNAL MEDICINE

## 2023-11-01 PROCEDURE — 700105 HCHG RX REV CODE 258: Performed by: RADIOLOGY

## 2023-11-01 PROCEDURE — 700101 HCHG RX REV CODE 250: Performed by: RADIOLOGY

## 2023-11-01 PROCEDURE — 97530 THERAPEUTIC ACTIVITIES: CPT

## 2023-11-01 PROCEDURE — 99232 SBSQ HOSP IP/OBS MODERATE 35: CPT | Performed by: NURSE PRACTITIONER

## 2023-11-01 PROCEDURE — 99291 CRITICAL CARE FIRST HOUR: CPT | Performed by: INTERNAL MEDICINE

## 2023-11-01 PROCEDURE — A9270 NON-COVERED ITEM OR SERVICE: HCPCS | Performed by: INTERNAL MEDICINE

## 2023-11-01 PROCEDURE — 83735 ASSAY OF MAGNESIUM: CPT

## 2023-11-01 PROCEDURE — 94669 MECHANICAL CHEST WALL OSCILL: CPT

## 2023-11-01 PROCEDURE — 94640 AIRWAY INHALATION TREATMENT: CPT

## 2023-11-01 PROCEDURE — 94760 N-INVAS EAR/PLS OXIMETRY 1: CPT

## 2023-11-01 PROCEDURE — 700111 HCHG RX REV CODE 636 W/ 250 OVERRIDE (IP): Mod: JZ | Performed by: INTERNAL MEDICINE

## 2023-11-01 PROCEDURE — 84100 ASSAY OF PHOSPHORUS: CPT

## 2023-11-01 PROCEDURE — A9270 NON-COVERED ITEM OR SERVICE: HCPCS | Performed by: RADIOLOGY

## 2023-11-01 PROCEDURE — 80053 COMPREHEN METABOLIC PANEL: CPT

## 2023-11-01 PROCEDURE — 85025 COMPLETE CBC W/AUTO DIFF WBC: CPT

## 2023-11-01 PROCEDURE — 31720 CLEARANCE OF AIRWAYS: CPT

## 2023-11-01 RX ORDER — MAGNESIUM SULFATE HEPTAHYDRATE 40 MG/ML
2 INJECTION, SOLUTION INTRAVENOUS ONCE
Status: COMPLETED | OUTPATIENT
Start: 2023-11-01 | End: 2023-11-01

## 2023-11-01 RX ORDER — POTASSIUM CHLORIDE 7.45 MG/ML
10 INJECTION INTRAVENOUS ONCE
Status: COMPLETED | OUTPATIENT
Start: 2023-11-01 | End: 2023-11-01

## 2023-11-01 RX ORDER — POTASSIUM CHLORIDE 14.9 MG/ML
20 INJECTION INTRAVENOUS ONCE
Status: COMPLETED | OUTPATIENT
Start: 2023-11-01 | End: 2023-11-02

## 2023-11-01 RX ORDER — 3% SODIUM CHLORIDE 3 G/100ML
INJECTION, SOLUTION INTRAVENOUS CONTINUOUS
Status: DISCONTINUED | OUTPATIENT
Start: 2023-11-01 | End: 2023-11-02

## 2023-11-01 RX ORDER — POTASSIUM CHLORIDE 14.9 MG/ML
20 INJECTION INTRAVENOUS ONCE
Status: COMPLETED | OUTPATIENT
Start: 2023-11-01 | End: 2023-11-01

## 2023-11-01 RX ADMIN — SODIUM CHLORIDE 2 G: 1 TABLET ORAL at 14:12

## 2023-11-01 RX ADMIN — ATORVASTATIN CALCIUM 80 MG: 40 TABLET, FILM COATED ORAL at 05:21

## 2023-11-01 RX ADMIN — IPRATROPIUM BROMIDE AND ALBUTEROL SULFATE 3 ML: 2.5; .5 SOLUTION RESPIRATORY (INHALATION) at 15:15

## 2023-11-01 RX ADMIN — ACETYLCYSTEINE 4 ML: 200 SOLUTION ORAL; RESPIRATORY (INHALATION) at 11:02

## 2023-11-01 RX ADMIN — SODIUM CHLORIDE 2 G: 1 TABLET ORAL at 22:17

## 2023-11-01 RX ADMIN — POTASSIUM CHLORIDE 20 MEQ: 14.9 INJECTION, SOLUTION INTRAVENOUS at 22:20

## 2023-11-01 RX ADMIN — IPRATROPIUM BROMIDE AND ALBUTEROL SULFATE 3 ML: 2.5; .5 SOLUTION RESPIRATORY (INHALATION) at 11:02

## 2023-11-01 RX ADMIN — ACETYLCYSTEINE 4 ML: 200 SOLUTION ORAL; RESPIRATORY (INHALATION) at 07:22

## 2023-11-01 RX ADMIN — LOSARTAN POTASSIUM 50 MG: 50 TABLET, FILM COATED ORAL at 05:21

## 2023-11-01 RX ADMIN — ASPIRIN 81 MG 81 MG: 81 TABLET ORAL at 05:21

## 2023-11-01 RX ADMIN — SODIUM CHLORIDE: 3 INJECTION, SOLUTION INTRAVENOUS at 15:56

## 2023-11-01 RX ADMIN — GUAIFENESIN 200 MG: 100 SOLUTION ORAL at 01:14

## 2023-11-01 RX ADMIN — SODIUM CHLORIDE 7.5 MG/HR: 9 INJECTION, SOLUTION INTRAVENOUS at 01:14

## 2023-11-01 RX ADMIN — POTASSIUM CHLORIDE 20 MEQ: 14.9 INJECTION, SOLUTION INTRAVENOUS at 15:59

## 2023-11-01 RX ADMIN — ACETYLCYSTEINE 4 ML: 200 SOLUTION ORAL; RESPIRATORY (INHALATION) at 18:43

## 2023-11-01 RX ADMIN — POTASSIUM CHLORIDE 10 MEQ: 7.46 INJECTION, SOLUTION INTRAVENOUS at 10:47

## 2023-11-01 RX ADMIN — GUAIFENESIN 200 MG: 100 SOLUTION ORAL at 05:22

## 2023-11-01 RX ADMIN — SODIUM CHLORIDE 2 G: 1 TABLET ORAL at 05:21

## 2023-11-01 RX ADMIN — IPRATROPIUM BROMIDE AND ALBUTEROL SULFATE 3 ML: 2.5; .5 SOLUTION RESPIRATORY (INHALATION) at 07:22

## 2023-11-01 RX ADMIN — POTASSIUM CHLORIDE 10 MEQ: 7.46 INJECTION, SOLUTION INTRAVENOUS at 02:56

## 2023-11-01 RX ADMIN — ACETAMINOPHEN 650 MG: 325 TABLET, FILM COATED ORAL at 11:36

## 2023-11-01 RX ADMIN — AMLODIPINE BESYLATE 10 MG: 10 TABLET ORAL at 05:21

## 2023-11-01 RX ADMIN — MAGNESIUM SULFATE HEPTAHYDRATE 2 G: 2 INJECTION, SOLUTION INTRAVENOUS at 09:51

## 2023-11-01 RX ADMIN — ENOXAPARIN SODIUM 40 MG: 100 INJECTION SUBCUTANEOUS at 17:30

## 2023-11-01 RX ADMIN — GUAIFENESIN 200 MG: 100 SOLUTION ORAL at 14:11

## 2023-11-01 RX ADMIN — ACETYLCYSTEINE 4 ML: 200 SOLUTION ORAL; RESPIRATORY (INHALATION) at 15:15

## 2023-11-01 RX ADMIN — FLUDROCORTISONE ACETATE 0.1 MG: 0.1 TABLET ORAL at 05:21

## 2023-11-01 RX ADMIN — EPTIFIBATIDE 1 MCG/KG/MIN: 0.75 INJECTION, SOLUTION INTRAVENOUS at 23:55

## 2023-11-01 RX ADMIN — POTASSIUM CHLORIDE 20 MEQ: 14.9 INJECTION, SOLUTION INTRAVENOUS at 08:29

## 2023-11-01 RX ADMIN — GUAIFENESIN 200 MG: 100 SOLUTION ORAL at 22:17

## 2023-11-01 RX ADMIN — GUAIFENESIN 200 MG: 100 SOLUTION ORAL at 11:35

## 2023-11-01 RX ADMIN — POTASSIUM CHLORIDE 20 MEQ: 14.9 INJECTION, SOLUTION INTRAVENOUS at 00:27

## 2023-11-01 RX ADMIN — GUAIFENESIN 200 MG: 100 SOLUTION ORAL at 17:30

## 2023-11-01 RX ADMIN — MONTELUKAST 10 MG: 10 TABLET, FILM COATED ORAL at 05:22

## 2023-11-01 RX ADMIN — LABETALOL HYDROCHLORIDE 10 MG: 5 INJECTION INTRAVENOUS at 23:35

## 2023-11-01 RX ADMIN — SODIUM CHLORIDE 5 MG/HR: 9 INJECTION, SOLUTION INTRAVENOUS at 04:49

## 2023-11-01 RX ADMIN — IPRATROPIUM BROMIDE AND ALBUTEROL SULFATE 3 ML: 2.5; .5 SOLUTION RESPIRATORY (INHALATION) at 18:43

## 2023-11-01 ASSESSMENT — COGNITIVE AND FUNCTIONAL STATUS - GENERAL
WALKING IN HOSPITAL ROOM: TOTAL
MOVING TO AND FROM BED TO CHAIR: UNABLE
PERSONAL GROOMING: A LOT
STANDING UP FROM CHAIR USING ARMS: A LOT
MOVING FROM LYING ON BACK TO SITTING ON SIDE OF FLAT BED: A LOT
DRESSING REGULAR LOWER BODY CLOTHING: TOTAL
TURNING FROM BACK TO SIDE WHILE IN FLAT BAD: UNABLE
DAILY ACTIVITIY SCORE: 8
SUGGESTED CMS G CODE MODIFIER DAILY ACTIVITY: CM
MOBILITY SCORE: 8
DRESSING REGULAR UPPER BODY CLOTHING: A LOT
EATING MEALS: TOTAL
TOILETING: TOTAL
HELP NEEDED FOR BATHING: TOTAL
CLIMB 3 TO 5 STEPS WITH RAILING: TOTAL
SUGGESTED CMS G CODE MODIFIER MOBILITY: CM

## 2023-11-01 ASSESSMENT — PAIN DESCRIPTION - PAIN TYPE
TYPE: ACUTE PAIN

## 2023-11-01 ASSESSMENT — GAIT ASSESSMENTS: GAIT LEVEL OF ASSIST: UNABLE TO PARTICIPATE

## 2023-11-01 NOTE — PROGRESS NOTES
"Critical Care Progress Note    Date of admission  10/26/2023    Chief Complaint  58 y.o. female admitted 10/26/2023 with aphasia and right-sided body weakness    Hospital Course  Edited HPI from Dr. Tejeda note from previous date of service:  \" 58 y.o. female who presented 10/26/2023 with a stroke alert.  Patient was brought by emergency medical services to the emergency department after her roommate noticed the patient having aphasia, right-sided body weakness (upper and lower limbs) earlier today.  At initial evaluation in the emergency department she was found to be confused with altered mental status, last time known at her normal was around 10 PM on 10/26/2023.  Has remained nonverbal in the ED, blood pressure was in the 130s over 80s, glucose was within normal limits.  The patient has a past medical history of left ICA aneurysm status post stent placement with diversion of flow with subsequent treatment with Brilinta.  Per notes it seems that she was not tolerating well the Brilinta?  But was advised to continue taking it.  Diagnostic work-up in the ED with a CTA shows no flow through the left ICA for what neurology Dr. Akbar was consulted.  Given recent neurosurgical intervention she was deemed poor candidate for thrombolytics due to high risk of bleeding.  IR was consulted for emergent thrombectomy.\"    Brought in by EMS for aphasia, right-sided weakness, LKW 10 PM 10/26/2023  PMH left ICA aneurysm status post stents with diversion of flow and subsequent treatment with Brilinta.  Allergic to Plavix     CTA of the neck revealed occlusion of mid left ICA through intracranial stent at the distal left ICA with large penumbra. CTA of the head revealed no LVO     Neuro consulted, poor candidate for thrombolytics due to high risk of bleeding given recent neurosurgical intervention  Taken to [facility] suite, found to have left ICA in-stent thrombosis s/p thrombectomy with TICI 3 flow and started on low-dose Integrilin " drip.      Initial concern for noncompliance with brillinta, however TEG showed therapeutic ADP inhibition suggesting compliance    10/28: acute left MCA distribution infarct, and old right posterior frontal infarct  10/29: Rapidly worsening hypoxia overnight from 3LPm -> %, CXR unremarkable  CT head showing no changes  CTA chest no PE, complete atelectasis/collaspse of the LLL and mucous plugging of the RLL as well  10/30: d/w neurology - at this time prognosis is not clear and will likely need prolonged time to assess recovery; suggest PEG  Was able to work with PT today  10/31: GI consult; family had agreed to PEG.  11/1: can start titrating hypertonic saline down    Interval Problem Update  Chart review from the past 24 hours includes imaging, laboratory studies, vital signs and notes available.  Pertinent data for today's visit includes      Cardiac: ST, -1500s, TICI 3: goal -140   Pulm: NC 3 l; IPV tid  Neuro: Global aphasia, ?able to follow simple commands, somnolent but opens eyes briefly to vocal stimulus. Pupils equal 3mm sluggish symmetrical, gaze crossing midline now, good cough, clearing secretions, L 4/4 uppers/lowers and purposeful, R w/d to pain  Heme: Mild leukocytosis, unchanged  Renal/volume status: Cr < 1   ID:  AF, no abx  GI/endo: BMP glucose at goal, tube feeds going and advancing  Labs/Imaging: Na 157, goal 150-160 on florinef, HTS 3% at 60 cc per protocol  Lines: PICC, purewick    Review of Systems  Review of Systems   Unable to perform ROS: Medical condition      Vital Signs for last 24 hours   Temp:  [36.6 °C (97.8 °F)-37.1 °C (98.8 °F)] 36.9 °C (98.4 °F)  Pulse:  [] 98  Resp:  [19-32] 20  BP: (112-142)/(57-67) 132/63  SpO2:  [87 %-96 %] 96 %    Physical Exam   Physical Exam  Vitals and nursing note reviewed.   Constitutional:       General: She is not in acute distress.     Appearance: She is ill-appearing. She is not toxic-appearing.   HENT:      Head:  Normocephalic and atraumatic.      Nose: Nose normal.      Mouth/Throat:      Mouth: Mucous membranes are moist.   Eyes:      Pupils: Pupils are equal, round, and reactive to light.   Cardiovascular:      Rate and Rhythm: Normal rate and regular rhythm.      Pulses: Normal pulses.      Heart sounds: Normal heart sounds. No murmur heard.     No gallop.   Pulmonary:      Effort: Pulmonary effort is normal. No respiratory distress.      Breath sounds: Normal breath sounds. No stridor. No wheezing or rales.   Abdominal:      General: There is no distension.      Tenderness: There is no abdominal tenderness. There is no guarding.   Musculoskeletal:         General: No swelling. Normal range of motion.      Right lower leg: No edema.      Left lower leg: No edema.   Lymphadenopathy:      Cervical: No cervical adenopathy.   Skin:     General: Skin is warm.      Capillary Refill: Capillary refill takes less than 2 seconds.      Findings: No rash.   Neurological:      Comments: Global aphasia, able to follow simple commands, somnolent but opens eyes  to vocal stimulus. Pupils equal 3mm sluggish symmetrical, gaze crossing midline now, good cough, clearing secretions, L 4/4 uppers/lowers, R 2/2 uppers/lowers.        Medications  Current Facility-Administered Medications   Medication Dose Route Frequency Provider Last Rate Last Admin    losartan (Cozaar) tablet 50 mg  50 mg Enteral Tube Q DAY Anna Marie Evans M.D.   50 mg at 11/01/23 0521    eptifibatide 0.75 mg/mL (Integrilin) infusion  1 mcg/kg/min Intravenous Continuous Anna Marie Evans M.D. 5.3 mL/hr at 10/31/23 1850 1 mcg/kg/min at 10/31/23 1850    acetylcysteine (Mucomyst) 20 % solution 3-5 mL  3-5 mL Inhalation 4X/DAY (RT) Anna Marie Evans M.D.   4 mL at 11/01/23 1102    amLODIPine (Norvasc) tablet 10 mg  10 mg Enteral Tube Q DAY Anna Marie Evans M.D.   10 mg at 11/01/23 0521    sodium chloride (Salt) tablet 2 g  2 g Enteral Tube Q8HRS Shiv VENTURA  SUE Gerardo   2 g at 11/01/23 0521    guaiFENesin (Robitussin) 100 MG/5ML liquid 200 mg  10 mL Enteral Tube Q4HRS Shiv Gerardo M.D.   200 mg at 11/01/23 1135    ipratropium-albuterol (DUONEB) nebulizer solution  3 mL Nebulization 4X/DAY (RT) Shiv Gerardo M.D.   3 mL at 11/01/23 1102    K+ Scale: Goal of 4.5  1 Each Intravenous Q6HR Shiv Gerardo M.D.   1 Each at 11/01/23 0300    Pharmacy Consult: Enteral tube insertion - review meds/change route/product selection  1 Each Other PHARMACY TO DOSE Shiv Gerardo M.D.        montelukast (Singulair) tablet 10 mg  10 mg Enteral Tube DAILY Shiv Gerardo M.D.   10 mg at 11/01/23 0522    senna-docusate (Pericolace Or Senokot S) 8.6-50 MG per tablet 2 Tablet  2 Tablet Enteral Tube BID Shiv Gerardo M.D.   2 Tablet at 10/30/23 1802    And    polyethylene glycol/lytes (Miralax) PACKET 1 Packet  1 Packet Enteral Tube QDAY PRN Shiv Gerardo M.D.        And    magnesium hydroxide (Milk Of Magnesia) suspension 30 mL  30 mL Enteral Tube QDAY PRN Shiv Gerardo M.D.        And    bisacodyl (Dulcolax) suppository 10 mg  10 mg Rectal QDAY PRN Shiv Gerardo M.D.        acetaminophen (Tylenol) tablet 650 mg  650 mg Enteral Tube Q6HRS PRN Shiv Gerardo M.D.   650 mg at 11/01/23 1136    enoxaparin (Lovenox) inj 40 mg  40 mg Subcutaneous DAILY AT 1800 Shiv Gerardo M.D.   40 mg at 10/31/23 1733    atorvastatin (Lipitor) tablet 80 mg  80 mg Enteral Tube DAILY Shiv Gerardo M.D.   80 mg at 11/01/23 0521    niCARdipine (Cardene) 25 mg in  mL Standard Infusion  0-15 mg/hr Intravenous Continuous Mauri Montes M.D. 50 mL/hr at 11/01/23 0449 5 mg/hr at 11/01/23 0449    labetalol (Normodyne/Trandate) injection 10 mg  10 mg Intravenous Q4HRS PRN Ken Tejeda M.D.   10 mg at 10/30/23 0107    hydrALAZINE (Apresoline) injection 20 mg  20 mg Intravenous Q6HRS PRN Ken Tejeda M.D.   20 mg at 10/29/23 4885    MD Alert...ICU  Electrolyte Replacement per Pharmacy   Other PHARMACY TO DOSE Shiv Gerardo M.D.        aspirin (Asa) chewable tab 81 mg  81 mg Enteral Tube DAILY Mauri Montes M.D.   81 mg at 11/01/23 0521    3% sodium chloride (Hypertonic Saline) 500mL infusion  0-60 mL/hr Intravenous Continuous Shiv Gerardo M.D.   Stopped at 10/31/23 1301       Fluids    Intake/Output Summary (Last 24 hours) at 11/1/2023 1248  Last data filed at 11/1/2023 1000  Gross per 24 hour   Intake 4433.7 ml   Output 2250 ml   Net 2183.7 ml       Laboratory          Recent Labs     10/30/23  0330 10/30/23  0904 10/31/23  0554 10/31/23  1155 10/31/23  1510 10/31/23  1830 10/31/23  2330 11/01/23  0530   SODIUM 153*   < > 159*   < > 161* 160* 158* 157*   POTASSIUM 3.0*   < > 2.9*   < > 2.9*  --  3.1* 3.2*   CHLORIDE 123*   < > 132*   < > 131*  --  129* 127*   CO2 22   < > 19*   < > 21  --  21 21   BUN 10   < > 13   < > 13  --  14 13   CREATININE 0.60   < > 0.62   < > 0.57  --  0.73 0.61   MAGNESIUM 1.9  --  1.9  --   --   --   --  1.9   PHOSPHORUS 2.2*  --  2.0*  --   --   --   --  3.1   CALCIUM 8.7   < > 7.6*   < > 8.2*  --  8.3* 8.2*    < > = values in this interval not displayed.     Recent Labs     10/30/23  0330 10/30/23  0904 10/30/23  1224 10/30/23  1345 10/31/23  0554 10/31/23  1155 10/31/23  1510 10/31/23  2330 11/01/23  0530   ALTSGPT 25  --   --   --  34  --   --   --  32   ASTSGOT 36  --   --   --  35  --   --   --  37   ALKPHOSPHAT 90  --   --   --  75  --   --   --  85   TBILIRUBIN 0.6  --   --   --  0.4  --   --   --  0.5   PREALBUMIN  --   --  10.4*  --   --   --   --   --   --    GLUCOSE 151*   < >  --    < > 131*   < > 151* 119* 113*    < > = values in this interval not displayed.     Recent Labs     10/30/23  0330 10/31/23  0554 11/01/23  0530   WBC 17.0* 15.4* 14.4*   NEUTSPOLYS 83.30* 83.00* 80.50*   LYMPHOCYTES 5.70* 7.30* 8.80*   MONOCYTES 9.30 8.30 8.00   EOSINOPHILS 0.10 0.20 1.40   BASOPHILS 0.40 0.50 0.50   ASTSGOT 36  35 37   ALTSGPT 25 34 32   ALKPHOSPHAT 90 75 85   TBILIRUBIN 0.6 0.4 0.5     Recent Labs     10/30/23  0330 10/31/23  0554 11/01/23  0530   RBC 4.65 4.09* 4.01*   HEMOGLOBIN 13.3 11.8* 11.6*   HEMATOCRIT 40.9 37.2 36.5*   PLATELETCT 261 241 245     Imaging  X-Ray:  I have personally reviewed the images and compared with prior images.  CT:    Reviewed  MRI:   Reviewed  10/28 acute left MCA distribution infarct, and old right posterior frontal infarct    Assessment/Plan    * Acute ischemic left internal carotid artery (ICA) stroke (HCC)- (present on admission)  Assessment & Plan  Recent left ICA aneurysm status post stent and flow diverter, subsequent treatment with Brilinta, allergic to Plavix  Developed in-stent thrombosis, initial concern for noncompliance with Brilinta, however TEG showed therapeutic ADP inhibition, underwent thrombectomy, integrilin bridged to ASA/Brilinta  MRI 10/28 acute left MCA distribution infarct, and old right posterior frontal infarct    SBP goal for TICI 2c/3: 100-140  ASA 81/Brillinta NGT transitioned to integrelin in anticipation for the PEG  Na goal 150-160, 3% HTS, salt tabs, decrease 3% drip down to 30 cc/hr stopped florinef as hypokalemia  q2H neurochecks  Atorvastatin 80  euglycemia <180mg/dL, TF   PT/OT  SLP- ok for ice chips  IR PICC placed 10/28  Family has agreed to PEG- plan for 11/2 with integrallin on board    Hypokalemia  Assessment & Plan  Replace and monitor    Acute hypoxemic respiratory failure (HCC)  Assessment & Plan  CTA chest no PE, complete atelectasis/collaspse of the LLL and mucous plugging of the RLL as well  Hypoxia due to shunting from total left lower lobe atelectasis/collapse from retention of secretions  Afebrile, no antibiotics  Likely due to inability to protect airway and poor effort from catastrophic CVA  Mobilization is helping and sats improved and maintained on NC  Aggressive pulmonary toileting with IPV/DuoNeb/hypertonic saline/PEP    Extrinsic  asthma without complication- (present on admission)  Assessment & Plan  duonebs prn  Continue montelukast home dose    Goals of care, counseling/discussion  Assessment & Plan  See associated advance care planning note from 10/29  In summary, due to recent respiratory decline family meeting was held with her brother over the phone [place] in [place] and her Sister Eusebia at bedside.  All are in agreement that the patient would want change of CODE STATUS to DNR/DNI.    Dyslipidemia- (present on admission)  Assessment & Plan  Lipitor  80 for high intensity therapy    Primary hypertension- (present on admission)  Assessment & Plan  TICI 2c/3: -140  Cardene gtt and IV PRNs  amlodipine and losartan       VTE:  Lovenox starting  Ulcer: Not Indicated  Lines:  PICC, purewick     I have performed a physical exam and reviewed and updated ROS and Plan today (11/1/2023). In review of yesterday's note (10/31/2023), there are no changes except as documented above.     Discussed patient condition and risk of morbidity and/or mortality with RN, Therapies, Pharmacy, Code status disscussed, Charge nurse / hot rounds, Patient, and neurology    The patient remains critically ill.  Critical care time = 33 minutes in directly providing and coordinating critical care and extensive data review.  No time overlap and excludes procedures.    This note was generated using voice recognition software which has a chance of producing errors of grammar and content.  I have made every reasonable attempt to find and correct any errors, but it should be expected that some may not be found prior to finalization of this note.

## 2023-11-01 NOTE — CARE PLAN
The patient is Stable - Low risk of patient condition declining or worsening    Shift Goals  Clinical Goals: -140, Q4 NC's, Na goal 1150-160  Patient Goals: BAKARI: nonverbal  Family Goals: BAKARI: not present    Progress made toward(s) clinical / shift goals:        Problem: Neuro Status  Goal: Neuro status will remain stable or improve  Description: Target End Date:  Prior to discharge or change in level of care    Document on Neuro assessment in the Assessment flowsheet    1.  Assess and monitor neurologic status per provider order/protocol/unit policy  2.  Assess level of consciousness and orientation  3.  Assess for speech, dysarthria, dysphagia, facial symmetry  4.  Assess visual field, eye movements, gaze preference, pupil reaction and size  5.  Assess muscle strength and motor response in all four extremities  6.  Assess for sensation (numbness and tingling)  7.  Assess basic neuro reflexes (cough, gag, corneal)  8.  Identify changes in neuro status and report to provider for testing/treatment orders  11/1/2023 0547 by Danitza Dodson R.N.  Outcome: Progressing  11/1/2023 0547 by Danitza Dodson R.N.  Outcome: Progressing  11/1/2023 0546 by Danitza Dodson R.N.  Outcome: Progressing     Problem: Safety - Medical Restraint  Goal: Remains free of injury from restraints (Restraint for Interference with Medical Device)  Description: INTERVENTIONS:  1. Determine that other, less restrictive measures have been tried or would not be effective before applying the restraint  2. Evaluate the patient's condition at the time of restraint application  3. Educate patient/family regarding the reason for restraint  4. Q2H: Monitor safety, psychosocial status, comfort, circulation, respiratory status, LOC, nutrition and hydration  11/1/2023 0547 by Danitza Dodson R.N.  Outcome: Progressing  11/1/2023 0547 by Danitza Dodson R.N.  Outcome: Progressing  11/1/2023 0546 by Danitza Dodson R.N.  Outcome: Progressing      Problem: Mobility - Stroke  Goal: Patient's capacity to carry out activities will improve  Description: Target End Date:  Prior to discharge or change in level of care    1.  Assess for barriers to mobility/activity  2.  Implement activity per interdisciplinary team recommendations  3.  Target activity level identified and patient/family/caregiver aware of goal  4.  Provide assistive devices  5.  Instruct patient/caregiver on proper use of assistive/adaptive devices  6.  Schedule activities and rest periods to decrease effects of fatigue  7.  Encourage mobilization to extent of ability  8.  Maintain proper body alignment  9.  Provide adequate pain management to allow progressive mobilization  10. Implement pace maker precautions as needed  11/1/2023 0547 by Danitza Dodson R.N.  Outcome: Progressing  11/1/2023 0547 by Danitza Dodson R.N.  Outcome: Progressing  11/1/2023 0546 by Danitza Dodson R.N.  Outcome: Progressing     Problem: Optimal Care of the Stroke Patient  Goal: Optimal emergency care for the stroke patient  Description: Target End Date:  End of day 1    Time of Onset    1.  Time of last known well obtained  2.  Patient and family/caregiver verbalize understanding of diagnosis, medications and testing  3.  NIHSS performed and documented, including date and time, for ischemic stroke patients prior to any acute recanalization therapy (thrombolytics or mechanical) or within 12 hours of arrival if no intervention is warranted  4.  Consults and referrals placed to appropriate departments    Medications Administration as Ordered:    1.  Implement appropriate reversal agents for INR greater than 1.5  2.  Pre-alteplase administration of antihypertensives for SBP >185 DBP >110  3.  Post-alteplase administration of antihypertensives for SBP >185, DBP >105  4.  Thrombolytic Therapy for qualifying ischemic stroke patients who arrive within 4.5 hours of time of Last Known Well. Thrombolytic therapy administered  within 30 minutes or a documented reason for delay  11/1/2023 0547 by Danitza Dodson R.N.  Outcome: Progressing  11/1/2023 0547 by Danitza Dodson R.N.  Outcome: Progressing  11/1/2023 0546 by Danitza Dodson R.N.  Outcome: Progressing      Patient is not progressing towards the following goals:

## 2023-11-01 NOTE — PROGRESS NOTES
Radiology Progress Note   Author: JUANCHO Johnston Date & Time created: 11/1/2023  9:06 AM   Date of admission  10/26/2023  Note to reader: this note follows the APSO format rather than the historical SOAP format. Assessment and plan located at the top of the note for ease of use.    Chief Complaint  58 y.o. 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 Montes on 10/19/23 now on Brilinta returned to the ED 10/26/23 with aphasia and right-sided weakness.  CT head showed left internal carotid artery occlusion extending to distal carotid flow diverter.  Patient underwent a left ICA stent thrombectomy with TICI 3 recanalization with IR Dr. Montes on 10/27/23.  It is unclear whether patient missed any of her Brilinta prior to the flow diverter occlusion.    Interval history:  10/30/23 - patient was seen with DINH Damon.  MRI shows acute left MCA territory infarct.  Patient awake but nonresponsive.  No movement of right upper and lower extremities.    0/31/23 - patient more lethargic this morning but still opens eyes when her name is called.  Bedside nurse reports patient waxes and wanes.     11/01/23 - neuro exam unchanged.  Patient bridged to Integrilin for PEG tube placement tomorrow with GI.      Assessment/Plan     Principal Problem:    Acute ischemic left internal carotid artery (ICA) stroke  (HCC)  Active Problems:    Primary hypertension    Dyslipidemia    Goals of care, counseling/discussion    Extrinsic asthma without complication    Acute hypoxemic respiratory failure (HCC)      Plan IR  - Brilinta stopped and patient bridged to Integrilin pending PEG tube placement.  - Restart Brilinta 90 mg twice daily and aspirin 81 mg daily following PEG tube placement  - Q 2 hour neuro checks per neurology recommendation.  - Maintain SBP between 100-140.     -Thank you for allowing Interventional Radiology team to participate in the patients care, if any additional care or requests are needed in the future please do not hesitate call or place IR order   [phone]           Review of Systems  Physical Exam   Review of Systems   Reason unable to perform ROS: Unresponsive.      Vitals:    11/01/23 0726   BP:    Pulse: (!) 101   Resp: 20   Temp:    SpO2: 94%        Physical Exam  Constitutional:       General: She is not in acute distress.  Cardiovascular:      Rate and Rhythm: Normal rate.      Pulses: Normal pulses.   Pulmonary:      Effort: Pulmonary effort is normal.   Abdominal:      Palpations: Abdomen is soft.   Skin:     General: Skin is warm and dry.      Comments: Right groin access site soft, nontender, dressing CDI   Neurological:      Motor: Weakness present.      Comments: awake, nonverbal, opens eyes when name is called  PERRLA; Mild left gaze preference  Moves left upper and lower extremities spontaneously   withdraws minimally to noxious stimuli in right upper and lower extremity.              Labs    Recent Labs     10/30/23  0330 10/31/23  0554 11/01/23  0530   WBC 17.0* 15.4* 14.4*   RBC 4.65 4.09* 4.01*   HEMOGLOBIN 13.3 11.8* 11.6*   HEMATOCRIT 40.9 37.2 36.5*   MCV 88.0 91.0 91.0   MCH 28.6 28.9 28.9   MCHC 32.5 31.7* 31.8*   RDW 45.1 47.2 48.4   PLATELETCT 261 241 245   MPV 9.6 9.8 10.3       Recent Labs     10/31/23  1510 10/31/23  1830 10/31/23  2330 11/01/23  0530   SODIUM 161* 160* 158*  157*   POTASSIUM 2.9*  --  3.1* 3.2*   CHLORIDE 131*  --  129* 127*   CO2 21  --  21 21   GLUCOSE 151*  --  119* 113*   BUN 13  --  14 13   CREATININE 0.57  --  0.73 0.61   CALCIUM 8.2*  --  8.3* 8.2*       Recent Labs     10/30/23  0330 10/30/23  0904 10/31/23  0554 10/31/23  1155 10/31/23  1510 10/31/23  2330 11/01/23  0530   ALBUMIN 3.7  --  2.7*  --   --   --  2.8*   TBILIRUBIN 0.6  --  0.4  --   --   --  0.5   ALKPHOSPHAT 90  --  75  --   --   --  85   TOTPROTEIN 6.3  --  5.5*  --   --   --  5.8*   ALTSGPT 25  --  34  --   --   --  32   ASTSGOT 36  --  35  --   --   --  37   CREATININE 0.60   < > 0.62   < > 0.57 0.73 0.61    < > = values in this interval not displayed.       DX-ABDOMEN FOR TUBE PLACEMENT   Final Result         1.  Nonspecific bowel gas pattern in the upper abdomen.   2.  Nasogastric tube tip terminates overlying the expected location of the gastric body.   3.  Hazy left lower lobe infiltrates and trace left pleural effusion      CT-CTA CHEST PULMONARY ARTERY W/ RECONS   Final Result         1.  No evidence of pulmonary embolism.   2.  Left lower lobe atelectasis/collapse.   3.  Mucous plugging of the right lower lobe bronchi and some mild dependent atelectasis in the right lower lobe.   4.  Small hiatal hernia.      Fleischner Society pulmonary nodule recommendations:   Not applicable      CT-HEAD W/O   Final Result      Evolving acute large left cerebral infarct.      No acute hemorrhage.      Chronic ischemic changes.      DX-CHEST-PORTABLE (1 VIEW)   Final Result      No acute process.      IR-PICC LINE PLACEMENT W/ GUIDANCE > AGE 5   Final Result                  Ultrasound-guided PICC placement performed by qualified nursing staff as    above.          MR-BRAIN-W/O   Final Result         1. Acute left MCA distribution infarct, as above.   2. 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   3. Mild chronic ischemic white matter migration.      CT-HEAD W/O   Final Result      1.  Possible  mild left frontotemporal edema and a present significantly improved      2.  Negative for hemorrhage or mass effect      3.  Right frontotemporal and encephalomalacia         DX-ABDOMEN FOR TUBE PLACEMENT   Final Result      1.  Enteric tube overlies the stomach.      CT-HEAD W/O   Final Result         1.  No acute intracranial abnormality is identified, there are nonspecific white matter changes, commonly associated with small vessel ischemic disease.  Associated mild cerebral atrophy is noted.   2.  Left maxillary sinusitis changes   3.  Atherosclerosis.         IR-THROMBO MECHANICAL ARTERY,INIT   Final Result      1.  Occluded LEFT internal carotid artery-LEFT internal carotid flow diverter.   2.  Successful mechanical thrombectomy.   3.  TICI 2c      DX-CHEST-PORTABLE (1 VIEW)   Final Result         1.  Left basilar atelectasis, no focal infiltrate   2.  Atherosclerosis      CT-CTA NECK WITH & W/O-POST PROCESSING   Final Result         1.  Occlusion from the mid left internal carotid artery through intracranial stent at the distal left ICA.      These findings were discussed with the patient's clinician, Kiet Brown, on 10/27/2023 12:37 AM.      CT-CTA HEAD WITH & W/O-POST PROCESS   Final Result         1.  Left internal carotid artery occlusion extending up to distal carotid arterial stent.   2.  Decreased density within the left anterior cerebral artery, appearance suggesting slow flow or spasm.   3.  Small caliber of the left middle cerebral artery branches which taper and are not visualized most distally, changes most compatible with diminished flow due to left internal carotid artery occlusion.   4.  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   5.  3 mm basilar artery tip aneurysm   6.  2.3 mm aneurysm inferiorly at the right M1/M2 junction      CT-CEREBRAL PERFUSION ANALYSIS   Final Result         1.  Cerebral blood flow less than 30% likely representing completed infarct  "= 183 mL.      2.  T Max more than 6 seconds likely representing combination of completed infarct and ischemia = 280 mL.      3.  Mismatched volume likely representing ischemic brain/penumbra = 97 mL      4.  Please note that the cerebral perfusion was performed on the limited brain tissue around the basal ganglia region. Infarct/ischemia outside the CT perfusion sections can be missed in this study.      CT-HEAD W/O   Final Result         1.  No acute intracranial abnormality is identified, there are nonspecific white matter changes, commonly associated with small vessel ischemic disease.  Associated mild cerebral atrophy is noted.   2.  Left maxillary sinusitis changes      Note: Artifacts are present limiting diagnostic sensitivity of this exam.          INR   Date Value Ref Range Status   10/26/2023 1.00 0.87 - 1.13 Final     Comment:     INR - Non-therapeutic Reference Range: 0.87-1.13  INR - Therapeutic Reference Range: 2.0-4.0       No results found for: \"POCINR\"     Intake/Output Summary (Last 24 hours) at 10/30/2023 1614  Last data filed at 10/30/2023 1400  Gross per 24 hour   Intake 4433.53 ml   Output 3350 ml   Net 1083.53 ml      Labs not explicitly included in this progress note were reviewed by the author. Radiology/imaging not explicitly included in this progress note was reviewed by the author.     I have performed a physical exam and reviewed and updated ROS and Plan today (11/1/2023).     30 minutes in directly providing and coordinating care and extensive data review.  No time overlap and excludes procedures.   "

## 2023-11-01 NOTE — PROGRESS NOTES
Gastroenterology Progress Note               Author:  JUANCHO Castorena Date & Time Created: 11/1/2023 11:27 AM       Patient ID:  Name:             Roslyn Kilgore  YOB: 1965  Age:                 58 y.o.  female  MRN:               [id number]        Medical Decision Making, by Problem:  Active Hospital Problems    Diagnosis     Acute hypoxemic respiratory failure (HCC) [J96.01]     Acute ischemic left internal carotid artery (ICA) stroke (HCC) [I63.232]     Extrinsic asthma without complication [J45.909]     Goals of care, counseling/discussion [Z71.89]     Dyslipidemia [E78.5]     Primary hypertension [I10]            Presenting Chief Complaint:  OP dysphagia        History of Present Illness:    This is a  58 y.o. female with acute ischemic left internal carotid artery stroke on 10/26/23.  She had a stent with diversion of flow placed 10/19/23, allergy to Plavix and started on Brilinta.  She was deemed poor candidate for thrombolytic due to recent neurosurgical intervention and underwent thrombectomy of a left ICA in stent thrombosis.  She has been on ASA and Brilinta and DVT prophylaxis with Lovenox.  She also experienced acute respiratory failure likely related to inability to protect airway.  She is DNR/DNI unless increased work of breathing and then discussion for transition to comfort care per 10/29 ICU note. As of 10/30, prognosis not clear and will need prolonged time to assess recovery.     Evaluated by SLP yesterday and high risk for aspiration due to severe swallowing impairment.  Recommend NPO.         Interval History:  11/1/2023: Patient seen at bedside with brother.  Discussed PEG tube and plan for placement tomorrow. Patient's brother states patient can raise her thumb to respond and he deferred to patient to answer and supports her getting  PEG tube. After bedside encounter, called patient's brother and he provided verbal consent to proceed with PEG tube placement.           Hospital Medications:  Current Facility-Administered Medications   Medication Dose Frequency Provider Last Rate Last Admin    potassium chloride (Kcl) ivpb 10 mEq  10 mEq Once Anna Marie Evans M.D. 100 mL/hr at 11/01/23 1047 10 mEq at 11/01/23 1047    magnesium sulfate IVPB premix 2 g  2 g Once Anna Marie Evans M.D. 25 mL/hr at 11/01/23 0951 2 g at 11/01/23 0951    losartan (Cozaar) tablet 50 mg  50 mg Q DAY Anna Marie Evans M.D.   50 mg at 11/01/23 0521    eptifibatide 0.75 mg/mL (Integrilin) infusion  1 mcg/kg/min Continuous Anna Marie Evans M.D. 5.3 mL/hr at 10/31/23 1850 1 mcg/kg/min at 10/31/23 1850    acetylcysteine (Mucomyst) 20 % solution 3-5 mL  3-5 mL 4X/DAY (RT) Anna Marie Evans M.D.   4 mL at 11/01/23 1102    amLODIPine (Norvasc) tablet 10 mg  10 mg Q DAY Anna Marie Evans M.D.   10 mg at 11/01/23 0521    sodium chloride (Salt) tablet 2 g  2 g Q8HRS Shiv Gerardo M.D.   2 g at 11/01/23 0521    guaiFENesin (Robitussin) 100 MG/5ML liquid 200 mg  10 mL Q4HRS Shiv Gerardo M.D.   200 mg at 11/01/23 0522    ipratropium-albuterol (DUONEB) nebulizer solution  3 mL 4X/DAY (RT) Shiv Gerardo M.D.   3 mL at 11/01/23 1102    K+ Scale: Goal of 4.5  1 Each Q6HR Shiv Gerardo M.D.   1 Each at 11/01/23 0300    Pharmacy Consult: Enteral tube insertion - review meds/change route/product selection  1 Each PHARMACY TO DOSE Shiv Gerardo M.D.        montelukast (Singulair) tablet 10 mg  10 mg DAILY Shiv Gerardo M.D.   10 mg at 11/01/23 0522    senna-docusate (Pericolace Or Senokot S) 8.6-50 MG per tablet 2 Tablet  2 Tablet BID Shiv Gerardo M.D.   2 Tablet at 10/30/23 1802    And    polyethylene glycol/lytes (Miralax) PACKET 1 Packet  1 Packet QDAY PRN Shiv Gerardo M.D.        And    magnesium hydroxide (Milk Of Magnesia) suspension 30 mL  30 mL QDAY PRN Shiv Gerardo M.D.        And    bisacodyl (Dulcolax) suppository 10 mg  10 mg QDAY PRN Shiv VENTURA  "SUE Gerardo        acetaminophen (Tylenol) tablet 650 mg  650 mg Q6HRS PRN Shiv Gerardo M.D.        enoxaparin (Lovenox) inj 40 mg  40 mg DAILY AT 1800 Shiv Gerardo M.D.   40 mg at 10/31/23 1733    atorvastatin (Lipitor) tablet 80 mg  80 mg DAILY Shiv Gerardo M.D.   80 mg at 11/01/23 0521    niCARdipine (Cardene) 25 mg in  mL Standard Infusion  0-15 mg/hr Continuous Mauri Montes M.D. 50 mL/hr at 11/01/23 0449 5 mg/hr at 11/01/23 0449    labetalol (Normodyne/Trandate) injection 10 mg  10 mg Q4HRS PRN Ken Tejeda M.D.   10 mg at 10/30/23 0107    hydrALAZINE (Apresoline) injection 20 mg  20 mg Q6HRS PRN Ken Tejeda M.D.   20 mg at 10/29/23 2304    MD Alert...ICU Electrolyte Replacement per Pharmacy   PHARMACY TO DOSE Shiv Gerardo M.D.        aspirin (Asa) chewable tab 81 mg  81 mg DAILY Mauri Montes M.D.   81 mg at 11/01/23 0521    3% sodium chloride (Hypertonic Saline) 500mL infusion  0-60 mL/hr Continuous Shiv Gerardo M.D.   Stopped at 10/31/23 1301   Last reviewed on 10/26/2023 11:53 PM by Rossana Groves R.N.       Review of Systems:  Review of Systems   Unable to perform ROS: Patient nonverbal         Vital signs:  Weight/BMI: Body mass index is 25.97 kg/m².  /63   Pulse 98   Temp 36.9 °C (98.4 °F) (Temporal)   Resp 20   Ht 1.6 m (5' 3\")   Wt 66.5 kg (146 lb 9.7 oz)   SpO2 96%   Vitals:    11/01/23 0900 11/01/23 1000 11/01/23 1100 11/01/23 1105   BP: 120/58 123/65 132/63 132/63   Pulse: 95 (!) 107 (!) 102 98   Resp: (!) 22 (!) 23 (!) 24 20   Temp:  36.9 °C (98.4 °F)     TempSrc:  Temporal     SpO2: 93% 92% 92% 96%   Weight:       Height:         Oxygen Therapy:  Pulse Oximetry: 96 %, O2 (LPM): 2, O2 Delivery Device: Silicone Nasal Cannula    Intake/Output Summary (Last 24 hours) at 11/1/2023 1127  Last data filed at 11/1/2023 1000  Gross per 24 hour   Intake 4433.7 ml   Output 2250 ml   Net 2183.7 ml         Physical Exam:  Physical " Exam  Vitals and nursing note reviewed.   Constitutional:       General: She is awake.      Appearance: She is overweight. She is ill-appearing.   HENT:      Head: Normocephalic.      Nose: Nose normal. No congestion.      Comments: Tube feed in progress     Mouth/Throat:      Mouth: Mucous membranes are moist.      Pharynx: Oropharynx is clear.   Eyes:      Extraocular Movements: Extraocular movements intact.      Conjunctiva/sclera: Conjunctivae normal.   Cardiovascular:      Rate and Rhythm: Normal rate and regular rhythm.      Pulses: Normal pulses.      Heart sounds: Murmur heard.   Pulmonary:      Effort: Pulmonary effort is normal. No respiratory distress.      Breath sounds: Normal breath sounds.   Abdominal:      General: Abdomen is flat. Bowel sounds are normal. There is no distension.      Palpations: Abdomen is soft.      Tenderness: There is no abdominal tenderness. There is no guarding.   Musculoskeletal:      Right lower leg: No edema.      Left lower leg: No edema.   Skin:     General: Skin is warm and dry.      Capillary Refill: Capillary refill takes less than 2 seconds.      Coloration: Skin is not jaundiced or pale.   Neurological:      Comments: Opens eyes spontaneously  Patient nonverbal.  Moves left upper and lower extremities  Minimal movement to right upper and lower extremities     Psychiatric:      Comments: Unable to assess             Labs:  Recent Labs     10/30/23  0330 10/30/23  0904 10/31/23  0554 10/31/23  1155 10/31/23  1510 10/31/23  1830 10/31/23  2330 11/01/23  0530   SODIUM 153*   < > 159*   < > 161* 160* 158* 157*   POTASSIUM 3.0*   < > 2.9*   < > 2.9*  --  3.1* 3.2*   CHLORIDE 123*   < > 132*   < > 131*  --  129* 127*   CO2 22   < > 19*   < > 21  --  21 21   BUN 10   < > 13   < > 13  --  14 13   CREATININE 0.60   < > 0.62   < > 0.57  --  0.73 0.61   MAGNESIUM 1.9  --  1.9  --   --   --   --  1.9   PHOSPHORUS 2.2*  --  2.0*  --   --   --   --  3.1   CALCIUM 8.7   < > 7.6*   <  > 8.2*  --  8.3* 8.2*    < > = values in this interval not displayed.     Recent Labs     10/30/23  0330 10/30/23  0904 10/30/23  1224 10/30/23  1345 10/31/23  0554 10/31/23  1155 10/31/23  1510 10/31/23  2330 11/01/23  0530   ALTSGPT 25  --   --   --  34  --   --   --  32   ASTSGOT 36  --   --   --  35  --   --   --  37   ALKPHOSPHAT 90  --   --   --  75  --   --   --  85   TBILIRUBIN 0.6  --   --   --  0.4  --   --   --  0.5   PREALBUMIN  --   --  10.4*  --   --   --   --   --   --    GLUCOSE 151*   < >  --    < > 131*   < > 151* 119* 113*    < > = values in this interval not displayed.     Recent Labs     10/30/23  0330 10/31/23  0554 11/01/23  0530   WBC 17.0* 15.4* 14.4*   NEUTSPOLYS 83.30* 83.00* 80.50*   LYMPHOCYTES 5.70* 7.30* 8.80*   MONOCYTES 9.30 8.30 8.00   EOSINOPHILS 0.10 0.20 1.40   BASOPHILS 0.40 0.50 0.50   ASTSGOT 36 35 37   ALTSGPT 25 34 32   ALKPHOSPHAT 90 75 85   TBILIRUBIN 0.6 0.4 0.5     Recent Labs     10/30/23  0330 10/31/23  0554 11/01/23  0530   RBC 4.65 4.09* 4.01*   HEMOGLOBIN 13.3 11.8* 11.6*   HEMATOCRIT 40.9 37.2 36.5*   PLATELETCT 261 241 245     Recent Results (from the past 24 hour(s))   Basic Metabolic Panel    Collection Time: 10/31/23 11:55 AM   Result Value Ref Range    Sodium 162 (HH) 135 - 145 mmol/L    Potassium 3.2 (L) 3.6 - 5.5 mmol/L    Chloride 133 (H) 96 - 112 mmol/L    Co2 20 20 - 33 mmol/L    Glucose 133 (H) 65 - 99 mg/dL    Bun 14 8 - 22 mg/dL    Creatinine 0.65 0.50 - 1.40 mg/dL    Calcium 8.0 (L) 8.5 - 10.5 mg/dL    Anion Gap 9.0 7.0 - 16.0   ESTIMATED GFR    Collection Time: 10/31/23 11:55 AM   Result Value Ref Range    GFR (CKD-EPI) 101 >60 mL/min/1.73 m 2   Basic Metabolic Panel    Collection Time: 10/31/23  3:10 PM   Result Value Ref Range    Sodium 161 (HH) 135 - 145 mmol/L    Potassium 2.9 (L) 3.6 - 5.5 mmol/L    Chloride 131 (H) 96 - 112 mmol/L    Co2 21 20 - 33 mmol/L    Glucose 151 (H) 65 - 99 mg/dL    Bun 13 8 - 22 mg/dL    Creatinine 0.57 0.50 - 1.40  mg/dL    Calcium 8.2 (L) 8.5 - 10.5 mg/dL    Anion Gap 9.0 7.0 - 16.0   ESTIMATED GFR    Collection Time: 10/31/23  3:10 PM   Result Value Ref Range    GFR (CKD-EPI) 104 >60 mL/min/1.73 m 2   SODIUM SERUM (NA)    Collection Time: 10/31/23  6:30 PM   Result Value Ref Range    Sodium 160 (HH) 135 - 145 mmol/L   Basic Metabolic Panel    Collection Time: 10/31/23 11:30 PM   Result Value Ref Range    Sodium 158 (HH) 135 - 145 mmol/L    Potassium 3.1 (L) 3.6 - 5.5 mmol/L    Chloride 129 (H) 96 - 112 mmol/L    Co2 21 20 - 33 mmol/L    Glucose 119 (H) 65 - 99 mg/dL    Bun 14 8 - 22 mg/dL    Creatinine 0.73 0.50 - 1.40 mg/dL    Calcium 8.3 (L) 8.5 - 10.5 mg/dL    Anion Gap 8.0 7.0 - 16.0   ESTIMATED GFR    Collection Time: 10/31/23 11:30 PM   Result Value Ref Range    GFR (CKD-EPI) 94 >60 mL/min/1.73 m 2   CBC with Differential    Collection Time: 11/01/23  5:30 AM   Result Value Ref Range    WBC 14.4 (H) 4.8 - 10.8 K/uL    RBC 4.01 (L) 4.20 - 5.40 M/uL    Hemoglobin 11.6 (L) 12.0 - 16.0 g/dL    Hematocrit 36.5 (L) 37.0 - 47.0 %    MCV 91.0 81.4 - 97.8 fL    MCH 28.9 27.0 - 33.0 pg    MCHC 31.8 (L) 32.2 - 35.5 g/dL    RDW 48.4 35.9 - 50.0 fL    Platelet Count 245 164 - 446 K/uL    MPV 10.3 9.0 - 12.9 fL    Neutrophils-Polys 80.50 (H) 44.00 - 72.00 %    Lymphocytes 8.80 (L) 22.00 - 41.00 %    Monocytes 8.00 0.00 - 13.40 %    Eosinophils 1.40 0.00 - 6.90 %    Basophils 0.50 0.00 - 1.80 %    Immature Granulocytes 0.80 0.00 - 0.90 %    Nucleated RBC 0.20 0.00 - 0.20 /100 WBC    Neutrophils (Absolute) 11.60 (H) 1.82 - 7.42 K/uL    Lymphs (Absolute) 1.27 1.00 - 4.80 K/uL    Monos (Absolute) 1.16 (H) 0.00 - 0.85 K/uL    Eos (Absolute) 0.20 0.00 - 0.51 K/uL    Baso (Absolute) 0.07 0.00 - 0.12 K/uL    Immature Granulocytes (abs) 0.11 0.00 - 0.11 K/uL    NRBC (Absolute) 0.03 K/uL   Magnesium    Collection Time: 11/01/23  5:30 AM   Result Value Ref Range    Magnesium 1.9 1.5 - 2.5 mg/dL   Comp Metabolic Panel    Collection Time:  23  5:30 AM   Result Value Ref Range    Sodium 157 (HH) 135 - 145 mmol/L    Potassium 3.2 (L) 3.6 - 5.5 mmol/L    Chloride 127 (H) 96 - 112 mmol/L    Co2 21 20 - 33 mmol/L    Anion Gap 9.0 7.0 - 16.0    Glucose 113 (H) 65 - 99 mg/dL    Bun 13 8 - 22 mg/dL    Creatinine 0.61 0.50 - 1.40 mg/dL    Calcium 8.2 (L) 8.5 - 10.5 mg/dL    Correct Calcium 9.2 8.5 - 10.5 mg/dL    AST(SGOT) 37 12 - 45 U/L    ALT(SGPT) 32 2 - 50 U/L    Alkaline Phosphatase 85 30 - 99 U/L    Total Bilirubin 0.5 0.1 - 1.5 mg/dL    Albumin 2.8 (L) 3.2 - 4.9 g/dL    Total Protein 5.8 (L) 6.0 - 8.2 g/dL    Globulin 3.0 1.9 - 3.5 g/dL    A-G Ratio 0.9 g/dL   PHOSPHORUS    Collection Time: 23  5:30 AM   Result Value Ref Range    Phosphorus 3.1 2.5 - 4.5 mg/dL   ESTIMATED GFR    Collection Time: 23  5:30 AM   Result Value Ref Range    GFR (CKD-EPI) 102 >60 mL/min/1.73 m 2   EKG (IP)    Collection Time: 23 10:55 AM   Result Value Ref Range    Report       [facility]    Test Date:  2023  Pt Name:    OSMANI MORENO              Department: [facility]  MRN:        [id number]                      Room:       [facility]  Gender:     Female                       Technician: FGJ  :        1965                   Requested By:JOAQUÍN ARIAS  Order #:    [id number]                    Reading MD:    Measurements  Intervals                                Axis  Rate:       101                          P:          0  VT:         88                           QRS:        58  QRSD:       85                           T:          221  QT:         330  QTc:        428    Interpretive Statements  Sinus tachycardia  Atrial premature complexes  Borderline repolarization abnormality  Compared to ECG 10/27/2023 08:53:52  Sinus rhythm no longer present  T-wave abnormality no longer present         Radiology Review:  DX-ABDOMEN FOR TUBE PLACEMENT   Final Result         1.  Nonspecific bowel gas pattern in the upper abdomen.   2.  Nasogastric tube tip  terminates overlying the expected location of the gastric body.   3.  Hazy left lower lobe infiltrates and trace left pleural effusion      CT-CTA CHEST PULMONARY ARTERY W/ RECONS   Final Result         1.  No evidence of pulmonary embolism.   2.  Left lower lobe atelectasis/collapse.   3.  Mucous plugging of the right lower lobe bronchi and some mild dependent atelectasis in the right lower lobe.   4.  Small hiatal hernia.      Fleischner Society pulmonary nodule recommendations:   Not applicable      CT-HEAD W/O   Final Result      Evolving acute large left cerebral infarct.      No acute hemorrhage.      Chronic ischemic changes.      DX-CHEST-PORTABLE (1 VIEW)   Final Result      No acute process.      IR-PICC LINE PLACEMENT W/ GUIDANCE > AGE 5   Final Result                  Ultrasound-guided PICC placement performed by qualified nursing staff as    above.          MR-BRAIN-W/O   Final Result         1. Acute left MCA distribution infarct, as above.   2. 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   3. Mild chronic ischemic white matter migration.      CT-HEAD W/O   Final Result      1.  Possible mild left frontotemporal edema and a present significantly improved      2.  Negative for hemorrhage or mass effect      3.  Right frontotemporal and encephalomalacia         DX-ABDOMEN FOR TUBE PLACEMENT   Final Result      1.  Enteric tube overlies the stomach.      CT-HEAD W/O   Final Result         1.  No acute intracranial abnormality is identified, there are nonspecific white matter changes, commonly associated with small vessel ischemic disease.  Associated mild cerebral atrophy is noted.   2.  Left maxillary sinusitis changes   3.  Atherosclerosis.         IR-THROMBO MECHANICAL ARTERY,INIT   Final Result      1.  Occluded LEFT internal carotid artery-LEFT internal carotid flow diverter.   2.  Successful mechanical thrombectomy.   3.  TICI 2c      DX-CHEST-PORTABLE (1 VIEW)   Final Result         1.  Left  basilar atelectasis, no focal infiltrate   2.  Atherosclerosis      CT-CTA NECK WITH & W/O-POST PROCESSING   Final Result         1.  Occlusion from the mid left internal carotid artery through intracranial stent at the distal left ICA.      These findings were discussed with the patient's clinician, Kiet Brown, on 10/27/2023 12:37 AM.      CT-CTA HEAD WITH & W/O-POST PROCESS   Final Result         1.  Left internal carotid artery occlusion extending up to distal carotid arterial stent.   2.  Decreased density within the left anterior cerebral artery, appearance suggesting slow flow or spasm.   3.  Small caliber of the left middle cerebral artery branches which taper and are not visualized most distally, changes most compatible with diminished flow due to left internal carotid artery occlusion.   4.  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   5.  3 mm basilar artery tip aneurysm   6.  2.3 mm aneurysm inferiorly at the right M1/M2 junction      CT-CEREBRAL PERFUSION ANALYSIS   Final Result         1.  Cerebral blood flow less than 30% likely representing completed infarct = 183 mL.      2.  T Max more than 6 seconds likely representing combination of completed infarct and ischemia = 280 mL.      3.  Mismatched volume likely representing ischemic brain/penumbra = 97 mL      4.  Please note that the cerebral perfusion was performed on the limited brain tissue around the basal ganglia region. Infarct/ischemia outside the CT perfusion sections can be missed in this study.      CT-HEAD W/O   Final Result         1.  No acute intracranial abnormality is identified, there are nonspecific white matter changes, commonly associated with small vessel ischemic disease.  Associated mild cerebral atrophy is noted.   2.  Left maxillary sinusitis changes      Note: Artifacts are present limiting diagnostic sensitivity of this exam.            MDM (Data Review):   -Records reviewed and summarized  in current documentation  -I personally reviewed and interpreted the laboratory results  -I personally reviewed the radiology images    Assessment/Recommendations:  Severe oropharyngeal dysphagia  Acute ischemic left internal carotid artery aneurysm s/p stent placement and subsequent stent occlusion- on DAPT-bridging Brilinta with Integrilin  Leukocytosis  Hyponatremia-weaning off hypertonic therapy per neurology recs.  Hyperchloremia  Hypokalemia  Protein calorie malnutrition  History of multiple intracranial aneurysms  History of arterial thromboses, s/p mult surgeries    MDM:  This is a 58-year-old female with a past medical history as listed above who presented to [facility] 10/27/2023 with acute ischemic left internal carotid artery stroke.  She is found to have severe oropharyngeal dysphagia.  She is been on DAPT with Brilinta, bridging on Integrilin for PEG tube placement.  Discussed PEG tube with patient and her brother at bedside.  Patient's brother agreeable to proceed. Concerns from IR about stent occlusion holding Integrilin for 6 hours prior to PEG tube procedure. IR reached out to Dr. Patel, plan to  proceed with caution with PEG tube without holding Integrilin.     Plan:   N.p.o. midnight.  Continue Integrilin, no holding for PEG tube (per discussion with Dr. Patel and  Dr. Montes)  OK to continue ASA  Hold Lovenox day of procedure  Patient will need Ancef 1g pre-procedurally.  Plan for PEG tube placement 11/2/2023    Discussed with patient, brother at bedside, nursing, Dr. Stanton, Dr. Patel    Core Quality Measures   Reviewed items::  Labs, Medications and Radiology reports reviewed

## 2023-11-01 NOTE — DISCHARGE PLANNING
Chart reviewed & case discussed in ICU rounds:   Plan for PEG placement tomorrow.  +drips/NGT.    Concerns raised about different family members wanting to be decision maker.   CM spoke at bedside to patient's brother, Indio, who again reiterated that patient had previously indicated that she would want him to make decisions on her behalf should she became incapacitated and his siblings are in agreement.   Indio added that patient's adoptive mother/April has been estranged for years.   CM had lengthy conversation, via phone, with patient's adoptive mother/ex-stepmother, April #[phone], who stated that she would prefer being the decision maker.        Case discussed with SW Manager & Risk Management; Per Risk Management, State of [place] does not have a hierarchy (i.e. spouse, children, parents, or siblings) for decision making unless it is for end-of-Life discussion.  If family not in agreement, decision should be deferred to the person acting in the best interest of the patient.  CM updated April/adoptive mother on the latter.      Dr. Evans & bedside nurse also informed of above.

## 2023-11-01 NOTE — PROGRESS NOTES
Neurology Progress Note  Neurohospitalist Service, [facility]    Referring Physician: DAISY Cole*    Chief Complaint   Patient presents with    Possible Stroke     As per EMS pt room mate, said that pt is having aphasia, right sided body weakness  Pt had history of stroke but no deficit as per EMS, pt normally can talk and walk    NIH: 15    LKW: 10 pm  Brought by Care flight with the above complaints       HPI: Refer to initial documented Neurology H&P, as detailed in the patient's chart.    Interval History 10/31: No new events overnight.    Review of systems: In addition to what is detailed in the HPI and/or updated in the interval history, all other systems reviewed and are negative.    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    FHx:  family history includes Stroke in her mother.    SHx:   reports that she quit smoking about 24 years ago. Her smoking use included cigarettes. She started smoking about 43 years ago. She has a 19.0 pack-year smoking history. She has never used smokeless tobacco. She reports that she does not currently use drugs after having used the following drugs: Marijuana and Oral. She reports that she does not drink alcohol.    Medications:    Current Facility-Administered Medications:     potassium chloride in water (Kcl) ivpb **Administer in ICU only** 20 mEq, 20 mEq, Intravenous, Once, Last Rate: 50 mL/hr at 11/01/23 0829, 20 mEq at  11/01/23 0829 **FOLLOWED BY** potassium chloride (Kcl) ivpb 10 mEq, 10 mEq, Intravenous, Once, Anna Marie Evans M.D.    magnesium sulfate IVPB premix 2 g, 2 g, Intravenous, Once, Anna Marie Evans M.D.    losartan (Cozaar) tablet 50 mg, 50 mg, Enteral Tube, Q DAY, Anna Marie Evans M.D., 50 mg at 11/01/23 0521    eptifibatide 0.75 mg/mL (Integrilin) infusion, 1 mcg/kg/min, Intravenous, Continuous, Anna Marie Evans M.D., Last Rate: 5.3 mL/hr at 10/31/23 1850, 1 mcg/kg/min at 10/31/23 1850    acetylcysteine (Mucomyst) 20 % solution 3-5 mL, 3-5 mL, Inhalation, 4X/DAY (RT), Anna Marie Evans M.D., 4 mL at 11/01/23 0722    amLODIPine (Norvasc) tablet 10 mg, 10 mg, Enteral Tube, Q DAY, Anna Marie Evans M.D., 10 mg at 11/01/23 0521    sodium chloride (Salt) tablet 2 g, 2 g, Enteral Tube, Q8HRS, Shiv Gerardo M.D., 2 g at 11/01/23 0521    guaiFENesin (Robitussin) 100 MG/5ML liquid 200 mg, 10 mL, Enteral Tube, Q4HRS, Shiv Gerardo M.D., 200 mg at 11/01/23 0522    ipratropium-albuterol (DUONEB) nebulizer solution, 3 mL, Nebulization, 4X/DAY (RT), Shiv Gerardo M.D., 3 mL at 11/01/23 0722    K+ Scale: Goal of 4.5, 1 Each, Intravenous, Q6HR, Shiv Gerardo M.D., 1 Each at 11/01/23 0300    Pharmacy Consult: Enteral tube insertion - review meds/change route/product selection, 1 Each, Other, PHARMACY TO DOSE, Shiv B Akin, M.D.    montelukast (Singulair) tablet 10 mg, 10 mg, Enteral Tube, DAILY, Shiv Gerardo M.D., 10 mg at 11/01/23 0522    senna-docusate (Pericolace Or Senokot S) 8.6-50 MG per tablet 2 Tablet, 2 Tablet, Enteral Tube, BID, 2 Tablet at 10/30/23 1802 **AND** polyethylene glycol/lytes (Miralax) PACKET 1 Packet, 1 Packet, Enteral Tube, QDAY PRN **AND** magnesium hydroxide (Milk Of Magnesia) suspension 30 mL, 30 mL, Enteral Tube, QDAY PRN **AND** bisacodyl (Dulcolax) suppository 10 mg, 10 mg, Rectal, QDAY PRN, Shiv Gerardo M.D.    acetaminophen (Tylenol) tablet 650  mg, 650 mg, Enteral Tube, Q6HRS PRN, Shiv Gerardo M.D.    enoxaparin (Lovenox) inj 40 mg, 40 mg, Subcutaneous, DAILY AT 1800, Shiv Gerardo M.D., 40 mg at 10/31/23 1733    atorvastatin (Lipitor) tablet 80 mg, 80 mg, Enteral Tube, DAILY, Shiv Gerardo M.D., 80 mg at 11/01/23 0521    fludrocortisone (Florinef) tablet 0.1 mg, 0.1 mg, Enteral Tube, BID, Shiv Gerardo M.D., 0.1 mg at 11/01/23 0521    niCARdipine (Cardene) 25 mg in  mL Standard Infusion, 0-15 mg/hr, Intravenous, Continuous, Mauri Montes M.D., Last Rate: 50 mL/hr at 11/01/23 0449, 5 mg/hr at 11/01/23 0449    labetalol (Normodyne/Trandate) injection 10 mg, 10 mg, Intravenous, Q4HRS PRN, Ken Tejeda M.D., 10 mg at 10/30/23 0107    hydrALAZINE (Apresoline) injection 20 mg, 20 mg, Intravenous, Q6HRS PRN, Ken Tejeda M.D., 20 mg at 10/29/23 2304    MD Alert...ICU Electrolyte Replacement per Pharmacy, , Other, PHARMACY TO DOSE, Shiv Gerardo M.D.    aspirin (Asa) chewable tab 81 mg, 81 mg, Enteral Tube, DAILY, Mauri Montes M.D., 81 mg at 11/01/23 0521    3% sodium chloride (Hypertonic Saline) 500mL infusion, 0-60 mL/hr, Intravenous, Continuous, Shiv Gerardo M.D., Stopped at 10/31/23 1301    Physical Examination:     Vitals:    11/01/23 0400 11/01/23 0500 11/01/23 0600 11/01/23 0726   BP: 119/60 125/66 138/61    Pulse: (!) 105 87 88 (!) 101   Resp: (!) 31 (!) 21 (!) 23 20   Temp: 36.7 °C (98 °F)      TempSrc: Temporal      SpO2: 90% 91% 92% 94%   Weight:       Height:               NEUROLOGICAL EXAM:     Patient is awake but nonverbal.  Following some some commands today..  Tracks me in the room.  Cranial nerves shows the pupils are equal reactive.  No gaze preference..  Face appears grossly intact.  Does not follow for formal testing.   Motor examination she moves the left and lower extremity spontaneously antigravity.  Minimal movement to the right arm and leg with noxious stimuli.  Upgoing toe on the  right.    No significant change compared to yesterday exam.      Objective Data:    Labs:  Lab Results   Component Value Date/Time    PROTHROMBTM 13.3 10/26/2023 11:45 PM    INR 1.00 10/26/2023 11:45 PM      Lab Results   Component Value Date/Time    WBC 14.4 (H) 11/01/2023 05:30 AM    RBC 4.01 (L) 11/01/2023 05:30 AM    HEMOGLOBIN 11.6 (L) 11/01/2023 05:30 AM    HEMATOCRIT 36.5 (L) 11/01/2023 05:30 AM    MCV 91.0 11/01/2023 05:30 AM    MCH 28.9 11/01/2023 05:30 AM    MCHC 31.8 (L) 11/01/2023 05:30 AM    MPV 10.3 11/01/2023 05:30 AM    NEUTSPOLYS 80.50 (H) 11/01/2023 05:30 AM    LYMPHOCYTES 8.80 (L) 11/01/2023 05:30 AM    MONOCYTES 8.00 11/01/2023 05:30 AM    EOSINOPHILS 1.40 11/01/2023 05:30 AM    BASOPHILS 0.50 11/01/2023 05:30 AM    HYPOCHROMIA 1+ 02/19/2011 04:20 PM    ANISOCYTOSIS 1+ 06/29/2010 04:10 AM      Lab Results   Component Value Date/Time    SODIUM 157 (HH) 11/01/2023 05:30 AM    POTASSIUM 3.2 (L) 11/01/2023 05:30 AM    CHLORIDE 127 (H) 11/01/2023 05:30 AM    CO2 21 11/01/2023 05:30 AM    GLUCOSE 113 (H) 11/01/2023 05:30 AM    BUN 13 11/01/2023 05:30 AM    CREATININE 0.61 11/01/2023 05:30 AM    CREATININE 0.8 06/12/2006 05:00 AM    GLOMRATE 66 05/15/2023 02:32 AM      Lab Results   Component Value Date/Time    CHOLSTRLTOT 135 10/28/2023 02:30 AM    LDL 61 10/28/2023 02:30 AM    HDL 62 10/28/2023 02:30 AM    TRIGLYCERIDE 58 10/28/2023 02:30 AM       Lab Results   Component Value Date/Time    ALKPHOSPHAT 85 11/01/2023 05:30 AM    ASTSGOT 37 11/01/2023 05:30 AM    ALTSGPT 32 11/01/2023 05:30 AM    TBILIRUBIN 0.5 11/01/2023 05:30 AM        Imaging/Testing:  DX-ABDOMEN FOR TUBE PLACEMENT   Final Result         1.  Nonspecific bowel gas pattern in the upper abdomen.   2.  Nasogastric tube tip terminates overlying the expected location of the gastric body.   3.  Hazy left lower lobe infiltrates and trace left pleural effusion      CT-CTA CHEST PULMONARY ARTERY W/ RECONS   Final Result         1.  No evidence  of pulmonary embolism.   2.  Left lower lobe atelectasis/collapse.   3.  Mucous plugging of the right lower lobe bronchi and some mild dependent atelectasis in the right lower lobe.   4.  Small hiatal hernia.      Fleischner Society pulmonary nodule recommendations:   Not applicable      CT-HEAD W/O   Final Result      Evolving acute large left cerebral infarct.      No acute hemorrhage.      Chronic ischemic changes.      DX-CHEST-PORTABLE (1 VIEW)   Final Result      No acute process.      IR-PICC LINE PLACEMENT W/ GUIDANCE > AGE 5   Final Result                  Ultrasound-guided PICC placement performed by qualified nursing staff as    above.          MR-BRAIN-W/O   Final Result         1. Acute left MCA distribution infarct, as above.   2. 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   3. Mild chronic ischemic white matter migration.      CT-HEAD W/O   Final Result      1.  Possible mild left frontotemporal edema and a present significantly improved      2.  Negative for hemorrhage or mass effect      3.  Right frontotemporal and encephalomalacia         DX-ABDOMEN FOR TUBE PLACEMENT   Final Result      1.  Enteric tube overlies the stomach.      CT-HEAD W/O   Final Result         1.  No acute intracranial abnormality is identified, there are nonspecific white matter changes, commonly associated with small vessel ischemic disease.  Associated mild cerebral atrophy is noted.   2.  Left maxillary sinusitis changes   3.  Atherosclerosis.         IR-THROMBO MECHANICAL ARTERY,INIT   Final Result      1.  Occluded LEFT internal carotid artery-LEFT internal carotid flow diverter.   2.  Successful mechanical thrombectomy.   3.  TICI 2c      DX-CHEST-PORTABLE (1 VIEW)   Final Result         1.  Left basilar atelectasis, no focal infiltrate   2.  Atherosclerosis      CT-CTA NECK WITH & W/O-POST PROCESSING   Final Result         1.  Occlusion from the mid left internal carotid artery through intracranial stent at the  distal left ICA.      These findings were discussed with the patient's clinician, Kiet Brown, on 10/27/2023 12:37 AM.      CT-CTA HEAD WITH & W/O-POST PROCESS   Final Result         1.  Left internal carotid artery occlusion extending up to distal carotid arterial stent.   2.  Decreased density within the left anterior cerebral artery, appearance suggesting slow flow or spasm.   3.  Small caliber of the left middle cerebral artery branches which taper and are not visualized most distally, changes most compatible with diminished flow due to left internal carotid artery occlusion.   4.  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   5.  3 mm basilar artery tip aneurysm   6.  2.3 mm aneurysm inferiorly at the right M1/M2 junction      CT-CEREBRAL PERFUSION ANALYSIS   Final Result         1.  Cerebral blood flow less than 30% likely representing completed infarct = 183 mL.      2.  T Max more than 6 seconds likely representing combination of completed infarct and ischemia = 280 mL.      3.  Mismatched volume likely representing ischemic brain/penumbra = 97 mL      4.  Please note that the cerebral perfusion was performed on the limited brain tissue around the basal ganglia region. Infarct/ischemia outside the CT perfusion sections can be missed in this study.      CT-HEAD W/O   Final Result         1.  No acute intracranial abnormality is identified, there are nonspecific white matter changes, commonly associated with small vessel ischemic disease.  Associated mild cerebral atrophy is noted.   2.  Left maxillary sinusitis changes      Note: Artifacts are present limiting diagnostic sensitivity of this exam.            Assessment and Plan:    58-year-old female recently had a right ICA aneurysm intervene with a flow diverter on 19 October.  Few days later patient presented with right-sided weakness and global aphasic.  Found to have a occluded right ICA under went thrombectomy with a TICI  score of 3.  MRI brain shows acute infarct over the left MCA territory however mostly in the cortical region.  Unfortunately appears to be in eloquent territory.  Low risk of edema forming given the amount of infarct.  Today's day #3 post thrombectomy.  For now I recommend continuing supportive therapies.  Given the location infarct burden I think there is a potential the patient may improved however this will take weeks to months with neuro rehab.    Update 10/31  Patient appears to be more awake and alert this morning.  She is following some simple commands such as opening closing her eyes.  Able to work with PT yesterday.  They were able to stand her briefly.  Today's post thrombectomy day #4.  Continue with supportive therapies.  Planning on PEG tube placement which I agree with.  Continue with DAPT therapy with Brilinta and aspirin.    Update 11/1  Patient still awake and alert she is following some some commands.  Patient needs a PEG tube.  However she needs to be off Brilinta for 2 days for the PEG tube placement.  We will bridge the Brilinta with integrilin during this time.  Continue aspirin.  Once the PEG tube is been placed the patient is back on Brilinta most likely be able to be sent to rehab at that time.    Plan:  1.  Can change neurochecks to every 4 hours.  2.  Maintain the blood pressure between 100-140 systolic.  3.  Wean off hypertonic therapy.  Okay to start normalizing sodiums to the normal range.  Do not overcorrect.  Aim for a 3-4 point drop per day.  4.  Bridging Brilinta with integrilin until PEG tube has been placed   5.  PT/OT/speech therapy        This chart was partially generated using voice recognition technology and sound alike word replacement may be present, best efforts were made to make the chart accurate.    Paramjit Childers MD  Board Certified Neurology, ABPN  (t) [phone]

## 2023-11-02 ENCOUNTER — APPOINTMENT (OUTPATIENT)
Dept: RADIOLOGY | Facility: MEDICAL CENTER | Age: 58
DRG: 981 | End: 2023-11-02
Attending: INTERNAL MEDICINE
Payer: COMMERCIAL

## 2023-11-02 LAB
ALBUMIN SERPL BCP-MCNC: 2.8 G/DL (ref 3.2–4.9)
ALBUMIN/GLOB SERPL: 1 G/DL
ALP SERPL-CCNC: 80 U/L (ref 30–99)
ALT SERPL-CCNC: 29 U/L (ref 2–50)
ANION GAP SERPL CALC-SCNC: 10 MMOL/L (ref 7–16)
ANION GAP SERPL CALC-SCNC: 11 MMOL/L (ref 7–16)
ANION GAP SERPL CALC-SCNC: 9 MMOL/L (ref 7–16)
ANION GAP SERPL CALC-SCNC: 9 MMOL/L (ref 7–16)
AST SERPL-CCNC: 28 U/L (ref 12–45)
BASOPHILS # BLD AUTO: 0.3 % (ref 0–1.8)
BASOPHILS # BLD: 0.05 K/UL (ref 0–0.12)
BILIRUB SERPL-MCNC: 0.5 MG/DL (ref 0.1–1.5)
BUN SERPL-MCNC: 14 MG/DL (ref 8–22)
BUN SERPL-MCNC: 15 MG/DL (ref 8–22)
CALCIUM ALBUM COR SERPL-MCNC: 9.2 MG/DL (ref 8.5–10.5)
CALCIUM SERPL-MCNC: 8.1 MG/DL (ref 8.5–10.5)
CALCIUM SERPL-MCNC: 8.2 MG/DL (ref 8.5–10.5)
CALCIUM SERPL-MCNC: 8.3 MG/DL (ref 8.5–10.5)
CALCIUM SERPL-MCNC: 8.6 MG/DL (ref 8.5–10.5)
CHLORIDE SERPL-SCNC: 118 MMOL/L (ref 96–112)
CHLORIDE SERPL-SCNC: 124 MMOL/L (ref 96–112)
CHLORIDE SERPL-SCNC: 125 MMOL/L (ref 96–112)
CHLORIDE SERPL-SCNC: 125 MMOL/L (ref 96–112)
CO2 SERPL-SCNC: 20 MMOL/L (ref 20–33)
CO2 SERPL-SCNC: 20 MMOL/L (ref 20–33)
CO2 SERPL-SCNC: 21 MMOL/L (ref 20–33)
CO2 SERPL-SCNC: 21 MMOL/L (ref 20–33)
CREAT SERPL-MCNC: 0.6 MG/DL (ref 0.5–1.4)
CREAT SERPL-MCNC: 0.61 MG/DL (ref 0.5–1.4)
CREAT SERPL-MCNC: 0.68 MG/DL (ref 0.5–1.4)
CREAT SERPL-MCNC: 0.72 MG/DL (ref 0.5–1.4)
EOSINOPHIL # BLD AUTO: 0.39 K/UL (ref 0–0.51)
EOSINOPHIL NFR BLD: 2.5 % (ref 0–6.9)
ERYTHROCYTE [DISTWIDTH] IN BLOOD BY AUTOMATED COUNT: 47.5 FL (ref 35.9–50)
GFR SERPLBLD CREATININE-BSD FMLA CKD-EPI: 100 ML/MIN/1.73 M 2
GFR SERPLBLD CREATININE-BSD FMLA CKD-EPI: 102 ML/MIN/1.73 M 2
GFR SERPLBLD CREATININE-BSD FMLA CKD-EPI: 103 ML/MIN/1.73 M 2
GFR SERPLBLD CREATININE-BSD FMLA CKD-EPI: 96 ML/MIN/1.73 M 2
GLOBULIN SER CALC-MCNC: 2.9 G/DL (ref 1.9–3.5)
GLUCOSE BLD STRIP.AUTO-MCNC: 110 MG/DL (ref 65–99)
GLUCOSE SERPL-MCNC: 102 MG/DL (ref 65–99)
GLUCOSE SERPL-MCNC: 115 MG/DL (ref 65–99)
GLUCOSE SERPL-MCNC: 123 MG/DL (ref 65–99)
GLUCOSE SERPL-MCNC: 123 MG/DL (ref 65–99)
HCT VFR BLD AUTO: 36.5 % (ref 37–47)
HGB BLD-MCNC: 11.8 G/DL (ref 12–16)
IMM GRANULOCYTES # BLD AUTO: 0.13 K/UL (ref 0–0.11)
IMM GRANULOCYTES NFR BLD AUTO: 0.8 % (ref 0–0.9)
LYMPHOCYTES # BLD AUTO: 1.32 K/UL (ref 1–4.8)
LYMPHOCYTES NFR BLD: 8.5 % (ref 22–41)
MAGNESIUM SERPL-MCNC: 2.1 MG/DL (ref 1.5–2.5)
MCH RBC QN AUTO: 29.4 PG (ref 27–33)
MCHC RBC AUTO-ENTMCNC: 32.3 G/DL (ref 32.2–35.5)
MCV RBC AUTO: 90.8 FL (ref 81.4–97.8)
MONOCYTES # BLD AUTO: 1.23 K/UL (ref 0–0.85)
MONOCYTES NFR BLD AUTO: 7.9 % (ref 0–13.4)
NEUTROPHILS # BLD AUTO: 12.45 K/UL (ref 1.82–7.42)
NEUTROPHILS NFR BLD: 80 % (ref 44–72)
NRBC # BLD AUTO: 0.03 K/UL
NRBC BLD-RTO: 0.2 /100 WBC (ref 0–0.2)
PHOSPHATE SERPL-MCNC: 3.4 MG/DL (ref 2.5–4.5)
PLATELET # BLD AUTO: 228 K/UL (ref 164–446)
PMV BLD AUTO: 10.4 FL (ref 9–12.9)
POTASSIUM SERPL-SCNC: 3.4 MMOL/L (ref 3.6–5.5)
POTASSIUM SERPL-SCNC: 3.4 MMOL/L (ref 3.6–5.5)
POTASSIUM SERPL-SCNC: 3.6 MMOL/L (ref 3.6–5.5)
POTASSIUM SERPL-SCNC: 3.8 MMOL/L (ref 3.6–5.5)
PROT SERPL-MCNC: 5.7 G/DL (ref 6–8.2)
RBC # BLD AUTO: 4.02 M/UL (ref 4.2–5.4)
SODIUM SERPL-SCNC: 150 MMOL/L (ref 135–145)
SODIUM SERPL-SCNC: 154 MMOL/L (ref 135–145)
SODIUM SERPL-SCNC: 154 MMOL/L (ref 135–145)
SODIUM SERPL-SCNC: 155 MMOL/L (ref 135–145)
WBC # BLD AUTO: 15.6 K/UL (ref 4.8–10.8)

## 2023-11-02 PROCEDURE — 84100 ASSAY OF PHOSPHORUS: CPT

## 2023-11-02 PROCEDURE — 82962 GLUCOSE BLOOD TEST: CPT

## 2023-11-02 PROCEDURE — 700102 HCHG RX REV CODE 250 W/ 637 OVERRIDE(OP): Performed by: INTERNAL MEDICINE

## 2023-11-02 PROCEDURE — 700111 HCHG RX REV CODE 636 W/ 250 OVERRIDE (IP)

## 2023-11-02 PROCEDURE — 700105 HCHG RX REV CODE 258: Performed by: INTERNAL MEDICINE

## 2023-11-02 PROCEDURE — 80053 COMPREHEN METABOLIC PANEL: CPT

## 2023-11-02 PROCEDURE — 700102 HCHG RX REV CODE 250 W/ 637 OVERRIDE(OP): Performed by: NURSE PRACTITIONER

## 2023-11-02 PROCEDURE — 99291 CRITICAL CARE FIRST HOUR: CPT | Mod: 25 | Performed by: INTERNAL MEDICINE

## 2023-11-02 PROCEDURE — 85025 COMPLETE CBC W/AUTO DIFF WBC: CPT

## 2023-11-02 PROCEDURE — 94760 N-INVAS EAR/PLS OXIMETRY 1: CPT

## 2023-11-02 PROCEDURE — 94799 UNLISTED PULMONARY SVC/PX: CPT

## 2023-11-02 PROCEDURE — 80048 BASIC METABOLIC PNL TOTAL CA: CPT | Mod: 91

## 2023-11-02 PROCEDURE — 0DJ08ZZ INSPECTION OF UPPER INTESTINAL TRACT, VIA NATURAL OR ARTIFICIAL OPENING ENDOSCOPIC: ICD-10-PCS | Performed by: INTERNAL MEDICINE

## 2023-11-02 PROCEDURE — 83735 ASSAY OF MAGNESIUM: CPT

## 2023-11-02 PROCEDURE — 160048 HCHG OR STATISTICAL LEVEL 1-5: Performed by: INTERNAL MEDICINE

## 2023-11-02 PROCEDURE — 700111 HCHG RX REV CODE 636 W/ 250 OVERRIDE (IP): Mod: JZ | Performed by: INTERNAL MEDICINE

## 2023-11-02 PROCEDURE — 94640 AIRWAY INHALATION TREATMENT: CPT

## 2023-11-02 PROCEDURE — 700111 HCHG RX REV CODE 636 W/ 250 OVERRIDE (IP): Performed by: INTERNAL MEDICINE

## 2023-11-02 PROCEDURE — 160202 HCHG ENDO MINUTES - 1ST 30 MINS LEVEL 3: Performed by: INTERNAL MEDICINE

## 2023-11-02 PROCEDURE — 700102 HCHG RX REV CODE 250 W/ 637 OVERRIDE(OP): Performed by: RADIOLOGY

## 2023-11-02 PROCEDURE — A9270 NON-COVERED ITEM OR SERVICE: HCPCS | Performed by: INTERNAL MEDICINE

## 2023-11-02 PROCEDURE — 43235 EGD DIAGNOSTIC BRUSH WASH: CPT | Performed by: INTERNAL MEDICINE

## 2023-11-02 PROCEDURE — A9270 NON-COVERED ITEM OR SERVICE: HCPCS | Performed by: RADIOLOGY

## 2023-11-02 PROCEDURE — 99156 MOD SED OTH PHYS/QHP 5/>YRS: CPT | Performed by: INTERNAL MEDICINE

## 2023-11-02 PROCEDURE — 94669 MECHANICAL CHEST WALL OSCILL: CPT

## 2023-11-02 PROCEDURE — 700101 HCHG RX REV CODE 250: Performed by: INTERNAL MEDICINE

## 2023-11-02 PROCEDURE — 99232 SBSQ HOSP IP/OBS MODERATE 35: CPT | Performed by: STUDENT IN AN ORGANIZED HEALTH CARE EDUCATION/TRAINING PROGRAM

## 2023-11-02 PROCEDURE — A9270 NON-COVERED ITEM OR SERVICE: HCPCS | Performed by: NURSE PRACTITIONER

## 2023-11-02 PROCEDURE — 97530 THERAPEUTIC ACTIVITIES: CPT

## 2023-11-02 PROCEDURE — 770022 HCHG ROOM/CARE - ICU (200)

## 2023-11-02 RX ORDER — POTASSIUM CHLORIDE 14.9 MG/ML
20 INJECTION INTRAVENOUS ONCE
Status: COMPLETED | OUTPATIENT
Start: 2023-11-02 | End: 2023-11-02

## 2023-11-02 RX ORDER — MIDAZOLAM HYDROCHLORIDE 1 MG/ML
0.5 INJECTION INTRAMUSCULAR; INTRAVENOUS ONCE
Status: COMPLETED | OUTPATIENT
Start: 2023-11-02 | End: 2023-11-02

## 2023-11-02 RX ORDER — MIDAZOLAM HYDROCHLORIDE 1 MG/ML
4 INJECTION INTRAMUSCULAR; INTRAVENOUS ONCE
Status: DISCONTINUED | OUTPATIENT
Start: 2023-11-02 | End: 2023-11-02

## 2023-11-02 RX ORDER — MIDAZOLAM HYDROCHLORIDE 1 MG/ML
INJECTION INTRAMUSCULAR; INTRAVENOUS
Status: COMPLETED
Start: 2023-11-02 | End: 2023-11-02

## 2023-11-02 RX ADMIN — MIDAZOLAM 0.5 MG: 1 INJECTION, SOLUTION INTRAMUSCULAR; INTRAVENOUS at 11:48

## 2023-11-02 RX ADMIN — IPRATROPIUM BROMIDE AND ALBUTEROL SULFATE 3 ML: 2.5; .5 SOLUTION RESPIRATORY (INHALATION) at 15:04

## 2023-11-02 RX ADMIN — IPRATROPIUM BROMIDE AND ALBUTEROL SULFATE 3 ML: 2.5; .5 SOLUTION RESPIRATORY (INHALATION) at 08:07

## 2023-11-02 RX ADMIN — ACETYLCYSTEINE 4 ML: 200 SOLUTION ORAL; RESPIRATORY (INHALATION) at 18:42

## 2023-11-02 RX ADMIN — GUAIFENESIN 200 MG: 100 SOLUTION ORAL at 18:16

## 2023-11-02 RX ADMIN — FENTANYL CITRATE 25 MCG: 50 INJECTION, SOLUTION INTRAMUSCULAR; INTRAVENOUS at 11:45

## 2023-11-02 RX ADMIN — MIDAZOLAM HYDROCHLORIDE 0.5 MG: 1 INJECTION INTRAMUSCULAR; INTRAVENOUS at 11:45

## 2023-11-02 RX ADMIN — ACETYLCYSTEINE 4 ML: 200 SOLUTION ORAL; RESPIRATORY (INHALATION) at 15:04

## 2023-11-02 RX ADMIN — MONTELUKAST 10 MG: 10 TABLET, FILM COATED ORAL at 06:10

## 2023-11-02 RX ADMIN — SODIUM CHLORIDE 2 G: 1 TABLET ORAL at 06:12

## 2023-11-02 RX ADMIN — MIDAZOLAM 0.5 MG: 1 INJECTION, SOLUTION INTRAMUSCULAR; INTRAVENOUS at 11:45

## 2023-11-02 RX ADMIN — SODIUM CHLORIDE 2 G: 1 TABLET ORAL at 23:58

## 2023-11-02 RX ADMIN — ATORVASTATIN CALCIUM 80 MG: 40 TABLET, FILM COATED ORAL at 06:02

## 2023-11-02 RX ADMIN — ASPIRIN 81 MG 81 MG: 81 TABLET ORAL at 06:10

## 2023-11-02 RX ADMIN — HYDRALAZINE HYDROCHLORIDE 20 MG: 20 INJECTION, SOLUTION INTRAMUSCULAR; INTRAVENOUS at 01:25

## 2023-11-02 RX ADMIN — FENTANYL CITRATE 25 MCG: 50 INJECTION, SOLUTION INTRAMUSCULAR; INTRAVENOUS at 11:49

## 2023-11-02 RX ADMIN — LABETALOL HYDROCHLORIDE 10 MG: 5 INJECTION INTRAVENOUS at 05:40

## 2023-11-02 RX ADMIN — ENOXAPARIN SODIUM 40 MG: 100 INJECTION SUBCUTANEOUS at 18:16

## 2023-11-02 RX ADMIN — AMLODIPINE BESYLATE 10 MG: 10 TABLET ORAL at 06:12

## 2023-11-02 RX ADMIN — POTASSIUM BICARBONATE 25 MEQ: 978 TABLET, EFFERVESCENT ORAL at 23:58

## 2023-11-02 RX ADMIN — GUAIFENESIN 200 MG: 100 SOLUTION ORAL at 10:20

## 2023-11-02 RX ADMIN — GUAIFENESIN 200 MG: 100 SOLUTION ORAL at 06:01

## 2023-11-02 RX ADMIN — GUAIFENESIN 200 MG: 100 SOLUTION ORAL at 23:58

## 2023-11-02 RX ADMIN — IPRATROPIUM BROMIDE AND ALBUTEROL SULFATE 3 ML: 2.5; .5 SOLUTION RESPIRATORY (INHALATION) at 18:42

## 2023-11-02 RX ADMIN — POTASSIUM CHLORIDE 20 MEQ: 14.9 INJECTION, SOLUTION INTRAVENOUS at 10:18

## 2023-11-02 RX ADMIN — EPTIFIBATIDE 1 MCG/KG/MIN: 0.75 INJECTION, SOLUTION INTRAVENOUS at 17:54

## 2023-11-02 RX ADMIN — FENTANYL CITRATE 25 MCG: 50 INJECTION, SOLUTION INTRAMUSCULAR; INTRAVENOUS at 11:52

## 2023-11-02 RX ADMIN — CEFAZOLIN 2 G: 2 INJECTION, POWDER, FOR SOLUTION INTRAMUSCULAR; INTRAVENOUS at 11:16

## 2023-11-02 RX ADMIN — POTASSIUM CHLORIDE 20 MEQ: 14.9 INJECTION, SOLUTION INTRAVENOUS at 03:43

## 2023-11-02 RX ADMIN — ACETYLCYSTEINE 4 ML: 200 SOLUTION ORAL; RESPIRATORY (INHALATION) at 08:07

## 2023-11-02 RX ADMIN — MIDAZOLAM 0.5 MG: 1 INJECTION, SOLUTION INTRAMUSCULAR; INTRAVENOUS at 11:50

## 2023-11-02 RX ADMIN — LOSARTAN POTASSIUM 50 MG: 50 TABLET, FILM COATED ORAL at 06:10

## 2023-11-02 RX ADMIN — LABETALOL HYDROCHLORIDE 10 MG: 5 INJECTION INTRAVENOUS at 18:16

## 2023-11-02 RX ADMIN — GUAIFENESIN 200 MG: 100 SOLUTION ORAL at 01:25

## 2023-11-02 RX ADMIN — HYDRALAZINE HYDROCHLORIDE 20 MG: 20 INJECTION, SOLUTION INTRAMUSCULAR; INTRAVENOUS at 19:05

## 2023-11-02 ASSESSMENT — GAIT ASSESSMENTS: GAIT LEVEL OF ASSIST: UNABLE TO PARTICIPATE

## 2023-11-02 ASSESSMENT — COGNITIVE AND FUNCTIONAL STATUS - GENERAL
MOBILITY SCORE: 8
WALKING IN HOSPITAL ROOM: TOTAL
MOVING TO AND FROM BED TO CHAIR: UNABLE
STANDING UP FROM CHAIR USING ARMS: A LOT
TURNING FROM BACK TO SIDE WHILE IN FLAT BAD: UNABLE
MOVING FROM LYING ON BACK TO SITTING ON SIDE OF FLAT BED: A LOT
CLIMB 3 TO 5 STEPS WITH RAILING: TOTAL
SUGGESTED CMS G CODE MODIFIER MOBILITY: CM

## 2023-11-02 ASSESSMENT — PAIN DESCRIPTION - PAIN TYPE
TYPE: ACUTE PAIN

## 2023-11-02 NOTE — DIETARY
"Nutrition support weekly update:  Day 6 of admit.  Roslyn Kilgore is a 58 y.o. female with admitting DX of Acute ischemic L ICA stroke.      Tube feeding initiated on 10/28. Current TF via IRIS feeding tube is Promote with Fiber with goal rate 55 ml/hr to provide 1320 kcals, 83 gm protein, and 1097 ml free water per day. Current free water order is 30 ml Q 4 hours.     Assessment:  Weight 10/31: 66.5 kg via bed scale. Weight is stable  Height: 5'3\" (160 cm), BMI: 25.97 (overweight)    Calculation/Equation: MSJ x 1.2 = 1452 kcals  Calories/day: 1450 - 1750 (22 - 26 kcals/kg)  Grams Protein/day: 66 - 80 (1.0 - 1.2 gm/kg)    Evaluation:   GI did attempt to place PEG tube today, however had to terminate procedure r/t unable to confirm overlying viscera between abdominal wall and gastric wall present. Recommends IR placement.   Pt is on 1 L NC.   SLP also notes FEES to be done 11/3.   Skin: No wounds or edema noted.   Labs: Na 155, glucose 115, Ca 8.3  Meds: lipitor, salt tabs, bowel protocol  Last BM: 11/2 with 2 watery BMs  Standard formula remains indicated ot meet estimated needs.     Malnutrition risk: No new risk identified.    Recommendations/Plan:  Pending PEG tube placement, bolus feeds will be appropriate as discussed at rounds today. Once placed and okay to feed, transition to bolus feeds. Provide 1/2 container at first feed and increase by 1/2 container per feed until goal is reached. Goal is 5 containers per day to provide 1775 kcals, 75 gm protein, and 900 ml free water per day. Will change orders once PEG tube is confirmed placed.  Fluids per MD. Recommend  free water flushes of 150 ml Q 4 hours to provide a total of 1800 ml per day (TF + flushes).   Diet upgrades per SLP - monitor FEES results for 11/3.     RD following.                   "

## 2023-11-02 NOTE — PROGRESS NOTES
Dr. Stanton to bedside and aware that ETCO2 monitoring discontinued and okay with same as pt breathing adequate volumes, SpO2 97% on 2L NC and HR and BP stable. This RN at bedside from [phone] to ensure pt stable.

## 2023-11-02 NOTE — OP REPORT
OPERATIVE REPORT    PATIENT:   Roslyn Kilgore   1965       PREOPERATIVE DIAGNOSES/INDICATIONS: PEG tube placement    PROCEDURE: Diagnostic EGD    PHYSICIAN:  Malcolm Patel MD     ANESTHESIA: Conscious sedation administered by ICU attending.  Midazolam 1.5 mg, fentanyl 75 mcg    LOCATION: [facility]    CONSENT: The risks, benefits and alternatives of the procedure were discussed in detail with the patient's brother. The risks include and are not limited to bleeding, infection, perforation, missed lesions, and sedations risks (cardiopulmonary compromise and allergic reaction to medications).    DESCRIPTION:   The patient was evaluated in the ICU.  Procedure at bedside..  A time out was performed prior to beginning the procedure.   The patient was placed in the supine position.     OPERATIVE FINDINGS:    Endoscope advanced to the second portion of the duodenum.  Esophagus: Reflux esophagitis at the GE junction.  Remainder of esophagus normal.  Stomach: Patchy chronic gastritis with coffee-ground/hematin present.  1 shallow erosion in the body of the stomach.  Duodenum: Normal to the second portion.    Transillumination could not be achieved.  Appropriate one-to-one ballottement was achievable but I could not eliminate the possibility that overlying viscera between abdominal wall and gastric wall is present.  Procedure terminated without PEG tube insertion.      Blood loss: None    The patient tolerated the procedure well.      There were no immediate complications.    Pathology samples obtained: None    IMPRESSION:  Reflux esophagitis, mild.  Chronic gastritis, moderate  Failed PEG tube insertion as I was unable to achieve appropriate transillumination.    RECOMMENDATIONS:  Recommend interventional radiology to place PEG tube.

## 2023-11-02 NOTE — PROGRESS NOTES
"Critical Care Progress Note    Date of admission  10/26/2023    Chief Complaint  58 y.o. female admitted 10/26/2023 with aphasia and right-sided body weakness    Hospital Course  Edited HPI from Dr. Tejeda note from previous date of service:  \" 58 y.o. female who presented 10/26/2023 with a stroke alert.  Patient was brought by emergency medical services to the emergency department after her roommate noticed the patient having aphasia, right-sided body weakness (upper and lower limbs) earlier today.  At initial evaluation in the emergency department she was found to be confused with altered mental status, last time known at her normal was around 10 PM on 10/26/2023.  Has remained nonverbal in the ED, blood pressure was in the 130s over 80s, glucose was within normal limits.  The patient has a past medical history of left ICA aneurysm status post stent placement with diversion of flow with subsequent treatment with Brilinta.  Per notes it seems that she was not tolerating well the Brilinta?  But was advised to continue taking it.  Diagnostic work-up in the ED with a CTA shows no flow through the left ICA for what neurology Dr. Akbar was consulted.  Given recent neurosurgical intervention she was deemed poor candidate for thrombolytics due to high risk of bleeding.  IR was consulted for emergent thrombectomy.\"    Brought in by EMS for aphasia, right-sided weakness, LKW 10 PM 10/26/2023  PMH left ICA aneurysm status post stents with diversion of flow and subsequent treatment with Brilinta.  Allergic to Plavix     CTA of the neck revealed occlusion of mid left ICA through intracranial stent at the distal left ICA with large penumbra. CTA of the head revealed no LVO     Neuro consulted, poor candidate for thrombolytics due to high risk of bleeding given recent neurosurgical intervention  Taken to [facility] suite, found to have left ICA in-stent thrombosis s/p thrombectomy with TICI 3 flow and started on low-dose Integrilin " drip.      Initial concern for noncompliance with brillinta, however TEG showed therapeutic ADP inhibition suggesting compliance    10/28: acute left MCA distribution infarct, and old right posterior frontal infarct  10/29: Rapidly worsening hypoxia overnight from 3LPm -> %, CXR unremarkable  CT head showing no changes  CTA chest no PE, complete atelectasis/collaspse of the LLL and mucous plugging of the RLL as well  10/30: d/w neurology - at this time prognosis is not clear and will likely need prolonged time to assess recovery; suggest PEG  Was able to work with PT today  10/31: GI consult; family had agreed to PEG.  11/1: can start titrating hypertonic saline down  11/2: GI did EGD to assess for PEG placement but could not see illumination to place PEG and recommends IR guided - message sent    Interval Problem Update  Chart review from the past 24 hours includes imaging, laboratory studies, vital signs and notes available.  Pertinent data for today's visit includes      Cardiac: ST, -1500s, TICI 3: goal -140   Pulm: NC 3 l; IPV tid  Neuro: Global aphasia, ?able to follow simple commands, somnolent but opens eyes briefly to vocal stimulus. Pupils equal 3mm sluggish symmetrical, gaze crossing midline now, good cough, clearing secretions, L 4/4 uppers/lowers and purposeful, R w/d to pain  Heme: Mild leukocytosis, unchanged  Renal/volume status: Cr < 1   ID:  AF, no abx  GI/endo: BMP glucose at goal, tube feeds going and advancing  Labs/Imaging: Na 154, goal to decrease slowly to normal Na  Lines: PICC, purewick    Review of Systems  Review of Systems   Unable to perform ROS: Medical condition      Vital Signs for last 24 hours   Temp:  [36.6 °C (97.8 °F)-37.4 °C (99.3 °F)] 36.8 °C (98.3 °F)  Pulse:  [] 88  Resp:  [12-44] 13  BP: (114-144)/(56-83) 131/83  SpO2:  [89 %-100 %] 96 %    Physical Exam   Physical Exam  Vitals and nursing note reviewed.   Constitutional:       General: She is not  in acute distress.     Appearance: She is ill-appearing. She is not toxic-appearing.   HENT:      Head: Normocephalic and atraumatic.      Nose: Nose normal.      Mouth/Throat:      Mouth: Mucous membranes are moist.   Eyes:      Pupils: Pupils are equal, round, and reactive to light.   Cardiovascular:      Rate and Rhythm: Normal rate and regular rhythm.      Pulses: Normal pulses.      Heart sounds: Normal heart sounds. No murmur heard.     No gallop.   Pulmonary:      Effort: Pulmonary effort is normal. No respiratory distress.      Breath sounds: Normal breath sounds. No stridor. No wheezing or rales.   Abdominal:      General: There is no distension.      Tenderness: There is no abdominal tenderness. There is no guarding.   Musculoskeletal:         General: No swelling. Normal range of motion.      Right lower leg: No edema.      Left lower leg: No edema.   Lymphadenopathy:      Cervical: No cervical adenopathy.   Skin:     General: Skin is warm.      Capillary Refill: Capillary refill takes less than 2 seconds.      Findings: No rash.   Neurological:      Comments: Global aphasia, able to follow simple commands, somnolent but opens eyes  to vocal stimulus. Pupils equal 3mm sluggish symmetrical, gaze crossing midline now, good cough, clearing secretions, L 4/4 uppers/lowers, R 2/2 uppers/lowers.        Medications  Current Facility-Administered Medications   Medication Dose Route Frequency Provider Last Rate Last Admin    losartan (Cozaar) tablet 50 mg  50 mg Enteral Tube Q DAY Anna Marie Evans M.D.   50 mg at 11/02/23 0610    eptifibatide 0.75 mg/mL (Integrilin) infusion  1 mcg/kg/min Intravenous Continuous Anna Marie Evans M.D. 5.3 mL/hr at 11/01/23 2355 1 mcg/kg/min at 11/01/23 2355    acetylcysteine (Mucomyst) 20 % solution 3-5 mL  3-5 mL Inhalation 4X/DAY (RT) Anna Marie Evans M.D.   4 mL at 11/02/23 0807    amLODIPine (Norvasc) tablet 10 mg  10 mg Enteral Tube Q DAY Anna Marie Evans  M.D.   10 mg at 11/02/23 0612    sodium chloride (Salt) tablet 2 g  2 g Enteral Tube Q8HRS Shiv Gerardo M.D.   2 g at 11/02/23 0612    guaiFENesin (Robitussin) 100 MG/5ML liquid 200 mg  10 mL Enteral Tube Q4HRS Shiv Gerardo M.D.   200 mg at 11/02/23 1020    ipratropium-albuterol (DUONEB) nebulizer solution  3 mL Nebulization 4X/DAY (RT) Shiv Gerardo M.D.   3 mL at 11/02/23 0807    Pharmacy Consult: Enteral tube insertion - review meds/change route/product selection  1 Each Other PHARMACY TO DOSE Shiv Gerardo M.D.        montelukast (Singulair) tablet 10 mg  10 mg Enteral Tube DAILY Shiv Gerardo M.D.   10 mg at 11/02/23 0610    acetaminophen (Tylenol) tablet 650 mg  650 mg Enteral Tube Q6HRS PRN Shiv Gerardo M.D.   650 mg at 11/01/23 1136    enoxaparin (Lovenox) inj 40 mg  40 mg Subcutaneous DAILY AT 1800 Shiv Gerardo M.D.   40 mg at 11/01/23 1730    atorvastatin (Lipitor) tablet 80 mg  80 mg Enteral Tube DAILY Shiv Gerardo M.D.   80 mg at 11/02/23 0602    niCARdipine (Cardene) 25 mg in  mL Standard Infusion  0-15 mg/hr Intravenous Continuous Mauri Montes M.D.   Paused at 11/01/23 1600    labetalol (Normodyne/Trandate) injection 10 mg  10 mg Intravenous Q4HRS PRN Ken Tejeda M.D.   10 mg at 11/02/23 0540    hydrALAZINE (Apresoline) injection 20 mg  20 mg Intravenous Q6HRS PRN Ken Tejeda M.D.   20 mg at 11/02/23 0125    MD Alert...ICU Electrolyte Replacement per Pharmacy   Other PHARMACY TO DOSE Shiv Gerardo M.D.        aspirin (Asa) chewable tab 81 mg  81 mg Enteral Tube DAILY Mauri Montes M.D.   81 mg at 11/02/23 0610       Fluids    Intake/Output Summary (Last 24 hours) at 11/2/2023 1438  Last data filed at 11/2/2023 1000  Gross per 24 hour   Intake 1388.76 ml   Output 1300 ml   Net 88.76 ml       Laboratory          Recent Labs     10/31/23  0554 10/31/23  1155 11/01/23  0530 11/01/23  1235 11/02/23  0130 11/02/23  0630 11/02/23  1225    SODIUM 159*   < > 157*   < > 154* 154* 155*   POTASSIUM 2.9*   < > 3.2*   < > 3.4* 3.6 3.8   CHLORIDE 132*   < > 127*   < > 125* 125* 124*   CO2 19*   < > 21   < > 20 20 21   BUN 13   < > 13   < > 14 15 14   CREATININE 0.62   < > 0.61   < > 0.61 0.60 0.72   MAGNESIUM 1.9  --  1.9  --   --  2.1  --    PHOSPHORUS 2.0*  --  3.1  --   --  3.4  --    CALCIUM 7.6*   < > 8.2*   < > 8.1* 8.2* 8.3*    < > = values in this interval not displayed.     Recent Labs     10/31/23  0554 10/31/23  1155 11/01/23  0530 11/01/23  1235 11/02/23  0130 11/02/23  0630 11/02/23  1225   ALTSGPT 34  --  32  --   --  29  --    ASTSGOT 35  --  37  --   --  28  --    ALKPHOSPHAT 75  --  85  --   --  80  --    TBILIRUBIN 0.4  --  0.5  --   --  0.5  --    GLUCOSE 131*   < > 113*   < > 123* 123* 115*    < > = values in this interval not displayed.     Recent Labs     10/31/23  0554 11/01/23 0530 11/02/23  0630   WBC 15.4* 14.4* 15.6*   NEUTSPOLYS 83.00* 80.50* 80.00*   LYMPHOCYTES 7.30* 8.80* 8.50*   MONOCYTES 8.30 8.00 7.90   EOSINOPHILS 0.20 1.40 2.50   BASOPHILS 0.50 0.50 0.30   ASTSGOT 35 37 28   ALTSGPT 34 32 29   ALKPHOSPHAT 75 85 80   TBILIRUBIN 0.4 0.5 0.5     Recent Labs     10/31/23  0554 11/01/23 0530 11/02/23  0630   RBC 4.09* 4.01* 4.02*   HEMOGLOBIN 11.8* 11.6* 11.8*   HEMATOCRIT 37.2 36.5* 36.5*   PLATELETCT 241 245 228     Imaging  X-Ray:  I have personally reviewed the images and compared with prior images.  CT:    Reviewed  MRI:   Reviewed  10/28 acute left MCA distribution infarct, and old right posterior frontal infarct    Assessment/Plan    * Acute ischemic left internal carotid artery (ICA) stroke (HCC)- (present on admission)  Assessment & Plan  Recent left ICA aneurysm status post stent and flow diverter, subsequent treatment with Brilinta, allergic to Plavix  Developed in-stent thrombosis, initial concern for noncompliance with Brilinta, however TEG showed therapeutic ADP inhibition, underwent thrombectomy, integrilin  bridged to ASA/Brilinta  MRI 10/28 acute left MCA distribution infarct, and old right posterior frontal infarct    SBP goal for TICI 2c/3: 100-140  ASA 81/Brillinta NGT transitioned to integrelin in anticipation for the PEG  Na goal 150-160, decrease by 4 meq q 24 hrs a day   q4H neurochecks  Atorvastatin 80  euglycemia <180mg/dL, TF   PT/OT  SLP- ok for ice chips  IR PICC placed 10/28  Family has agreed to PEG- Gi requested IR 11/2    Hypokalemia  Assessment & Plan  Replace and monitor    Acute hypoxemic respiratory failure (HCC)  Assessment & Plan  CTA chest no PE, complete atelectasis/collaspse of the LLL and mucous plugging of the RLL as well  Hypoxia due to shunting from total left lower lobe atelectasis/collapse from retention of secretions  Afebrile, no antibiotics  Likely due to inability to protect airway and poor effort from catastrophic CVA  Mobilization is helping and sats improved and maintained on NC  Aggressive pulmonary toileting with IPV/DuoNeb/hypertonic saline/PEP    Extrinsic asthma without complication- (present on admission)  Assessment & Plan  duonebs prn  Continue montelukast home dose    Goals of care, counseling/discussion  Assessment & Plan  See associated advance care planning note from 10/29  In summary, due to recent respiratory decline family meeting was held with her brother over the phone [place] in [place] and her Sister Eusebia at bedside.  All are in agreement that the patient would want change of CODE STATUS to DNR/DNI.    Dyslipidemia- (present on admission)  Assessment & Plan  Lipitor  80 for high intensity therapy    Primary hypertension- (present on admission)  Assessment & Plan  TICI 2c/3: -140  Cardene gtt and IV PRNs  amlodipine and losartan       VTE:   integrallin  Ulcer: Not Indicated  Lines:  PICC, purewick     I have performed a physical exam and reviewed and updated ROS and Plan today (11/2/2023). In review of yesterday's note (11/1/2023), there are no changes except  as documented above.     Discussed patient condition and risk of morbidity and/or mortality with RN, Therapies, Pharmacy, Code status disscussed, Charge nurse / hot rounds, Patient, and neurology    The patient remains critically ill.  Critical care time = 34minutes in directly providing and coordinating critical care and extensive data review.  No time overlap and excludes procedures.    This note was generated using voice recognition software which has a chance of producing errors of grammar and content.  I have made every reasonable attempt to find and correct any errors, but it should be expected that some may not be found prior to finalization of this note.

## 2023-11-02 NOTE — CARE PLAN
The patient is Stable - Low risk of patient condition declining or worsening    Shift Goals  Clinical Goals: -140, Q4 NC's, Na Management, 3% gtt Titration, NPO @ MN for PEG  Patient Goals: BAKARI: nonverbal  Family Goals: BAKARI    Progress made toward(s) clinical / shift goals:        Problem: Optimal Care of the Stroke Patient  Goal: Optimal emergency care for the stroke patient  Description: Target End Date:  End of day 1    Time of Onset    1.  Time of last known well obtained  2.  Patient and family/caregiver verbalize understanding of diagnosis, medications and testing  3.  NIHSS performed and documented, including date and time, for ischemic stroke patients prior to any acute recanalization therapy (thrombolytics or mechanical) or within 12 hours of arrival if no intervention is warranted  4.  Consults and referrals placed to appropriate departments    Medications Administration as Ordered:    1.  Implement appropriate reversal agents for INR greater than 1.5  2.  Pre-alteplase administration of antihypertensives for SBP >185 DBP >110  3.  Post-alteplase administration of antihypertensives for SBP >185, DBP >105  4.  Thrombolytic Therapy for qualifying ischemic stroke patients who arrive within 4.5 hours of time of Last Known Well. Thrombolytic therapy administered within 30 minutes or a documented reason for delay  Outcome: Progressing     Problem: Neuro Status  Goal: Neuro status will remain stable or improve  Description: Target End Date:  Prior to discharge or change in level of care    Document on Neuro assessment in the Assessment flowsheet    1.  Assess and monitor neurologic status per provider order/protocol/unit policy  2.  Assess level of consciousness and orientation  3.  Assess for speech, dysarthria, dysphagia, facial symmetry  4.  Assess visual field, eye movements, gaze preference, pupil reaction and size  5.  Assess muscle strength and motor response in all four extremities  6.  Assess for  sensation (numbness and tingling)  7.  Assess basic neuro reflexes (cough, gag, corneal)  8.  Identify changes in neuro status and report to provider for testing/treatment orders  Outcome: Progressing     Problem: Bowel Elimination  Goal: Establish and maintain regular bowel function  Description: Target End Date:  Prior to discharge or change in level of care    1.   Note date of last BM  2.   Educate about diet, fluid intake, medication and activity to promote bowel function  3.   Educate signs and symptoms of constipation and interventions to implement  4.   Pharmacologic bowel management per provider order  5.   Regular toileting schedule  6.   Upright position for toileting  7.   High fiber diet  8.   Encourage hydration  9.   Collaborate with Clinical Dietician  10. Care and maintenance of ostomy if applicable  Outcome: Progressing     Problem: Safety - Medical Restraint  Goal: Remains free of injury from restraints (Restraint for Interference with Medical Device)  Description: INTERVENTIONS:  1. Determine that other, less restrictive measures have been tried or would not be effective before applying the restraint  2. Evaluate the patient's condition at the time of restraint application  3. Educate patient/family regarding the reason for restraint  4. Q2H: Monitor safety, psychosocial status, comfort, circulation, respiratory status, LOC, nutrition and hydration  Outcome: Progressing       Patient is not progressing towards the following goals:

## 2023-11-02 NOTE — THERAPY
Occupational Therapy  Daily Treatment     Patient Name: Roslyn Kilgore  Age:  58 y.o., Sex:  female  Medical Record #: [id number]  Today's Date: 11/1/2023       Precautions: Fall Risk, Swallow Precautions, Nasogastric Tube    Assessment    Pt seen for OT tx with supportive mother at bedside. Pt with L preferential gaze but demonstrates ability to track in R and L visual fields but does require increased cues to draw attention to R, cross midline, and continue to track in R upper and lower visual fields. RUE remains flaccid, LUE strength and AROM testing limited by decreased instruction following today. Pt demonstrates fair seated balance but remains unable to engage in self-care ADLs without max-totalA at this time. Acute OT to continue to follow.     Plan    Treatment Plan Status: Continue Current Treatment Plan  Type of Treatment: Self Care / Activities of Daily Living, Adaptive Equipment, Manual Therapy Techniques, Neuro Re-Education / Balance, Therapeutic Exercises, Therapeutic Activity  Treatment Frequency: 3 Times per Week  Treatment Duration: Until Therapy Goals Met    DC Equipment Recommendations: Unable to determine at this time  Discharge Recommendations: Recommend post-acute placement for additional occupational therapy services prior to discharge home     Objective       11/01/23 1655   Precautions   Precautions Fall Risk;Swallow Precautions;Nasogastric Tube   Pain 0 - 10 Group   Therapist Pain Assessment Post Activity Pain Same as Prior to Activity;Nurse Notified;0   Cognition    Cognition / Consciousness X   Level of Consciousness Responds to voice   Ability To Follow Commands Unable to Follow 1 Step Commands   Attention Impaired   Initiation Impaired   Comments no verbal engagement   Active ROM Upper Body   Active ROM Upper Body  X   Flaccid Upper Extremity Right Upper Extremity Flaccid   Strength Upper Body   Upper Body Strength  X   Comments RUE flaccid, LUE exam limited 2/2 poor instruction  following, unable to MMT, did not squeeze or lift to cues   Upper Body Muscle Tone   Upper Body Muscle Tone  X   Rt Upper Extremity Muscle Tone Hypotonic   Balance   Sitting Balance (Static) Fair -   Sitting Balance (Dynamic) Poor +   Standing Balance (Static) Poor +   Standing Balance (Dynamic) Poor -   Weight Shift Sitting Fair   Weight Shift Standing Poor   Skilled Intervention Verbal Cuing;Sequencing;Tactile Cuing;Facilitation;Compensatory Strategies   Bed Mobility    Supine to Sit Maximal Assist   Sit to Supine Maximal Assist   Scooting Maximal Assist   Skilled Intervention Tactile Cuing;Verbal Cuing;Sequencing;Facilitation   Activities of Daily Living   Eating Total Assist   Grooming Moderate Assist;Seated   Upper Body Dressing Maximal Assist   Lower Body Dressing Total Assist   Toileting Total Assist   Skilled Intervention Verbal Cuing;Tactile Cuing;Sequencing;Compensatory Strategies   How much help from another person does the patient currently need...   Putting on and taking off regular lower body clothing? 1   Bathing (including washing, rinsing, and drying)? 1   Toileting, which includes using a toilet, bedpan, or urinal? 1   Putting on and taking off regular upper body clothing? 2   Taking care of personal grooming such as brushing teeth? 2   Eating meals? 1   6 Clicks Daily Activity Score 8   Modified Schleicher (mRS)   Modified Schleicher Score 4   Functional Mobility   Sit to Stand Moderate Assist   Mobility sit>stand   Skilled Intervention Verbal Cuing;Tactile Cuing;Sequencing;Facilitation   Activity Tolerance   Sitting in Chair NT   Sitting Edge of Bed 15min   Standing 1min x2   Short Term Goals   Short Term Goal # 1 Pt will complete ADL transfers with modA   Goal Outcome # 1 Progressing as expected   Short Term Goal # 2 Pt will complete LB dressing with modA   Goal Outcome # 2 Goal not met   Short Term Goal # 3 Pt will complete toileting with modA   Goal Outcome # 3 Goal not met   Education Group    Education Provided Role of Occupational Therapist;Activities of Daily Living   Role of Occupational Therapist Patient Response Patient;Family;Acceptance;Explanation;Verbal Demonstration   ADL Patient Response Family;Patient;Acceptance;Explanation;Verbal Demonstration   Additional Comments mother at bedside   Occupational Therapy Treatment Plan    O.T. Treatment Plan Continue Current Treatment Plan   Anticipated Discharge Equipment and Recommendations   DC Equipment Recommendations Unable to determine at this time   Discharge Recommendations Recommend post-acute placement for additional occupational therapy services prior to discharge home

## 2023-11-02 NOTE — PROGRESS NOTES
At 1144, time out for PEG insertion done. This RN, RT, GI sx, Intensivist, and GI RN present for time out and all agree.    1145 - 0.5mg IV Versed and 25mcg IV Fentanyl given for conscious sedation    ETCO2 being monitored for procedure and on 10L NC    1148 - 0.5mg IV Versed given as pt remained restless  1149 - 25mcg IV Fentanyl given to prevent pain during procedure  1150 - 0.5mg IV Versed given for restlessness  1152 - 25mcg IV Fentanyl given again to prevent pain  1155 - procedure stopped per GI MD due to inability to attempt PEG at bedside due to pt's anatomy    Per Dr. Stanton, IR to be consulted for PEG placement.    AT 1211, pt remains stable with resps 20 and SpO2 100%. ETCO2 monitoring removed and NC decreased to 2L. Bedside RN Susan made aware.

## 2023-11-02 NOTE — CARE PLAN
Problem: Bronchopulmonary Hygiene  Goal: Increase mobilization of retained secretions  Description: Target End Date:  2 to 3 days    1.  Perform bronchopulmonary therapy as indicated by assessment  2.  Perform airway suctioning  3.  Perform actions to maintain patient airway  Outcome: Progressing    Duo QID  Muco QID  IPV QID

## 2023-11-02 NOTE — PROGRESS NOTES
Conscious sedation for EGD PEG tube placement    For the above procedure I also performed the conscious sedation and was directly involved with administration of medication, monitoring airway, vital signs, preventing complications.  Administered 75 mcgs of fentanyl and 1.5 mg of versed in titration fashion until achieving a level of moderate procedural sedation.  Patient tolerated procedure well without complications from sedation and was left in the care of his bedside nurse and respiratory therapy. Procedural sedation time equals 11 minutes which was separate from procedure time as described above.  Start time: 11:44 am  Stop time: 11: 55 am

## 2023-11-02 NOTE — PROGRESS NOTES
Neurology Progress Note  Neurohospitalist Service, [facility]    Referring Physician: DAISY Cole*      Interval History: Patient seen and examined this AM.  No reported acute overnight events.  Patient remains in stable condition.      Review of systems: In addition to what is detailed in the HPI and/or updated in the interval history, all other systems reviewed and are negative.    Past Medical History, Past Surgical History and Social History reviewed and unchanged from prior    Medications:    Current Facility-Administered Medications:     potassium chloride in water (Kcl) ivpb **Administer in ICU only** 20 mEq, 20 mEq, Intravenous, Once, Anna Marie Evans M.D., Last Rate: 50 mL/hr at 11/02/23 1018, 20 mEq at 11/02/23 1018    ceFAZolin (Ancef) 2 g in  mL IVPB, 2 g, Intravenous, Once, Malcolm Patel M.D., Last Rate: 200 mL/hr at 11/02/23 1116, 2 g at 11/02/23 1116    losartan (Cozaar) tablet 50 mg, 50 mg, Enteral Tube, Q DAY, Anna Marie Evans M.D., 50 mg at 11/02/23 0610    eptifibatide 0.75 mg/mL (Integrilin) infusion, 1 mcg/kg/min, Intravenous, Continuous, Anna Marie Evans M.D., Last Rate: 5.3 mL/hr at 11/01/23 2355, 1 mcg/kg/min at 11/01/23 2355    acetylcysteine (Mucomyst) 20 % solution 3-5 mL, 3-5 mL, Inhalation, 4X/DAY (RT), Anna Marie Evans M.D., 4 mL at 11/02/23 0807    amLODIPine (Norvasc) tablet 10 mg, 10 mg, Enteral Tube, Q DAY, Anna Marie Evans M.D., 10 mg at 11/02/23 0612    sodium chloride (Salt) tablet 2 g, 2 g, Enteral Tube, Q8HRS, Shiv Gerardo M.D., 2 g at 11/02/23 0612    guaiFENesin (Robitussin) 100 MG/5ML liquid 200 mg, 10 mL, Enteral Tube, Q4HRS, Shiv Gerardo M.D., 200 mg at 11/02/23 1020    ipratropium-albuterol (DUONEB) nebulizer solution, 3 mL, Nebulization, 4X/DAY (RT), Shiv Gerardo M.D., 3 mL at 11/02/23 0807    Pharmacy Consult: Enteral tube insertion - review meds/change route/product selection, 1 Each,  "Other, PHARMACY TO DOSE, Shiv Gerardo M.D.    montelukast (Singulair) tablet 10 mg, 10 mg, Enteral Tube, DAILY, Shiv Gerardo M.D., 10 mg at 11/02/23 0610    acetaminophen (Tylenol) tablet 650 mg, 650 mg, Enteral Tube, Q6HRS PRN, Shiv Gerardo M.D., 650 mg at 11/01/23 1136    enoxaparin (Lovenox) inj 40 mg, 40 mg, Subcutaneous, DAILY AT 1800, Shiv Gerardo M.D., 40 mg at 11/01/23 1730    atorvastatin (Lipitor) tablet 80 mg, 80 mg, Enteral Tube, DAILY, Shiv Gerardo M.D., 80 mg at 11/02/23 0602    niCARdipine (Cardene) 25 mg in  mL Standard Infusion, 0-15 mg/hr, Intravenous, Continuous, Mauri Montes M.D., Paused at 11/01/23 1600    labetalol (Normodyne/Trandate) injection 10 mg, 10 mg, Intravenous, Q4HRS PRN, Ken Tejeda M.D., 10 mg at 11/02/23 0540    hydrALAZINE (Apresoline) injection 20 mg, 20 mg, Intravenous, Q6HRS PRN, Ken Tejeda M.D., 20 mg at 11/02/23 0125    MD Alert...ICU Electrolyte Replacement per Pharmacy, , Other, PHARMACY TO DOSE, Shiv Gerardo M.D.    aspirin (Asa) chewable tab 81 mg, 81 mg, Enteral Tube, DAILY, Mauri Montes M.D., 81 mg at 11/02/23 0610    Physical Examination:   /78   Pulse 90   Temp 36.7 °C (98 °F) (Temporal)   Resp (!) 21   Ht 1.6 m (5' 3\")   Wt 66.5 kg (146 lb 9.7 oz)   LMP 01/04/2011   SpO2 100%   Breastfeeding No   BMI 25.97 kg/m²     NEUROLOGICAL EXAM:     MENTAL STATUS: Awake, alert, near globally aphasic (follows some simple commands, no verbal output)  LANG/SPEECH: Follows command to close and open eyes, follow finger, no verbal output, cannot follow appendicular commands    CRANIAL NERVES:  II: Pupils equal and reactive, no blink to threat bilaterally  III, IV, VI: EOM intact, no gaze preference or deviation, no nystagmus.  V: Not tested  VII: no asymmetry, no nasolabial fold flattening  VIII: normal hearing to speech  IX, X: Not tested  XI: 5/5 head turn and 5/5 shoulder shrug bilaterally  XII: " midline tongue protrusion    MOTOR:  0/0 muscle power in Rt shoulder abductors/adductors, elbow flexors/extensors, wrist flexors/extensors, finger abductors/adductors.  0/0 in Rt hipflexors/extensors, knee flexors/extensors, ankle dorsiflexors and planter flexors.    5/5 muscle power in Lt shoulder abductors/adductors, elbow flexors/extensors.  5/5 in Lt hipflexors/extensors, dorsiflexors and planter flexors.    REFLEXES: no clonus, upgoing right toe  SENSORY: Grossly normal to touch in SPRING/LLE, likely intact sensation in RU/RLE  COORD: Not tested  GAIT: Deferred    Objective Data:    Labs:  Lab Results   Component Value Date/Time    PROTHROMBTM 13.3 10/26/2023 11:45 PM    INR 1.00 10/26/2023 11:45 PM      Lab Results   Component Value Date/Time    WBC 15.6 (H) 11/02/2023 06:30 AM    RBC 4.02 (L) 11/02/2023 06:30 AM    HEMOGLOBIN 11.8 (L) 11/02/2023 06:30 AM    HEMATOCRIT 36.5 (L) 11/02/2023 06:30 AM    MCV 90.8 11/02/2023 06:30 AM    MCH 29.4 11/02/2023 06:30 AM    MCHC 32.3 11/02/2023 06:30 AM    MPV 10.4 11/02/2023 06:30 AM    NEUTSPOLYS 80.00 (H) 11/02/2023 06:30 AM    LYMPHOCYTES 8.50 (L) 11/02/2023 06:30 AM    MONOCYTES 7.90 11/02/2023 06:30 AM    EOSINOPHILS 2.50 11/02/2023 06:30 AM    BASOPHILS 0.30 11/02/2023 06:30 AM    HYPOCHROMIA 1+ 02/19/2011 04:20 PM    ANISOCYTOSIS 1+ 06/29/2010 04:10 AM      Lab Results   Component Value Date/Time    SODIUM 154 (H) 11/02/2023 06:30 AM    POTASSIUM 3.6 11/02/2023 06:30 AM    CHLORIDE 125 (H) 11/02/2023 06:30 AM    CO2 20 11/02/2023 06:30 AM    GLUCOSE 123 (H) 11/02/2023 06:30 AM    BUN 15 11/02/2023 06:30 AM    CREATININE 0.60 11/02/2023 06:30 AM    CREATININE 0.8 06/12/2006 05:00 AM    GLOMRATE 66 05/15/2023 02:32 AM      Lab Results   Component Value Date/Time    CHOLSTRLTOT 135 10/28/2023 02:30 AM    LDL 61 10/28/2023 02:30 AM    HDL 62 10/28/2023 02:30 AM    TRIGLYCERIDE 58 10/28/2023 02:30 AM       Lab Results   Component Value Date/Time    ALKPHOSPHAT 80  11/02/2023 06:30 AM    ASTSGOT 28 11/02/2023 06:30 AM    ALTSGPT 29 11/02/2023 06:30 AM    TBILIRUBIN 0.5 11/02/2023 06:30 AM        Imaging/Testing:    I interpreted and/or reviewed the patient's neuroimaging    DX-ABDOMEN FOR TUBE PLACEMENT   Final Result         1.  Nonspecific bowel gas pattern in the upper abdomen.   2.  Nasogastric tube tip terminates overlying the expected location of the gastric body.   3.  Hazy left lower lobe infiltrates and trace left pleural effusion      CT-CTA CHEST PULMONARY ARTERY W/ RECONS   Final Result         1.  No evidence of pulmonary embolism.   2.  Left lower lobe atelectasis/collapse.   3.  Mucous plugging of the right lower lobe bronchi and some mild dependent atelectasis in the right lower lobe.   4.  Small hiatal hernia.      Fleischner Society pulmonary nodule recommendations:   Not applicable      CT-HEAD W/O   Final Result      Evolving acute large left cerebral infarct.      No acute hemorrhage.      Chronic ischemic changes.      DX-CHEST-PORTABLE (1 VIEW)   Final Result      No acute process.      IR-PICC LINE PLACEMENT W/ GUIDANCE > AGE 5   Final Result                  Ultrasound-guided PICC placement performed by qualified nursing staff as    above.          MR-BRAIN-W/O   Final Result         1. Acute left MCA distribution infarct, as above.   2. 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   3. Mild chronic ischemic white matter migration.      CT-HEAD W/O   Final Result      1.  Possible mild left frontotemporal edema and a present significantly improved      2.  Negative for hemorrhage or mass effect      3.  Right frontotemporal and encephalomalacia         DX-ABDOMEN FOR TUBE PLACEMENT   Final Result      1.  Enteric tube overlies the stomach.      CT-HEAD W/O   Final Result         1.  No acute intracranial abnormality is identified, there are nonspecific white matter changes, commonly associated with small vessel ischemic disease.  Associated mild  cerebral atrophy is noted.   2.  Left maxillary sinusitis changes   3.  Atherosclerosis.         IR-THROMBO MECHANICAL ARTERY,INIT   Final Result      1.  Occluded LEFT internal carotid artery-LEFT internal carotid flow diverter.   2.  Successful mechanical thrombectomy.   3.  TICI 2c      DX-CHEST-PORTABLE (1 VIEW)   Final Result         1.  Left basilar atelectasis, no focal infiltrate   2.  Atherosclerosis      CT-CTA NECK WITH & W/O-POST PROCESSING   Final Result         1.  Occlusion from the mid left internal carotid artery through intracranial stent at the distal left ICA.      These findings were discussed with the patient's clinician, Kiet Brown, on 10/27/2023 12:37 AM.      CT-CTA HEAD WITH & W/O-POST PROCESS   Final Result         1.  Left internal carotid artery occlusion extending up to distal carotid arterial stent.   2.  Decreased density within the left anterior cerebral artery, appearance suggesting slow flow or spasm.   3.  Small caliber of the left middle cerebral artery branches which taper and are not visualized most distally, changes most compatible with diminished flow due to left internal carotid artery occlusion.   4.  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   5.  3 mm basilar artery tip aneurysm   6.  2.3 mm aneurysm inferiorly at the right M1/M2 junction      CT-CEREBRAL PERFUSION ANALYSIS   Final Result         1.  Cerebral blood flow less than 30% likely representing completed infarct = 183 mL.      2.  T Max more than 6 seconds likely representing combination of completed infarct and ischemia = 280 mL.      3.  Mismatched volume likely representing ischemic brain/penumbra = 97 mL      4.  Please note that the cerebral perfusion was performed on the limited brain tissue around the basal ganglia region. Infarct/ischemia outside the CT perfusion sections can be missed in this study.      CT-HEAD W/O   Final Result         1.  No acute intracranial  abnormality is identified, there are nonspecific white matter changes, commonly associated with small vessel ischemic disease.  Associated mild cerebral atrophy is noted.   2.  Left maxillary sinusitis changes      Note: Artifacts are present limiting diagnostic sensitivity of this exam.          Assessment and Plan:  58-year-old female with history of recent right ICA aneurysm pipeline placement on October 19, 2023.  Patient presented on October 27, 2023 with acute onset of right-sided weakness and global aphasia.  Patient was found to have an occluded right ICA and underwent a thrombectomy with TICI 3 score recanalization.  Follow-up imaging demonstrated moderate stroke burden.  It is unclear why patient's stent occluded although it is thought that she possibly stopped taking her Brilinta post stent.  Patient has remained stable but nearly globally aphasic and will be going for PEG tube placement for discharge to long-term rehab.  Patient is on an Integrilin drip until she receives PEG placement and will be transition to Brilinta thereafter.       Plan:  -- Can change neurochecks to every 4 hours.  -- Maintain the blood pressure between 100-140 systolic.  -- Can begin normalizing sodium level, recommend no more than 4 mmol/L/day   Na level today: 154  -- Bridging Brilinta with integrilin until PEG tube has been placed  -- PT/OT/speech therapy    I have performed a physical exam and reviewed and updated ROS and Plan today (11/2/2023).     Amandeep Art III, MD  Vascular Neurology, [facility]

## 2023-11-02 NOTE — THERAPY
Physical Therapy   Daily Treatment     Patient Name: Roslyn Kilgore  Age:  58 y.o., Sex:  female  Medical Record #: [id number]  Today's Date: 11/1/2023     Precautions  Precautions: Fall Risk;Swallow Precautions  Comments: -140    Assessment  Pt seen for PT tx session with mobility detailed down below. Pt limited by lethargy and inattention during session, however she was more alert at EOB and able to stand with HHA. Pt able to track in all visual quadrants, however her R side is delayed. LLE still stronger than her R, although pt needs repeated cues for full ROM and activation of LLE. Continue to recommend placement. Will follow.     Plan    Treatment Plan Status: Continue Current Treatment Plan  Type of Treatment: Bed Mobility, Equipment, Gait Training, Neuro Re-Education / Balance, Self Care / Home Evaluation, Stair Training, Therapeutic Activities, Therapeutic Exercise  Treatment Frequency: 4 Times per Week  Treatment Duration: Until Therapy Goals Met    DC Equipment Recommendations: Unable to determine at this time  Discharge Recommendations: Recommend post-acute placement for additional physical therapy services prior to discharge home        Vitals   Vitals Comments BP within parameters throughout   Cognition    Cognition / Consciousness X   Speech/ Communication Unable to Communicate   Level of Consciousness Responds to voice   Ability To Follow Commands Unable to Follow 1 Step Commands   Attention Impaired   Initiation Impaired   Comments no verbalization, small smile at times   Sitting Lower Body Exercises   Sitting Lower Body Exercises Yes   Long Arc Quad 1 set of 10   Comments strong LLE, significanlty weaker RLE. pt needs multiple reps and cueing to get full ROM on her L   Balance   Sitting Balance (Static) Fair -   Sitting Balance (Dynamic) Poor +   Standing Balance (Static) Poor +   Standing Balance (Dynamic) Poor -   Weight Shift Sitting Fair   Weight Shift Standing Poor   Skilled  Intervention Verbal Cuing   Comments HHA in standign   Bed Mobility    Supine to Sit Maximal Assist   Sit to Supine Maximal Assist   Scooting Maximal Assist   Skilled Intervention Verbal Cuing   Comments incr assist due to pt with poor initiation   Gait Analysis   Gait Level Of Assist Unable to Participate   Functional Mobility   Sit to Stand Moderate Assist   Mobility STS x2 at EOB   Skilled Intervention Verbal Cuing;Tactile Cuing;Sequencing   Comments pt with good standing balance, needing only Min A HHA to remain upright   ICU Target Mobility Level   ICU Mobility - Targeted Level Level 3A   How much difficulty does the patient currently have...   Turning over in bed (including adjusting bedclothes, sheets and blankets)? 1   Sitting down on and standing up from a chair with arms (e.g., wheelchair, bedside commode, etc.) 2   Moving from lying on back to sitting on the side of the bed? 1   How much help from another person does the patient currently need...   Moving to and from a bed to a chair (including a wheelchair)? 2   Need to walk in a hospital room? 1   Climbing 3-5 steps with a railing? 1   6 clicks Mobility Score 8   Activity Tolerance   Sitting Edge of Bed 10 min   Standing 2 min   Comments limited by cognition and weakness   Short Term Goals    Short Term Goal # 1 pt will perform supine <> sit without bed features and Min A in 6 visits   Goal Outcome # 1 goal not met   Short Term Goal # 2 pt will perform sit <> stand and functional transfers with LRAD and Min A to improve moblity independence in 6 visits   Goal Outcome # 2 Goal not met   Short Term Goal # 3 pt will ambulate > 25 ft with LRAD and Mod A to improve function in 6 visits   Goal Outcome # 3 Goal not met   Physical Therapy Treatment Plan   Physical Therapy Treatment Plan Continue Current Treatment Plan   Anticipated Discharge Equipment and Recommendations   DC Equipment Recommendations Unable to determine at this time   Discharge Recommendations  Recommend post-acute placement for additional physical therapy services prior to discharge home   Interdisciplinary Plan of Care Collaboration   IDT Collaboration with  Family / Caregiver;Nursing;Occupational Therapist   Patient Position at End of Therapy In Bed;Wrist Restraints Applied;Call Light within Reach;Tray Table within Reach;Phone within Reach;Family / Friend in Room   Collaboration Comments RN updated   Session Information   Date / Session Number  11/1- 2 (2/4, 11/5)

## 2023-11-02 NOTE — THERAPY
Speech Language Therapy Contact Note    Patient Name: Roslyn Kilgore  Age:  58 y.o., Sex:  female  Medical Record #: [id number]  Today's Date: 11/2/2023    Discussed missed therapy with RNLeslie.      11/02/23 1257   Treatment Variance   Reason For Missed Therapy Medical - Patient not Able To Participate;Medical - Other (Please Comment)   Initial Contact Note    Initial Contact Note  Order Received and Verified, Speech Therapy Evaluation in Progress with Full Report to Follow.   Interdisciplinary Plan of Care Collaboration   IDT Collaboration with  Nursing;Other (See Comments)  (EMR)   Collaboration Comments Per RN/EMR, attempted PEG placement this afternoon- failed insertion 2/2 transillumination. Pt not appropriate for FEES at this time d/t sedation for PEG placement. FEES to be tentatively completed 11/3. Thank you.

## 2023-11-03 LAB
ANION GAP SERPL CALC-SCNC: 12 MMOL/L (ref 7–16)
ANION GAP SERPL CALC-SCNC: 12 MMOL/L (ref 7–16)
BASOPHILS # BLD AUTO: 0.4 % (ref 0–1.8)
BASOPHILS # BLD: 0.06 K/UL (ref 0–0.12)
BUN SERPL-MCNC: 15 MG/DL (ref 8–22)
BUN SERPL-MCNC: 17 MG/DL (ref 8–22)
CALCIUM SERPL-MCNC: 8.4 MG/DL (ref 8.5–10.5)
CALCIUM SERPL-MCNC: 8.5 MG/DL (ref 8.5–10.5)
CHLORIDE SERPL-SCNC: 117 MMOL/L (ref 96–112)
CHLORIDE SERPL-SCNC: 118 MMOL/L (ref 96–112)
CO2 SERPL-SCNC: 20 MMOL/L (ref 20–33)
CO2 SERPL-SCNC: 20 MMOL/L (ref 20–33)
CREAT SERPL-MCNC: 0.68 MG/DL (ref 0.5–1.4)
CREAT SERPL-MCNC: 0.72 MG/DL (ref 0.5–1.4)
EOSINOPHIL # BLD AUTO: 0.46 K/UL (ref 0–0.51)
EOSINOPHIL NFR BLD: 3.4 % (ref 0–6.9)
ERYTHROCYTE [DISTWIDTH] IN BLOOD BY AUTOMATED COUNT: 46.2 FL (ref 35.9–50)
GFR SERPLBLD CREATININE-BSD FMLA CKD-EPI: 100 ML/MIN/1.73 M 2
GFR SERPLBLD CREATININE-BSD FMLA CKD-EPI: 96 ML/MIN/1.73 M 2
GLUCOSE SERPL-MCNC: 105 MG/DL (ref 65–99)
GLUCOSE SERPL-MCNC: 116 MG/DL (ref 65–99)
HCT VFR BLD AUTO: 38 % (ref 37–47)
HGB BLD-MCNC: 12 G/DL (ref 12–16)
IMM GRANULOCYTES # BLD AUTO: 0.15 K/UL (ref 0–0.11)
IMM GRANULOCYTES NFR BLD AUTO: 1.1 % (ref 0–0.9)
LYMPHOCYTES # BLD AUTO: 1.34 K/UL (ref 1–4.8)
LYMPHOCYTES NFR BLD: 9.8 % (ref 22–41)
MAGNESIUM SERPL-MCNC: 2.1 MG/DL (ref 1.5–2.5)
MCH RBC QN AUTO: 28.4 PG (ref 27–33)
MCHC RBC AUTO-ENTMCNC: 31.6 G/DL (ref 32.2–35.5)
MCV RBC AUTO: 90 FL (ref 81.4–97.8)
MONOCYTES # BLD AUTO: 0.99 K/UL (ref 0–0.85)
MONOCYTES NFR BLD AUTO: 7.2 % (ref 0–13.4)
NEUTROPHILS # BLD AUTO: 10.73 K/UL (ref 1.82–7.42)
NEUTROPHILS NFR BLD: 78.1 % (ref 44–72)
NRBC # BLD AUTO: 0 K/UL
NRBC BLD-RTO: 0 /100 WBC (ref 0–0.2)
PLATELET # BLD AUTO: 258 K/UL (ref 164–446)
PMV BLD AUTO: 10.6 FL (ref 9–12.9)
POTASSIUM SERPL-SCNC: 3.3 MMOL/L (ref 3.6–5.5)
POTASSIUM SERPL-SCNC: 3.5 MMOL/L (ref 3.6–5.5)
RBC # BLD AUTO: 4.22 M/UL (ref 4.2–5.4)
SODIUM SERPL-SCNC: 149 MMOL/L (ref 135–145)
SODIUM SERPL-SCNC: 150 MMOL/L (ref 135–145)
WBC # BLD AUTO: 13.7 K/UL (ref 4.8–10.8)

## 2023-11-03 PROCEDURE — 700101 HCHG RX REV CODE 250: Performed by: INTERNAL MEDICINE

## 2023-11-03 PROCEDURE — 700111 HCHG RX REV CODE 636 W/ 250 OVERRIDE (IP): Mod: JZ | Performed by: INTERNAL MEDICINE

## 2023-11-03 PROCEDURE — A9270 NON-COVERED ITEM OR SERVICE: HCPCS | Performed by: INTERNAL MEDICINE

## 2023-11-03 PROCEDURE — 700102 HCHG RX REV CODE 250 W/ 637 OVERRIDE(OP): Performed by: INTERNAL MEDICINE

## 2023-11-03 PROCEDURE — 83735 ASSAY OF MAGNESIUM: CPT

## 2023-11-03 PROCEDURE — 94669 MECHANICAL CHEST WALL OSCILL: CPT

## 2023-11-03 PROCEDURE — 92612 ENDOSCOPY SWALLOW (FEES) VID: CPT

## 2023-11-03 PROCEDURE — 700111 HCHG RX REV CODE 636 W/ 250 OVERRIDE (IP): Performed by: INTERNAL MEDICINE

## 2023-11-03 PROCEDURE — 85025 COMPLETE CBC W/AUTO DIFF WBC: CPT

## 2023-11-03 PROCEDURE — 700102 HCHG RX REV CODE 250 W/ 637 OVERRIDE(OP): Performed by: RADIOLOGY

## 2023-11-03 PROCEDURE — 99291 CRITICAL CARE FIRST HOUR: CPT | Performed by: INTERNAL MEDICINE

## 2023-11-03 PROCEDURE — A9270 NON-COVERED ITEM OR SERVICE: HCPCS | Performed by: RADIOLOGY

## 2023-11-03 PROCEDURE — 99223 1ST HOSP IP/OBS HIGH 75: CPT | Mod: AI | Performed by: HOSPITALIST

## 2023-11-03 PROCEDURE — 97530 THERAPEUTIC ACTIVITIES: CPT

## 2023-11-03 PROCEDURE — A9270 NON-COVERED ITEM OR SERVICE: HCPCS | Performed by: HOSPITALIST

## 2023-11-03 PROCEDURE — 700102 HCHG RX REV CODE 250 W/ 637 OVERRIDE(OP): Performed by: HOSPITALIST

## 2023-11-03 PROCEDURE — 99232 SBSQ HOSP IP/OBS MODERATE 35: CPT | Performed by: STUDENT IN AN ORGANIZED HEALTH CARE EDUCATION/TRAINING PROGRAM

## 2023-11-03 PROCEDURE — 80048 BASIC METABOLIC PNL TOTAL CA: CPT | Mod: 91

## 2023-11-03 PROCEDURE — 94640 AIRWAY INHALATION TREATMENT: CPT

## 2023-11-03 PROCEDURE — 97535 SELF CARE MNGMENT TRAINING: CPT

## 2023-11-03 PROCEDURE — 770020 HCHG ROOM/CARE - TELE (206)

## 2023-11-03 RX ORDER — ATORVASTATIN CALCIUM 40 MG/1
40 TABLET, FILM COATED ORAL DAILY
Status: DISCONTINUED | OUTPATIENT
Start: 2023-11-04 | End: 2023-11-06

## 2023-11-03 RX ORDER — SODIUM CHLORIDE 1 G/1
1 TABLET ORAL EVERY 8 HOURS
Status: DISCONTINUED | OUTPATIENT
Start: 2023-11-03 | End: 2023-11-04

## 2023-11-03 RX ORDER — POTASSIUM CHLORIDE 14.9 MG/ML
20 INJECTION INTRAVENOUS ONCE
Status: COMPLETED | OUTPATIENT
Start: 2023-11-03 | End: 2023-11-03

## 2023-11-03 RX ADMIN — IPRATROPIUM BROMIDE AND ALBUTEROL SULFATE 3 ML: 2.5; .5 SOLUTION RESPIRATORY (INHALATION) at 15:18

## 2023-11-03 RX ADMIN — ASPIRIN 81 MG 81 MG: 81 TABLET ORAL at 05:40

## 2023-11-03 RX ADMIN — IPRATROPIUM BROMIDE AND ALBUTEROL SULFATE 3 ML: 2.5; .5 SOLUTION RESPIRATORY (INHALATION) at 07:46

## 2023-11-03 RX ADMIN — SODIUM CHLORIDE 2 G: 1 TABLET ORAL at 05:39

## 2023-11-03 RX ADMIN — ATORVASTATIN CALCIUM 80 MG: 40 TABLET, FILM COATED ORAL at 05:40

## 2023-11-03 RX ADMIN — POTASSIUM CHLORIDE 20 MEQ: 14.9 INJECTION, SOLUTION INTRAVENOUS at 12:11

## 2023-11-03 RX ADMIN — GUAIFENESIN 200 MG: 100 SOLUTION ORAL at 20:40

## 2023-11-03 RX ADMIN — GUAIFENESIN 200 MG: 100 SOLUTION ORAL at 17:41

## 2023-11-03 RX ADMIN — GUAIFENESIN 200 MG: 100 SOLUTION ORAL at 05:40

## 2023-11-03 RX ADMIN — GUAIFENESIN 200 MG: 100 SOLUTION ORAL at 02:15

## 2023-11-03 RX ADMIN — TICAGRELOR 90 MG: 90 TABLET ORAL at 12:06

## 2023-11-03 RX ADMIN — IPRATROPIUM BROMIDE AND ALBUTEROL SULFATE 3 ML: 2.5; .5 SOLUTION RESPIRATORY (INHALATION) at 19:41

## 2023-11-03 RX ADMIN — ACETYLCYSTEINE 4 ML: 200 SOLUTION ORAL; RESPIRATORY (INHALATION) at 19:41

## 2023-11-03 RX ADMIN — GUAIFENESIN 200 MG: 100 SOLUTION ORAL at 12:07

## 2023-11-03 RX ADMIN — LOSARTAN POTASSIUM 50 MG: 50 TABLET, FILM COATED ORAL at 05:39

## 2023-11-03 RX ADMIN — AMLODIPINE BESYLATE 10 MG: 10 TABLET ORAL at 05:40

## 2023-11-03 RX ADMIN — ACETYLCYSTEINE 4 ML: 200 SOLUTION ORAL; RESPIRATORY (INHALATION) at 07:47

## 2023-11-03 RX ADMIN — MONTELUKAST 10 MG: 10 TABLET, FILM COATED ORAL at 05:40

## 2023-11-03 RX ADMIN — SODIUM CHLORIDE 1 G: 1 TABLET ORAL at 22:18

## 2023-11-03 RX ADMIN — ENOXAPARIN SODIUM 40 MG: 100 INJECTION SUBCUTANEOUS at 17:41

## 2023-11-03 RX ADMIN — EPTIFIBATIDE 1 MCG/KG/MIN: 0.75 INJECTION, SOLUTION INTRAVENOUS at 13:10

## 2023-11-03 RX ADMIN — GUAIFENESIN 200 MG: 100 SOLUTION ORAL at 13:36

## 2023-11-03 RX ADMIN — TICAGRELOR 90 MG: 90 TABLET ORAL at 20:00

## 2023-11-03 RX ADMIN — SODIUM CHLORIDE 2 G: 1 TABLET ORAL at 13:36

## 2023-11-03 ASSESSMENT — COGNITIVE AND FUNCTIONAL STATUS - GENERAL
DRESSING REGULAR UPPER BODY CLOTHING: A LOT
HELP NEEDED FOR BATHING: TOTAL
EATING MEALS: TOTAL
DRESSING REGULAR LOWER BODY CLOTHING: TOTAL
SUGGESTED CMS G CODE MODIFIER MOBILITY: CM
CLIMB 3 TO 5 STEPS WITH RAILING: TOTAL
SUGGESTED CMS G CODE MODIFIER DAILY ACTIVITY: CM
STANDING UP FROM CHAIR USING ARMS: A LOT
MOVING TO AND FROM BED TO CHAIR: UNABLE
DAILY ACTIVITIY SCORE: 8
PERSONAL GROOMING: A LOT
TURNING FROM BACK TO SIDE WHILE IN FLAT BAD: UNABLE
MOVING FROM LYING ON BACK TO SITTING ON SIDE OF FLAT BED: A LOT
TOILETING: TOTAL
WALKING IN HOSPITAL ROOM: TOTAL
MOBILITY SCORE: 8

## 2023-11-03 ASSESSMENT — GAIT ASSESSMENTS: GAIT LEVEL OF ASSIST: UNABLE TO PARTICIPATE

## 2023-11-03 ASSESSMENT — PAIN DESCRIPTION - PAIN TYPE
TYPE: ACUTE PAIN

## 2023-11-03 NOTE — THERAPY
Occupational Therapy  Daily Treatment     Patient Name: Roslyn Kilgore  Age:  58 y.o., Sex:  female  Medical Record #: [id number]  Today's Date: 11/3/2023     Precautions  Precautions: Fall Risk, Swallow Precautions, Nasogastric Tube  Comments: (P) -140, BMS    Assessment    Pt w/ increased level of arousal today, pt sat EOB w/ CGA, found to be incont, required total A for clean up, pt able to follow ~50% of 1 step functional commands w/ increased time on L side. Pt required max A for SPT txf to chair, mod A for grooming. Will continue to follow as pt is below functional baseline.     Plan    Treatment Plan Status: (P) Continue Current Treatment Plan  Type of Treatment: Self Care / Activities of Daily Living, Adaptive Equipment, Manual Therapy Techniques, Neuro Re-Education / Balance, Therapeutic Exercises, Therapeutic Activity  Treatment Frequency: 3 Times per Week  Treatment Duration: Until Therapy Goals Met    DC Equipment Recommendations: (P) Unable to determine at this time  Discharge Recommendations: (P) Recommend post-acute placement for additional occupational therapy services prior to discharge home         Objective       11/03/23 0942   Treatment Charges   Charges Yes   OT Self Care / ADL (Units) 1   Total Time Spent   OT Self Care / ADL (Minutes) 24   Precautions   Precautions Fall Risk;Swallow Precautions;Nasogastric Tube   Comments -140, BMS   Vitals   O2 Delivery Device Silicone Nasal Cannula   Vitals Comments BP within parameters throughout session, initially on RA upon arrival, after mobility desatted to 87% NC replaced, RN aware   Pain 0 - 10 Group   Therapist Pain Assessment Post Activity Pain Same as Prior to Activity;Nurse Notified  (no indications of pain during session)   Cognition    Cognition / Consciousness X   Speech/ Communication Unable to Communicate   Level of Consciousness Alert   Ability To Follow Commands 1 Step  (25% of functional commands)   Attention Impaired    Initiation Impaired   Comments Pt alert, followed 1 step functional commands, would give small smile   Other Treatments   Other Treatments Provided collaborated with PT for functional txf to maximize benefits to pt   Balance   Sitting Balance (Static) Fair -   Sitting Balance (Dynamic) Poor +   Standing Balance (Static) Poor   Standing Balance (Dynamic) Poor -   Weight Shift Sitting Fair   Weight Shift Standing Poor   Skilled Intervention Tactile Cuing;Verbal Cuing;Facilitation   Comments w/ B UE and trunk support   Bed Mobility    Supine to Sit Contact Guard Assist   Sit to Supine   (NT in chair post)   Scooting Minimal Assist   Rolling Minimal Assist to Rt.   Skilled Intervention Verbal Cuing   Activities of Daily Living   Grooming Moderate Assist;Seated  (hair care)   Upper Body Dressing Maximal Assist   Toileting Total Assist  (incont of stool)   Skilled Intervention Verbal Cuing;Tactile Cuing;Facilitation   How much help from another person does the patient currently need...   Putting on and taking off regular lower body clothing? 1   Bathing (including washing, rinsing, and drying)? 1   Toileting, which includes using a toilet, bedpan, or urinal? 1   Putting on and taking off regular upper body clothing? 2   Taking care of personal grooming such as brushing teeth? 2   Eating meals? 1   6 Clicks Daily Activity Score 8   Functional Mobility   Sit to Stand Minimal Assist   Bed, Chair, Wheelchair Transfer Maximal Assist   Mobility Stand pivot from EOB > chair   Skilled Intervention Verbal Cuing;Tactile Cuing;Facilitation   Activity Tolerance   Sitting in Chair 10+ min (up post)   Sitting Edge of Bed 10 min   Standing 5 min   Short Term Goals   Short Term Goal # 1 Pt will complete ADL transfers with modA   Goal Outcome # 1 Progressing as expected   Short Term Goal # 2 Pt will complete LB dressing with modA   Goal Outcome # 2 Goal not met   Short Term Goal # 3 Pt will complete toileting with modA   Goal Outcome #  3 Goal not met   Education Group   Role of Occupational Therapist Patient Response Patient;Acceptance;Explanation;Reinforcement Needed   Occupational Therapy Treatment Plan    O.T. Treatment Plan Continue Current Treatment Plan   Anticipated Discharge Equipment and Recommendations   DC Equipment Recommendations Unable to determine at this time   Discharge Recommendations Recommend post-acute placement for additional occupational therapy services prior to discharge home   Interdisciplinary Plan of Care Collaboration   IDT Collaboration with  Nursing;Physical Therapist   Patient Position at End of Therapy Seated;Chair Alarm On;Call Light within Reach;Tray Table within Reach;Phone within Reach   Collaboration Comments report given   Session Information   Date / Session Number  11/3, 3 (3/3, 11/5)

## 2023-11-03 NOTE — CONSULTS
Hospital Medicine Consultation    Date of Service  11/3/2023    Referring Physician  RENE Cole.*    Consulting Physician  Scott Hurley D.O.    Reason for Consultation  Cerebral infarct    History of Presenting Illness  58 y.o. female who presented 10/26/2023 with PMHx HTN, HLD, diverticulitis, previous ischemic CVA and intracranial aneurysm.  She had a ICA flow diverter stent placed on 10/19.  She was sent home on DAPT with ASA and brilinta.  Unclear if she was taking Brillinta; pt apparently felt she had an allergy.  TEG did not show signficant platelet inhibition.  She presented back on 10/27 with R side weakness and aphasia.  CTA on presentation showing occlusion of mid L ICA through stent.  Taken emergently to IR 10/27 for theombectomy with TICI 3 flow post.  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    Surgical History   has a past surgical history that includes other; other; angiogram (08/17/2009); thrombectomy (12/21/2009); angiogram (02/02/2010); angiogram (04/15/2010); angioplasty balloon (04/15/2010); femoral artery repair (06/28/2010); mammoplasty reduction (08/24/2010); pr u/s leiomyomata ablate <200 cc; thrombectomy (01/09/2011); angiogram (01/09/2011); primary c section; hysteroscopy novasure-2  (09/27/2010); angiogram (Right, 11/05/2015); angioplasty upper (Right, 11/05/2015); other orthopedic surgery; debridement (Right, 11/11/2015); endarterectomy (Right, 11/18/2015); hysterectomy laparoscopy (2015); and pr upper gi endoscopy,diagnosis (11/2/2023).    Family History  family history includes Stroke in her mother.    Social History   reports that she quit smoking about 24 years ago. Her smoking use included cigarettes. She started smoking about 43 years ago. She has a 19.0 pack-year smoking history. She has never used smokeless tobacco. She reports that she does not currently use drugs after having used the following drugs: Marijuana and Oral. She reports that she does not drink alcohol.    Medications  Prior to Admission Medications   Prescriptions Last Dose Informant Patient Reported? Taking?   albuterol 108 (90 Base) MCG/ACT Aero Soln inhalation aerosol  Patient No No   Sig: Inhale 2 Puffs every 6 hours as needed for Shortness of Breath.   amLODIPine (NORVASC) 10 MG Tab  Patient No No   Sig: TAKE 1 TABLET BY MOUTH DAILY   aspirin 81 MG EC tablet  Patient Yes No   Sig: Take 81 mg by mouth every day.   atorvastatin (LIPITOR) 40 MG Tab   No No   Sig: TAKE 1 TABLET BY MOUTH DAILY   losartan (COZAAR) 25 MG Tab  Patient No No   Sig: Take 1 Tablet by mouth every day.   montelukast (SINGULAIR) 10 MG Tab  Patient No No   Sig: Take 1 Tablet by mouth every day.   ticagrelor (BRILINTA) 90 MG Tab tablet   No No   Sig: Take 1 Tablet by mouth 2 times a day.      Facility-Administered Medications: None       Allergies  Allergies   Allergen Reactions    Alcohol Unspecified     REQUESTS NO ALCOHOL CONTENT IN HER MEDICATIONS (per pt, pt is an alcoholic)    Trazodone Hives     Caused breat hyperplasia. Pt then underwent a breast reduction and attempted to take again, new reaction: Hives.    Lisinopril Cough       Physical Exam  Temp:  [36.4 °C (97.6 °F)-36.8 °C (98.2 °F)] 36.8 °C (98.2 °F)  Pulse:  [] (P) 85  Resp:   [12-30] (P) 18  BP: (118-150)/(58-83) (P) 124/61  SpO2:  [91 %-99 %] (P) 93 %    Physical Exam  Constitutional:       General: She is not in acute distress.     Appearance: Normal appearance. She is well-developed. She is not diaphoretic.   HENT:      Head: Normocephalic and atraumatic.   Neck:      Vascular: No JVD.   Cardiovascular:      Rate and Rhythm: Normal rate and regular rhythm.      Heart sounds: No murmur heard.  Pulmonary:      Effort: Pulmonary effort is normal. No respiratory distress.      Breath sounds: No stridor. No wheezing or rales.   Abdominal:      Palpations: Abdomen is soft.      Tenderness: There is no abdominal tenderness. There is no guarding or rebound.   Skin:     General: Skin is warm and dry.      Findings: No rash.   Neurological:      Mental Status: She is alert.      Comments: No verbalization  Patient follows physical commands fairly consistently  Asymmetric  Tongue midline  Motor on the right side upper and lower extremity is 0 out of 5  Left side upper and lower 5/5   Psychiatric:         Mood and Affect: Mood normal.         Behavior: Behavior normal.         Fluids      Laboratory  Recent Labs     11/01/23  0530 11/02/23  0630 11/03/23  0600   WBC 14.4* 15.6* 13.7*   RBC 4.01* 4.02* 4.22   HEMOGLOBIN 11.6* 11.8* 12.0   HEMATOCRIT 36.5* 36.5* 38.0   MCV 91.0 90.8 90.0   MCH 28.9 29.4 28.4   MCHC 31.8* 32.3 31.6*   RDW 48.4 47.5 46.2   PLATELETCT 245 228 258   MPV 10.3 10.4 10.6     Recent Labs     11/02/23  1826 11/03/23  0000 11/03/23  0600   SODIUM 150* 149* 150*   POTASSIUM 3.4* 3.3* 3.5*   CHLORIDE 118* 117* 118*   CO2 21 20 20   GLUCOSE 102* 105* 116*   BUN 14 15 17   CREATININE 0.68 0.68 0.72   CALCIUM 8.6 8.4* 8.5                     Imaging  DX-ABDOMEN FOR TUBE PLACEMENT   Final Result      NG tube tip projects at the gastroesophageal junction. Recommend advancing before use.      DX-ABDOMEN FOR TUBE PLACEMENT   Final Result         1.  Nonspecific bowel gas pattern in  the upper abdomen.   2.  Nasogastric tube tip terminates overlying the expected location of the gastric body.   3.  Hazy left lower lobe infiltrates and trace left pleural effusion      CT-CTA CHEST PULMONARY ARTERY W/ RECONS   Final Result         1.  No evidence of pulmonary embolism.   2.  Left lower lobe atelectasis/collapse.   3.  Mucous plugging of the right lower lobe bronchi and some mild dependent atelectasis in the right lower lobe.   4.  Small hiatal hernia.      Fleischner Society pulmonary nodule recommendations:   Not applicable      CT-HEAD W/O   Final Result      Evolving acute large left cerebral infarct.      No acute hemorrhage.      Chronic ischemic changes.      DX-CHEST-PORTABLE (1 VIEW)   Final Result      No acute process.      IR-PICC LINE PLACEMENT W/ GUIDANCE > AGE 5   Final Result                  Ultrasound-guided PICC placement performed by qualified nursing staff as    above.          MR-BRAIN-W/O   Final Result         1. Acute left MCA distribution infarct, as above.   2. 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   3. Mild chronic ischemic white matter migration.      CT-HEAD W/O   Final Result      1.  Possible mild left frontotemporal edema and a present significantly improved      2.  Negative for hemorrhage or mass effect      3.  Right frontotemporal and encephalomalacia         DX-ABDOMEN FOR TUBE PLACEMENT   Final Result      1.  Enteric tube overlies the stomach.      CT-HEAD W/O   Final Result         1.  No acute intracranial abnormality is identified, there are nonspecific white matter changes, commonly associated with small vessel ischemic disease.  Associated mild cerebral atrophy is noted.   2.  Left maxillary sinusitis changes   3.  Atherosclerosis.         IR-THROMBO MECHANICAL ARTERY,INIT   Final Result      1.  Occluded LEFT internal carotid artery-LEFT internal carotid flow diverter.   2.  Successful mechanical thrombectomy.   3.  TICI 2c       DX-CHEST-PORTABLE (1 VIEW)   Final Result         1.  Left basilar atelectasis, no focal infiltrate   2.  Atherosclerosis      CT-CTA NECK WITH & W/O-POST PROCESSING   Final Result         1.  Occlusion from the mid left internal carotid artery through intracranial stent at the distal left ICA.      These findings were discussed with the patient's clinician, Kiet Brown, on 10/27/2023 12:37 AM.      CT-CTA HEAD WITH & W/O-POST PROCESS   Final Result         1.  Left internal carotid artery occlusion extending up to distal carotid arterial stent.   2.  Decreased density within the left anterior cerebral artery, appearance suggesting slow flow or spasm.   3.  Small caliber of the left middle cerebral artery branches which taper and are not visualized most distally, changes most compatible with diminished flow due to left internal carotid artery occlusion.   4.  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   5.  3 mm basilar artery tip aneurysm   6.  2.3 mm aneurysm inferiorly at the right M1/M2 junction      CT-CEREBRAL PERFUSION ANALYSIS   Final Result         1.  Cerebral blood flow less than 30% likely representing completed infarct = 183 mL.      2.  T Max more than 6 seconds likely representing combination of completed infarct and ischemia = 280 mL.      3.  Mismatched volume likely representing ischemic brain/penumbra = 97 mL      4.  Please note that the cerebral perfusion was performed on the limited brain tissue around the basal ganglia region. Infarct/ischemia outside the CT perfusion sections can be missed in this study.      CT-HEAD W/O   Final Result         1.  No acute intracranial abnormality is identified, there are nonspecific white matter changes, commonly associated with small vessel ischemic disease.  Associated mild cerebral atrophy is noted.   2.  Left maxillary sinusitis changes      Note: Artifacts are present limiting diagnostic sensitivity of this exam.           Assessment/Plan  * Acute ischemic left internal carotid artery (ICA) stroke (HCC)- (present on admission)  Assessment & Plan  Etiology of this event appears to have been noncompliant with the patient's DAPT at home after a left ICA stent being placed on October 19.  Now s/p thrombectomy  Back on dual antiplatelet therapy  High-dose statin  BP control  Anticipate discharge to rehab  Neurology following  PT OT and speech following  Physiatry consulted  EKG and telemetry are sinus  Echo benign  MRI confirms ischemic infarct  Neurology recommends allowing sodium to drift to normal range: We will titrate off supplemental salt tablets  Patient will need a feeding tube, however unable to get 1 done endoscopically due to anatomy.  Surgery consulted.  Will need to stop Integrilin 4 hours before surgery and then can resume it 4 hours after.    Hypokalemia  Assessment & Plan  Follow and replace    Extrinsic asthma without complication- (present on admission)  Assessment & Plan  On exam benign  O2 and RT protocols  Continue montelukast    Dyslipidemia- (present on admission)  Assessment & Plan  High-dose statin    Primary hypertension- (present on admission)  Assessment & Plan  As outpatient was on losartan 25 mg daily, and amlodipine 10 mg daily  Continue amlodipine dose and increase losartan to 50 mg

## 2023-11-03 NOTE — THERAPY
Physical Therapy   Daily Treatment     Patient Name: Roslyn Kilgore  Age:  58 y.o., Sex:  female  Medical Record #: [id number]  Today's Date: 11/2/2023     Precautions  Precautions: Fall Risk;Swallow Precautions  Comments: -140    Assessment  Pt seen for PT tx session with mobility detailed down below. Pt is somewhat more alert today, however she can still only follow commands with her LUE and LLE. Pt unable to ambulate due to poor R foot clearance, although she was able to transfer to the chair with Max A. Continue to recommend placement. Will follow.     Plan    Treatment Plan Status: Continue Current Treatment Plan  Type of Treatment: Bed Mobility, Equipment, Gait Training, Neuro Re-Education / Balance, Self Care / Home Evaluation, Stair Training, Therapeutic Activities, Therapeutic Exercise  Treatment Frequency: 4 Times per Week  Treatment Duration: Until Therapy Goals Met    DC Equipment Recommendations: Unable to determine at this time  Discharge Recommendations: Recommend post-acute placement for additional physical therapy services prior to discharge home        Vitals   O2 (LPM) 1   O2 Delivery Device Silicone Nasal Cannula   Vitals Comments BP within parameters throughout   Cognition    Cognition / Consciousness X   Speech/ Communication Unable to Communicate   Level of Consciousness Alert   Ability To Follow Commands   (minimally following commands, LUE and LLE only)   Attention Impaired   Initiation Impaired   Comments more alert today, smiling more often   Sitting Lower Body Exercises   Sitting Lower Body Exercises Yes   Long Arc Quad 1 set of 10   Comments LLE only, no RLE movement despite full PROM   Balance   Sitting Balance (Static) Fair -   Sitting Balance (Dynamic) Poor +   Standing Balance (Static) Poor   Standing Balance (Dynamic) Poor -   Weight Shift Sitting Fair   Weight Shift Standing Poor   Skilled Intervention Verbal Cuing;Tactile Cuing;Postural Facilitation   Comments HHA x2 in  standing, poor weight shifting onto RLE   Bed Mobility    Supine to Sit Maximal Assist   Sit to Supine Maximal Assist   Scooting Maximal Assist   Skilled Intervention Verbal Cuing   Gait Analysis   Gait Level Of Assist Unable to Participate   Comments pt unable to advance RLE to ambulate, can step fwd with L foot   Functional Mobility   Sit to Stand Moderate Assist   Bed, Chair, Wheelchair Transfer Maximal Assist   Mobility STS x4 at EOB, SPT x1 to chair   Skilled Intervention Verbal Cuing;Tactile Cuing;Sequencing   Comments 2 person assist, pt needing assistance to move R foot due to limited step height clearance   ICU Target Mobility Level   ICU Mobility - Targeted Level Level 3B   How much difficulty does the patient currently have...   Turning over in bed (including adjusting bedclothes, sheets and blankets)? 1   Sitting down on and standing up from a chair with arms (e.g., wheelchair, bedside commode, etc.) 2   Moving from lying on back to sitting on the side of the bed? 1   How much help from another person does the patient currently need...   Moving to and from a bed to a chair (including a wheelchair)? 2   Need to walk in a hospital room? 1   Climbing 3-5 steps with a railing? 1   6 clicks Mobility Score 8   Activity Tolerance   Sitting in Chair 5 min/post session   Sitting Edge of Bed 10 min   Standing 3 min   Comments limited by cognition and weakness   Short Term Goals    Short Term Goal # 1 pt will perform supine <> sit without bed features and Min A in 6 visits   Goal Outcome # 1 goal not met   Short Term Goal # 2 pt will perform sit <> stand and functional transfers with LRAD and Min A to improve moblity independence in 6 visits   Goal Outcome # 2 Progressing slower than expected   Short Term Goal # 3 pt will ambulate > 25 ft with LRAD and Mod A to improve function in 6 visits   Goal Outcome # 3 Goal not met   Physical Therapy Treatment Plan   Physical Therapy Treatment Plan Continue Current Treatment  Plan   Anticipated Discharge Equipment and Recommendations   DC Equipment Recommendations Unable to determine at this time   Discharge Recommendations Recommend post-acute placement for additional physical therapy services prior to discharge home   Interdisciplinary Plan of Care Collaboration   IDT Collaboration with  Nursing   Patient Position at End of Therapy Seated;Chair Alarm On;Call Light within Reach;Tray Table within Reach;Phone within Reach  (RN at bedside)   Collaboration Comments RN present for tx   Session Information   Date / Session Number  11/2- 3 (3/4, 11/5)

## 2023-11-03 NOTE — PROGRESS NOTES
"Critical Care Progress Note    Date of admission  10/26/2023    Chief Complaint  58 y.o. female admitted 10/26/2023 with aphasia and right-sided body weakness    Hospital Course  Edited HPI from Dr. Tejeda note from previous date of service:  \" 58 y.o. female who presented 10/26/2023 with a stroke alert.  Patient was brought by emergency medical services to the emergency department after her roommate noticed the patient having aphasia, right-sided body weakness (upper and lower limbs) earlier today.  At initial evaluation in the emergency department she was found to be confused with altered mental status, last time known at her normal was around 10 PM on 10/26/2023.  Has remained nonverbal in the ED, blood pressure was in the 130s over 80s, glucose was within normal limits.  The patient has a past medical history of left ICA aneurysm status post stent placement with diversion of flow with subsequent treatment with Brilinta.  Per notes it seems that she was not tolerating well the Brilinta?  But was advised to continue taking it.  Diagnostic work-up in the ED with a CTA shows no flow through the left ICA for what neurology Dr. Akbar was consulted.  Given recent neurosurgical intervention she was deemed poor candidate for thrombolytics due to high risk of bleeding.  IR was consulted for emergent thrombectomy.\"    Brought in by EMS for aphasia, right-sided weakness, LKW 10 PM 10/26/2023  PMH left ICA aneurysm status post stents with diversion of flow and subsequent treatment with Brilinta.  Allergic to Plavix     CTA of the neck revealed occlusion of mid left ICA through intracranial stent at the distal left ICA with large penumbra. CTA of the head revealed no LVO     Neuro consulted, poor candidate for thrombolytics due to high risk of bleeding given recent neurosurgical intervention  Taken to [facility] suite, found to have left ICA in-stent thrombosis s/p thrombectomy with TICI 3 flow and started on low-dose Integrilin " drip.      Initial concern for noncompliance with brillinta, however TEG showed therapeutic ADP inhibition suggesting compliance    10/28: acute left MCA distribution infarct, and old right posterior frontal infarct  10/29: Rapidly worsening hypoxia overnight from 3LPm -> %, CXR unremarkable  CT head showing no changes  CTA chest no PE, complete atelectasis/collaspse of the LLL and mucous plugging of the RLL as well  10/30: d/w neurology - at this time prognosis is not clear and will likely need prolonged time to assess recovery; suggest PEG  Was able to work with PT today  10/31: GI consult; family had agreed to PEG.  11/1: can start titrating hypertonic saline down  11/2: GI did EGD to assess for PEG placement but could not see illumination to place PEG and recommended IR  IR reviewed and unable and behind the liver recommending laprascopic  NG replaced and restarted feeds  11/3 consult gen surgery    Interval Problem Update  Chart review from the past 24 hours includes imaging, laboratory studies, vital signs and notes available.  Pertinent data for today's visit includes      Cardiac: ST, -1500s, TICI 3: goal -140   Pulm: NC 3 l; IPV tid  Neuro: Global aphasia, ?able to follow simple commands, somnolent but opens eyes briefly to vocal stimulus. Pupils equal 3mm sluggish symmetrical, gaze crossing midline now, good cough, clearing secretions, L 4/4 uppers/lowers and purposeful, R w/d to pain  Heme: Mild leukocytosis, unchanged  Renal/volume status: Cr < 1   ID:  AF, no abx  GI/endo: BMP glucose at goal, tube feeds going and advancing  Labs/Imaging: Na 150 goal to decrease slowly to normal Na  Lines: PICC, purewick    Review of Systems  Review of Systems   Unable to perform ROS: Medical condition      Vital Signs for last 24 hours   Temp:  [36.4 °C (97.6 °F)-36.8 °C (98.3 °F)] 36.8 °C (98.2 °F)  Pulse:  [] (P) 85  Resp:  [12-30] (P) 18  BP: (118-150)/(58-83) (P) 124/61  SpO2:  [91 %-100  %] (P) 93 %    Physical Exam   Physical Exam  Vitals and nursing note reviewed.   Constitutional:       General: She is not in acute distress.     Appearance: She is ill-appearing. She is not toxic-appearing.   HENT:      Head: Normocephalic and atraumatic.      Nose: Nose normal.      Mouth/Throat:      Mouth: Mucous membranes are moist.   Eyes:      Pupils: Pupils are equal, round, and reactive to light.   Cardiovascular:      Rate and Rhythm: Normal rate and regular rhythm.      Pulses: Normal pulses.      Heart sounds: Normal heart sounds. No murmur heard.     No gallop.   Pulmonary:      Effort: Pulmonary effort is normal. No respiratory distress.      Breath sounds: Normal breath sounds. No stridor. No wheezing or rales.   Abdominal:      General: There is no distension.      Tenderness: There is no abdominal tenderness. There is no guarding.   Musculoskeletal:         General: No swelling. Normal range of motion.      Right lower leg: No edema.      Left lower leg: No edema.   Lymphadenopathy:      Cervical: No cervical adenopathy.   Skin:     General: Skin is warm.      Capillary Refill: Capillary refill takes less than 2 seconds.      Findings: No rash.   Neurological:      Comments: Global aphasia, able to follow simple commands, somnolent but opens eyes  to vocal stimulus. Pupils equal 3mm sluggish symmetrical, gaze crossing midline now, good cough, clearing secretions, L 4/4 uppers/lowers, R 2/2 uppers/lowers.        Medications  Current Facility-Administered Medications   Medication Dose Route Frequency Provider Last Rate Last Admin    losartan (Cozaar) tablet 50 mg  50 mg Enteral Tube Q DAY Anna Marie Evans M.D.   50 mg at 11/03/23 0539    eptifibatide 0.75 mg/mL (Integrilin) infusion  1 mcg/kg/min Intravenous Continuous Anna Marie Evans M.D. 5.3 mL/hr at 11/02/23 1754 1 mcg/kg/min at 11/02/23 1754    acetylcysteine (Mucomyst) 20 % solution 3-5 mL  3-5 mL Inhalation 4X/DAY (RT) Anna Marie  SUE Evans   4 mL at 11/03/23 0747    amLODIPine (Norvasc) tablet 10 mg  10 mg Enteral Tube Q DAY Anna Marie SUE Evans   10 mg at 11/03/23 0540    sodium chloride (Salt) tablet 2 g  2 g Enteral Tube Q8HRS Shiv Gerardo M.D.   2 g at 11/03/23 0539    guaiFENesin (Robitussin) 100 MG/5ML liquid 200 mg  10 mL Enteral Tube Q4HRS Shiv Gerardo M.D.   200 mg at 11/03/23 0540    ipratropium-albuterol (DUONEB) nebulizer solution  3 mL Nebulization 4X/DAY (RT) Shiv Gerardo M.D.   3 mL at 11/03/23 0746    Pharmacy Consult: Enteral tube insertion - review meds/change route/product selection  1 Each Other PHARMACY TO DOSE Shiv Gerardo M.D.        montelukast (Singulair) tablet 10 mg  10 mg Enteral Tube DAILY Shiv Gerardo M.D.   10 mg at 11/03/23 0540    acetaminophen (Tylenol) tablet 650 mg  650 mg Enteral Tube Q6HRS PRN Shiv Gerardo M.D.   650 mg at 11/01/23 1136    enoxaparin (Lovenox) inj 40 mg  40 mg Subcutaneous DAILY AT 1800 Shiv Gerardo M.D.   40 mg at 11/02/23 1816    atorvastatin (Lipitor) tablet 80 mg  80 mg Enteral Tube DAILY Shiv Gerardo M.D.   80 mg at 11/03/23 0540    niCARdipine (Cardene) 25 mg in  mL Standard Infusion  0-15 mg/hr Intravenous Continuous Mauri Montes M.D.   Paused at 11/01/23 1600    labetalol (Normodyne/Trandate) injection 10 mg  10 mg Intravenous Q4HRS PRN Ken Tejeda M.D.   10 mg at 11/02/23 1816    hydrALAZINE (Apresoline) injection 20 mg  20 mg Intravenous Q6HRS PRN Ken Tejeda M.D.   20 mg at 11/02/23 1905    MD Alert...ICU Electrolyte Replacement per Pharmacy   Other PHARMACY TO DOSE Shiv Gerardo M.D.        aspirin (Asa) chewable tab 81 mg  81 mg Enteral Tube DAILY Mauri Montes M.D.   81 mg at 11/03/23 0540       Fluids    Intake/Output Summary (Last 24 hours) at 11/3/2023 0750  Last data filed at 11/3/2023 0600  Gross per 24 hour   Intake 668.08 ml   Output 350 ml   Net 318.08 ml       Laboratory           Recent Labs     11/01/23  0530 11/01/23  1235 11/02/23  0630 11/02/23  1225 11/02/23  1826 11/03/23  0000 11/03/23  0600   SODIUM 157*   < > 154*   < > 150* 149* 150*   POTASSIUM 3.2*   < > 3.6   < > 3.4* 3.3* 3.5*   CHLORIDE 127*   < > 125*   < > 118* 117* 118*   CO2 21   < > 20   < > 21 20 20   BUN 13   < > 15   < > 14 15 17   CREATININE 0.61   < > 0.60   < > 0.68 0.68 0.72   MAGNESIUM 1.9  --  2.1  --   --   --  2.1   PHOSPHORUS 3.1  --  3.4  --   --   --   --    CALCIUM 8.2*   < > 8.2*   < > 8.6 8.4* 8.5    < > = values in this interval not displayed.     Recent Labs     11/01/23  0530 11/01/23 1235 11/02/23 0630 11/02/23 1225 11/02/23 1826 11/03/23  0000 11/03/23  0600   ALTSGPT 32  --  29  --   --   --   --    ASTSGOT 37  --  28  --   --   --   --    ALKPHOSPHAT 85  --  80  --   --   --   --    TBILIRUBIN 0.5  --  0.5  --   --   --   --    GLUCOSE 113*   < > 123*   < > 102* 105* 116*    < > = values in this interval not displayed.     Recent Labs     11/01/23 0530 11/02/23 0630 11/03/23  0600   WBC 14.4* 15.6* 13.7*   NEUTSPOLYS 80.50* 80.00* 78.10*   LYMPHOCYTES 8.80* 8.50* 9.80*   MONOCYTES 8.00 7.90 7.20   EOSINOPHILS 1.40 2.50 3.40   BASOPHILS 0.50 0.30 0.40   ASTSGOT 37 28  --    ALTSGPT 32 29  --    ALKPHOSPHAT 85 80  --    TBILIRUBIN 0.5 0.5  --      Recent Labs     11/01/23 0530 11/02/23 0630 11/03/23  0600   RBC 4.01* 4.02* 4.22   HEMOGLOBIN 11.6* 11.8* 12.0   HEMATOCRIT 36.5* 36.5* 38.0   PLATELETCT 245 228 258     Imaging  X-Ray:  I have personally reviewed the images and compared with prior images.  CT:    Reviewed  MRI:   Reviewed  10/28 acute left MCA distribution infarct, and old right posterior frontal infarct    Assessment/Plan    * Acute ischemic left internal carotid artery (ICA) stroke (HCC)- (present on admission)  Assessment & Plan  Recent left ICA aneurysm status post stent and flow diverter, subsequent treatment with Brilinta, allergic to Plavix  Developed in-stent  thrombosis, initial concern for noncompliance with Brilinta, however TEG showed therapeutic ADP inhibition, underwent thrombectomy, integrilin bridged to ASA/Brilinta  MRI 10/28 acute left MCA distribution infarct, and old right posterior frontal infarct    SBP goal for TICI 2c/3: 100-140  ASA 81/Brillinta NGT transitioned to integrelin in anticipation for the PEG  Na goal 135-145, decrease by 4 meq q 24 hrs a day   q4H neurochecks  Atorvastatin 80  euglycemia <180mg/dL, TF   PT/OT  SLP- ok for ice chips  IR PICC placed 10/28  Family has agreed to PEG- needs to be laprascopic as IR and GI unable due to anatomy  Surgery consulted    Hypokalemia  Assessment & Plan  Replace and monitor    Acute hypoxemic respiratory failure (HCC)  Assessment & Plan  CTA chest no PE, complete atelectasis/collaspse of the LLL and mucous plugging of the RLL as well  Hypoxia due to shunting from total left lower lobe atelectasis/collapse from retention of secretions  Afebrile, no antibiotics  Likely due to inability to protect airway and poor effort from catastrophic CVA  Mobilization is helping and sats improved and maintained on NC  Aggressive pulmonary toileting with IPV/DuoNeb/hypertonic saline/PEP    Extrinsic asthma without complication- (present on admission)  Assessment & Plan  duonebs prn  Continue montelukast home dose    Goals of care, counseling/discussion  Assessment & Plan  See associated advance care planning note from 10/29  In summary, due to recent respiratory decline family meeting was held with her brother over the phone [place] in [place] and her Sister Eusebia at bedside.  All are in agreement that the patient would want change of CODE STATUS to DNR/DNI.    Dyslipidemia- (present on admission)  Assessment & Plan  Lipitor  80 for high intensity therapy    Primary hypertension- (present on admission)  Assessment & Plan  TICI 2c/3: -140  Have not needed prn  amlodipine and losartan       VTE:   integrallin  Ulcer: Not  Indicated  Lines:  PICC, purewick     I have performed a physical exam and reviewed and updated ROS and Plan today (11/3/2023). In review of yesterday's note (11/2/2023), there are no changes except as documented above.     Discussed patient condition and risk of morbidity and/or mortality with RN, Therapies, Pharmacy, Code status disscussed, Charge nurse / hot rounds, Patient, and neurology    The patient remains critically ill.  Critical care time = 31 minutes in directly providing and coordinating critical care and extensive data review.  No time overlap and excludes procedures.    This note was generated using voice recognition software which has a chance of producing errors of grammar and content.  I have made every reasonable attempt to find and correct any errors, but it should be expected that some may not be found prior to finalization of this note.

## 2023-11-03 NOTE — WOUND TEAM
Wound consult received regarding BMS. BMS placed by superuser. Appropriate LDA and orders in place. No further advanced wound care needs at this time. Please call for questions/concerns.

## 2023-11-03 NOTE — CARE PLAN
Problem: Bronchopulmonary Hygiene  Goal: Increase mobilization of retained secretions  Description: Target End Date:  2 to 3 days    1.  Perform bronchopulmonary therapy as indicated by assessment  2.  Perform airway suctioning  3.  Perform actions to maintain patient airway  Outcome: Not Progressing                                              Respiratory Update     Treatment modality: Duo/Mucomyst/IPV  Frequency: QID     Pt tolerating current treatments well with no adverse reactions.

## 2023-11-03 NOTE — PROGRESS NOTES
Pulm/cc update    D/w surgery will need integrillin held 4 hrs before surgery and 4 hrs after surgery  High risk of thrombosis so at this time reassess speech  Continue NG  Restart feeds

## 2023-11-03 NOTE — ASSESSMENT & PLAN NOTE
Etiology of this event appears to have been noncompliant with the patient's DAPT at home after a left ICA stent being placed on October 19.  Now s/p thrombectomy  Back on dual antiplatelet therapy  High-dose statin  BP control  Anticipate discharge to rehab  Neurology following  PT OT and speech following  Physiatry consulted  EKG and telemetry are sinus  Echo benign  MRI confirms ischemic infarct  Neurology recommends allowing sodium to drift to normal range: We will titrate off supplemental salt tablets  Patient will need a feeding tube, however unable to get 1 done endoscopically due to anatomy.  Surgery consulted.  Will need to stop Integrilin 4 hours before surgery and then can resume it 4 hours after.      Right sided weakness.   Unequal pupils per nursing, new. Ordered head CT Came back evolution of stroke. Discussed with Dr. Art and Ion Montez. Minimal change no new recommendations from that.  Cough and dysphagia with leukocytosis. Ordered CXR and procalcitonin empirically treat possible aspiration woith antibiotics  Update: pneumonitis on CXR. Ordered IV Unasyn. COVID ordered

## 2023-11-03 NOTE — THERAPY
Speech Language Pathology   Flexible Endoscopic Evaluation of Swallowing (FEES)        Patient Name: Roslyn Kilgore  AGE:  58 y.o., SEX:  female  Medical Record #: [id number]  Date of Service: 11/3/2023      History of Present Illness  The pt is a 57 y/o F who was admitted 10/27/23. Pt presented with AMS, aphasia and  RSW.    Hospital course significant for:  11/2 - attempted PEG tube placement by GI, unable to see illumination, aborted and NGT was replaced    PMHx: intracranial aneurysms, prior stoke, arterial thrombosis, essential HTN, DLD, diverticulitis    CXR 10/29 -  No acute process.    Brain MRI 10/28 -  1. Acute left MCA distribution infarct, as above.  2. 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  3. Mild chronic ischemic white matter migration.      Pertinent Information  Current Method of Nutrition: NPO until cleared by speech pathology, NGT  Patient Behaviors: Flat Affect   Dentition: Poor, Scattered dentition   Feeding Tube: NGT intact to right nare   Factor(s) Affecting Performance: Impaired mental status, Impaired command following     Discussed the risks, benefits, and alternatives of the FEES procedure. Patient/family acknowledged and agreed to proceed.      Assessment  Flexible Endoscopic Evaluation of Swallowing (FEES) completed at bedside today. NGT intact to R nare; however dried mucus impeded clear passage of endoscope. Therefore, the endoscope was passed transnasally via Left nare to evaluate the anatomy and physiology of swallowing. Pt was seen while sitting [facility]; she tolerated the procedure with no apparent distress.     Anatomic Findings: WFL  Vocal Fold Motion: Pt did not vocalize to assess  Secretion Management: Excess secretions  PO Trials: Ice Chips, Thin Liquid, Mildly Thick Liquid, Liquidised, Pudding, Soft & Bite Sized      Consistency PAS Score Timing Residue Comments   Ice Chip 8 Pre Swallow Vallecular Residue: Moderate (25%-50%)  Pyriform Sinus Residue: Moderate (25%-50%)     Thin Liquid 7 Pre Swallow Vallecular Residue: Mild (5%-25%)  Pyriform Sinus Residue: Mild (5%-25%) Spoon, Cup (single)   Mildly Thick 2 Pre Swallow Vallecular Residue: None (0%)  Pyriform Sinus Residue: None (0%) PAS 1 - Spoon, Cup (single), Straw (single)  PAS 2 - liquid wash x1   Liquidised 1 N/A Vallecular Residue: Trace (1%-5%)  Pyriform Sinus Residue: Trace (1%-5%)    Pudding 1 N/A Vallecular Residue: Trace (1%-5%)  Pyriform Sinus Residue: Trace (1%-5%)    Soft & Bite Sized  N/A N/A N/A Pt did not swallow boluses     Penetration-Aspiration Scale (PAS)  1     No contrast enters airway  2     Contrast enters the airway, remains above the vocal folds, and is ejected from the airway (not seen in the airway at the end of the swallow).  3     Contrast enters the airway, remains above the vocal folds, and is not ejected from the airway (is seen in the airway after the swallow).  4     Contrast enters the airway, contacts the vocal folds, and is ejected from the airway.  5     Contrast enters the airway, contacts the vocal folds, and is not ejected from the airway  6     Contrast enters the airway, crosses the plane of the vocal folds, and is ejected from the airway.  7     Contrast enters the airway, crosses the plane of the vocal folds, and is not ejected from the airway despite effort.  8     Contrast enters the airway, crosses the plane of the vocal folds, is not ejected from the airway and there is no response to aspiration.      Oral phase:  Appropriate oral acceptance and sufficient containment. Impaired mastication of diced peaches evidenced by completely intact boluses. Intermittently absent oral manipulation and lingual motion of ice chips and diced peaches. Otherwise, presumed slowed oral transit of liquids and semi-solid boluses. Piecemeal deglutition of thin liquids, liquidized and puree consistencies. Diced peaches manually removed from oral cavity.      Pharyngeal phase:   Pharyngeal phase marked by  impairment(s) in initiation of pharyngeal swallow, sensory integrity, and pharyngeal constriction. Resulting in the following:    Moderate standing secretions upon cursory view.  Silent aspiration of melted ice pre-swallow. Visible residue mixed with secretions and resulted in silent aspiration through the interarytenoid space. Absent swallow response to second ice chip bolus.  Aspiration of thin liquids pre-swallow. A responsive cough did not clear the aspirate.  There was a single episode of penetration of mildly thick liquids pre-swallow during a liquid wash. Otherwise, no airway invasion appreciated.  Trace vallecular and pyriform sinus stasis with liquidized and pudding consistencies.        Compensatory Strategies:  Global aphasia limited compensatory strategy attempts.  Volume Regulation - effective, increased volume and weight of bolus resulted in improved swallow response  Liquid Wash (MT2) - effective, cleared pharyngeal residue    Severity Rating:  Severity Rating: MILTON  MILTON: Moderate      Clinical Impressions  The pt presents with a moderate oropharyngeal dysphagia. Resulting in impaired swallow safety and efficiency. Findings appear likely acute (L MCA stroke) though may also be impacted by previous stroke. Aspiration risk from oral intake is elevated. A modified diet with 1:1 close supervision is indicated at this time. Risk for malnutrition and dehydration is also increased in the setting of impaired cognition including reduced feeding/swallowing awareness. Pt is a good candidate for swallow rehabilitation though global aphasia may be a barrier.       Recommendations  Diet Consistency: Pureed solids (PU4), Mildly thick liquids (MT2) - continue supplemental nutrition/hydration via NGT  Medication: Crush with applesauce, as appropriate, Crush with pudding/puree, as appropriate, Non Oral, As tolerated  Supervision: 1:1 feeding with constant supervision  Positioning: Fully upright and midline during oral  intake, Meals sitting upright in a chair, as tolerated  Strategies: Small bites/sips, Alternate bites and sips, Slow rate of intake, Reduce environmental distractions  Oral Care: Q8h  Additional Instrumentation: Repeat diagnostic study when clinically appropriate      SLP to follow for dysphagia management and aphasia tx.  Results and recs d/w pt, RN and RD. Thank you.       SLP Treatment Plan  Treatment Plan: Dysphagia Treatment  SLP Frequency: 4x Per Week  Estimated Duration: Until Therapy Goals Met      Anticipated Discharge Needs  Discharge Recommendations: Recommend post-acute placement for additional speech therapy services prior to discharge home   Therapy Recommendations Upon DC: Dysphagia Training, Expression Training, Comprehension Training, Patient / Family / Caregiver Education       Patient / Family Goals  Patient / Family Goal #1: unable to state  Short Term Goal # 1: Pt will participate in oral feeding trials 1:1 with SLP with no s/sx of aspiration and min cues.  Goal Outcome # 1: Goal met  Short Term Goal # 2: Patient will follow 1 part commands with 90% accuray with no cues  Goal Outcome # 2 : Progressing as expected  Short Term Goal # 3: Patient will name simple objects with 90% accuracy with no cues  Goal Outcome  # 3: Progressing as expected  Short Term Goal # 4: Patient will provide egocentric information with 90% accuracy with no cues  Goal Outcome  # 4: Progressing as expected  Short Term Goal # 5: Pt will consume PU4/MT2 without signs/sequelae of aspiration given maxA.      Amanda Rosenberg, SLP

## 2023-11-03 NOTE — ASSESSMENT & PLAN NOTE
"As outpatient was on losartan 25 mg daily, and amlodipine 10 mg daily  Continue amlodipine dose and increase losartan to 50 mg  Monitoring\"    Vitals:    11/05/23 1250   BP: 109/60   Pulse: 84   Resp: 14   Temp:    SpO2: 94%     Stable  "

## 2023-11-03 NOTE — DISCHARGE PLANNING
Case Management Discharge Planning      Chart reviewed & case discussed in ICU rounds:  Patient still non-verbal.  Pending PEG placement by general surgery; NGT replaced. +integrelin drip, soft L wrist restraint.   Plan is to transfer out of ICU today.    DCP remains SNF placement:  Only SNF acceptance to date is [facility].

## 2023-11-03 NOTE — PROGRESS NOTES
Neurology Progress Note  Neurohospitalist Service, [facility] Neurosciences    Referring Physician: DAISY Cole*      Interval History: Patient seen and examined this AM. Patient was sitting in chair. Tracks examiner in room but remains nearly globally aphasic. Briefly mumbled a response to examiner but would not repeat and unlikely to have been true speech. No reported acute overnight events.      Review of systems: In addition to what is detailed in the HPI and/or updated in the interval history, all other systems reviewed and are negative.    Past Medical History, Past Surgical History and Social History reviewed and unchanged from prior    Medications:    Current Facility-Administered Medications:     losartan (Cozaar) tablet 50 mg, 50 mg, Enteral Tube, Q DAY, Anna Marie Evans M.D., 50 mg at 11/03/23 0539    eptifibatide 0.75 mg/mL (Integrilin) infusion, 1 mcg/kg/min, Intravenous, Continuous, Anna Marie Evans M.D., Last Rate: 5.3 mL/hr at 11/02/23 1754, 1 mcg/kg/min at 11/02/23 1754    acetylcysteine (Mucomyst) 20 % solution 3-5 mL, 3-5 mL, Inhalation, 4X/DAY (RT), Anna Marie Evans M.D., 4 mL at 11/02/23 1842    amLODIPine (Norvasc) tablet 10 mg, 10 mg, Enteral Tube, Q DAY, Anna Marie vEans M.D., 10 mg at 11/03/23 0540    sodium chloride (Salt) tablet 2 g, 2 g, Enteral Tube, Q8HRS, Shiv Gerardo M.D., 2 g at 11/03/23 0539    guaiFENesin (Robitussin) 100 MG/5ML liquid 200 mg, 10 mL, Enteral Tube, Q4HRS, Shiv Gerardo M.D., 200 mg at 11/03/23 0540    ipratropium-albuterol (DUONEB) nebulizer solution, 3 mL, Nebulization, 4X/DAY (RT), Shiv Gerardo M.D., 3 mL at 11/02/23 1842    Pharmacy Consult: Enteral tube insertion - review meds/change route/product selection, 1 Each, Other, PHARMACY TO DOSE, Shiv Gerardo M.D.    montelukast (Singulair) tablet 10 mg, 10 mg, Enteral Tube, DAILY, Shiv Gerardo M.D., 10 mg at 11/03/23 0561    acetaminophen (Tylenol)  "tablet 650 mg, 650 mg, Enteral Tube, Q6HRS PRN, Shiv Gerardo M.D., 650 mg at 11/01/23 1136    enoxaparin (Lovenox) inj 40 mg, 40 mg, Subcutaneous, DAILY AT 1800, Shiv Gerardo M.D., 40 mg at 11/02/23 1816    atorvastatin (Lipitor) tablet 80 mg, 80 mg, Enteral Tube, DAILY, Shiv Gerardo M.D., 80 mg at 11/03/23 0540    niCARdipine (Cardene) 25 mg in  mL Standard Infusion, 0-15 mg/hr, Intravenous, Continuous, Mauri Montes M.D., Paused at 11/01/23 1600    labetalol (Normodyne/Trandate) injection 10 mg, 10 mg, Intravenous, Q4HRS PRN, Ken Tejeda M.D., 10 mg at 11/02/23 1816    hydrALAZINE (Apresoline) injection 20 mg, 20 mg, Intravenous, Q6HRS PRN, Ken Tejeda M.D., 20 mg at 11/02/23 1905    MD Alert...ICU Electrolyte Replacement per Pharmacy, , Other, PHARMACY TO DOSE, Shiv Gerardo M.D.    aspirin (Asa) chewable tab 81 mg, 81 mg, Enteral Tube, DAILY, Mauri Montes M.D., 81 mg at 11/03/23 0540    Physical Examination:   BP (P) 124/61   Pulse (P) 85   Temp 36.8 °C (98.2 °F) (Temporal)   Resp (P) 18   Ht 1.6 m (5' 2.99\")   Wt 66.5 kg (146 lb 9.7 oz)   LMP 01/04/2011   SpO2 (P) 93%   Breastfeeding No   BMI 25.98 kg/m²     NEUROLOGICAL EXAM:     Physical exam completed today, no change and patient remains w/ stable aphasia and right sided weakness.     MENTAL STATUS: Awake, alert, near globally aphasic (follows some simple commands, no verbal output)  LANG/SPEECH: Follows command to close and open eyes, follow finger, no verbal output, cannot follow appendicular commands    CRANIAL NERVES:  II: Pupils equal and reactive, no blink to threat bilaterally  III, IV, VI: EOM intact, no gaze preference or deviation, no nystagmus.  V: Not tested  VII: no asymmetry, no nasolabial fold flattening  VIII: normal hearing to speech  IX, X: Not tested  XI: 5/5 head turn and 5/5 shoulder shrug bilaterally  XII: midline tongue protrusion    MOTOR:  0/0 muscle power in Rt shoulder " abductors/adductors, elbow flexors/extensors, wrist flexors/extensors, finger abductors/adductors.  0/0 in Rt hipflexors/extensors, knee flexors/extensors, ankle dorsiflexors and planter flexors.    5/5 muscle power in Lt shoulder abductors/adductors, elbow flexors/extensors.  5/5 in Lt hipflexors/extensors, dorsiflexors and planter flexors.    REFLEXES: no clonus, upgoing right toe  SENSORY: Grossly normal to touch in SPRING/LLE, likely intact sensation in RU/RLE  COORD: Not tested  GAIT: Deferred    Objective Data:    Labs:  Lab Results   Component Value Date/Time    PROTHROMBTM 13.3 10/26/2023 11:45 PM    INR 1.00 10/26/2023 11:45 PM      Lab Results   Component Value Date/Time    WBC 13.7 (H) 11/03/2023 06:00 AM    RBC 4.22 11/03/2023 06:00 AM    HEMOGLOBIN 12.0 11/03/2023 06:00 AM    HEMATOCRIT 38.0 11/03/2023 06:00 AM    MCV 90.0 11/03/2023 06:00 AM    MCH 28.4 11/03/2023 06:00 AM    MCHC 31.6 (L) 11/03/2023 06:00 AM    MPV 10.6 11/03/2023 06:00 AM    NEUTSPOLYS 78.10 (H) 11/03/2023 06:00 AM    LYMPHOCYTES 9.80 (L) 11/03/2023 06:00 AM    MONOCYTES 7.20 11/03/2023 06:00 AM    EOSINOPHILS 3.40 11/03/2023 06:00 AM    BASOPHILS 0.40 11/03/2023 06:00 AM    HYPOCHROMIA 1+ 02/19/2011 04:20 PM    ANISOCYTOSIS 1+ 06/29/2010 04:10 AM      Lab Results   Component Value Date/Time    SODIUM 150 (H) 11/03/2023 06:00 AM    POTASSIUM 3.5 (L) 11/03/2023 06:00 AM    CHLORIDE 118 (H) 11/03/2023 06:00 AM    CO2 20 11/03/2023 06:00 AM    GLUCOSE 116 (H) 11/03/2023 06:00 AM    BUN 17 11/03/2023 06:00 AM    CREATININE 0.72 11/03/2023 06:00 AM    CREATININE 0.8 06/12/2006 05:00 AM    GLOMRATE 66 05/15/2023 02:32 AM      Lab Results   Component Value Date/Time    CHOLSTRLTOT 135 10/28/2023 02:30 AM    LDL 61 10/28/2023 02:30 AM    HDL 62 10/28/2023 02:30 AM    TRIGLYCERIDE 58 10/28/2023 02:30 AM       Lab Results   Component Value Date/Time    ALKPHOSPHAT 80 11/02/2023 06:30 AM    ASTSGOT 28 11/02/2023 06:30 AM    ALTSGPT 29 11/02/2023  06:30 AM    TBILIRUBIN 0.5 11/02/2023 06:30 AM        Imaging/Testing:    I interpreted and/or reviewed the patient's neuroimaging    DX-ABDOMEN FOR TUBE PLACEMENT   Final Result      NG tube tip projects at the gastroesophageal junction. Recommend advancing before use.      DX-ABDOMEN FOR TUBE PLACEMENT   Final Result         1.  Nonspecific bowel gas pattern in the upper abdomen.   2.  Nasogastric tube tip terminates overlying the expected location of the gastric body.   3.  Hazy left lower lobe infiltrates and trace left pleural effusion      CT-CTA CHEST PULMONARY ARTERY W/ RECONS   Final Result         1.  No evidence of pulmonary embolism.   2.  Left lower lobe atelectasis/collapse.   3.  Mucous plugging of the right lower lobe bronchi and some mild dependent atelectasis in the right lower lobe.   4.  Small hiatal hernia.      Fleischner Society pulmonary nodule recommendations:   Not applicable      CT-HEAD W/O   Final Result      Evolving acute large left cerebral infarct.      No acute hemorrhage.      Chronic ischemic changes.      DX-CHEST-PORTABLE (1 VIEW)   Final Result      No acute process.      IR-PICC LINE PLACEMENT W/ GUIDANCE > AGE 5   Final Result                  Ultrasound-guided PICC placement performed by qualified nursing staff as    above.          MR-BRAIN-W/O   Final Result         1. Acute left MCA distribution infarct, as above.   2. 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   3. Mild chronic ischemic white matter migration.      CT-HEAD W/O   Final Result      1.  Possible mild left frontotemporal edema and a present significantly improved      2.  Negative for hemorrhage or mass effect      3.  Right frontotemporal and encephalomalacia         DX-ABDOMEN FOR TUBE PLACEMENT   Final Result      1.  Enteric tube overlies the stomach.      CT-HEAD W/O   Final Result         1.  No acute intracranial abnormality is identified, there are nonspecific white matter changes, commonly  associated with small vessel ischemic disease.  Associated mild cerebral atrophy is noted.   2.  Left maxillary sinusitis changes   3.  Atherosclerosis.         IR-THROMBO MECHANICAL ARTERY,INIT   Final Result      1.  Occluded LEFT internal carotid artery-LEFT internal carotid flow diverter.   2.  Successful mechanical thrombectomy.   3.  TICI 2c      DX-CHEST-PORTABLE (1 VIEW)   Final Result         1.  Left basilar atelectasis, no focal infiltrate   2.  Atherosclerosis      CT-CTA NECK WITH & W/O-POST PROCESSING   Final Result         1.  Occlusion from the mid left internal carotid artery through intracranial stent at the distal left ICA.      These findings were discussed with the patient's clinician, Kiet Brown, on 10/27/2023 12:37 AM.      CT-CTA HEAD WITH & W/O-POST PROCESS   Final Result         1.  Left internal carotid artery occlusion extending up to distal carotid arterial stent.   2.  Decreased density within the left anterior cerebral artery, appearance suggesting slow flow or spasm.   3.  Small caliber of the left middle cerebral artery branches which taper and are not visualized most distally, changes most compatible with diminished flow due to left internal carotid artery occlusion.   4.  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   5.  3 mm basilar artery tip aneurysm   6.  2.3 mm aneurysm inferiorly at the right M1/M2 junction      CT-CEREBRAL PERFUSION ANALYSIS   Final Result         1.  Cerebral blood flow less than 30% likely representing completed infarct = 183 mL.      2.  T Max more than 6 seconds likely representing combination of completed infarct and ischemia = 280 mL.      3.  Mismatched volume likely representing ischemic brain/penumbra = 97 mL      4.  Please note that the cerebral perfusion was performed on the limited brain tissue around the basal ganglia region. Infarct/ischemia outside the CT perfusion sections can be missed in this study.       CT-HEAD W/O   Final Result         1.  No acute intracranial abnormality is identified, there are nonspecific white matter changes, commonly associated with small vessel ischemic disease.  Associated mild cerebral atrophy is noted.   2.  Left maxillary sinusitis changes      Note: Artifacts are present limiting diagnostic sensitivity of this exam.          Assessment and Plan:  58-year-old female with history of recent right ICA aneurysm pipeline placement on October 19, 2023.  Patient presented on October 27, 2023 with acute onset of right-sided weakness and global aphasia.  Patient was found to have an occluded right ICA and underwent a thrombectomy with TICI 3 score recanalization.  Follow-up imaging demonstrated moderate stroke burden.  It is unclear why patient's stent occluded although it is thought that she possibly stopped taking her Brilinta post stent; TEG evaluation showed platelet inhibition however on admission.  PEG tube placement was unable to be completed secondary to large overlying liver. Currently in discussion with IR to go with IR guided PEG. Patient has NG tube and may trial another swallow evaluation to assess if patient could in fact begin oral intact. Will remain on Integrilin gtt until procedure with plan to transition to Brilinta for stent patency (DAPT therapy).        Plan:  -- Can change neurochecks to every 4 hours.  -- Maintain the blood pressure between 100-140 systolic.  -- Can begin normalizing sodium level, recommend no more than 4 mmol/L/day   Na level today: 150  -- Bridging Brilinta with integrilin until PEG tube has been placed; IR has been contacted to go with an IR-guided PEG tube placement; pending coordination with their group may proceed with repeat swallow evaluation to assess for possible initiation of PO intact  -- PT/OT/speech therapy  -- On Lovenox SQ for DVT chemoppx  -- Patient on atorvastatin 40 mg prior to admission; escalated to 80 mg here however we have  switched back to patients home dose as she was already at an LDL goal of <70   LDL: 61, A1c: 5.8    I have performed a physical exam and reviewed and updated ROS and Plan today (11/3/2023).     Amandeep Art III, MD  Vascular Neurology, [facility]

## 2023-11-03 NOTE — DIETARY
Nutrition Services: Brief Update    Pt passed FEES with SLP, recommends pureed diet with MTL. Gen surg consulted for PEG tube this morning, will likely be deferred which is my recommendation.     Plan: Hold TF to assess PO intake and once pt is consistently eating >50% meals for at least 3 meals, consider removing feeding tube. Diet per SLP.     RD following.

## 2023-11-03 NOTE — THERAPY
Physical Therapy   Daily Treatment     Patient Name: Roslyn Kilgore  Age:  58 y.o., Sex:  female  Medical Record #: [id number]  Today's Date: 11/3/2023     Precautions  Precautions: Fall Risk;Swallow Precautions  Comments: -140    Assessment  Pt seen for PT tx session with mobility detailed down below. Pt with increased alertness in bed today, leading to pt mobilizing to EOB with only Min A and demonstrating good initiation for STS. Pt still with no active RLE movement upon command, although her RLE continues to be steady in static standing with no buckling. Continue to recommend placement. Will follow.     Plan    Treatment Plan Status: Continue Current Treatment Plan  Type of Treatment: Bed Mobility, Equipment, Gait Training, Neuro Re-Education / Balance, Self Care / Home Evaluation, Stair Training, Therapeutic Activities, Therapeutic Exercise  Treatment Frequency: 4 Times per Week  Treatment Duration: Until Therapy Goals Met    DC Equipment Recommendations: Unable to determine at this time  Discharge Recommendations: Recommend post-acute placement for additional physical therapy services prior to discharge home        Vitals   Pulse Oximetry 92 %   O2 (LPM) 2   O2 Delivery Device Silicone Nasal Cannula   Vitals Comments BP within parameters, pt initially on RA but then desatted to 87%, placed back on NC   Cognition    Cognition / Consciousness X   Speech/ Communication Unable to Communicate   Level of Consciousness Alert   Attention Impaired   Comments pt alert in bed today and following commands before sitting at EOB   Sitting Lower Body Exercises   Sitting Lower Body Exercises Yes   Long Arc Quad 1 set of 10   Comments pt unable to sequence LAQ initially at EOB, however pt could complete once seated in recliner   Balance   Sitting Balance (Static) Fair -   Sitting Balance (Dynamic) Poor +   Standing Balance (Static) Poor   Standing Balance (Dynamic) Poor -   Weight Shift Sitting Fair   Weight Shift  Standing Poor   Skilled Intervention Verbal Cuing;Tactile Cuing;Postural Facilitation   Comments HHA x2 in standing   Bed Mobility    Supine to Sit Minimal Assist   Scooting Minimal Assist   Rolling Minimal Assist to Rt.   Skilled Intervention Verbal Cuing   Comments significant improvement today, pt with great initiation and core control to get to EOB   Gait Analysis   Gait Level Of Assist Unable to Participate   Functional Mobility   Sit to Stand Minimal Assist   Bed, Chair, Wheelchair Transfer Maximal Assist   Mobility STS x3 total, followed by stand step to chair   Skilled Intervention Verbal Cuing;Tactile Cuing;Sequencing   Comments pt still having difficulty with taking steps with R foot, needs constant 2 person support during SPT   ICU Target Mobility Level   ICU Mobility - Targeted Level Level 3B   How much difficulty does the patient currently have...   Turning over in bed (including adjusting bedclothes, sheets and blankets)? 1   Sitting down on and standing up from a chair with arms (e.g., wheelchair, bedside commode, etc.) 2   Moving from lying on back to sitting on the side of the bed? 1   How much help from another person does the patient currently need...   Moving to and from a bed to a chair (including a wheelchair)? 2   Need to walk in a hospital room? 1   Climbing 3-5 steps with a railing? 1   6 clicks Mobility Score 8   Activity Tolerance   Sitting in Chair 5 min/post session   Sitting Edge of Bed 10 min   Standing 5 min   Comments limited by cognition   Short Term Goals    Short Term Goal # 1 pt will perform supine <> sit without bed features and Min A in 6 visits   Goal Outcome # 1 Progressing as expected   Short Term Goal # 2 pt will perform sit <> stand and functional transfers with LRAD and Min A to improve moblity independence in 6 visits   Goal Outcome # 2 Progressing as expected   Short Term Goal # 3 pt will ambulate > 25 ft with LRAD and Mod A to improve function in 6 visits   Goal  Outcome # 3 Goal not met   Physical Therapy Treatment Plan   Physical Therapy Treatment Plan Continue Current Treatment Plan   Anticipated Discharge Equipment and Recommendations   DC Equipment Recommendations Unable to determine at this time   Discharge Recommendations Recommend post-acute placement for additional physical therapy services prior to discharge home   Interdisciplinary Plan of Care Collaboration   IDT Collaboration with  Nursing;Occupational Therapist   Patient Position at End of Therapy Seated;Chair Alarm On;Call Light within Reach;Tray Table within Reach;Phone within Reach   Collaboration Comments RN updated   Session Information   Date / Session Number  11/3- 4 (4/4, 11/5)

## 2023-11-03 NOTE — CARE PLAN
The patient is Stable - Low risk of patient condition declining or worsening    Shift Goals  Clinical Goals: -140, stable neuro checks  Patient Goals: BAKARI  Family Goals: Updates    Progress made toward(s) clinical / shift goals:        Problem: Optimal Care of the Stroke Patient  Goal: Optimal emergency care for the stroke patient  Description: Target End Date:  End of day 1    Time of Onset    1.  Time of last known well obtained  2.  Patient and family/caregiver verbalize understanding of diagnosis, medications and testing  3.  NIHSS performed and documented, including date and time, for ischemic stroke patients prior to any acute recanalization therapy (thrombolytics or mechanical) or within 12 hours of arrival if no intervention is warranted  4.  Consults and referrals placed to appropriate departments    Medications Administration as Ordered:    1.  Implement appropriate reversal agents for INR greater than 1.5  2.  Pre-alteplase administration of antihypertensives for SBP >185 DBP >110  3.  Post-alteplase administration of antihypertensives for SBP >185, DBP >105  4.  Thrombolytic Therapy for qualifying ischemic stroke patients who arrive within 4.5 hours of time of Last Known Well. Thrombolytic therapy administered within 30 minutes or a documented reason for delay  11/3/2023 0708 by Danitza Dodson R.N.  Outcome: Progressing  11/3/2023 0459 by Danitza Dodson R.N.  Outcome: Progressing     Problem: Hemodynamic Monitoring  Goal: Patient's hemodynamics, fluid balance and neurologic status will be stable or improve  Description: Target End Date:  Prior to discharge or change in level of care    1.  Vital signs, pulse oximetry and cardiac monitor per provider order and/or policy  2.  Frequent pulse checks performed post thrombectomy  3.  Frequent monitoring for signs of bleeding post TPA administration  4.  Proper management of IV infusions  5.  Intake and output monitored per provider order  6.  Daily  weight obtained per unit policy or provider order  7.  Peripheral pulses and capillary refill assessed as needed  8.  Monitor for signs/symptoms of excessive bleeding  9.  Body temperature assessed and fevers treated  10. Patient positioned for maximum circulation/cardiac output  11/3/2023 0708 by Danitza Dodson R.N.  Outcome: Progressing  11/3/2023 0459 by Danitza Dodson R.N.  Outcome: Progressing     Problem: Safety - Medical Restraint  Goal: Remains free of injury from restraints (Restraint for Interference with Medical Device)  Description: INTERVENTIONS:  1. Determine that other, less restrictive measures have been tried or would not be effective before applying the restraint  2. Evaluate the patient's condition at the time of restraint application  3. Educate patient/family regarding the reason for restraint  4. Q2H: Monitor safety, psychosocial status, comfort, circulation, respiratory status, LOC, nutrition and hydration  11/3/2023 0708 by Danitza Dodson R.N.  Outcome: Progressing  11/3/2023 0459 by Danitza Dodson R.N.  Outcome: Progressing       Patient is not progressing towards the following goals:

## 2023-11-03 NOTE — CARE PLAN
The patient is Stable - Low risk of patient condition declining or worsening    Shift Goals  Clinical Goals: -140, stable neuro checks  Patient Goals: BAKARI  Family Goals: Updates    Progress made toward(s) clinical / shift goals:        Problem: Optimal Care of the Stroke Patient  Goal: Optimal emergency care for the stroke patient  Description: Target End Date:  End of day 1    Time of Onset    1.  Time of last known well obtained  2.  Patient and family/caregiver verbalize understanding of diagnosis, medications and testing  3.  NIHSS performed and documented, including date and time, for ischemic stroke patients prior to any acute recanalization therapy (thrombolytics or mechanical) or within 12 hours of arrival if no intervention is warranted  4.  Consults and referrals placed to appropriate departments    Medications Administration as Ordered:    1.  Implement appropriate reversal agents for INR greater than 1.5  2.  Pre-alteplase administration of antihypertensives for SBP >185 DBP >110  3.  Post-alteplase administration of antihypertensives for SBP >185, DBP >105  4.  Thrombolytic Therapy for qualifying ischemic stroke patients who arrive within 4.5 hours of time of Last Known Well. Thrombolytic therapy administered within 30 minutes or a documented reason for delay  Outcome: Progressing     Problem: Safety - Medical Restraint  Goal: Remains free of injury from restraints (Restraint for Interference with Medical Device)  Description: INTERVENTIONS:  1. Determine that other, less restrictive measures have been tried or would not be effective before applying the restraint  2. Evaluate the patient's condition at the time of restraint application  3. Educate patient/family regarding the reason for restraint  4. Q2H: Monitor safety, psychosocial status, comfort, circulation, respiratory status, LOC, nutrition and hydration  Outcome: Progressing       Patient is not progressing towards the following  goals:

## 2023-11-04 PROBLEM — E87.0 HYPERNATREMIA: Status: ACTIVE | Noted: 2023-11-04

## 2023-11-04 PROBLEM — R13.10 DYSPHAGIA: Status: ACTIVE | Noted: 2023-11-04

## 2023-11-04 LAB
ANION GAP SERPL CALC-SCNC: 11 MMOL/L (ref 7–16)
ANION GAP SERPL CALC-SCNC: 11 MMOL/L (ref 7–16)
BASOPHILS # BLD AUTO: 0.5 % (ref 0–1.8)
BASOPHILS # BLD: 0.05 K/UL (ref 0–0.12)
BUN SERPL-MCNC: 19 MG/DL (ref 8–22)
BUN SERPL-MCNC: 20 MG/DL (ref 8–22)
CALCIUM SERPL-MCNC: 8.1 MG/DL (ref 8.5–10.5)
CALCIUM SERPL-MCNC: 8.3 MG/DL (ref 8.5–10.5)
CHLORIDE SERPL-SCNC: 113 MMOL/L (ref 96–112)
CHLORIDE SERPL-SCNC: 114 MMOL/L (ref 96–112)
CO2 SERPL-SCNC: 21 MMOL/L (ref 20–33)
CO2 SERPL-SCNC: 22 MMOL/L (ref 20–33)
CREAT SERPL-MCNC: 0.71 MG/DL (ref 0.5–1.4)
CREAT SERPL-MCNC: 0.72 MG/DL (ref 0.5–1.4)
EOSINOPHIL # BLD AUTO: 0.58 K/UL (ref 0–0.51)
EOSINOPHIL NFR BLD: 5.3 % (ref 0–6.9)
ERYTHROCYTE [DISTWIDTH] IN BLOOD BY AUTOMATED COUNT: 45 FL (ref 35.9–50)
GFR SERPLBLD CREATININE-BSD FMLA CKD-EPI: 96 ML/MIN/1.73 M 2
GFR SERPLBLD CREATININE-BSD FMLA CKD-EPI: 97 ML/MIN/1.73 M 2
GLUCOSE SERPL-MCNC: 149 MG/DL (ref 65–99)
GLUCOSE SERPL-MCNC: 163 MG/DL (ref 65–99)
HCT VFR BLD AUTO: 36.6 % (ref 37–47)
HGB BLD-MCNC: 11.6 G/DL (ref 12–16)
IMM GRANULOCYTES # BLD AUTO: 0.14 K/UL (ref 0–0.11)
IMM GRANULOCYTES NFR BLD AUTO: 1.3 % (ref 0–0.9)
LYMPHOCYTES # BLD AUTO: 1.08 K/UL (ref 1–4.8)
LYMPHOCYTES NFR BLD: 9.8 % (ref 22–41)
MAGNESIUM SERPL-MCNC: 2.1 MG/DL (ref 1.5–2.5)
MCH RBC QN AUTO: 28.5 PG (ref 27–33)
MCHC RBC AUTO-ENTMCNC: 31.7 G/DL (ref 32.2–35.5)
MCV RBC AUTO: 89.9 FL (ref 81.4–97.8)
MONOCYTES # BLD AUTO: 1.06 K/UL (ref 0–0.85)
MONOCYTES NFR BLD AUTO: 9.6 % (ref 0–13.4)
NEUTROPHILS # BLD AUTO: 8.12 K/UL (ref 1.82–7.42)
NEUTROPHILS NFR BLD: 73.5 % (ref 44–72)
NRBC # BLD AUTO: 0 K/UL
NRBC BLD-RTO: 0 /100 WBC (ref 0–0.2)
PLATELET # BLD AUTO: 241 K/UL (ref 164–446)
PMV BLD AUTO: 11.4 FL (ref 9–12.9)
POTASSIUM SERPL-SCNC: 3.2 MMOL/L (ref 3.6–5.5)
POTASSIUM SERPL-SCNC: 3.5 MMOL/L (ref 3.6–5.5)
RBC # BLD AUTO: 4.07 M/UL (ref 4.2–5.4)
SODIUM SERPL-SCNC: 146 MMOL/L (ref 135–145)
SODIUM SERPL-SCNC: 146 MMOL/L (ref 135–145)
WBC # BLD AUTO: 11 K/UL (ref 4.8–10.8)

## 2023-11-04 PROCEDURE — 770020 HCHG ROOM/CARE - TELE (206)

## 2023-11-04 PROCEDURE — 700102 HCHG RX REV CODE 250 W/ 637 OVERRIDE(OP): Performed by: HOSPITALIST

## 2023-11-04 PROCEDURE — 85025 COMPLETE CBC W/AUTO DIFF WBC: CPT

## 2023-11-04 PROCEDURE — 92507 TX SP LANG VOICE COMM INDIV: CPT

## 2023-11-04 PROCEDURE — A9270 NON-COVERED ITEM OR SERVICE: HCPCS | Performed by: RADIOLOGY

## 2023-11-04 PROCEDURE — A9270 NON-COVERED ITEM OR SERVICE: HCPCS | Performed by: PSYCHIATRY & NEUROLOGY

## 2023-11-04 PROCEDURE — 99233 SBSQ HOSP IP/OBS HIGH 50: CPT | Performed by: HOSPITALIST

## 2023-11-04 PROCEDURE — A9270 NON-COVERED ITEM OR SERVICE: HCPCS | Performed by: HOSPITALIST

## 2023-11-04 PROCEDURE — 700101 HCHG RX REV CODE 250: Performed by: INTERNAL MEDICINE

## 2023-11-04 PROCEDURE — 94640 AIRWAY INHALATION TREATMENT: CPT

## 2023-11-04 PROCEDURE — 700102 HCHG RX REV CODE 250 W/ 637 OVERRIDE(OP): Performed by: PSYCHIATRY & NEUROLOGY

## 2023-11-04 PROCEDURE — 700102 HCHG RX REV CODE 250 W/ 637 OVERRIDE(OP): Performed by: INTERNAL MEDICINE

## 2023-11-04 PROCEDURE — 80048 BASIC METABOLIC PNL TOTAL CA: CPT | Mod: 91

## 2023-11-04 PROCEDURE — 700111 HCHG RX REV CODE 636 W/ 250 OVERRIDE (IP): Mod: JZ | Performed by: INTERNAL MEDICINE

## 2023-11-04 PROCEDURE — G0316 PR PROLONGED IP/OBS E&M EA 15 MIN: HCPCS | Performed by: HOSPITALIST

## 2023-11-04 PROCEDURE — 700102 HCHG RX REV CODE 250 W/ 637 OVERRIDE(OP): Performed by: RADIOLOGY

## 2023-11-04 PROCEDURE — 83735 ASSAY OF MAGNESIUM: CPT

## 2023-11-04 PROCEDURE — A9270 NON-COVERED ITEM OR SERVICE: HCPCS | Performed by: INTERNAL MEDICINE

## 2023-11-04 PROCEDURE — 92526 ORAL FUNCTION THERAPY: CPT

## 2023-11-04 PROCEDURE — 99232 SBSQ HOSP IP/OBS MODERATE 35: CPT | Performed by: NURSE PRACTITIONER

## 2023-11-04 RX ORDER — SODIUM CHLORIDE 1 G/1
500 TABLET ORAL EVERY 8 HOURS
Status: DISCONTINUED | OUTPATIENT
Start: 2023-11-04 | End: 2023-11-05

## 2023-11-04 RX ADMIN — LOSARTAN POTASSIUM 50 MG: 50 TABLET, FILM COATED ORAL at 04:04

## 2023-11-04 RX ADMIN — IPRATROPIUM BROMIDE AND ALBUTEROL SULFATE 3 ML: 2.5; .5 SOLUTION RESPIRATORY (INHALATION) at 18:24

## 2023-11-04 RX ADMIN — ACETYLCYSTEINE 4 ML: 200 SOLUTION ORAL; RESPIRATORY (INHALATION) at 15:52

## 2023-11-04 RX ADMIN — GUAIFENESIN 200 MG: 100 SOLUTION ORAL at 17:37

## 2023-11-04 RX ADMIN — ACETYLCYSTEINE 4 ML: 200 SOLUTION ORAL; RESPIRATORY (INHALATION) at 07:52

## 2023-11-04 RX ADMIN — IPRATROPIUM BROMIDE AND ALBUTEROL SULFATE 3 ML: 2.5; .5 SOLUTION RESPIRATORY (INHALATION) at 15:52

## 2023-11-04 RX ADMIN — AMLODIPINE BESYLATE 10 MG: 10 TABLET ORAL at 04:05

## 2023-11-04 RX ADMIN — ATORVASTATIN CALCIUM 40 MG: 40 TABLET, FILM COATED ORAL at 06:05

## 2023-11-04 RX ADMIN — ENOXAPARIN SODIUM 40 MG: 100 INJECTION SUBCUTANEOUS at 17:37

## 2023-11-04 RX ADMIN — GUAIFENESIN 200 MG: 100 SOLUTION ORAL at 04:05

## 2023-11-04 RX ADMIN — IPRATROPIUM BROMIDE AND ALBUTEROL SULFATE 3 ML: 2.5; .5 SOLUTION RESPIRATORY (INHALATION) at 12:02

## 2023-11-04 RX ADMIN — TICAGRELOR 90 MG: 90 TABLET ORAL at 17:37

## 2023-11-04 RX ADMIN — SODIUM CHLORIDE 1 G: 1 TABLET ORAL at 04:04

## 2023-11-04 RX ADMIN — ASPIRIN 81 MG 81 MG: 81 TABLET ORAL at 04:05

## 2023-11-04 RX ADMIN — GUAIFENESIN 200 MG: 100 SOLUTION ORAL at 20:07

## 2023-11-04 RX ADMIN — GUAIFENESIN 200 MG: 100 SOLUTION ORAL at 14:49

## 2023-11-04 RX ADMIN — IPRATROPIUM BROMIDE AND ALBUTEROL SULFATE 3 ML: 2.5; .5 SOLUTION RESPIRATORY (INHALATION) at 07:52

## 2023-11-04 RX ADMIN — MONTELUKAST 10 MG: 10 TABLET, FILM COATED ORAL at 04:04

## 2023-11-04 RX ADMIN — GUAIFENESIN 200 MG: 100 SOLUTION ORAL at 10:50

## 2023-11-04 RX ADMIN — ACETYLCYSTEINE 4 ML: 200 SOLUTION ORAL; RESPIRATORY (INHALATION) at 18:24

## 2023-11-04 RX ADMIN — ACETYLCYSTEINE 4 ML: 200 SOLUTION ORAL; RESPIRATORY (INHALATION) at 12:02

## 2023-11-04 RX ADMIN — SODIUM CHLORIDE 0.5 G: 1 TABLET ORAL at 14:49

## 2023-11-04 RX ADMIN — SODIUM CHLORIDE 0.5 G: 1 TABLET ORAL at 20:07

## 2023-11-04 RX ADMIN — TICAGRELOR 90 MG: 90 TABLET ORAL at 04:04

## 2023-11-04 ASSESSMENT — PAIN DESCRIPTION - PAIN TYPE
TYPE: ACUTE PAIN
TYPE: ACUTE PAIN

## 2023-11-04 NOTE — HOSPITAL COURSE
58 y.o. female who presented 10/26/2023 with PMHx HTN, HLD, diverticulitis, previous ischemic CVA and intracranial aneurysm.  She had a ICA flow diverter stent placed on 10/19.  She was sent home on DAPT with ASA and brilinta.  Unclear if she was taking Brillinta; pt apparently felt she had an allergy.  TEG did not show signficant platelet inhibition.  She presented back on 10/27 with R side weakness and aphasia.  CTA on presentation showing occlusion of mid L ICA through stent.  Taken emergently to IR 10/27 for theombectomy with TICI 3 flow post.  PEG attempted 11/2 but unable to do secondary to anatomy

## 2023-11-04 NOTE — PROGRESS NOTES
Monitor summary: SR 91-99, NC .13, QRS .08, QT .33 with occasional PAC's and rare PVC's per strip from monitor room.

## 2023-11-04 NOTE — ASSESSMENT & PLAN NOTE
"Likely due to hypertonic therapy  Per neuro ok to slowly normalize Na  Decreased Na tablets to 0.5 g tid.\"    OK to normalize Na per Neurology  D/c salt tabs  Allow free water  Will note the rate of Na reduction       "

## 2023-11-04 NOTE — THERAPY
Speech Language Pathology   Daily Treatment- Swallow & Language     Patient Name: Roslyn Kilgore  AGE:  58 y.o., SEX:  female  Medical Record #: [id number]  Date of Service: 11/4/2023    Precautions:  Precautions: Fall Risk, Swallow Precautions, Nasogastric Tube     Subjective  Pt seen alert & nonverbal for session, NGT in situ. RN and CNA reporting that pt ate 100% of her meals today w/ no overt signs of aspiration- plans to remove NGT.     Assessment  SLP provided 1:1 feeding thought w/ mod cueing pt demo'd hand to mouth contact with cups sips of mildly thick liquid. Oral stage c/b brief bolus holding; no oral residue post swallow. No upper airway wetness or coughing was noted. See description below for language tx    Clinical Impressions  Continued signs of oropharyngeal dysphagia consistent w/ FEES on 11/3 (moderate oropharyngeal dysphagia). Pt also w/ severe global aphasia requiring her communication partner to anticipate all basic wants/needs. Continue diet modification w/ 1:1 feeding, attempts at swallow rehab (global aphasia is a barrier) and aphasia tx.     Recommendations  Treatment Completed: Dysphagia Treatment, Speech-Language Treatment     Dysphagia Treatment  Diet Consistency: Pureed solids (PU4), Mildly thick liquids (MT2)  Instrumentation: Instrumental swallow study pending clinical progress  Medication: Crush with pudding/puree, as appropriate  Supervision: 1:1 feeding with constant supervision  Positioning: Fully upright and midline during oral intake, Meals sitting upright in a chair, as tolerated  Risk Management : Small bites/sips, Slow rate of intake  Oral Care: Q8h    Speech-Language Treatment  Speech-Language Treatment: Simple Yes/No Questions: +0/3, Simple Commands: +0/5, Simple Commands with Max verbal & tactile cueing +3/5 ; Simple Objects- demo'ing function: +3/3, Single word repetition given max cueing: +0/3 ; Automatics: +0/4 max cueing    Cognitive Treatment  Supervision Needs Upons  Discharge: Direct assistance with IADLs (see below)    SLP Treatment Plan  Treatment Plan: Dysphagia Treatment, Speech-Language Treatment  SLP Frequency: 4x Per Week  Estimated Duration: Until Therapy Goals Met    Anticipated Discharge Needs  Discharge Recommendations: Recommend post-acute placement for additional speech therapy services prior to discharge home  Therapy Recommendations Upon DC: Dysphagia Training, Comprehension Training, Expression Training, Patient / Family / Caregiver Education    Patient / Family Goals  Patient / Family Goal #1: unable to state  Short Term Goals  Short Term Goal # 1: Pt will participate in oral feeding trials 1:1 with SLP with no s/sx of aspiration and min cues.  Goal Outcome # 1: Goal met  Short Term Goal # 2: Patient will follow 1 part commands with 90% accuray with no cues  Goal Outcome # 2 : Progressing as expected  Short Term Goal # 3: Patient will name simple objects with 90% accuracy with no cues  Goal Outcome  # 3: Progressing as expected  Short Term Goal # 4: Patient will provide egocentric information with 90% accuracy with no cues  Goal Outcome  # 4: Progressing as expected  Short Term Goal # 5: Pt will consume PU4/MT2 without signs/sequelae of aspiration given maxA.  Goal Outcome  # 5: Progressing as expected    Terrie Meredith, SLP

## 2023-11-04 NOTE — PROGRESS NOTES
Radiology Progress Note   Author: JUANCHO Johnston Date & Time created: 11/3/2023  5:22 PM   Date of admission  10/26/2023  Note to reader: this note follows the APSO format rather than the historical SOAP format. Assessment and plan located at the top of the note for ease of use.    Chief Complaint  58 y.o. 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 Montes on 10/19/23 now on Brilinta returned to the ED 10/26/23 with aphasia and right-sided weakness.  CT head showed left internal carotid artery occlusion extending to distal carotid flow diverter.  Patient underwent a left ICA stent thrombectomy with TICI 3 recanalization with IR Dr. Montes on 10/27/23.  It is unclear whether patient missed any of her Brilinta prior to the flow diverter occlusion.    Interval history:  10/30/23 - patient was seen with DINH Damon.  MRI shows acute left MCA territory infarct.  Patient awake but nonresponsive.  No movement of right upper and lower extremities.    0/31/23 - patient more lethargic this morning but still opens eyes when her name is called.  Bedside nurse reports patient waxes and wanes.     11/01/23 - neuro exam unchanged.  Patient bridged to Integrilin for PEG tube placement tomorrow with GI.    11/03/23 -GI unable to place PEG tube yesterday.  NG replaced and general surgery consulted patient more alert  and interactive today.  Patient still on Integrilin drip.  Smiling and tracking with her eyes and following some commands.    Assessment/Plan     Principal Problem:    Acute ischemic left internal carotid artery (ICA) stroke (HCC)  Active Problems:    Primary hypertension    Dyslipidemia    Goals of care, counseling/discussion    Extrinsic asthma without complication    Acute hypoxemic respiratory failure (HCC)    Hypokalemia      Plan IR  - Continue Integrilin drip until such time as Brilinta can be restarted.    - Restart Brilinta 90 mg twice daily and aspirin 81 mg daily following PEG tube placement.  Original plan was to have patient continue Brilinta and aspirin x6 months with an end date of 04/20/2024.  - Q 4 hour neuro checks per neurology recommendation.  - Maintain SBP between 100-140.   - Patient to follow-up as an outpatient with neuro IR.  Our office will call to schedule.    - IR does not round during the weekends. If any questions; please call IR Film room [phone] to talk to the on call DINH CHENG for assistance             Review of Systems  Physical Exam   Review of Systems   Reason unable to perform ROS: Unresponsive.      Vitals:    11/03/23 1526   BP:    Pulse: 98   Resp: 17   Temp:    SpO2: 96%        Physical Exam  Constitutional:       General: She is not in acute distress.  Cardiovascular:      Rate and Rhythm: Normal rate.      Pulses: Normal pulses.   Pulmonary:      Effort: Pulmonary effort is normal.   Abdominal:      Palpations: Abdomen is soft.   Skin:     General: Skin is warm and dry.      Comments: Right groin access site soft, nontender, dressing CDI   Neurological:      Motor: Weakness present.      Comments: awake, nonverbal, opens eyes when name is called  PERRLA  Right facial droop  Moves left upper and lower extremities spontaneously   withdraws minimally to noxious stimuli in right upper and lower extremity.              Labs    Recent Labs     11/01/23  0530 11/02/23  0630  11/03/23  0600   WBC 14.4* 15.6* 13.7*   RBC 4.01* 4.02* 4.22   HEMOGLOBIN 11.6* 11.8* 12.0   HEMATOCRIT 36.5* 36.5* 38.0   MCV 91.0 90.8 90.0   MCH 28.9 29.4 28.4   MCHC 31.8* 32.3 31.6*   RDW 48.4 47.5 46.2   PLATELETCT 245 228 258   MPV 10.3 10.4 10.6       Recent Labs     11/02/23  1826 11/03/23  0000 11/03/23  0600   SODIUM 150* 149* 150*   POTASSIUM 3.4* 3.3* 3.5*   CHLORIDE 118* 117* 118*   CO2 21 20 20   GLUCOSE 102* 105* 116*   BUN 14 15 17   CREATININE 0.68 0.68 0.72   CALCIUM 8.6 8.4* 8.5       Recent Labs     11/01/23  0530 11/01/23  1235 11/02/23  0630 11/02/23  1225 11/02/23  1826 11/03/23  0000 11/03/23  0600   ALBUMIN 2.8*  --  2.8*  --   --   --   --    TBILIRUBIN 0.5  --  0.5  --   --   --   --    ALKPHOSPHAT 85  --  80  --   --   --   --    TOTPROTEIN 5.8*  --  5.7*  --   --   --   --    ALTSGPT 32  --  29  --   --   --   --    ASTSGOT 37  --  28  --   --   --   --    CREATININE 0.61   < > 0.60   < > 0.68 0.68 0.72    < > = values in this interval not displayed.       DX-ABDOMEN FOR TUBE PLACEMENT   Final Result      NG tube tip projects at the gastroesophageal junction. Recommend advancing before use.      DX-ABDOMEN FOR TUBE PLACEMENT   Final Result         1.  Nonspecific bowel gas pattern in the upper abdomen.   2.  Nasogastric tube tip terminates overlying the expected location of the gastric body.   3.  Hazy left lower lobe infiltrates and trace left pleural effusion      CT-CTA CHEST PULMONARY ARTERY W/ RECONS   Final Result         1.  No evidence of pulmonary embolism.   2.  Left lower lobe atelectasis/collapse.   3.  Mucous plugging of the right lower lobe bronchi and some mild dependent atelectasis in the right lower lobe.   4.  Small hiatal hernia.      Fleischner Society pulmonary nodule recommendations:   Not applicable      CT-HEAD W/O   Final Result      Evolving acute large left cerebral infarct.      No acute hemorrhage.      Chronic ischemic changes.      DX-CHEST-PORTABLE (1  VIEW)   Final Result      No acute process.      IR-PICC LINE PLACEMENT W/ GUIDANCE > AGE 5   Final Result                  Ultrasound-guided PICC placement performed by qualified nursing staff as    above.          MR-BRAIN-W/O   Final Result         1. Acute left MCA distribution infarct, as above.   2. 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   3. Mild chronic ischemic white matter migration.      CT-HEAD W/O   Final Result      1.  Possible mild left frontotemporal edema and a present significantly improved      2.  Negative for hemorrhage or mass effect      3.  Right frontotemporal and encephalomalacia         DX-ABDOMEN FOR TUBE PLACEMENT   Final Result      1.  Enteric tube overlies the stomach.      CT-HEAD W/O   Final Result         1.  No acute intracranial abnormality is identified, there are nonspecific white matter changes, commonly associated with small vessel ischemic disease.  Associated mild cerebral atrophy is noted.   2.  Left maxillary sinusitis changes   3.  Atherosclerosis.         IR-THROMBO MECHANICAL ARTERY,INIT   Final Result      1.  Occluded LEFT internal carotid artery-LEFT internal carotid flow diverter.   2.  Successful mechanical thrombectomy.   3.  TICI 2c      DX-CHEST-PORTABLE (1 VIEW)   Final Result         1.  Left basilar atelectasis, no focal infiltrate   2.  Atherosclerosis      CT-CTA NECK WITH & W/O-POST PROCESSING   Final Result         1.  Occlusion from the mid left internal carotid artery through intracranial stent at the distal left ICA.      These findings were discussed with the patient's clinician, Kiet Brown, on 10/27/2023 12:37 AM.      CT-CTA HEAD WITH & W/O-POST PROCESS   Final Result         1.  Left internal carotid artery occlusion extending up to distal carotid arterial stent.   2.  Decreased density within the left anterior cerebral artery, appearance suggesting slow flow or spasm.   3.  Small caliber of the left middle cerebral artery branches  "which taper and are not visualized most distally, changes most compatible with diminished flow due to left internal carotid artery occlusion.   4.  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   5.  3 mm basilar artery tip aneurysm   6.  2.3 mm aneurysm inferiorly at the right M1/M2 junction      CT-CEREBRAL PERFUSION ANALYSIS   Final Result         1.  Cerebral blood flow less than 30% likely representing completed infarct = 183 mL.      2.  T Max more than 6 seconds likely representing combination of completed infarct and ischemia = 280 mL.      3.  Mismatched volume likely representing ischemic brain/penumbra = 97 mL      4.  Please note that the cerebral perfusion was performed on the limited brain tissue around the basal ganglia region. Infarct/ischemia outside the CT perfusion sections can be missed in this study.      CT-HEAD W/O   Final Result         1.  No acute intracranial abnormality is identified, there are nonspecific white matter changes, commonly associated with small vessel ischemic disease.  Associated mild cerebral atrophy is noted.   2.  Left maxillary sinusitis changes      Note: Artifacts are present limiting diagnostic sensitivity of this exam.          INR   Date Value Ref Range Status   10/26/2023 1.00 0.87 - 1.13 Final     Comment:     INR - Non-therapeutic Reference Range: 0.87-1.13  INR - Therapeutic Reference Range: 2.0-4.0       No results found for: \"POCINR\"     Intake/Output Summary (Last 24 hours) at 10/30/2023 1614  Last data filed at 10/30/2023 1400  Gross per 24 hour   Intake 4433.53 ml   Output 3350 ml   Net 1083.53 ml      Labs not explicitly included in this progress note were reviewed by the author. Radiology/imaging not explicitly included in this progress note was reviewed by the author.     I have performed a physical exam and reviewed and updated ROS and Plan today (11/3/2023).     30 minutes in directly providing and coordinating care and " extensive data review.  No time overlap and excludes procedures.

## 2023-11-04 NOTE — CARE PLAN
The patient is Stable - Low risk of patient condition declining or worsening    Shift Goals  Clinical Goals: Monitor neuro status  Patient Goals: BAKARI  Family Goals: BAKARI    Progress made toward(s) clinical / shift goals:    Problem: Knowledge Deficit - Stroke Education  Goal: Patient's knowledge of stroke and risk factors will improve  Outcome: Progressing  Note: Pt unable to demonstrate understanding of plan of care.     Problem: Neuro Status  Goal: Neuro status will remain stable or improve  Outcome: Progressing  Note: Neuro status remains stable.      Problem: Skin Integrity  Goal: Skin integrity is maintained or improved  Outcome: Progressing  Note: Pt on waffle overlay, heel float boot and PRAFO boot on, q2 turns performed, mepilex on elbows and heels, gray foam padding on oxygen tubing.

## 2023-11-04 NOTE — CARE PLAN
Problem: Bronchopulmonary Hygiene  Goal: Increase mobilization of retained secretions  Description: Target End Date:  2 to 3 days    1.  Perform bronchopulmonary therapy as indicated by assessment  2.  Perform airway suctioning  3.  Perform actions to maintain patient airway  Outcome: Progressing     Problem: Bronchoconstriction  Goal: Improve in air movement and diminished wheezing  Description: Target End Date:  2 to 3 days    1.  Implement inhaled treatments  2.  Evaluate and manage medication effects  Outcome: Progressing   Duoneb, Mucomyst QID

## 2023-11-04 NOTE — ASSESSMENT & PLAN NOTE
"Requiring tube feeding.   Discussed with previous hospitiatlist surgery Dr Milligan has been consulted for PEG placement after failed attempt by GI. \"    Trial on PO dysphagia diet with 1:1 supervision.       "

## 2023-11-04 NOTE — PROGRESS NOTES
Hospital Medicine Daily Progress Note    Date of Service  11/4/2023    Chief Complaint  Roslyn Kilgore is a 58 y.o. female admitted 10/26/2023 with stroke    Hospital Course  58 y.o. female who presented 10/26/2023 with PMHx HTN, HLD, diverticulitis, previous ischemic CVA and intracranial aneurysm.  She had a ICA flow diverter stent placed on 10/19.  She was sent home on DAPT with ASA and brilinta.  Unclear if she was taking Brillinta; pt apparently felt she had an allergy.  TEG did not show signficant platelet inhibition.  She presented back on 10/27 with R side weakness and aphasia.  CTA on presentation showing occlusion of mid L ICA through stent.  Taken emergently to IR 10/27 for theombectomy with TICI 3 flow post.  PEG attempted 11/2 but unable to do secondary to anatomy    Interval Problem Update  11/4 patient is new to me today, patient continue with right-sided weakness, patient with aphasia, patient has NG tube in place, and GI try to place PEG tube but was unsuccessful, will check with neuro patient is on aspirin Brilinta and subcu Lovenox for DVT prophylaxis if we can stop antiplatelet medications, pending on response will talk with surgery for PEG tube placement    I have discussed this patient's plan of care and discharge plan at IDT rounds today with Case Management, Nursing, Nursing leadership, and other members of the IDT team.    Consultants/Specialty  critical care and neurology    Code Status  DNAR/DNI    Disposition  The patient is not medically cleared for discharge to home or a post-acute facility.      I have placed the appropriate orders for post-discharge needs.    Review of Systems  Review of Systems   Reason unable to perform ROS: aphasia.        Physical Exam  Temp:  [35.9 °C (96.7 °F)-36.5 °C (97.7 °F)] 35.9 °C (96.7 °F)  Pulse:  [] 91  Resp:  [17-18] 18  BP: (114-150)/(68-95) 150/95  SpO2:  [93 %-97 %] 96 %    Physical Exam  Vitals and nursing note reviewed.   Constitutional:        Appearance: She is ill-appearing.   Eyes:      General: No scleral icterus.        Right eye: No discharge.         Left eye: No discharge.      Conjunctiva/sclera: Conjunctivae normal.   Cardiovascular:      Rate and Rhythm: Normal rate.      Pulses: Normal pulses.   Pulmonary:      Effort: Pulmonary effort is normal. No respiratory distress.      Breath sounds: Normal breath sounds.   Abdominal:      General: Bowel sounds are normal. There is no distension.      Tenderness: There is no abdominal tenderness.   Musculoskeletal:         General: Normal range of motion.      Cervical back: Normal range of motion and neck supple.   Skin:     General: Skin is warm.   Neurological:      Mental Status: She is alert.      Motor: Weakness (right sided upper and lower ext weakness 0/5) present.      Comments: Aphasia.          Fluids    Intake/Output Summary (Last 24 hours) at 11/4/2023 1357  Last data filed at 11/4/2023 0903  Gross per 24 hour   Intake 230 ml   Output 500 ml   Net -270 ml       Laboratory  Recent Labs     11/02/23  0630 11/03/23  0600 11/04/23  0416   WBC 15.6* 13.7* 11.0*   RBC 4.02* 4.22 4.07*   HEMOGLOBIN 11.8* 12.0 11.6*   HEMATOCRIT 36.5* 38.0 36.6*   MCV 90.8 90.0 89.9   MCH 29.4 28.4 28.5   MCHC 32.3 31.6* 31.7*   RDW 47.5 46.2 45.0   PLATELETCT 228 258 241   MPV 10.4 10.6 11.4     Recent Labs     11/02/23  1826 11/03/23  0000 11/03/23  0600   SODIUM 150* 149* 150*   POTASSIUM 3.4* 3.3* 3.5*   CHLORIDE 118* 117* 118*   CO2 21 20 20   GLUCOSE 102* 105* 116*   BUN 14 15 17   CREATININE 0.68 0.68 0.72   CALCIUM 8.6 8.4* 8.5                   Imaging  DX-ABDOMEN FOR TUBE PLACEMENT   Final Result      NG tube tip projects at the gastroesophageal junction. Recommend advancing before use.      DX-ABDOMEN FOR TUBE PLACEMENT   Final Result         1.  Nonspecific bowel gas pattern in the upper abdomen.   2.  Nasogastric tube tip terminates overlying the expected location of the gastric body.   3.  Hazy  left lower lobe infiltrates and trace left pleural effusion      CT-CTA CHEST PULMONARY ARTERY W/ RECONS   Final Result         1.  No evidence of pulmonary embolism.   2.  Left lower lobe atelectasis/collapse.   3.  Mucous plugging of the right lower lobe bronchi and some mild dependent atelectasis in the right lower lobe.   4.  Small hiatal hernia.      Fleischner Society pulmonary nodule recommendations:   Not applicable      CT-HEAD W/O   Final Result      Evolving acute large left cerebral infarct.      No acute hemorrhage.      Chronic ischemic changes.      DX-CHEST-PORTABLE (1 VIEW)   Final Result      No acute process.      IR-PICC LINE PLACEMENT W/ GUIDANCE > AGE 5   Final Result                  Ultrasound-guided PICC placement performed by qualified nursing staff as    above.          MR-BRAIN-W/O   Final Result         1. Acute left MCA distribution infarct, as above.   2. 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   3. Mild chronic ischemic white matter migration.      CT-HEAD W/O   Final Result      1.  Possible mild left frontotemporal edema and a present significantly improved      2.  Negative for hemorrhage or mass effect      3.  Right frontotemporal and encephalomalacia         DX-ABDOMEN FOR TUBE PLACEMENT   Final Result      1.  Enteric tube overlies the stomach.      CT-HEAD W/O   Final Result         1.  No acute intracranial abnormality is identified, there are nonspecific white matter changes, commonly associated with small vessel ischemic disease.  Associated mild cerebral atrophy is noted.   2.  Left maxillary sinusitis changes   3.  Atherosclerosis.         IR-THROMBO MECHANICAL ARTERY,INIT   Final Result      1.  Occluded LEFT internal carotid artery-LEFT internal carotid flow diverter.   2.  Successful mechanical thrombectomy.   3.  TICI 2c      DX-CHEST-PORTABLE (1 VIEW)   Final Result         1.  Left basilar atelectasis, no focal infiltrate   2.  Atherosclerosis      CT-CTA  NECK WITH & W/O-POST PROCESSING   Final Result         1.  Occlusion from the mid left internal carotid artery through intracranial stent at the distal left ICA.      These findings were discussed with the patient's clinician, Kiet Brown, on 10/27/2023 12:37 AM.      CT-CTA HEAD WITH & W/O-POST PROCESS   Final Result         1.  Left internal carotid artery occlusion extending up to distal carotid arterial stent.   2.  Decreased density within the left anterior cerebral artery, appearance suggesting slow flow or spasm.   3.  Small caliber of the left middle cerebral artery branches which taper and are not visualized most distally, changes most compatible with diminished flow due to left internal carotid artery occlusion.   4.  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   5.  3 mm basilar artery tip aneurysm   6.  2.3 mm aneurysm inferiorly at the right M1/M2 junction      CT-CEREBRAL PERFUSION ANALYSIS   Final Result         1.  Cerebral blood flow less than 30% likely representing completed infarct = 183 mL.      2.  T Max more than 6 seconds likely representing combination of completed infarct and ischemia = 280 mL.      3.  Mismatched volume likely representing ischemic brain/penumbra = 97 mL      4.  Please note that the cerebral perfusion was performed on the limited brain tissue around the basal ganglia region. Infarct/ischemia outside the CT perfusion sections can be missed in this study.      CT-HEAD W/O   Final Result         1.  No acute intracranial abnormality is identified, there are nonspecific white matter changes, commonly associated with small vessel ischemic disease.  Associated mild cerebral atrophy is noted.   2.  Left maxillary sinusitis changes      Note: Artifacts are present limiting diagnostic sensitivity of this exam.           Assessment/Plan  * Acute ischemic left internal carotid artery (ICA) stroke (HCC)- (present on admission)  Assessment &  Plan  Etiology of this event appears to have been noncompliant with the patient's DAPT at home after a left ICA stent being placed on October 19.  Now s/p thrombectomy  Back on dual antiplatelet therapy  High-dose statin  BP control  Anticipate discharge to rehab  Neurology following  PT OT and speech following  Physiatry consulted  EKG and telemetry are sinus  Echo benign  MRI confirms ischemic infarct  Neurology recommends allowing sodium to drift to normal range: We will titrate off supplemental salt tablets  Patient will need a feeding tube, however unable to get 1 done endoscopically due to anatomy.  Surgery consulted.  Will need to stop Integrilin 4 hours before surgery and then can resume it 4 hours after.      Right sided weakness.     Hypernatremia  Assessment & Plan  Likely due to hypertonic therapy  Per neuro ok to slowly normalize Na  Decreased Na tablets to 0.5 g tid.     Dysphagia  Assessment & Plan  Requiring tube feeding.   Discussed with previous hospitiatlist surgery Dr Milligan has been consulted for PEG placement after failed attempt by GI.       Hypokalemia  Assessment & Plan  Follow and replace    Extrinsic asthma without complication- (present on admission)  Assessment & Plan  On exam benign  O2 and RT protocols  Continue montelukast    Dyslipidemia- (present on admission)  Assessment & Plan  High-dose statin    Primary hypertension- (present on admission)  Assessment & Plan  As outpatient was on losartan 25 mg daily, and amlodipine 10 mg daily  Continue amlodipine dose and increase losartan to 50 mg  Monitoring.          VTE prophylaxis: Lovenox    I have performed a physical exam and reviewed and updated ROS and Plan today (11/4/2023). In review of yesterday's note (11/3/2023), there are no changes except as documented above.      Greater than 55 minutes spent prepping to see patient (e.g. reviewing  tests/imaging results, notes from consultants, bedside nurse, night shift ) obtaining and/or  reviewing separately obtained history. Performing a medically appropriate examination and evaluation.  Counseling and educating the patient.  Ordering medications, tests, or procedures.  Referring and communicating with other health care professionals.  Documenting clinical information in EPIC.  Independently interpreting results and communicating results to patient.  Care coordination.      Came back to see patient and discuss with staff regarding plan of care, discussed with neurology due to patient being on aspirin and Brilinta and being high risk for thrombosis patient cannot stop dual antiplatelet treatment for now, hope is that speech therapy will continue working with her and patient will improve enough so she can tolerate diet, if patient is not improving in the next few days then will have to discuss with surgery regarding PEG tube placement while on dual antiplatelet therapy although this is a very high risk of bleeding and complications.  Went extra 11 minutes

## 2023-11-04 NOTE — PROGRESS NOTES
Neurology Progress Note  Neurohospitalist Service, [facility]    Referring Physician: DAISY Cole*      Interval History 11/04: No acute events overnight. Neuro exam remains stable as compared to prior exams. SLP reevaluating patient and is slowly advancing diet as patient had an unsuccessful PEG placement on 11/02      Review of systems: In addition to what is detailed in the HPI and/or updated in the interval history, all other systems reviewed and are negative.    Past Medical History, Past Surgical History and Social History reviewed and unchanged from prior    Medications:    Current Facility-Administered Medications:     sodium chloride (Salt) tablet 0.5 g, 500 mg, Enteral Tube, Q8HRS, Carl Villalta M.D.    atorvastatin (Lipitor) tablet 40 mg, 40 mg, Enteral Tube, DAILY, Paramjit Stephens M.D., 40 mg at 11/04/23 0605    ticagrelor (Brilinta) tablet 90 mg, 90 mg, Enteral Tube, BID, Anna Marie Evans M.D., 90 mg at 11/04/23 0404    Respiratory Therapy Consult, , Nebulization, Continuous RT, Scott Hurley D.O.    losartan (Cozaar) tablet 50 mg, 50 mg, Enteral Tube, Q DAY, Anna Marie Evans M.D., 50 mg at 11/04/23 0404    acetylcysteine (Mucomyst) 20 % solution 3-5 mL, 3-5 mL, Inhalation, 4X/DAY (RT), Anna Marie Evans M.D., 4 mL at 11/04/23 0752    amLODIPine (Norvasc) tablet 10 mg, 10 mg, Enteral Tube, Q DAY, Anna Marie Evans M.D., 10 mg at 11/04/23 0405    guaiFENesin (Robitussin) 100 MG/5ML liquid 200 mg, 10 mL, Enteral Tube, Q4HRS, Shiv Gerardo M.D., 200 mg at 11/04/23 1050    ipratropium-albuterol (DUONEB) nebulizer solution, 3 mL, Nebulization, 4X/DAY (RT), Shiv Gerardo M.D., 3 mL at 11/04/23 0752    Pharmacy Consult: Enteral tube insertion - review meds/change route/product selection, 1 Each, Other, PHARMACY TO DOSE, Shiv Gerardo M.D.    montelukast (Singulair) tablet 10 mg, 10 mg, Enteral Tube, DAILY, Shiv Gerardo,  "M.D., 10 mg at 11/04/23 0404    acetaminophen (Tylenol) tablet 650 mg, 650 mg, Enteral Tube, Q6HRS PRN, Shiv Gerardo M.D., 650 mg at 11/01/23 1136    enoxaparin (Lovenox) inj 40 mg, 40 mg, Subcutaneous, DAILY AT 1800, Shiv Gerardo M.D., 40 mg at 11/03/23 1741    labetalol (Normodyne/Trandate) injection 10 mg, 10 mg, Intravenous, Q4HRS PRN, Ken Tejeda M.D., 10 mg at 11/02/23 1816    hydrALAZINE (Apresoline) injection 20 mg, 20 mg, Intravenous, Q6HRS PRN, Ken Tejeda M.D., 20 mg at 11/02/23 1905    aspirin (Asa) chewable tab 81 mg, 81 mg, Enteral Tube, DAILY, Mauri Montes M.D., 81 mg at 11/04/23 0405    Physical Examination:   /68   Pulse (!) 103   Temp 36.1 °C (96.9 °F) (Temporal)   Resp 18   Ht 1.6 m (5' 2.99\")   Wt 66.5 kg (146 lb 9.7 oz)   LMP 01/04/2011   SpO2 97%   Breastfeeding No   BMI 25.98 kg/m²     NEUROLOGICAL EXAM:     Physical exam completed today, no change and patient remains w/ stable aphasia and right sided weakness.     MENTAL STATUS: Awake, alert, near globally aphasic (follows some simple commands, no verbal output)  LANG/SPEECH: Follows command to close and open eyes, follow finger, no verbal output, cannot follow appendicular commands    CRANIAL NERVES:  II: Pupils equal and reactive, no blink to threat bilaterally  III, IV, VI: EOM intact, no gaze preference or deviation, no nystagmus.  V: Not tested  VII: no asymmetry, no nasolabial fold flattening  VIII: normal hearing to speech  IX, X: Not tested  XI: 5/5 head turn and 5/5 shoulder shrug bilaterally  XII: midline tongue protrusion    MOTOR:  0/0 muscle power in Rt shoulder abductors/adductors, elbow flexors/extensors, wrist flexors/extensors, finger abductors/adductors.  0/0 in Rt hipflexors/extensors, knee flexors/extensors, ankle dorsiflexors and planter flexors.    5/5 muscle power in Lt shoulder abductors/adductors, elbow flexors/extensors.  5/5 in Lt hipflexors/extensors, dorsiflexors and " planter flexors.    REFLEXES: no clonus, upgoing right toe  SENSORY: Grossly normal to touch in LUE/LLE, intact sensation in RU/RLE  GAIT: Deferred    Objective Data:    Labs:  Lab Results   Component Value Date/Time    PROTHROMBTM 13.3 10/26/2023 11:45 PM    INR 1.00 10/26/2023 11:45 PM      Lab Results   Component Value Date/Time    WBC 11.0 (H) 11/04/2023 04:16 AM    RBC 4.07 (L) 11/04/2023 04:16 AM    HEMOGLOBIN 11.6 (L) 11/04/2023 04:16 AM    HEMATOCRIT 36.6 (L) 11/04/2023 04:16 AM    MCV 89.9 11/04/2023 04:16 AM    MCH 28.5 11/04/2023 04:16 AM    MCHC 31.7 (L) 11/04/2023 04:16 AM    MPV 11.4 11/04/2023 04:16 AM    NEUTSPOLYS 73.50 (H) 11/04/2023 04:16 AM    LYMPHOCYTES 9.80 (L) 11/04/2023 04:16 AM    MONOCYTES 9.60 11/04/2023 04:16 AM    EOSINOPHILS 5.30 11/04/2023 04:16 AM    BASOPHILS 0.50 11/04/2023 04:16 AM    HYPOCHROMIA 1+ 02/19/2011 04:20 PM    ANISOCYTOSIS 1+ 06/29/2010 04:10 AM      Lab Results   Component Value Date/Time    SODIUM 150 (H) 11/03/2023 06:00 AM    POTASSIUM 3.5 (L) 11/03/2023 06:00 AM    CHLORIDE 118 (H) 11/03/2023 06:00 AM    CO2 20 11/03/2023 06:00 AM    GLUCOSE 116 (H) 11/03/2023 06:00 AM    BUN 17 11/03/2023 06:00 AM    CREATININE 0.72 11/03/2023 06:00 AM    CREATININE 0.8 06/12/2006 05:00 AM    GLOMRATE 66 05/15/2023 02:32 AM      Lab Results   Component Value Date/Time    CHOLSTRLTOT 135 10/28/2023 02:30 AM    LDL 61 10/28/2023 02:30 AM    HDL 62 10/28/2023 02:30 AM    TRIGLYCERIDE 58 10/28/2023 02:30 AM       Lab Results   Component Value Date/Time    ALKPHOSPHAT 80 11/02/2023 06:30 AM    ASTSGOT 28 11/02/2023 06:30 AM    ALTSGPT 29 11/02/2023 06:30 AM    TBILIRUBIN 0.5 11/02/2023 06:30 AM        Imaging/Testing:    I interpreted and/or reviewed the patient's neuroimaging    DX-ABDOMEN FOR TUBE PLACEMENT   Final Result      NG tube tip projects at the gastroesophageal junction. Recommend advancing before use.      DX-ABDOMEN FOR TUBE PLACEMENT   Final Result         1.   Nonspecific bowel gas pattern in the upper abdomen.   2.  Nasogastric tube tip terminates overlying the expected location of the gastric body.   3.  Hazy left lower lobe infiltrates and trace left pleural effusion      CT-CTA CHEST PULMONARY ARTERY W/ RECONS   Final Result         1.  No evidence of pulmonary embolism.   2.  Left lower lobe atelectasis/collapse.   3.  Mucous plugging of the right lower lobe bronchi and some mild dependent atelectasis in the right lower lobe.   4.  Small hiatal hernia.      Fleischner Society pulmonary nodule recommendations:   Not applicable      CT-HEAD W/O   Final Result      Evolving acute large left cerebral infarct.      No acute hemorrhage.      Chronic ischemic changes.      DX-CHEST-PORTABLE (1 VIEW)   Final Result      No acute process.      IR-PICC LINE PLACEMENT W/ GUIDANCE > AGE 5   Final Result                  Ultrasound-guided PICC placement performed by qualified nursing staff as    above.          MR-BRAIN-W/O   Final Result         1. Acute left MCA distribution infarct, as above.   2. 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   3. Mild chronic ischemic white matter migration.      CT-HEAD W/O   Final Result      1.  Possible mild left frontotemporal edema and a present significantly improved      2.  Negative for hemorrhage or mass effect      3.  Right frontotemporal and encephalomalacia         DX-ABDOMEN FOR TUBE PLACEMENT   Final Result      1.  Enteric tube overlies the stomach.      CT-HEAD W/O   Final Result         1.  No acute intracranial abnormality is identified, there are nonspecific white matter changes, commonly associated with small vessel ischemic disease.  Associated mild cerebral atrophy is noted.   2.  Left maxillary sinusitis changes   3.  Atherosclerosis.         IR-THROMBO MECHANICAL ARTERY,INIT   Final Result      1.  Occluded LEFT internal carotid artery-LEFT internal carotid flow diverter.   2.  Successful mechanical thrombectomy.    3.  TICI 2c      DX-CHEST-PORTABLE (1 VIEW)   Final Result         1.  Left basilar atelectasis, no focal infiltrate   2.  Atherosclerosis      CT-CTA NECK WITH & W/O-POST PROCESSING   Final Result         1.  Occlusion from the mid left internal carotid artery through intracranial stent at the distal left ICA.      These findings were discussed with the patient's clinician, Kiet Brown, on 10/27/2023 12:37 AM.      CT-CTA HEAD WITH & W/O-POST PROCESS   Final Result         1.  Left internal carotid artery occlusion extending up to distal carotid arterial stent.   2.  Decreased density within the left anterior cerebral artery, appearance suggesting slow flow or spasm.   3.  Small caliber of the left middle cerebral artery branches which taper and are not visualized most distally, changes most compatible with diminished flow due to left internal carotid artery occlusion.   4.  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   5.  3 mm basilar artery tip aneurysm   6.  2.3 mm aneurysm inferiorly at the right M1/M2 junction      CT-CEREBRAL PERFUSION ANALYSIS   Final Result         1.  Cerebral blood flow less than 30% likely representing completed infarct = 183 mL.      2.  T Max more than 6 seconds likely representing combination of completed infarct and ischemia = 280 mL.      3.  Mismatched volume likely representing ischemic brain/penumbra = 97 mL      4.  Please note that the cerebral perfusion was performed on the limited brain tissue around the basal ganglia region. Infarct/ischemia outside the CT perfusion sections can be missed in this study.      CT-HEAD W/O   Final Result         1.  No acute intracranial abnormality is identified, there are nonspecific white matter changes, commonly associated with small vessel ischemic disease.  Associated mild cerebral atrophy is noted.   2.  Left maxillary sinusitis changes      Note: Artifacts are present limiting diagnostic sensitivity  of this exam.          Assessment and Plan:  58-year-old female with history of recent right ICA aneurysm pipeline placement on October 19, 2023.  Patient presented on October 27, 2023 with acute onset of right-sided weakness and global aphasia.  Patient was found to have an occluded right ICA and underwent a thrombectomy with TICI 3 score recanalization.  Follow-up imaging demonstrated moderate stroke burden.  It is unclear why patient's stent occluded although it is thought that she possibly stopped taking her Brilinta post stent; TEG evaluation showed platelet inhibition however on admission.  PEG tube placement was unable to be completed secondary to large overlying liver. Currently in discussion with IR to go with IR guided PEG. Patient has NG tube and may trial another swallow evaluation to assess if patient could in fact begin oral intact. Will remain on ASA and Brilinta for stent patency (DAPT therapy).        Plan:  -- Q4h neuro exams  -- Maintain the blood pressure between 100-140 systolic.  -- Normonatremia, last Na+ no checked, please check daily BMP  -- DAPT with ASA 81mg daily and Brilinta 90mg daily x 8 weeks (12/27/2023), ASA monotherapy thereafter  -- PT/OT/speech therapy  -- On Lovenox SQ for DVT chemoppx  -- Atorvastatin 40 mg, already at an LDL goal of <70  --LDL: 61, A1c: 5.8  --Patient will need stroke bridge clinic follow up    Case reviewed and plan created with Dr. Art, Acute Neurology. Neurology will sign off at this time. Please call with any questions.    Rafael SINGH  Vascular Neurology, Acute Care Services

## 2023-11-04 NOTE — PROGRESS NOTES
4 Eyes Skin Assessment Completed by GABO Watt and GABO Sotelo.    Head WDL  Ears WDL  Nose WDL  Mouth WDL  Neck WDL  Breast/Chest WDL  Shoulder Blades WDL  Spine WDL  (R) Arm/Elbow/Hand Bruising  (L) Arm/Elbow/Hand Bruising  Abdomen WDL  Groin WDL  Scrotum/Coccyx/Buttocks Redness and Blanching  (R) Leg WDL  (L) Leg WDL  (R) Heel/Foot/Toe dryness  (L) Heel/Foot/Toe dryness          Devices In Places Tele Box, SCD's, OG/NG, and Nasal Cannula      Interventions In Place NC W/Ear Foams, Heel Float Boots, TAP System, Pillows, and Q2 Turns    Possible Skin Injury No    Pictures Uploaded Into Epic N/A  Wound Consult Placed N/A  RN Wound Prevention Protocol Ordered Yes

## 2023-11-05 ENCOUNTER — APPOINTMENT (OUTPATIENT)
Dept: RADIOLOGY | Facility: MEDICAL CENTER | Age: 58
DRG: 981 | End: 2023-11-05
Attending: INTERNAL MEDICINE
Payer: COMMERCIAL

## 2023-11-05 LAB
ALBUMIN SERPL BCP-MCNC: 2.8 G/DL (ref 3.2–4.9)
ANION GAP SERPL CALC-SCNC: 10 MMOL/L (ref 7–16)
ANION GAP SERPL CALC-SCNC: 11 MMOL/L (ref 7–16)
ANION GAP SERPL CALC-SCNC: 11 MMOL/L (ref 7–16)
ANION GAP SERPL CALC-SCNC: 9 MMOL/L (ref 7–16)
BASOPHILS # BLD AUTO: 0.3 % (ref 0–1.8)
BASOPHILS # BLD: 0.04 K/UL (ref 0–0.12)
BUN SERPL-MCNC: 16 MG/DL (ref 8–22)
BUN SERPL-MCNC: 16 MG/DL (ref 8–22)
BUN SERPL-MCNC: 17 MG/DL (ref 8–22)
BUN SERPL-MCNC: 18 MG/DL (ref 8–22)
CALCIUM ALBUM COR SERPL-MCNC: 9.2 MG/DL (ref 8.5–10.5)
CALCIUM SERPL-MCNC: 7.9 MG/DL (ref 8.5–10.5)
CALCIUM SERPL-MCNC: 8.1 MG/DL (ref 8.5–10.5)
CALCIUM SERPL-MCNC: 8.2 MG/DL (ref 8.5–10.5)
CALCIUM SERPL-MCNC: 8.3 MG/DL (ref 8.5–10.5)
CHLORIDE SERPL-SCNC: 113 MMOL/L (ref 96–112)
CHLORIDE SERPL-SCNC: 114 MMOL/L (ref 96–112)
CHLORIDE SERPL-SCNC: 114 MMOL/L (ref 96–112)
CHLORIDE SERPL-SCNC: 115 MMOL/L (ref 96–112)
CO2 SERPL-SCNC: 21 MMOL/L (ref 20–33)
CO2 SERPL-SCNC: 22 MMOL/L (ref 20–33)
CO2 SERPL-SCNC: 23 MMOL/L (ref 20–33)
CO2 SERPL-SCNC: 24 MMOL/L (ref 20–33)
CREAT SERPL-MCNC: 0.65 MG/DL (ref 0.5–1.4)
CREAT SERPL-MCNC: 0.68 MG/DL (ref 0.5–1.4)
CREAT SERPL-MCNC: 0.69 MG/DL (ref 0.5–1.4)
CREAT SERPL-MCNC: 0.71 MG/DL (ref 0.5–1.4)
EOSINOPHIL # BLD AUTO: 0.42 K/UL (ref 0–0.51)
EOSINOPHIL NFR BLD: 3.3 % (ref 0–6.9)
ERYTHROCYTE [DISTWIDTH] IN BLOOD BY AUTOMATED COUNT: 44.8 FL (ref 35.9–50)
FLUAV RNA SPEC QL NAA+PROBE: NEGATIVE
FLUBV RNA SPEC QL NAA+PROBE: NEGATIVE
GFR SERPLBLD CREATININE-BSD FMLA CKD-EPI: 100 ML/MIN/1.73 M 2
GFR SERPLBLD CREATININE-BSD FMLA CKD-EPI: 101 ML/MIN/1.73 M 2
GFR SERPLBLD CREATININE-BSD FMLA CKD-EPI: 102 ML/MIN/1.73 M 2
GFR SERPLBLD CREATININE-BSD FMLA CKD-EPI: 97 ML/MIN/1.73 M 2
GFR SERPLBLD CREATININE-BSD FMLA CKD-EPI: 99 ML/MIN/1.73 M 2
GLUCOSE BLD STRIP.AUTO-MCNC: 125 MG/DL (ref 65–99)
GLUCOSE SERPL-MCNC: 101 MG/DL (ref 65–99)
GLUCOSE SERPL-MCNC: 113 MG/DL (ref 65–99)
GLUCOSE SERPL-MCNC: 150 MG/DL (ref 65–99)
GLUCOSE SERPL-MCNC: 154 MG/DL (ref 65–99)
HCT VFR BLD AUTO: 35.1 % (ref 37–47)
HGB BLD-MCNC: 11.4 G/DL (ref 12–16)
IMM GRANULOCYTES # BLD AUTO: 0.21 K/UL (ref 0–0.11)
IMM GRANULOCYTES NFR BLD AUTO: 1.7 % (ref 0–0.9)
INR PPP: 0.98 (ref 0.87–1.13)
LACTATE SERPL-SCNC: 1.8 MMOL/L (ref 0.5–2)
LYMPHOCYTES # BLD AUTO: 1.27 K/UL (ref 1–4.8)
LYMPHOCYTES NFR BLD: 10 % (ref 22–41)
MAGNESIUM SERPL-MCNC: 2 MG/DL (ref 1.5–2.5)
MAGNESIUM SERPL-MCNC: 2 MG/DL (ref 1.5–2.5)
MCH RBC QN AUTO: 29.2 PG (ref 27–33)
MCHC RBC AUTO-ENTMCNC: 32.5 G/DL (ref 32.2–35.5)
MCV RBC AUTO: 89.8 FL (ref 81.4–97.8)
MONOCYTES # BLD AUTO: 1.36 K/UL (ref 0–0.85)
MONOCYTES NFR BLD AUTO: 10.7 % (ref 0–13.4)
NEUTROPHILS # BLD AUTO: 9.41 K/UL (ref 1.82–7.42)
NEUTROPHILS NFR BLD: 74 % (ref 44–72)
NRBC # BLD AUTO: 0 K/UL
NRBC BLD-RTO: 0 /100 WBC (ref 0–0.2)
PHOSPHATE SERPL-MCNC: 2.3 MG/DL (ref 2.5–4.5)
PLATELET # BLD AUTO: 245 K/UL (ref 164–446)
PMV BLD AUTO: 11 FL (ref 9–12.9)
POTASSIUM SERPL-SCNC: 3.2 MMOL/L (ref 3.6–5.5)
POTASSIUM SERPL-SCNC: 3.4 MMOL/L (ref 3.6–5.5)
POTASSIUM SERPL-SCNC: 3.8 MMOL/L (ref 3.6–5.5)
POTASSIUM SERPL-SCNC: 3.8 MMOL/L (ref 3.6–5.5)
PROCALCITONIN SERPL-MCNC: 0.14 NG/ML
PROCALCITONIN SERPL-MCNC: 0.14 NG/ML
PROTHROMBIN TIME: 13.1 SEC (ref 12–14.6)
RBC # BLD AUTO: 3.91 M/UL (ref 4.2–5.4)
RSV RNA SPEC QL NAA+PROBE: NEGATIVE
SARS-COV-2 RNA RESP QL NAA+PROBE: NOTDETECTED
SODIUM SERPL-SCNC: 145 MMOL/L (ref 135–145)
SODIUM SERPL-SCNC: 146 MMOL/L (ref 135–145)
SODIUM SERPL-SCNC: 147 MMOL/L (ref 135–145)
SODIUM SERPL-SCNC: 149 MMOL/L (ref 135–145)
SPECIMEN SOURCE: NORMAL
WBC # BLD AUTO: 12.7 K/UL (ref 4.8–10.8)

## 2023-11-05 PROCEDURE — 700111 HCHG RX REV CODE 636 W/ 250 OVERRIDE (IP): Mod: JZ | Performed by: INTERNAL MEDICINE

## 2023-11-05 PROCEDURE — 92526 ORAL FUNCTION THERAPY: CPT

## 2023-11-05 PROCEDURE — 85025 COMPLETE CBC W/AUTO DIFF WBC: CPT

## 2023-11-05 PROCEDURE — 94640 AIRWAY INHALATION TREATMENT: CPT

## 2023-11-05 PROCEDURE — 700101 HCHG RX REV CODE 250: Performed by: INTERNAL MEDICINE

## 2023-11-05 PROCEDURE — 92507 TX SP LANG VOICE COMM INDIV: CPT

## 2023-11-05 PROCEDURE — 82962 GLUCOSE BLOOD TEST: CPT

## 2023-11-05 PROCEDURE — G0316 PR PROLONGED IP/OBS E&M EA 15 MIN: HCPCS | Performed by: INTERNAL MEDICINE

## 2023-11-05 PROCEDURE — C9803 HOPD COVID-19 SPEC COLLECT: HCPCS | Performed by: INTERNAL MEDICINE

## 2023-11-05 PROCEDURE — 87040 BLOOD CULTURE FOR BACTERIA: CPT

## 2023-11-05 PROCEDURE — 770020 HCHG ROOM/CARE - TELE (206)

## 2023-11-05 PROCEDURE — 70450 CT HEAD/BRAIN W/O DYE: CPT

## 2023-11-05 PROCEDURE — 84145 PROCALCITONIN (PCT): CPT

## 2023-11-05 PROCEDURE — 83605 ASSAY OF LACTIC ACID: CPT

## 2023-11-05 PROCEDURE — A9270 NON-COVERED ITEM OR SERVICE: HCPCS | Performed by: HOSPITALIST

## 2023-11-05 PROCEDURE — 85610 PROTHROMBIN TIME: CPT

## 2023-11-05 PROCEDURE — 99233 SBSQ HOSP IP/OBS HIGH 50: CPT | Performed by: INTERNAL MEDICINE

## 2023-11-05 PROCEDURE — A9270 NON-COVERED ITEM OR SERVICE: HCPCS | Performed by: INTERNAL MEDICINE

## 2023-11-05 PROCEDURE — 80069 RENAL FUNCTION PANEL: CPT | Mod: 91

## 2023-11-05 PROCEDURE — 700102 HCHG RX REV CODE 250 W/ 637 OVERRIDE(OP): Performed by: RADIOLOGY

## 2023-11-05 PROCEDURE — 700102 HCHG RX REV CODE 250 W/ 637 OVERRIDE(OP): Mod: JZ | Performed by: INTERNAL MEDICINE

## 2023-11-05 PROCEDURE — A9270 NON-COVERED ITEM OR SERVICE: HCPCS | Mod: JZ | Performed by: INTERNAL MEDICINE

## 2023-11-05 PROCEDURE — 700105 HCHG RX REV CODE 258: Performed by: INTERNAL MEDICINE

## 2023-11-05 PROCEDURE — 71045 X-RAY EXAM CHEST 1 VIEW: CPT

## 2023-11-05 PROCEDURE — 80048 BASIC METABOLIC PNL TOTAL CA: CPT

## 2023-11-05 PROCEDURE — 700102 HCHG RX REV CODE 250 W/ 637 OVERRIDE(OP): Performed by: INTERNAL MEDICINE

## 2023-11-05 PROCEDURE — 36415 COLL VENOUS BLD VENIPUNCTURE: CPT

## 2023-11-05 PROCEDURE — 83735 ASSAY OF MAGNESIUM: CPT

## 2023-11-05 PROCEDURE — A9270 NON-COVERED ITEM OR SERVICE: HCPCS | Performed by: RADIOLOGY

## 2023-11-05 PROCEDURE — A9270 NON-COVERED ITEM OR SERVICE: HCPCS | Performed by: PSYCHIATRY & NEUROLOGY

## 2023-11-05 PROCEDURE — 0241U HCHG SARS-COV-2 COVID-19 NFCT DS RESP RNA 4 TRGT MIC: CPT

## 2023-11-05 PROCEDURE — 700102 HCHG RX REV CODE 250 W/ 637 OVERRIDE(OP): Performed by: PSYCHIATRY & NEUROLOGY

## 2023-11-05 PROCEDURE — 700102 HCHG RX REV CODE 250 W/ 637 OVERRIDE(OP): Performed by: HOSPITALIST

## 2023-11-05 RX ORDER — AMOXICILLIN AND CLAVULANATE POTASSIUM 875; 125 MG/1; MG/1
1 TABLET, FILM COATED ORAL EVERY 12 HOURS
Status: DISCONTINUED | OUTPATIENT
Start: 2023-11-05 | End: 2023-11-05

## 2023-11-05 RX ORDER — POTASSIUM CHLORIDE 20 MEQ/1
20 TABLET, EXTENDED RELEASE ORAL ONCE
Status: COMPLETED | OUTPATIENT
Start: 2023-11-05 | End: 2023-11-05

## 2023-11-05 RX ORDER — IPRATROPIUM BROMIDE AND ALBUTEROL SULFATE 2.5; .5 MG/3ML; MG/3ML
3 SOLUTION RESPIRATORY (INHALATION)
Status: DISCONTINUED | OUTPATIENT
Start: 2023-11-05 | End: 2023-11-06 | Stop reason: HOSPADM

## 2023-11-05 RX ORDER — ACETYLCYSTEINE 200 MG/ML
3-5 SOLUTION ORAL; RESPIRATORY (INHALATION)
Status: DISCONTINUED | OUTPATIENT
Start: 2023-11-05 | End: 2023-11-06 | Stop reason: HOSPADM

## 2023-11-05 RX ADMIN — TICAGRELOR 90 MG: 90 TABLET ORAL at 05:15

## 2023-11-05 RX ADMIN — TICAGRELOR 90 MG: 90 TABLET ORAL at 18:59

## 2023-11-05 RX ADMIN — ASPIRIN 81 MG 81 MG: 81 TABLET ORAL at 05:14

## 2023-11-05 RX ADMIN — AMPICILLIN AND SULBACTAM 3 G: 1; 2 INJECTION, POWDER, FOR SOLUTION INTRAMUSCULAR; INTRAVENOUS at 17:16

## 2023-11-05 RX ADMIN — GUAIFENESIN 200 MG: 100 SOLUTION ORAL at 08:51

## 2023-11-05 RX ADMIN — AMLODIPINE BESYLATE 10 MG: 10 TABLET ORAL at 05:14

## 2023-11-05 RX ADMIN — LOSARTAN POTASSIUM 50 MG: 50 TABLET, FILM COATED ORAL at 05:14

## 2023-11-05 RX ADMIN — MONTELUKAST 10 MG: 10 TABLET, FILM COATED ORAL at 05:14

## 2023-11-05 RX ADMIN — AMPICILLIN AND SULBACTAM 3 G: 1; 2 INJECTION, POWDER, FOR SOLUTION INTRAMUSCULAR; INTRAVENOUS at 23:09

## 2023-11-05 RX ADMIN — ATORVASTATIN CALCIUM 40 MG: 40 TABLET, FILM COATED ORAL at 05:14

## 2023-11-05 RX ADMIN — IPRATROPIUM BROMIDE AND ALBUTEROL SULFATE 3 ML: 2.5; .5 SOLUTION RESPIRATORY (INHALATION) at 11:15

## 2023-11-05 RX ADMIN — GUAIFENESIN 200 MG: 100 SOLUTION ORAL at 23:07

## 2023-11-05 RX ADMIN — ENOXAPARIN SODIUM 40 MG: 100 INJECTION SUBCUTANEOUS at 17:17

## 2023-11-05 RX ADMIN — ACETYLCYSTEINE 4 ML: 200 SOLUTION ORAL; RESPIRATORY (INHALATION) at 11:16

## 2023-11-05 RX ADMIN — IPRATROPIUM BROMIDE AND ALBUTEROL SULFATE 3 ML: 2.5; .5 SOLUTION RESPIRATORY (INHALATION) at 07:55

## 2023-11-05 RX ADMIN — ACETYLCYSTEINE 4 ML: 200 SOLUTION ORAL; RESPIRATORY (INHALATION) at 07:55

## 2023-11-05 RX ADMIN — GUAIFENESIN 200 MG: 100 SOLUTION ORAL at 03:07

## 2023-11-05 RX ADMIN — GUAIFENESIN 200 MG: 100 SOLUTION ORAL at 05:14

## 2023-11-05 RX ADMIN — SODIUM CHLORIDE 0.5 G: 1 TABLET ORAL at 06:00

## 2023-11-05 RX ADMIN — GUAIFENESIN 200 MG: 100 SOLUTION ORAL at 17:17

## 2023-11-05 RX ADMIN — POTASSIUM CHLORIDE 20 MEQ: 1500 TABLET, EXTENDED RELEASE ORAL at 14:19

## 2023-11-05 ASSESSMENT — PAIN DESCRIPTION - PAIN TYPE
TYPE: ACUTE PAIN
TYPE: ACUTE PAIN

## 2023-11-05 ASSESSMENT — FIBROSIS 4 INDEX: FIB4 SCORE: 1.25

## 2023-11-05 NOTE — PROGRESS NOTES
Monitor summary: SR , MN .12, QRS .06, QT .38 with PAC's, PVC's, and couplet per strip from monitor room.

## 2023-11-05 NOTE — THERAPY
Speech Language Pathology    Dysphagia and Speech/Language Daily Treatment     Patient Name: Roslyn Kilgore  AGE:  58 y.o., SEX:  female  Medical Record #: [id number]  Date of Service: 11/5/2023      Precautions:  Precautions: Fall Risk, Swallow Precautions         Subjective  Per discussion with RN, Code Stroke called earlier this date due to neuro change of increased right pupil dilation. Pt s/p stat CT and updated CXR.     CT Head, 11/5/23, 12:43 pm:  1.  Continued evolution of large left frontal lobe infarct. No CT evidence of hemorrhagic transformation.  2.  Local edema without midline shift. No herniation.  3.  Chronic right frontal encephalomalacia.  4.  Atrophy and chronic small vessel ischemic changes.       CXR, 11/5 15:12:  1.  Linear hazy right lower lobe opacity could represent atelectasis or pneumonitis.      Assessment    DYSPHAGIA: Pt seen with lunch tray of pureed solids and mildly thick liquids. Since last SLP session, NGT has been removed and pt has been consuming 100% of breakfast. Pt demonstrated adequate bolus stripping from utensil with intermittently prolonged bolus manipulation and transit. Mild-moderate oral stasis of solid remaining along the right side cleared with spontaneous second swallow or mildly thick liquid wash. No clinical s/sx aspiration noted across trials. CXR was completed following dysphagia treatment.     SPEECH/LANGUAGE: Pt with obvious attempts at verbal communication this date, though suspect aphasia is also complicated by apraxia. When provided with nonverbal stimuli only, pt consistently mimicked clinician with hand shapes/signals, initially requiring 1:1 hand-over-hand assistance, and eventually requiring clinician model only. Attempted to elicit object identification in a field of two using eye gaze, though accuracy did not appear greater than chance. Attempted to shape sounds via blowing (successful in 3/5 attempts). Unable to elicit vocalizations via humming or  "single phonemes, despite 1:1 model, though as SLP continued with feeding task, and eventually, patient stated, \"garlic\" x2. Attempted to elicit automatic speech (1, 2, 3) with pt making obvious attempts to say each word, achieving whisper speech x2. Attempted singing familiar songs (Row, Row, Row Your Boat; Happy Birthday; ABCs; Twinkle Twinkle) with significant success in all attempts except for ABCs. Pt sang a combination of all songs 10 times. Following first song, pt appeared to say \"incredible!\" Inconsistent phonemic errors present throughout songs. Attempted to shape this into single spoken phrases, though unsuccessful this date. At end of session, pt demonstrated rote phrases of \"Ok\" x4 and \"ok, cool\" x1. RN notified.       Clinical Impressions  Pt continues with oropharyngeal dysphagia consistent with FEES completed 11/3 and improving global aphasia, likely complicated by apraxia, achieving consistent voicing with 1:1 clinician model while singing familiar songs and completing one-step non-verbal command with clinician model. Recommend continue current diet of pureed solids and mildly thick liquids and communication partner anticipating basic wants/needs. Please allow time for pt to attempt to respond to cares. SLP will continue to follow.      Recommendations  Treatment Completed: Speech-Language Treatment       Dysphagia Treatment  Diet Consistency: pureed solids and mildly thick liquids (PU4/MT2)  Instrumentation:  (FEES completed)  Medication: Crush with pudding/puree, as appropriate  Supervision: 1:1 feeding with constant supervision  Positioning: Fully upright and midline during oral intake, Meals sitting upright in a chair, as tolerated  Risk Management : Small bites/sips, Alternate bites and sips, Slow rate of intake    Speech-Language Treatment  Speech-Language Treatment: verbal and non-verbal commands, automatics, singing (Row, Row, Row Your Boat; Happy Birthday; Twinkle Twinkle; ABCs)    Cognitive " Treatment  Supervision Needs Upons Discharge: Direct assistance with IADLs (see below)  IADLs:  (dependent for all ADLs and IADLs)           SLP Treatment Plan  Treatment Plan: Speech-Language Treatment, Dysphagia Treatment, Patient/Family/Caregiver Training  SLP Frequency: 4x Per Week  Estimated Duration: Until Therapy Goals Met      Anticipated Discharge Needs  Discharge Recommendations: Recommend post-acute placement for additional speech therapy services prior to discharge home  Therapy Recommendations Upon DC: Dysphagia Training, Comprehension Training, Expression Training, Reading Training, Writing Training, Community Re-Integration, Patient / Family / Caregiver Education      Patient / Family Goals  Patient / Family Goal #1: unable to state  Short Term Goals  Short Term Goal # 1: Pt will participate in oral feeding trials 1:1 with SLP with no s/sx of aspiration and min cues.  Goal Outcome # 1: Goal met  Short Term Goal # 2: Patient will follow 1 part commands with 90% accuray with no cues  Goal Outcome # 2 : Progressing as expected  Short Term Goal # 3: Patient will name simple objects with 90% accuracy with no cues  Goal Outcome  # 3: Progressing slower than expected  Short Term Goal # 4: Patient will provide egocentric information with 90% accuracy with no cues  Goal Outcome  # 4: Progressing slower than expected  Short Term Goal # 5: Pt will consume PU4/MT2 without signs/sequelae of aspiration given maxA.  Goal Outcome  # 5: Progressing as expected      Tatum Shea MS,CCC-SLP

## 2023-11-05 NOTE — PROGRESS NOTES
"Hospital Medicine Daily Progress Note    Date of Service  11/5/2023    Chief Complaint  Roslyn Kilgore is a 58 y.o. female admitted 10/26/2023 with stroke    Hospital Course  58 y.o. female who presented 10/26/2023 with PMHx HTN, HLD, diverticulitis, previous ischemic CVA and intracranial aneurysm.  She had a ICA flow diverter stent placed on 10/19.  She was sent home on DAPT with ASA and brilinta.  Unclear if she was taking Brillinta; pt apparently felt she had an allergy.  TEG did not show signficant platelet inhibition.  She presented back on 10/27 with R side weakness and aphasia.  CTA on presentation showing occlusion of mid L ICA through stent.  Taken emergently to IR 10/27 for theombectomy with TICI 3 flow post.  PEG attempted 11/2 but unable to do secondary to anatomy    Interval Problem Update  11/4 patient is new to me today, patient continue with right-sided weakness, patient with aphasia, patient has NG tube in place, and GI try to place PEG tube but was unsuccessful, will check with neuro patient is on aspirin Brilinta and subcu Lovenox for DVT prophylaxis if we can stop antiplatelet medications, pending on response will talk with surgery for PEG tube placement    Came back to see patient and discuss with staff regarding plan of care, discussed with neurology due to patient being on aspirin and Brilinta and being high risk for thrombosis patient cannot stop dual antiplatelet treatment for now, hope is that speech therapy will continue working with her and patient will improve enough so she can tolerate diet, if patient is not improving in the next few days then will have to discuss with surgery regarding PEG tube placement while on dual antiplatelet therapy although this is a very high risk of bleeding and complications. \" Dr. Houser 11/4.\"    11/5. I reviewed the chart along with vitals, labs, imaging, test (both pending and resulted) and recommendations from specialists and interdisciplinary " team.  PEG tube placement failed. Currently speech is allowing PO dysphagia diet but needs 1 to 1  feeding. Patient says she occasionally coughs when taking in food.   She also has a leukocytosis, hypernatremic, mild hypokalemic. She is getting salt tabs. I spoke with Ion and Dr. Art and they were okay normalizing Na. FOr now I will stop the salt tabs and allow drinking free water. Will continue to check serial Na levels.  Nursing reported uneven pupils and this is new from prior. I ordered a STAT head CT. That showed evbolution of stroke. Neurology was on the floor and we discussed findings.  Update: Pneumonia on CXR. Ordered Unasyn and Ordered CoVID    I have discussed this patient's plan of care and discharge plan at IDT rounds today with Case Management, Nursing, Nursing leadership, and other members of the IDT team.    Consultants/Specialty  critical care and neurology    Code Status  DNAR/DNI    Disposition  Medically Cleared  I have placed the appropriate orders for post-discharge needs.    Review of Systems  Review of Systems   Reason unable to perform ROS: aphasia.        Physical Exam  Temp:  [35.9 °C (96.7 °F)-36.6 °C (97.9 °F)] 36.6 °C (97.9 °F)  Pulse:  [] 81  Resp:  [18] 18  BP: (111-150)/(65-95) 122/65  SpO2:  [93 %-97 %] 94 %    Physical Exam  Vitals and nursing note reviewed.   Constitutional:       Appearance: She is ill-appearing.   Eyes:      General: No scleral icterus.        Right eye: No discharge.         Left eye: No discharge.      Conjunctiva/sclera: Conjunctivae normal.      Comments: Uneven pupils.   Cardiovascular:      Rate and Rhythm: Normal rate.      Pulses: Normal pulses.   Pulmonary:      Effort: Pulmonary effort is normal. No respiratory distress.      Breath sounds: Normal breath sounds.   Abdominal:      General: Bowel sounds are normal. There is no distension.      Tenderness: There is no abdominal tenderness.   Musculoskeletal:         General: Normal range  of motion.      Cervical back: Normal range of motion and neck supple.   Skin:     General: Skin is warm.   Neurological:      Mental Status: She is alert.      Motor: Weakness (right sided upper and lower ext weakness 0/5) present.      Comments: Aphasia.          Fluids    Intake/Output Summary (Last 24 hours) at 11/5/2023 0742  Last data filed at 11/4/2023 1800  Gross per 24 hour   Intake 480 ml   Output 750 ml   Net -270 ml         Laboratory  Recent Labs     11/03/23  0600 11/04/23  0416 11/05/23  0313   WBC 13.7* 11.0* 12.7*   RBC 4.22 4.07* 3.91*   HEMOGLOBIN 12.0 11.6* 11.4*   HEMATOCRIT 38.0 36.6* 35.1*   MCV 90.0 89.9 89.8   MCH 28.4 28.5 29.2   MCHC 31.6* 31.7* 32.5   RDW 46.2 45.0 44.8   PLATELETCT 258 241 245   MPV 10.6 11.4 11.0       Recent Labs     11/04/23 2037 11/05/23  0004 11/05/23 0313   SODIUM 146* 147* 149*   POTASSIUM 3.2* 3.2* 3.4*   CHLORIDE 114* 115* 114*   CO2 21 23 24   GLUCOSE 149* 101* 113*   BUN 20 18 16   CREATININE 0.71 0.65 0.71   CALCIUM 8.1* 7.9* 8.1*                     Imaging  DX-ABDOMEN FOR TUBE PLACEMENT   Final Result      NG tube tip projects at the gastroesophageal junction. Recommend advancing before use.      DX-ABDOMEN FOR TUBE PLACEMENT   Final Result         1.  Nonspecific bowel gas pattern in the upper abdomen.   2.  Nasogastric tube tip terminates overlying the expected location of the gastric body.   3.  Hazy left lower lobe infiltrates and trace left pleural effusion      CT-CTA CHEST PULMONARY ARTERY W/ RECONS   Final Result         1.  No evidence of pulmonary embolism.   2.  Left lower lobe atelectasis/collapse.   3.  Mucous plugging of the right lower lobe bronchi and some mild dependent atelectasis in the right lower lobe.   4.  Small hiatal hernia.      Fleischner Society pulmonary nodule recommendations:   Not applicable      CT-HEAD W/O   Final Result      Evolving acute large left cerebral infarct.      No acute hemorrhage.      Chronic ischemic  changes.      DX-CHEST-PORTABLE (1 VIEW)   Final Result      No acute process.      IR-PICC LINE PLACEMENT W/ GUIDANCE > AGE 5   Final Result                  Ultrasound-guided PICC placement performed by qualified nursing staff as    above.          MR-BRAIN-W/O   Final Result         1. Acute left MCA distribution infarct, as above.   2. 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   3. Mild chronic ischemic white matter migration.      CT-HEAD W/O   Final Result      1.  Possible mild left frontotemporal edema and a present significantly improved      2.  Negative for hemorrhage or mass effect      3.  Right frontotemporal and encephalomalacia         DX-ABDOMEN FOR TUBE PLACEMENT   Final Result      1.  Enteric tube overlies the stomach.      CT-HEAD W/O   Final Result         1.  No acute intracranial abnormality is identified, there are nonspecific white matter changes, commonly associated with small vessel ischemic disease.  Associated mild cerebral atrophy is noted.   2.  Left maxillary sinusitis changes   3.  Atherosclerosis.         IR-THROMBO MECHANICAL ARTERY,INIT   Final Result      1.  Occluded LEFT internal carotid artery-LEFT internal carotid flow diverter.   2.  Successful mechanical thrombectomy.   3.  TICI 2c      DX-CHEST-PORTABLE (1 VIEW)   Final Result         1.  Left basilar atelectasis, no focal infiltrate   2.  Atherosclerosis      CT-CTA NECK WITH & W/O-POST PROCESSING   Final Result         1.  Occlusion from the mid left internal carotid artery through intracranial stent at the distal left ICA.      These findings were discussed with the patient's clinician, Kiet Brown, on 10/27/2023 12:37 AM.      CT-CTA HEAD WITH & W/O-POST PROCESS   Final Result         1.  Left internal carotid artery occlusion extending up to distal carotid arterial stent.   2.  Decreased density within the left anterior cerebral artery, appearance suggesting slow flow or spasm.   3.  Small caliber of the left  middle cerebral artery branches which taper and are not visualized most distally, changes most compatible with diminished flow due to left internal carotid artery occlusion.   4.  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   5.  3 mm basilar artery tip aneurysm   6.  2.3 mm aneurysm inferiorly at the right M1/M2 junction      CT-CEREBRAL PERFUSION ANALYSIS   Final Result         1.  Cerebral blood flow less than 30% likely representing completed infarct = 183 mL.      2.  T Max more than 6 seconds likely representing combination of completed infarct and ischemia = 280 mL.      3.  Mismatched volume likely representing ischemic brain/penumbra = 97 mL      4.  Please note that the cerebral perfusion was performed on the limited brain tissue around the basal ganglia region. Infarct/ischemia outside the CT perfusion sections can be missed in this study.      CT-HEAD W/O   Final Result         1.  No acute intracranial abnormality is identified, there are nonspecific white matter changes, commonly associated with small vessel ischemic disease.  Associated mild cerebral atrophy is noted.   2.  Left maxillary sinusitis changes      Note: Artifacts are present limiting diagnostic sensitivity of this exam.           Assessment/Plan  * Acute ischemic left internal carotid artery (ICA) stroke, leukocytosis, hypernatremia (HCC)- (present on admission)  Assessment & Plan  Etiology of this event appears to have been noncompliant with the patient's DAPT at home after a left ICA stent being placed on October 19.  Now s/p thrombectomy  Back on dual antiplatelet therapy  High-dose statin  BP control  Anticipate discharge to rehab  Neurology following  PT OT and speech following  Physiatry consulted  EKG and telemetry are sinus  Echo benign  MRI confirms ischemic infarct  Neurology recommends allowing sodium to drift to normal range: We will titrate off supplemental salt tablets  Patient will need a  "feeding tube, however unable to get 1 done endoscopically due to anatomy.  Surgery consulted.  Will need to stop Integrilin 4 hours before surgery and then can resume it 4 hours after.      Right sided weakness.   Unequal pupils per nursing, new. Ordered head CT Came back evolution of stroke. Discussed with Dr. Art and Ion Montez. Minimal change no new recommendations from that.  Cough and dysphagia with leukocytosis. Ordered CXR and procalcitonin empirically treat possible aspiration woith antibiotics    Hypernatremia  Assessment & Plan  Likely due to hypertonic therapy  Per neuro ok to slowly normalize Na  Decreased Na tablets to 0.5 g tid.\"    OK to normalize Na per Neurology  D/c salt tabs  Allow free water  Will note the rate of Na reduction         Dysphagia  Assessment & Plan  Requiring tube feeding.   Discussed with previous hospitiatlist surgery Dr Milligan has been consulted for PEG placement after failed attempt by GI. \"    Trial on PO dysphagia diet with 1:1 supervision.         Hypokalemia  Assessment & Plan  Follow and replace\"    Mild  Ordered Mg level  Replaced    Extrinsic asthma without complication- (present on admission)  Assessment & Plan  On exam benign  O2 and RT protocols  Continue montelukast    Dyslipidemia- (present on admission)  Assessment & Plan  High-dose statin    Primary hypertension- (present on admission)  Assessment & Plan  As outpatient was on losartan 25 mg daily, and amlodipine 10 mg daily  Continue amlodipine dose and increase losartan to 50 mg  Monitoring\"    Vitals:    11/05/23 1250   BP: 109/60   Pulse: 84   Resp: 14   Temp:    SpO2: 94%     Stable         VTE prophylaxis: Lovenox    I have performed a physical exam and reviewed and updated ROS and Plan today (11/5/2023). In review of yesterday's note (11/4/2023), there are no changes except as documented above.    Patient is has a high medical complexity, complex decision making and is at high risk for " complication, morbidity, and mortality, thus requiring the highest level of my preparedness for sudden, emergent intervention. Medical decision making is therefore complex. I provided  services, which included ordering labs and/or imaging, and discussing the case with various consultants.medication orders, frequent reevaluations of the patient's condition and response to treatment. Time was also devoted to counseling and coordinating care including review of records, pertinent lab data and studies, as well as discussing diagnostic evaluation and work up, planned therapeutic interventions and future disposition of care. Where indicated, the assessment and plan reflect discussion of patient with consultants, other healthcare providers, family members, and additional research needed to obtain further information in formulating the plan of care for Roslyn Kilgore. Total time spent was 67 minutes.

## 2023-11-05 NOTE — PROGRESS NOTES
Assumed care of pt at 0700. Pt alert but BAKARI orientation due to her being nonverbal. Pt follows some but not all commands. RUE/RLE flaccid. No s/s any pain or discomfort. She is tolerating diet well. Q2h turning and positioning provided. Bed low and locked, alarm set and call bell within reach. Hourly rounding continues.    1204: Dr. Garcia notified about neuro change. Right pupil is presently 4-5mm and brisk, left 3mm. This am, both were 3mm. No other neuro change noted. VSS.    1212: MD ordered stat CT of the head and requested I call a code stroke. Code stroke initiated.    1220: RRT team at bedside.    1230: Pt being taken to CT.    1310: Dr. Garcia and Neuro NP Ion Saldana notified of CT Head results. Dr. Garcia was asked if Na tabs should be DC'd since NA has been elevated.

## 2023-11-05 NOTE — CARE PLAN
The patient is Stable - Low risk of patient condition declining or worsening    Shift Goals  Clinical Goals: Improve communication  Patient Goals: BAKARI  Family Goals: BAKARI    Progress made toward(s) clinical / shift goals:     Problem: Neuro Status  Goal: Neuro status will remain stable or improve  Outcome: Progressing  Q4h neuro checks; pt mostly nonverbal.     Problem: Skin Integrity  Goal: Skin integrity is maintained or improved  Outcome: Progressing  Q2h turning and positioning provided.       Patient is not progressing towards the following goals: N/A

## 2023-11-05 NOTE — PROGRESS NOTES
Monitor summary: SR/ST , SD .12, QRS .08, QT .32 with frequent PVC's and occasional Couplet per strip from monitor room.

## 2023-11-06 ENCOUNTER — APPOINTMENT (OUTPATIENT)
Dept: RADIOLOGY | Facility: MEDICAL CENTER | Age: 58
End: 2023-11-06
Attending: NURSE PRACTITIONER
Payer: COMMERCIAL

## 2023-11-06 VITALS
HEIGHT: 63 IN | DIASTOLIC BLOOD PRESSURE: 70 MMHG | SYSTOLIC BLOOD PRESSURE: 121 MMHG | TEMPERATURE: 97.6 F | WEIGHT: 145.06 LBS | BODY MASS INDEX: 25.7 KG/M2 | HEART RATE: 96 BPM | OXYGEN SATURATION: 91 % | RESPIRATION RATE: 18 BRPM

## 2023-11-06 PROBLEM — I63.512 ACUTE ISCHEMIC LEFT MCA STROKE (HCC): Status: ACTIVE | Noted: 2023-11-06

## 2023-11-06 PROBLEM — G81.91 RIGHT HEMIPLEGIA (HCC): Status: ACTIVE | Noted: 2023-11-06

## 2023-11-06 PROBLEM — R13.12 OROPHARYNGEAL DYSPHAGIA: Status: ACTIVE | Noted: 2023-11-04

## 2023-11-06 PROBLEM — J69.0 ASPIRATION PNEUMONIA OF RIGHT LOWER LOBE (HCC): Status: ACTIVE | Noted: 2023-11-06

## 2023-11-06 LAB
ALBUMIN SERPL BCP-MCNC: 2.9 G/DL (ref 3.2–4.9)
ANION GAP SERPL CALC-SCNC: 9 MMOL/L (ref 7–16)
ANION GAP SERPL CALC-SCNC: 9 MMOL/L (ref 7–16)
APPEARANCE UR: ABNORMAL
BACTERIA #/AREA URNS HPF: NEGATIVE /HPF
BILIRUB UR QL STRIP.AUTO: NEGATIVE
BUN SERPL-MCNC: 15 MG/DL (ref 8–22)
BUN SERPL-MCNC: 17 MG/DL (ref 8–22)
CALCIUM ALBUM COR SERPL-MCNC: 9.1 MG/DL (ref 8.5–10.5)
CALCIUM SERPL-MCNC: 8.2 MG/DL (ref 8.5–10.5)
CALCIUM SERPL-MCNC: 8.4 MG/DL (ref 8.5–10.5)
CHLORIDE SERPL-SCNC: 113 MMOL/L (ref 96–112)
CHLORIDE SERPL-SCNC: 113 MMOL/L (ref 96–112)
CO2 SERPL-SCNC: 24 MMOL/L (ref 20–33)
CO2 SERPL-SCNC: 24 MMOL/L (ref 20–33)
COLOR UR: YELLOW
CREAT SERPL-MCNC: 0.57 MG/DL (ref 0.5–1.4)
CREAT SERPL-MCNC: 0.63 MG/DL (ref 0.5–1.4)
CRP SERPL HS-MCNC: 5.01 MG/DL (ref 0–0.75)
EPI CELLS #/AREA URNS HPF: NEGATIVE /HPF
ERYTHROCYTE [DISTWIDTH] IN BLOOD BY AUTOMATED COUNT: 44.3 FL (ref 35.9–50)
GFR SERPLBLD CREATININE-BSD FMLA CKD-EPI: 104 ML/MIN/1.73 M 2
GLUCOSE SERPL-MCNC: 103 MG/DL (ref 65–99)
GLUCOSE SERPL-MCNC: 108 MG/DL (ref 65–99)
GLUCOSE UR STRIP.AUTO-MCNC: NEGATIVE MG/DL
HCT VFR BLD AUTO: 34.8 % (ref 37–47)
HGB BLD-MCNC: 11 G/DL (ref 12–16)
HYALINE CASTS #/AREA URNS LPF: ABNORMAL /LPF
KETONES UR STRIP.AUTO-MCNC: NEGATIVE MG/DL
LEUKOCYTE ESTERASE UR QL STRIP.AUTO: ABNORMAL
MAGNESIUM SERPL-MCNC: 2 MG/DL (ref 1.5–2.5)
MCH RBC QN AUTO: 28.5 PG (ref 27–33)
MCHC RBC AUTO-ENTMCNC: 31.6 G/DL (ref 32.2–35.5)
MCV RBC AUTO: 90.2 FL (ref 81.4–97.8)
MICRO URNS: ABNORMAL
NITRITE UR QL STRIP.AUTO: NEGATIVE
PH UR STRIP.AUTO: 7 [PH] (ref 5–8)
PHOSPHATE SERPL-MCNC: 2.7 MG/DL (ref 2.5–4.5)
PLATELET # BLD AUTO: 257 K/UL (ref 164–446)
PMV BLD AUTO: 11 FL (ref 9–12.9)
POTASSIUM SERPL-SCNC: 3.6 MMOL/L (ref 3.6–5.5)
POTASSIUM SERPL-SCNC: 3.7 MMOL/L (ref 3.6–5.5)
PREALB SERPL-MCNC: 13.3 MG/DL (ref 18–38)
PROT UR QL STRIP: NEGATIVE MG/DL
RBC # BLD AUTO: 3.86 M/UL (ref 4.2–5.4)
RBC # URNS HPF: ABNORMAL /HPF
RBC UR QL AUTO: NEGATIVE
SODIUM SERPL-SCNC: 146 MMOL/L (ref 135–145)
SODIUM SERPL-SCNC: 146 MMOL/L (ref 135–145)
SP GR UR STRIP.AUTO: 1.02
UROBILINOGEN UR STRIP.AUTO-MCNC: 1 MG/DL
WBC # BLD AUTO: 11 K/UL (ref 4.8–10.8)
WBC #/AREA URNS HPF: ABNORMAL /HPF

## 2023-11-06 PROCEDURE — 81001 URINALYSIS AUTO W/SCOPE: CPT

## 2023-11-06 PROCEDURE — 99239 HOSP IP/OBS DSCHRG MGMT >30: CPT | Performed by: STUDENT IN AN ORGANIZED HEALTH CARE EDUCATION/TRAINING PROGRAM

## 2023-11-06 PROCEDURE — 700102 HCHG RX REV CODE 250 W/ 637 OVERRIDE(OP): Performed by: INTERNAL MEDICINE

## 2023-11-06 PROCEDURE — 700102 HCHG RX REV CODE 250 W/ 637 OVERRIDE(OP): Performed by: RADIOLOGY

## 2023-11-06 PROCEDURE — A9270 NON-COVERED ITEM OR SERVICE: HCPCS | Performed by: RADIOLOGY

## 2023-11-06 PROCEDURE — 86140 C-REACTIVE PROTEIN: CPT

## 2023-11-06 PROCEDURE — 3E0234Z INTRODUCTION OF SERUM, TOXOID AND VACCINE INTO MUSCLE, PERCUTANEOUS APPROACH: ICD-10-PCS | Performed by: STUDENT IN AN ORGANIZED HEALTH CARE EDUCATION/TRAINING PROGRAM

## 2023-11-06 PROCEDURE — 700102 HCHG RX REV CODE 250 W/ 637 OVERRIDE(OP): Performed by: PSYCHIATRY & NEUROLOGY

## 2023-11-06 PROCEDURE — 700105 HCHG RX REV CODE 258: Performed by: INTERNAL MEDICINE

## 2023-11-06 PROCEDURE — A9270 NON-COVERED ITEM OR SERVICE: HCPCS | Performed by: INTERNAL MEDICINE

## 2023-11-06 PROCEDURE — 90471 IMMUNIZATION ADMIN: CPT

## 2023-11-06 PROCEDURE — 85027 COMPLETE CBC AUTOMATED: CPT

## 2023-11-06 PROCEDURE — 84134 ASSAY OF PREALBUMIN: CPT

## 2023-11-06 PROCEDURE — 90686 IIV4 VACC NO PRSV 0.5 ML IM: CPT | Performed by: STUDENT IN AN ORGANIZED HEALTH CARE EDUCATION/TRAINING PROGRAM

## 2023-11-06 PROCEDURE — A9270 NON-COVERED ITEM OR SERVICE: HCPCS | Performed by: PSYCHIATRY & NEUROLOGY

## 2023-11-06 PROCEDURE — 80048 BASIC METABOLIC PNL TOTAL CA: CPT

## 2023-11-06 PROCEDURE — 36415 COLL VENOUS BLD VENIPUNCTURE: CPT

## 2023-11-06 PROCEDURE — 700111 HCHG RX REV CODE 636 W/ 250 OVERRIDE (IP): Performed by: STUDENT IN AN ORGANIZED HEALTH CARE EDUCATION/TRAINING PROGRAM

## 2023-11-06 PROCEDURE — 700111 HCHG RX REV CODE 636 W/ 250 OVERRIDE (IP): Mod: JZ | Performed by: INTERNAL MEDICINE

## 2023-11-06 PROCEDURE — 90677 PCV20 VACCINE IM: CPT | Performed by: STUDENT IN AN ORGANIZED HEALTH CARE EDUCATION/TRAINING PROGRAM

## 2023-11-06 PROCEDURE — 3E02340 INTRODUCTION OF INFLUENZA VACCINE INTO MUSCLE, PERCUTANEOUS APPROACH: ICD-10-PCS | Performed by: STUDENT IN AN ORGANIZED HEALTH CARE EDUCATION/TRAINING PROGRAM

## 2023-11-06 PROCEDURE — 83735 ASSAY OF MAGNESIUM: CPT

## 2023-11-06 RX ORDER — MONTELUKAST SODIUM 10 MG/1
10 TABLET ORAL DAILY
Status: DISCONTINUED | OUTPATIENT
Start: 2023-11-07 | End: 2023-11-06 | Stop reason: HOSPADM

## 2023-11-06 RX ORDER — AMLODIPINE BESYLATE 5 MG/1
10 TABLET ORAL
Status: DISCONTINUED | OUTPATIENT
Start: 2023-11-07 | End: 2023-11-06 | Stop reason: HOSPADM

## 2023-11-06 RX ORDER — ATORVASTATIN CALCIUM 40 MG/1
40 TABLET, FILM COATED ORAL DAILY
Status: DISCONTINUED | OUTPATIENT
Start: 2023-11-07 | End: 2023-11-06 | Stop reason: HOSPADM

## 2023-11-06 RX ORDER — GUAIFENESIN 200 MG/10ML
10 LIQUID ORAL EVERY 4 HOURS
Status: DISCONTINUED | OUTPATIENT
Start: 2023-11-06 | End: 2023-11-06

## 2023-11-06 RX ORDER — GUAIFENESIN 600 MG/1
600 TABLET, EXTENDED RELEASE ORAL EVERY 12 HOURS
Status: DISCONTINUED | OUTPATIENT
Start: 2023-11-06 | End: 2023-11-06 | Stop reason: HOSPADM

## 2023-11-06 RX ORDER — LOSARTAN POTASSIUM 50 MG/1
50 TABLET ORAL
Status: DISCONTINUED | OUTPATIENT
Start: 2023-11-07 | End: 2023-11-06 | Stop reason: HOSPADM

## 2023-11-06 RX ORDER — AMOXICILLIN AND CLAVULANATE POTASSIUM 875; 125 MG/1; MG/1
1 TABLET, FILM COATED ORAL 2 TIMES DAILY
Qty: 8 TABLET | Refills: 0 | Status: ACTIVE | DISCHARGE
Start: 2023-11-06 | End: 2023-11-10

## 2023-11-06 RX ORDER — ENOXAPARIN SODIUM 100 MG/ML
40 INJECTION SUBCUTANEOUS DAILY
Status: ACTIVE | DISCHARGE
Start: 2023-11-06

## 2023-11-06 RX ORDER — ASPIRIN 81 MG/1
81 TABLET, CHEWABLE ORAL DAILY
Status: DISCONTINUED | OUTPATIENT
Start: 2023-11-07 | End: 2023-11-06 | Stop reason: HOSPADM

## 2023-11-06 RX ORDER — ACETAMINOPHEN 325 MG/1
650 TABLET ORAL EVERY 6 HOURS PRN
Status: DISCONTINUED | OUTPATIENT
Start: 2023-11-06 | End: 2023-11-06 | Stop reason: HOSPADM

## 2023-11-06 RX ORDER — LOSARTAN POTASSIUM 50 MG/1
50 TABLET ORAL DAILY
Qty: 30 TABLET | Status: SHIPPED
Start: 2023-11-07

## 2023-11-06 RX ADMIN — AMPICILLIN AND SULBACTAM 3 G: 1; 2 INJECTION, POWDER, FOR SOLUTION INTRAMUSCULAR; INTRAVENOUS at 05:22

## 2023-11-06 RX ADMIN — GUAIFENESIN 200 MG: 100 SOLUTION ORAL at 05:21

## 2023-11-06 RX ADMIN — LOSARTAN POTASSIUM 50 MG: 50 TABLET, FILM COATED ORAL at 05:21

## 2023-11-06 RX ADMIN — PNEUMOCOCCAL 20-VALENT CONJUGATE VACCINE 0.5 ML
2.2; 2.2; 2.2; 2.2; 2.2; 2.2; 2.2; 2.2; 2.2; 2.2; 2.2; 2.2; 2.2; 2.2; 2.2; 2.2; 4.4; 2.2; 2.2; 2.2 INJECTION, SUSPENSION INTRAMUSCULAR at 14:06

## 2023-11-06 RX ADMIN — INFLUENZA A VIRUS A/VICTORIA/4897/2022 IVR-238 (H1N1) ANTIGEN (FORMALDEHYDE INACTIVATED), INFLUENZA A VIRUS A/DARWIN/9/2021 SAN-010 (H3N2) ANTIGEN (FORMALDEHYDE INACTIVATED), INFLUENZA B VIRUS B/PHUKET/3073/2013 ANTIGEN (FORMALDEHYDE INACTIVATED), AND INFLUENZA B VIRUS B/MICHIGAN/01/2021 ANTIGEN (FORMALDEHYDE INACTIVATED) 0.5 ML: 15; 15; 15; 15 INJECTION, SUSPENSION INTRAMUSCULAR at 14:04

## 2023-11-06 RX ADMIN — AMPICILLIN AND SULBACTAM 3 G: 1; 2 INJECTION, POWDER, FOR SOLUTION INTRAMUSCULAR; INTRAVENOUS at 11:09

## 2023-11-06 RX ADMIN — ASPIRIN 81 MG 81 MG: 81 TABLET ORAL at 05:21

## 2023-11-06 RX ADMIN — ATORVASTATIN CALCIUM 40 MG: 40 TABLET, FILM COATED ORAL at 05:20

## 2023-11-06 RX ADMIN — MONTELUKAST 10 MG: 10 TABLET, FILM COATED ORAL at 05:20

## 2023-11-06 RX ADMIN — TICAGRELOR 90 MG: 90 TABLET ORAL at 05:20

## 2023-11-06 RX ADMIN — AMLODIPINE BESYLATE 10 MG: 10 TABLET ORAL at 05:21

## 2023-11-06 ASSESSMENT — FIBROSIS 4 INDEX: FIB4 SCORE: 1.25

## 2023-11-06 ASSESSMENT — PAIN DESCRIPTION - PAIN TYPE: TYPE: ACUTE PAIN

## 2023-11-06 NOTE — DISCHARGE PLANNING
DC Transport Scheduled    Received request at: 11/6/2023 at 1200    Transport Company Scheduled:  [employer]  Spoke with [facility] at [facility] to schedule transport.    Scheduled Date: 11/6/2023  Scheduled Time: 1630    Destination: [facility] at [address] [place] [address] [place]     Notified care team of scheduled transport via Voalte.     If there are any changes needed to the DC transportation scheduled, please contact [facility] at ext. [phone] between the hours of 1654-8641 Mon-Fri. If outside those hours, contact the ED Case Manager at ext. [phone].

## 2023-11-06 NOTE — DIETARY
Nutrition Update:    Day 10 of admit.  Roslyn Kilgore is a 58 y.o. female with admitting DX of Acute ischemic left internal carotid artery (ICA) stroke   Patient being followed to optimize nutrition.    Pt previously followed by RD department for tube feeding. Pt previously receiving Promote with Fiber @ goal rate 55 ml/hr to provide 1320 kcals, 83 gm protein, and 1097 ml free water per day. Pt passed FEES per SLP 11/3 Current Diet: Level 2 pureed with level 2 mildly thick liquids. PO % of all meals since PO diet initiated. Pt continues to work with SLP, most recently seen 11/5. RD continues to encourage intake of all meals and supplements.    RD to monitor per department policy

## 2023-11-06 NOTE — DISCHARGE SUMMARY
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 female who presented 10/26/2023 with right-sided weakness and expressive aphasia.  This is a pleasant woman with a history of hypertension, hyperlipidemia, diverticulitis, previous ischemic stroke, and intracranial aneurysm.  Patient previously underwent endovascular repair of left internal carotid artery aneurysm by interventional radiology.  She underwent a left internal carotid artery aneurysm treatment with a flow diverting stent on 10/19/2023 and was monitored post procedurally in the intensive care unit.  She was subsequently discharged on 10/20/2023 with dual antiplatelet therapy on baby aspirin and Brilinta.  Her Plavix was discontinued.  She was noted to be neurologically intact without any deficits.    It was unclear whether patient was taking her Brilinta as the patient apparently felt that she had an allergy to it.  TEG notably showed no significant platelet inhibition.  She presented back to the hospital on 10/26/2023 with right-sided weakness and expressive aphasia.  CT angiogram of the head and neck in the emergency room showed occlusion of the mid left internal carotid artery through the stent.  She was taken emergently to the interventional radiology suite on 10/27/2023 for a thrombectomy with TICI 3 flow post procedurally.    Neurology was consulted and recommended continuing dual antiplatelet therapy with baby aspirin indefinitely  and Brilinta 90 mg daily for 8 weeks and date of 12/27/2023.    Patient developed hyponatremia with sodium up to 150, which was treated appropriately with improvement to 146 upon day of discharge.    Patient initially failed her swallow study and was placed on tube feedings.  Gastroenterology was consulted and attempted PEG tube placement with conscious sedation, which failed as the gastroenterologist was unable to achieve appropriate transillumination.  She was noted to have mild reflux esophagitis with moderate chronic gastritis.    Patient underwent FEES study per speech therapy on 11/3/2023 and her diet was advanced, which was well-tolerated.  Per registered dietitian assessment, patient was taking 75 to 100% of all meals.  On 10/5/2023, patient was noted to be coughing occasionally when taking in food.  Chest x-ray showed right lower lobe infiltrate and was started on Unasyn.  White count continued to decrease from up to 15.6 down to 11.0 on the day of discharge.  She was requiring 2 LPM oxygen via nasal cannula.  He was started on Dulera and her albuterol was resumed upon discharge due to her history of asthma.    Patient was evaluated by occupational physical therapy, who recommended postacute placement.  Patient was evaluated by physical medicine and rotation and was found to be candidate for inpatient rehabilitation based upon right-sided deficits.  Patient was accepted to [facility] skilled nursing [facility] [place] for continued physical and occupational therapy services.    Therefore, she is discharged in good and stable condition to skilled nursing facility.    The patient met 2-midnight criteria for an inpatient stay at the time of discharge.    Discharge Date  11/6/2023       FOLLOW UP ITEMS POST DISCHARGE  -As per [facility] nursing [facility] [place].  -Wean off oxygen as tolerated.  -Follow-up with primary care provider and [facility] clinic following discharge from skilled nursing  facility.  -Continue baby aspirin indefinitely and Brilinta until 12/27/2023.    DISCHARGE DIAGNOSES  Principal Problem:    Acute ischemic left internal carotid artery (ICA) stroke, leukocytosis, hypernatremia (HCC) (POA: Yes)  Active Problems:    Acute respiratory failure with hypoxia (HCC) (POA: Yes)    Aspiration pneumonia of right lower lobe (HCC) (POA: No)    Right hemiplegia (HCC) (POA: Yes)    Acute ischemic left MCA stroke (HCC) (POA: Yes)    Primary hypertension (POA: Yes)    Dyslipidemia (POA: Yes)    Goals of care, counseling/discussion (POA: Unknown)    Extrinsic asthma without complication (POA: Yes)    Hypokalemia (POA: Unknown)    Oropharyngeal dysphagia (POA: Unknown)    Hypernatremia (POA: Unknown)  Resolved Problems:    * No resolved hospital problems. *      FOLLOW UP  Future Appointments   Date Time Provider Department Center   11/15/2023  1:00 PM Ken Taylor M.D. [facility]   4/22/2024  1:30 PM Epi Donis M.D. [facility] [address] [address]  [phone]        Ken Taylor M.D.  [address] [facility] [address]  [phone]    Follow up      [facility] NEUROLOGY  [address]  [facility] [address]  [phone]  Follow up        MEDICATIONS ON DISCHARGE     Medication List        Start taking these medications        Instructions   amoxicillin-clavulanate 875-125 MG Tabs  Commonly known as: Augmentin   Take 1 Tablet by mouth 2 times a day for 4 days.  Dose: 1 Tablet     enoxaparin 40 MG/0.4ML Sosy inj  Commonly known as: Lovenox   Inject 40 mg under the skin every day at 6 PM.  Dose: 40 mg     mometasone-formoterol 100-5 MCG/ACT Aero  Commonly known as: Dulera   Inhale 2 Puffs 2 times a day.  Dose: 2 Puff            Change how you take these medications        Instructions   losartan 50 MG Tabs  Start taking on: November 7, 2023  What changed:   medication strength  how much to  take  Commonly known as: Cozaar   Take 1 Tablet by mouth every day.  Dose: 50 mg     ticagrelor 90 MG Tabs tablet  Start taking on: December 31, 2023  What changed: These instructions start on December 31, 2023. If you are unsure what to do until then, ask your doctor or other care provider.  Commonly known as: Brilinta   Take 1 Tablet by mouth 2 times a day.  Dose: 90 mg            Continue taking these medications        Instructions   albuterol 108 (90 Base) MCG/ACT Aers inhalation aerosol   Inhale 2 Puffs every 6 hours as needed for Shortness of Breath.  Dose: 2 Puff     amLODIPine 10 MG Tabs  Commonly known as: Norvasc   TAKE 1 TABLET BY MOUTH DAILY  Dose: 10 mg     aspirin 81 MG EC tablet   Take 81 mg by mouth every day.  Dose: 81 mg     atorvastatin 40 MG Tabs  Commonly known as: Lipitor   TAKE 1 TABLET BY MOUTH DAILY  Dose: 40 mg     montelukast 10 MG Tabs  Commonly known as: Singulair   Take 1 Tablet by mouth every day.  Dose: 10 mg              Allergies  Allergies   Allergen Reactions    Alcohol Unspecified     REQUESTS NO ALCOHOL CONTENT IN HER MEDICATIONS (per pt, pt is an alcoholic)    Trazodone Hives     Caused breat hyperplasia. Pt then underwent a breast reduction and attempted to take again, new reaction: Hives.    Lisinopril Cough       DIET  Orders Placed This Encounter   Procedures    Diet Order Diet: Level 4 - Pureed (crush meds); Liquid level: Level 2 - Mildly Thick; Tray Modifications (optional): SLP - 1:1 Supervision by Nursing     Standing Status:   Standing     Number of Occurrences:   1     Order Specific Question:   Diet:     Answer:   Level 4 - Pureed [25]     Comments:   crush meds     Order Specific Question:   Liquid level     Answer:   Level 2 - Mildly Thick     Order Specific Question:   Tray Modifications (optional)     Answer:   SLP - 1:1 Supervision by Nursing       ACTIVITY  As tolerated and directed by skilled nursing.  Weight bearing as tolerated    LINES, DRAINS, AND  WOUNDS  This is an automated list. Peripheral IVs will be removed prior to discharge.  PICC Double Lumen 10/28/23 Lumen 1: Purple Lumen 2: Red Left Basilic (Active)   Site Assessment Clean;Dry;Intact 11/06/23 0900   Lumen 1 Status Scrubbed the hub prior to access;Normal saline locked;Flushed 11/06/23 0900   Lumen 2 Status Scrubbed the hub prior to access;Normal saline locked;Flushed;Blood return noted 11/06/23 0900   Line Secured at (cm) 0 cm 10/28/23 1215   Extremity Circumference (cm) 26 cm 10/28/23 1215   Dressing Type Biopatch;Transparent 11/06/23 0900   Dressing Status Clean;Dry;Intact 11/06/23 0900   Dressing Intervention N/A 11/06/23 0900   Dressing Change Due 11/11/23 11/05/23 2000   Date Primary Tubing Changed 10/29/23 10/30/23 0800   Date Secondary Tubing Changed 10/29/23 10/30/23 0800   Date IV Connector(s) Changed 10/28/23 10/30/23 0800   NEXT Primary Tubing Change  11/04/23 11/03/23 0800   NEXT Secondary Tubing Change  10/30/23 10/29/23 0800   NEXT IV Connector(s) Change 11/04/23 10/30/23 0800   Line Necessity Assessed Lack of Peripheral Access 11/04/23 0800   Line Necessity Reviewed With Akin 10/29/23 0800   $ Double Lumen PICC Charge Single kit used 10/28/23 1215     Rectal Tube (Active)   Skin Care Area cleansed;Protective Barrier Applied 11/05/23 2000   Skin Integrity Red 11/05/23 2000   Flushed Per Protocol Yes 11/05/23 2000   Rectal Tube Output 200 mL 11/03/23 1200       External Urinary Catheter (Female Wick) (Active)   Collection Container Suction container 11/05/23 0755   Suction Level (Female Wick Catheter) 60 mmHg 11/03/23 0600   Output (mL) 750 mL 11/04/23 1600        PICC Double Lumen 10/28/23 Lumen 1: Purple Lumen 2: Red Left Basilic (Active)   Site Assessment Clean;Dry;Intact 11/06/23 0900   Lumen 1 Status Scrubbed the hub prior to access;Normal saline locked;Flushed 11/06/23 0900   Lumen 2 Status Scrubbed the hub prior to access;Normal saline locked;Flushed;Blood return noted 11/06/23  0900   Line Secured at (cm) 0 cm 10/28/23 1215   Extremity Circumference (cm) 26 cm 10/28/23 1215   Dressing Type Biopatch;Transparent 11/06/23 0900   Dressing Status Clean;Dry;Intact 11/06/23 0900   Dressing Intervention N/A 11/06/23 0900   Dressing Change Due 11/11/23 11/05/23 2000   Date Primary Tubing Changed 10/29/23 10/30/23 0800   Date Secondary Tubing Changed 10/29/23 10/30/23 0800   Date IV Connector(s) Changed 10/28/23 10/30/23 0800   NEXT Primary Tubing Change  11/04/23 11/03/23 0800   NEXT Secondary Tubing Change  10/30/23 10/29/23 0800   NEXT IV Connector(s) Change 11/04/23 10/30/23 0800   Line Necessity Assessed Lack of Peripheral Access 11/04/23 0800   Line Necessity Reviewed With Akin 10/29/23 0800   $ Double Lumen PICC Charge Single kit used 10/28/23 1215                MENTAL STATUS ON TRANSFER  Nonverbal, following commands in left upper and lower extremities.       CONSULTATIONS  Neurology  Critical care  Physical medicine [facility] medicine services  Gastroenterology    PROCEDURES  -Left internal carotid artery stent thrombectomy by Dr. Mauri Montes on 10/27/2023.  -PICC line placement on 10/28/2023.  -Diagnostic EGD with failed PEG tube placement by Dr. Garrett Patel on on 11/2/2023.    LABORATORY  Lab Results   Component Value Date    SODIUM 146 (H) 11/06/2023    POTASSIUM 3.7 11/06/2023    CHLORIDE 113 (H) 11/06/2023    CO2 24 11/06/2023    GLUCOSE 108 (H) 11/06/2023    BUN 15 11/06/2023    CREATININE 0.57 11/06/2023    CREATININE 0.8 06/12/2006    GLOMRATE 66 05/15/2023        Lab Results   Component Value Date    WBC 11.0 (H) 11/06/2023    HEMOGLOBIN 11.0 (L) 11/06/2023    HEMATOCRIT 34.8 (L) 11/06/2023    PLATELETCT 257 11/06/2023        Total time of the discharge process 39 minutes.

## 2023-11-06 NOTE — DISCHARGE PLANNING
Agency/Facility Name: [facility]  Spoke To: Kenna  Outcome: DPA called facility to ask if facility has a bed and if pts would need insurance auth. Per Kenna, pts insurance does not need insurance auth and pt has a bed.      1153    Agency/Facility Name: [facility]  Spoke To: Kenna  Outcome: DPA called facility to let them know that pt is medically clear and asked if they still have a bed. Per Kenna, pts still has a bed and asked for a 1500 transport  DPA to call back with confirmed transport time      1247    Agency/Facility Name: [facility]  Outcome: DPA left vmail giving confirmed transport time

## 2023-11-06 NOTE — DISCHARGE INSTRUCTIONS
"Ischemic Stroke  Discharge Instructions    You experienced an Ischemic Stroke.  Ischemic stroke is the most common type of stroke and happens when an artery in the brain becomes blocked by a plaque fragment or blood clot. Typically, these blockages travel from the heart or larger arteries that supply the brain.  The brain needs a constant supply of blood, which carries oxygen and nutrients it needs to function.  A stroke occurs when one of these arteries to the brain is either blocked or bursts. As a result, part of the brain does not get the blood it needs, so it starts to die.     Thrombolytic Treatment for Ischemic Stroke    You received thrombolytic treatment for an Ischemic Stroke. Thrombolytics are medicines that can break up blood clots. These medicines help to treat a stroke when given as soon as possible after stroke symptoms start.  The medicine was given to you through an IV tube.  In some cases, the medicine may go to the affected area through a thin tube (catheter). This tube is usually put in at the top of your leg.    Get help right away if:  You have blood in your vomit, pee, or poop.  You have a serious fall or accident, or you hit your head.  You have symptoms of an allergy to the medicine, such as a rash or trouble breathing.  You have other signs of a stroke, such as:  A sudden, very bad headache with no known cause.  Feeling like you may vomit (nausea).  Vomiting.  A seizure    Stroke Risk Factors    You are at increased risk of having another stroke event.  See your Patient Stroke Guide to help reduce your stroke risk. These are your specific risk factors:  Age - Over 55  High blood pressure  High Cholesterol and lipids  Inactivity or Overweight / High BMI  Previous TIAs or \"mini strokes\"     Get help right away if you have any signs of a stroke.  \"BE FAST\" is an easy way to remember the main warning signs of a stroke:  B - Balance. Dizziness, sudden trouble walking, or loss of balance.  E - " Eyes. Trouble seeing or a change in how you see.  F - Face. Sudden weakness or loss of feeling in the face. The face or eyelid may droop on one side.  A - Arms. Weakness or loss of feeling in an arm. This happens all of a sudden and most often on one side of the body.  S - Speech. Sudden trouble speaking, slurred speech, or trouble understanding what people say.  T - Time. Time to call emergency services. Write down what time symptoms started.

## 2023-11-06 NOTE — DISCHARGE PLANNING
Case Management Discharge Planning    Admission Date: 10/26/2023  GMLOS: 9  ALOS: 10    6-Clicks ADL Score: 8  6-Clicks Mobility Score: 8  PT and/or OT Eval ordered: Yes  Post-acute Referrals Ordered: Yes  Post-acute Choice Obtained: Yes  Has referral(s) been sent to post-acute provider:  Yes      Anticipated Discharge Dispo: Discharge Disposition: D/T to SNF with Medicare cert in anticipation of skilled care (03)    DME Needed: No    Action(s) Taken: Updated Provider/Nurse on Discharge Plan, Acceptance Received, Transport Arranged , Completed PASSR/LOC, and OTHER: RN CM attended IDT rounds and collaborated with the team regarding discharge planning needs and barriers. [facility] states they are able to accept and have a bed available. Transportation request sent to [facility] to set-up transport to [facility]. COBRA packet completed and provided to the Bedside RN for discharge.    Escalations Completed: Provider, Pending Discharge Destination, Ride Line, and Bedside RN    Medically Clear: Yes    Next Steps: Follow-up with the Attending and Bedside RN for any issues/changes and update the discharge plan accordingly.    Barriers to Discharge: None    Is the patient up for discharge tomorrow: No, patient set to discharge today.

## 2023-11-06 NOTE — PROGRESS NOTES
Monitor summary: SR-ST, HR , WA .12, QRS .08, QT .31 with O PVC, R PVC per strip from monitor room

## 2023-11-06 NOTE — PROGRESS NOTES
Monitor summary: SR 81-98, TX -0.12, QRS -0.08, QT -0.40, with rare PACs and PVCs per strip from the monitor room.

## 2023-11-07 NOTE — PROGRESS NOTES
Pt. Discharged to [facility]. Discharge instructions completed and sent with pt. ALONZO here to transport pt.  PICC line in place. Pt tolerated well dressing applied. Pt has all belongings.     Per pt's mom April and her Brother [facility]

## 2023-11-09 RX ORDER — AMLODIPINE BESYLATE 10 MG/1
10 TABLET ORAL DAILY
Qty: 30 TABLET | Refills: 0 | Status: SHIPPED | OUTPATIENT
Start: 2023-11-09

## 2023-11-18 ENCOUNTER — HOSPITAL ENCOUNTER (EMERGENCY)
Facility: MEDICAL CENTER | Age: 58
End: 2023-11-18
Attending: STUDENT IN AN ORGANIZED HEALTH CARE EDUCATION/TRAINING PROGRAM
Payer: COMMERCIAL

## 2023-11-18 ENCOUNTER — APPOINTMENT (OUTPATIENT)
Dept: RADIOLOGY | Facility: MEDICAL CENTER | Age: 58
End: 2023-11-18
Attending: STUDENT IN AN ORGANIZED HEALTH CARE EDUCATION/TRAINING PROGRAM
Payer: COMMERCIAL

## 2023-11-18 VITALS
OXYGEN SATURATION: 97 % | TEMPERATURE: 97.8 F | RESPIRATION RATE: 12 BRPM | BODY MASS INDEX: 25.69 KG/M2 | HEART RATE: 76 BPM | DIASTOLIC BLOOD PRESSURE: 74 MMHG | WEIGHT: 145 LBS | SYSTOLIC BLOOD PRESSURE: 116 MMHG

## 2023-11-18 DIAGNOSIS — R47.01 APHASIA: ICD-10-CM

## 2023-11-18 DIAGNOSIS — I63.232 CEREBRAL INFARCTION DUE TO OCCLUSION OF LEFT CAROTID ARTERY (HCC): ICD-10-CM

## 2023-11-18 LAB
ALBUMIN SERPL BCP-MCNC: 3.4 G/DL (ref 3.2–4.9)
ALBUMIN/GLOB SERPL: 1.1 G/DL
ALP SERPL-CCNC: 94 U/L (ref 30–99)
ALT SERPL-CCNC: 17 U/L (ref 2–50)
ANION GAP SERPL CALC-SCNC: 14 MMOL/L (ref 7–16)
APPEARANCE UR: CLEAR
AST SERPL-CCNC: 37 U/L (ref 12–45)
BASOPHILS # BLD AUTO: 0.7 % (ref 0–1.8)
BASOPHILS # BLD: 0.04 K/UL (ref 0–0.12)
BILIRUB SERPL-MCNC: <0.2 MG/DL (ref 0.1–1.5)
BILIRUB UR QL STRIP.AUTO: NEGATIVE
BUN SERPL-MCNC: 12 MG/DL (ref 8–22)
CALCIUM ALBUM COR SERPL-MCNC: 9.2 MG/DL (ref 8.5–10.5)
CALCIUM SERPL-MCNC: 8.7 MG/DL (ref 8.5–10.5)
CHLORIDE SERPL-SCNC: 103 MMOL/L (ref 96–112)
CO2 SERPL-SCNC: 25 MMOL/L (ref 20–33)
COLOR UR: YELLOW
CREAT SERPL-MCNC: 0.84 MG/DL (ref 0.5–1.4)
EOSINOPHIL # BLD AUTO: 0.19 K/UL (ref 0–0.51)
EOSINOPHIL NFR BLD: 3.1 % (ref 0–6.9)
ERYTHROCYTE [DISTWIDTH] IN BLOOD BY AUTOMATED COUNT: 42.5 FL (ref 35.9–50)
GFR SERPLBLD CREATININE-BSD FMLA CKD-EPI: 80 ML/MIN/1.73 M 2
GLOBULIN SER CALC-MCNC: 3.2 G/DL (ref 1.9–3.5)
GLUCOSE SERPL-MCNC: 187 MG/DL (ref 65–99)
GLUCOSE UR STRIP.AUTO-MCNC: NEGATIVE MG/DL
HCT VFR BLD AUTO: 35.6 % (ref 37–47)
HGB BLD-MCNC: 11.6 G/DL (ref 12–16)
IMM GRANULOCYTES # BLD AUTO: 0.02 K/UL (ref 0–0.11)
IMM GRANULOCYTES NFR BLD AUTO: 0.3 % (ref 0–0.9)
KETONES UR STRIP.AUTO-MCNC: NEGATIVE MG/DL
LEUKOCYTE ESTERASE UR QL STRIP.AUTO: NEGATIVE
LYMPHOCYTES # BLD AUTO: 1.63 K/UL (ref 1–4.8)
LYMPHOCYTES NFR BLD: 26.8 % (ref 22–41)
MCH RBC QN AUTO: 28.8 PG (ref 27–33)
MCHC RBC AUTO-ENTMCNC: 32.6 G/DL (ref 32.2–35.5)
MCV RBC AUTO: 88.3 FL (ref 81.4–97.8)
MICRO URNS: NORMAL
MONOCYTES # BLD AUTO: 0.57 K/UL (ref 0–0.85)
MONOCYTES NFR BLD AUTO: 9.4 % (ref 0–13.4)
NEUTROPHILS # BLD AUTO: 3.63 K/UL (ref 1.82–7.42)
NEUTROPHILS NFR BLD: 59.7 % (ref 44–72)
NITRITE UR QL STRIP.AUTO: NEGATIVE
NRBC # BLD AUTO: 0 K/UL
NRBC BLD-RTO: 0 /100 WBC (ref 0–0.2)
PH UR STRIP.AUTO: 7 [PH] (ref 5–8)
PLATELET # BLD AUTO: 448 K/UL (ref 164–446)
PMV BLD AUTO: 9.5 FL (ref 9–12.9)
POTASSIUM SERPL-SCNC: 3.5 MMOL/L (ref 3.6–5.5)
PROT SERPL-MCNC: 6.6 G/DL (ref 6–8.2)
PROT UR QL STRIP: NEGATIVE MG/DL
RBC # BLD AUTO: 4.03 M/UL (ref 4.2–5.4)
RBC UR QL AUTO: NEGATIVE
SODIUM SERPL-SCNC: 142 MMOL/L (ref 135–145)
SP GR UR STRIP.AUTO: 1.01
TROPONIN T SERPL-MCNC: 8 NG/L (ref 6–19)
UROBILINOGEN UR STRIP.AUTO-MCNC: 1 MG/DL
WBC # BLD AUTO: 6.1 K/UL (ref 4.8–10.8)

## 2023-11-18 PROCEDURE — 81003 URINALYSIS AUTO W/O SCOPE: CPT

## 2023-11-18 PROCEDURE — 80053 COMPREHEN METABOLIC PANEL: CPT

## 2023-11-18 PROCEDURE — 70496 CT ANGIOGRAPHY HEAD: CPT

## 2023-11-18 PROCEDURE — 70498 CT ANGIOGRAPHY NECK: CPT

## 2023-11-18 PROCEDURE — 99284 EMERGENCY DEPT VISIT MOD MDM: CPT

## 2023-11-18 PROCEDURE — 84484 ASSAY OF TROPONIN QUANT: CPT

## 2023-11-18 PROCEDURE — 700117 HCHG RX CONTRAST REV CODE 255: Performed by: STUDENT IN AN ORGANIZED HEALTH CARE EDUCATION/TRAINING PROGRAM

## 2023-11-18 PROCEDURE — 36415 COLL VENOUS BLD VENIPUNCTURE: CPT

## 2023-11-18 PROCEDURE — 85025 COMPLETE CBC W/AUTO DIFF WBC: CPT

## 2023-11-18 RX ADMIN — IOHEXOL 90 ML: 350 INJECTION, SOLUTION INTRAVENOUS at 17:15

## 2023-11-18 ASSESSMENT — FIBROSIS 4 INDEX: FIB4 SCORE: 1.19

## 2023-11-18 NOTE — ED PROVIDER NOTES
ED Provider Note    CHIEF COMPLAINT  Chief Complaint   Patient presents with    Other     Patient sent from [facility] for worsening aphasia. Hx of stroke with expressive aphasia and right sided weakness at baseline. Family reports patient was able to say 3 word sentences but over the past 3 days she can only say the word breathe.        EXTERNAL RECORDS REVIEWED  Inpatient Notes Patient was discharged from the hospital on 6 November.  Patient had a left internal carotid artery aneurysm with a flow diverting stent on 19 October and was monitored postprocedure procedurally in the ICU.  She was discharged on 20 October and started on dual antiplatelet therapy with aspirin and Brilinta.  Plavix was discontinued and she was neurologically intact.  She presented back to the hospital on 26 October with right-sided weakness and expressive aphasia.  CTA of the head and neck showed occlusion of the mid left internal carotid artery through the stent.  She was taken to the IR suite on 27 October for thrombectomy with TICI 3 flow post procedurally..    HPI/ROS  LIMITATION TO HISTORY   Select: Aphasia  OUTSIDE HISTORIAN(S):  Family Daughter in law at bedside    Roslyn Kilgore is a 58 y.o. female who presents with worsening aphasia.  Patient's daughter-in-law says that she has noticed for the past 2 days she has been saying the word 'breathe' frequently and in contexts that seems to be not appropriately.  She says that her response to any question she will just say 'breathe'.  They said that this is new since her stroke.  At lunch today she did answer appropriately to the question of the color of a napkin and said 'yellow' but since that time she has just continued to respond to questions with ' breathe'.    Her daughter in law says that she thinks he has more of a left-sided facial droop when her right side had been previously affected but otherwise no new weakness, numbness or changes in mental status.  They felt like she  has been making good progress otherwise since discharge from the hospital.  The patient is herself unable to engage in further history and just responds 'breathe' to all my questions.  Her daughter in law denies any recent illness.  No vomiting, pain, URI symptoms, recent illness or other complaints.     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    SURGICAL HISTORY   has a past surgical history that includes other; other; angiogram (08/17/2009); thrombectomy (12/21/2009); angiogram (02/02/2010); angiogram (04/15/2010); angioplasty balloon (04/15/2010); femoral artery repair (06/28/2010); mammoplasty reduction (08/24/2010); u/s leiomyomata ablate <200 cc; thrombectomy (01/09/2011); angiogram (01/09/2011); primary c section; hysteroscopy novasure-2 (09/27/2010); angiogram (Right, 11/05/2015); angioplasty upper (Right, 11/05/2015); other orthopedic surgery; debridement (Right, 11/11/2015); endarterectomy (Right, 11/18/2015); hysterectomy laparoscopy (2015); and upper gi endoscopy,diagnosis (11/2/2023).    FAMILY HISTORY  Family History   Problem Relation Age of Onset    Stroke Mother     Lung Disease Neg Hx     Cancer Neg Hx     Diabetes Neg Hx     Heart Disease Neg Hx        SOCIAL HISTORY  Social History     Tobacco Use    Smoking status: Former     Current packs/day: 0.00     Average packs/day: 1 pack/day for 19.0 years (19.0 ttl pk-yrs)     Types: Cigarettes     Start date: 1980     Quit date: 1999      Years since quittin.8    Smokeless tobacco: Never    Tobacco comments:     1 pk a day for 6 yrs, Quit    Vaping Use    Vaping Use: Some days    Substances: THC    Devices: Pre-filled or refillable cartridge   Substance and Sexual Activity    Alcohol use: No     Comment: quit in     Drug use: Not Currently     Types: Marijuana, Oral     Comment: THC, sober from other drugs since     Sexual activity: Yes     Partners: Male       CURRENT MEDICATIONS  Home Medications    **Home medications have not yet been reviewed for this encounter**         ALLERGIES  Allergies   Allergen Reactions    Alcohol Unspecified     REQUESTS NO ALCOHOL CONTENT IN HER MEDICATIONS (per pt, pt is an alcoholic)    Trazodone Hives     Caused breat hyperplasia. Pt then underwent a breast reduction and attempted to take again, new reaction: Hives.    Lisinopril Cough       PHYSICAL EXAM  VITAL SIGNS: /74   Pulse 76   Temp 36.6 °C (97.8 °F) (Temporal)   Resp 12   Wt 65.8 kg (145 lb)   LMP 2011   SpO2 97%   BMI 25.69 kg/m²    Constitutional: Awake and alert . Non toxic  HENT: Normal inspection    Eyes: Normal inspection  Neck: Grossly normal range of motion.  Cardiovascular: Normal heart rate, Normal rhythm.  Symmetric peripheral pulses.   Thorax & Lungs: No respiratory distress, No wheezing, No rales, No rhonchi  Abdomen: Soft, non-distended, nontender to palpation in all 4 quadrants, no mass  Skin: No obvious rash.  Extremities: Warm, well perfused. No clubbing, cyanosis, edema   Neurologic: She is unable to answer orientation questions but does follow commands. She has right sided facial droop  She responds ' breathe' to every question.  She has right-sided weakness (chronic since stroke)  Psychiatric: Normal for situation      DIAGNOSTIC STUDIES / PROCEDURES  EKG  I have independently interpreted this EKG  Results for orders placed or performed during the hospital encounter of 10/26/23   EKG (IP)   Result  Value Ref Range    Report       [facility]    Test Date:  2023-10-27  Pt Name:    OSMANI MORENO              Department: [facility]  MRN:        [id number]                      Room:       R107  Gender:     Female                       Technician: GLYNN  :        1965                   Requested By:JORGE STAUFFER III  Order #:    [id number]                    Reading MD: Arielle Sanchez    Measurements  Intervals                                Axis  Rate:       73                           P:          85  CT:         146                          QRS:        67  QRSD:       104                          T:          90  QT:         421  QTc:        464    Interpretive Statements  Sinus rhythm  Atrial premature complexes  Borderline ST and T wave abnormalities  Compared to ECG 2023 11:46:25  Atrial premature complex(es) now present  Electronically Signed On 10- 23:08:39 PDT by Arielle Sanchez     EKG (IP)   Result Value Ref Range    Report       [facility]    Test Date:  2023  Pt Name:    OSMANI MORENO              Department: [facility]  MRN:        [id number]                      Room:       [facility]  Gender:     Female                       Technician: FGJ  :        1965                   Requested By:JOAQUÍN ARIAS  Order #:    [id number]                    Reading MD: Davey Moreno MD    Measurements  Intervals                                Axis  Rate:       101                          P:          0  CT:         88                           QRS:        58  QRSD:       85                           T:          221  QT:         330  QTc:        428    Interpretive Statements  Sinus tachycardia  Atrial premature complexes  Borderline repolarization abnormality  Compared to ECG 10/27/2023 08:53:52  NO SIGNIFICANT CHANGES  Electronically Signed On 2023 16:15:28 PDT by Davey Moreno MD           LABS  Results for orders placed or performed during the hospital encounter of 23    CBC WITH DIFFERENTIAL   Result Value Ref Range    WBC 6.1 4.8 - 10.8 K/uL    RBC 4.03 (L) 4.20 - 5.40 M/uL    Hemoglobin 11.6 (L) 12.0 - 16.0 g/dL    Hematocrit 35.6 (L) 37.0 - 47.0 %    MCV 88.3 81.4 - 97.8 fL    MCH 28.8 27.0 - 33.0 pg    MCHC 32.6 32.2 - 35.5 g/dL    RDW 42.5 35.9 - 50.0 fL    Platelet Count 448 (H) 164 - 446 K/uL    MPV 9.5 9.0 - 12.9 fL    Neutrophils-Polys 59.70 44.00 - 72.00 %    Lymphocytes 26.80 22.00 - 41.00 %    Monocytes 9.40 0.00 - 13.40 %    Eosinophils 3.10 0.00 - 6.90 %    Basophils 0.70 0.00 - 1.80 %    Immature Granulocytes 0.30 0.00 - 0.90 %    Nucleated RBC 0.00 0.00 - 0.20 /100 WBC    Neutrophils (Absolute) 3.63 1.82 - 7.42 K/uL    Lymphs (Absolute) 1.63 1.00 - 4.80 K/uL    Monos (Absolute) 0.57 0.00 - 0.85 K/uL    Eos (Absolute) 0.19 0.00 - 0.51 K/uL    Baso (Absolute) 0.04 0.00 - 0.12 K/uL    Immature Granulocytes (abs) 0.02 0.00 - 0.11 K/uL    NRBC (Absolute) 0.00 K/uL   COMP METABOLIC PANEL   Result Value Ref Range    Sodium 142 135 - 145 mmol/L    Potassium 3.5 (L) 3.6 - 5.5 mmol/L    Chloride 103 96 - 112 mmol/L    Co2 25 20 - 33 mmol/L    Anion Gap 14.0 7.0 - 16.0    Glucose 187 (H) 65 - 99 mg/dL    Bun 12 8 - 22 mg/dL    Creatinine 0.84 0.50 - 1.40 mg/dL    Calcium 8.7 8.5 - 10.5 mg/dL    Correct Calcium 9.2 8.5 - 10.5 mg/dL    AST(SGOT) 37 12 - 45 U/L    ALT(SGPT) 17 2 - 50 U/L    Alkaline Phosphatase 94 30 - 99 U/L    Total Bilirubin <0.2 0.1 - 1.5 mg/dL    Albumin 3.4 3.2 - 4.9 g/dL    Total Protein 6.6 6.0 - 8.2 g/dL    Globulin 3.2 1.9 - 3.5 g/dL    A-G Ratio 1.1 g/dL   TROPONIN   Result Value Ref Range    Troponin T 8 6 - 19 ng/L   URINALYSIS CULTURE, IF INDICATED    Specimen: Urine, Clean Catch   Result Value Ref Range    Color Yellow     Character Clear     Specific Gravity 1.008 <1.035    Ph 7.0 5.0 - 8.0    Glucose Negative Negative mg/dL    Ketones Negative Negative mg/dL    Protein Negative Negative mg/dL    Bilirubin Negative Negative    Urobilinogen,  Urine 1.0 Negative    Nitrite Negative Negative    Leukocyte Esterase Negative Negative    Occult Blood Negative Negative    Micro Urine Req see below    ESTIMATED GFR   Result Value Ref Range    GFR (CKD-EPI) 80 >60 mL/min/1.73 m 2         RADIOLOGY  I have independently interpreted the diagnostic imaging associated with this visit and am waiting the final reading from the radiologist.   My preliminary interpretation is as follows: No large intracranial bleed  Radiologist interpretation:   CT-CTA NECK WITH & W/O-POST PROCESSING   Final Result         1. Redemonstration of noncalcified atherosclerotic plaque in the left common carotid artery with focal 50% stenosis, similar to prior.      2. No occlusion seen in the left internal carotid artery.      CT-CTA HEAD WITH & W/O-POST PROCESS   Final Result      1.  Interval reconstitution of flow within the left internal carotid artery.   2.  No new large vessel occlusion is seen.   3.  Multiple intracranial aneurysms again noted.   4.  Evolving large left cerebral infarct with decreased swelling and localized mass effect. There are new mild gyriform areas of hyperdensity which probably represent some areas of developing laminar necrosis. Additional chronic small infarcts.   5.  No acute intracranial hemorrhage.            COURSE & MEDICAL DECISION MAKING    ED Observation Status? Yes; I am placing the patient in to an observation status due to a diagnostic uncertainty as well as therapeutic intensity. Patient placed in observation status at 2:18 PM, 11/18/2023.     Observation plan is as follows: IV, Labs, CT, Serial Exams    Upon Reevaluation, the patient's condition has: Improved; and will be discharged.    Patient discharged from ED Observation status at 5:29 PM  11/18/2023    INITIAL ASSESSMENT, COURSE AND PLAN  Care Narrative: This is a 58-year-old female with recent left internal carotid artery occlusion with residual aphasia and right-sided weakness who presents  today with worsening speech deficit.  Her daughter says that she has been repeating the word 'breathe'.  This has been ongoing for 2 days and therefore this was not a code stroke activation.  Patient's labs are reassuring, no significant electrolyte derangements.  No signs or symptoms to suggest underlying infection. CT was obtained which shows interval reconstitution of flow within the left internal carotid artery without any new large vessel occlusion.  She does have again multiple intracranial aneurysms noted.  On CT she does have an evolving large left cerebral infarct with decreased swelling and localized mass effect.  New mild gyriform areas of hypodensity which probably represents some areas of developing laminar necrosis.  She has additional chronic small infarcts.    5:30 PM  I discussed the case with Dr. Childers, vascular neurology.  I explained to him in detail the patient's history, exam and work-up in the emergency department.  He was also familiar with the patient from her hospitalization recently.  He felt that her symptoms were consistent with large previous stroke without any new findings.  He explained that her neurologic symptoms are expected to wax and wane.  He did not recommend any additional imaging or work-up in the hospital.    I discussed the imaging findings and my conversation with Dr. Childers with the patient and her family.  I answered all their questions.  They overall feel very reassured.  They understand to return to the ER should she develop any new neurologic symptoms             DISPOSITION AND DISCUSSIONS  I have discussed management of the patient with the following physicians and LEORA's:  Dr. Walter    Discussion of management with other [facility] or appropriate source(s): None     Escalation of care considered, and ultimately not performed:acute inpatient care management, however at this time, the patient is most appropriate for outpatient management    Barriers to care at this  time, including but not limited to:  None .     Decision tools and prescription drugs considered including, but not limited to: None    FINAL DIAGNOSIS  1. Aphasia    2. Cerebral infarction due to occlusion of left carotid artery (HCC) Chronic          Electronically signed by: Celine Harkins M.D., 11/18/2023 2:04 PM

## 2023-11-18 NOTE — ED TRIAGE NOTES
Chief Complaint   Patient presents with    Other     Patient sent from [facility] for worsening aphasia. Hx of stroke with expressive aphasia and right sided weakness at baseline. Family reports patient was able to say 3 word sentences but over the past 3 days she can only say the word breathe.

## 2023-11-19 NOTE — ED NOTES
Bedside report received from off going RN/tech: Astrid, assumed care of patient.  POC discussed with patient. Call light within reach, all needs addressed at this time.       Fall risk interventions in place: Move the patient closer to the nurse's station, Patient's personal possessions are with in their safe reach, Place socks on patient, Place fall risk sign on patient's door, and Keep floor surfaces clean and dry (all applicable per [place] Fall risk assessment)   Continuous monitoring: Cardiac Leads, Pulse Ox, or Blood Pressure  IVF/IV medications: Not Applicable   Oxygen: How many liters 3L  Bedside sitter: Not Applicable   Isolation: Not Applicable

## 2023-11-19 NOTE — DISCHARGE PLANNING
RN has notified Pt is medically cleared and can return to [facility] .     PCS completed and faxed to [facility]  Transportation Time 1930    DRAGAN updated RN on transportation time  Transfer Packet and COBRA given to RN for discharge    DRAGAN called [facility] and spoke to Dora to update on transportation time .  Dora confirmed Pt in room 411

## 2023-11-19 NOTE — DISCHARGE INSTRUCTIONS
She should continue with her medications as is.   She should continue to make progress from a neurological standpoint.  There were no concerning findings on imaging today.

## 2023-11-19 NOTE — ED NOTES
Pt discharged to home. Discharge paperwork provided. Education provided by ERP. Reinforced discharge instructions.  [facility] RN was given follow up instructions.   Patient went out of the ER with [facility],, with all report and belongings.

## 2023-11-28 ENCOUNTER — TELEPHONE (OUTPATIENT)
Dept: NEUROLOGY | Facility: MEDICAL CENTER | Age: 58
End: 2023-11-28
Payer: COMMERCIAL

## 2023-11-28 NOTE — TELEPHONE ENCOUNTER
NEUROLOGY PATIENT PRE-VISIT PLANNING     Patient was NOT contacted to complete PVP.  Note: Patient will not be contacted if there is no indication to call.     Patient Appointment is scheduled as: New Patient     Is visit type and length scheduled correctly? Yes    [facility]? Yes    3.   Is referral attached to visit? Yes    4. Were records received from referring provider? Yes    4. Patient was NOT contacted to have someone accompany them to visit.     5. Is this appointment scheduled as a Hospital Follow-Up?  Yes    6. Does the patient require any pre procedure or post procedure follow up? No    7. If any orders were placed at last visit or intended to be done for this visit do we have Results/Consult Notes? No  Labs - Labs ordered, completed on 11.18.2023 and results are in chart.  Imaging - Imaging ordered, completed and results are in chart.  Referrals - No referrals were ordered at last office visit.  Note: If patient appointment is for lab or imaging review and patient did not complete the studies, check with provider if OK to reschedule patient until completed.    8. If patient appointment is for Botox - is order pended for provider? N/A    9. Was Plan Assessment from last Neurology Office Visit Reviewed?  Yes

## 2023-11-29 ENCOUNTER — APPOINTMENT (OUTPATIENT)
Dept: NEUROLOGY | Facility: MEDICAL CENTER | Age: 58
End: 2023-11-29
Attending: PSYCHIATRY & NEUROLOGY
Payer: COMMERCIAL

## 2023-11-29 NOTE — PROGRESS NOTES
No chief complaint on file.      History of present illness:  Roslyn Kilgore 58 y.o. female with HTN, HL presents to stroke bridge clinic. This patient presented with R sided weakness/aphasia on 10/26/23.    Prior to this patient had had endovascular repair of the left internal carotid artery aneurysm with stent placement on 10/19.  Patient was noncompliant with the antiplatelet Brilinta.  CT angiogram of the head and neck showed occlusion of the mid left internal carotid artery stent.  Therefore, she received a thrombectomy.      Neurology was consulted and recommended continuing dual antiplatelet therapy with baby aspirin indefinitely and Brilinta 90 mg daily for 8 weeks and date of 12/27/2023.     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)     Arthritis     osteo- right hip    ASTHMA     Bronchitis     Cancer (HCC) 10 years ago    pre cancerous s/p leep    Carcinoma in situ of respiratory system     per pt early 70's, early 80's    Dental disorder     full dentures    DEPRESSION     anxiety    Heart burn     High cholesterol     Hypertension     Hyponatremia 02/20/2022    Indigestion     Other specified symptom associated with female genital organs     see cat scan    Personal history of venous thrombosis and embolism     Pneumonia 01/1996    Sepsis (HCC) 02/20/2022    Stroke ([facility])     4 strokes, 3 aneurysms       Past surgical history:   Past Surgical History:   Procedure Laterality Date    VA UPPER GI ENDOSCOPY,DIAGNOSIS  11/2/2023    Procedure: GASTROSCOPY;  Surgeon: Malcolm Patel M.D.;  Location: SURGERY SAME DAY [facility];  Service: Gastroenterology    ENDARTERECTOMY Right 11/18/2015    Procedure: ENDARTERECTOMY BRACHIAL ARTERY W/VEIN PATCH REPAIR;  Surgeon: Greta Wilhelm M.D.;  Location: SURGERY [facility];  Service:      DEBRIDEMENT Right 11/11/2015    Procedure: FINGER AMPUTATION 2ND;  Surgeon: Greta Wilhelm M.D.;  Location: SURGERY [facility];  Service:     ANGIOGRAM Right 11/05/2015    Procedure: ANGIOGRAM- ARM;  Surgeon: Greta Wilhelm M.D.;  Location: SURGERY [facility];  Service:     ANGIOPLASTY UPPER Right 11/05/2015    Procedure: ANGIOPLASTY UPPER;  Surgeon: Greta Wilhelm M.D.;  Location: SURGERY [facility];  Service:     HYSTERECTOMY LAPAROSCOPY  2015    THROMBECTOMY  01/09/2011    Performed by TREY TRAN at SURGERY [facility] ORS    ANGIOGRAM  01/09/2011    Performed by TERY TRAN at SURGERY [facility]    HYSTEROSCOPY NOVASURE-2  09/27/2010    Performed by JEANIE DE LOS SANTOS at SURGERY SAME DAY [facility] ORS    MAMMOPLASTY REDUCTION  08/24/2010    Performed by SIRI MOORE at SURGERY [facility] ORS    FEMORAL ARTERY REPAIR  06/28/2010    Performed by GRETA WILHELM at SURGERY [facility] ORS    ANGIOGRAM  04/15/2010    Performed by RGETA WILHELM at SURGERY [facility] ORS    ANGIOPLASTY BALLOON  04/15/2010    Performed by GRETA WILHELM at SURGERY [facility] ORS    ANGIOGRAM  02/02/2010    Performed by GRETA WILHELM at SURGERY [facility] ORS    THROMBECTOMY  12/21/2009    Performed by GRETA WILHELM at SURGERY [facility] ORS    ANGIOGRAM  08/17/2009    Performed by GRETA WILHELM at SURGERY [facility] ORS    OTHER      MULTIPLE BYPASS SURGERIES TO RUE FOR CLOTS    OTHER      LUE CLOT REMOVAL    OTHER ORTHOPEDIC SURGERY      RIGHT HIP ARTHROPLASTY 2014    WI U/S LEIOMYOMATA ABLATE <200 CC      PRIMARY C SECTION      TUBAL LIGATION, ECTOPIC PREGNANCY       Family history:   Family History   Problem Relation Age of Onset    Stroke Mother     Lung Disease Neg Hx     Cancer Neg Hx     Diabetes Neg Hx     Heart Disease Neg Hx        Social history:   Social History     Socioeconomic History    Marital status:      Spouse name: Not on file     Number of children: Not on file    Years of education: Not on file    Highest education level: Not on file   Occupational History    Not on file   Tobacco Use    Smoking status: Former     Current packs/day: 0.00     Average packs/day: 1 pack/day for 19.0 years (19.0 ttl pk-yrs)     Types: Cigarettes     Start date:      Quit date:      Years since quittin.9    Smokeless tobacco: Never    Tobacco comments:     1 pk a day for 6 yrs, Quit    Vaping Use    Vaping Use: Some days    Substances: THC    Devices: Pre-filled or refillable cartridge   Substance and Sexual Activity    Alcohol use: No     Comment: quit in     Drug use: Not Currently     Types: Marijuana, Oral     Comment: THC, sober from other drugs since     Sexual activity: Yes     Partners: Male   Other Topics Concern    Not on file   Social History Narrative    retired     Social Determinants of Health     Financial Resource Strain: Not on file   Food Insecurity: Not on file   Transportation Needs: Not on file   Physical Activity: Not on file   Stress: Not on file   Social Connections: Not on file   Intimate Partner Violence: Not on file   Housing Stability: Not on file       Current medications:   Current Outpatient Medications   Medication    amLODIPine (NORVASC) 10 MG Tab    losartan (COZAAR) 50 MG Tab    enoxaparin (LOVENOX) 40 MG/0.4ML Solution Prefilled Syringe inj    mometasone-formoterol (DULERA) 100-5 MCG/ACT Aerosol    [START ON 2023] ticagrelor (BRILINTA) 90 MG Tab tablet    atorvastatin (LIPITOR) 40 MG Tab    aspirin 81 MG EC tablet    albuterol 108 (90 Base) MCG/ACT Aero Soln inhalation aerosol    montelukast (SINGULAIR) 10 MG Tab     No current facility-administered medications for this visit.       Medication Allergy:  Allergies   Allergen Reactions    Alcohol Unspecified     REQUESTS NO ALCOHOL CONTENT IN HER MEDICATIONS (per pt, pt is an alcoholic)    Trazodone Hives     Caused breat hyperplasia. Pt then  underwent a breast reduction and attempted to take again, new reaction: Hives.    Lisinopril Cough       Physical examination:     Current NIHSS    1a. LOC: ***  1b. LOC Questions: ***  1c. LOC Commands: ***  2. Best Gaze:***  3. Visual Fields: ***  4. Facial Paresis: ***  5a. Motor arm left: ***  5b. Motor arm right: ***  6a. Motor leg left: ***  6b. Motor leg right: ***  7. Sensory: ***  8. Best Language: ***  9. Limb Ataxia: ***  10. Dysarthria: ***  11. Extinction/Inattention: ***    Total Score: ***        Current mRS ***    Labs:  I reviewed the following labs personally:  Lab Results   Component Value Date/Time    HBA1C 5.8 (H) 10/27/2023 02:34 AM      Lab Results   Component Value Date/Time    CHOLSTRLTOT 135 10/28/2023 0230    TRIGLYCERIDE 58 10/28/2023 0230    HDL 62 10/28/2023 0230    LDL 61 10/28/2023 0230       Imaging:   MRI brain  FINDINGS:  There is restricted diffusion involving most of the left frontal and parietal cortices. There are also smaller foci of restricted diffusion within the left caudate head, left insular cortex, left mesial temporal lobe, and left posterior temporal lobe.   Old right frontal infarct again noted. Mild chronic ischemic white matter evaluation.     IMPRESSION:        1. Acute left MCA distribution infarct, as above.  2. 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  3. Mild chronic ischemic white matter migration.                  ASSESSMENT AND PLAN:  Problem List Items Addressed This Visit    None      There are no diagnoses linked to this encounter.    ***    FOLLOW-UP:   No follow-ups on file.    Total time spent for the day for this patient unrelated to procedure time is: *** minutes. I spent *** minutes in face to face time and I spent *** minutes pre-charting and *** minutes in post-visit documentation.      Dr. Paddy Grossman D.O.  [facility]

## 2024-06-06 ENCOUNTER — TELEPHONE (OUTPATIENT)
Dept: INTERNAL MEDICINE | Facility: OTHER | Age: 59
End: 2024-06-06
Payer: COMMERCIAL

## 2024-06-06 DIAGNOSIS — I67.1 CEREBRAL ANEURYSM: ICD-10-CM

## 2024-06-06 NOTE — TELEPHONE ENCOUNTER
Patients daughter-n-law and left a vm on the front machine asking for a call back, she stated that Roslyn had a procedure done with Dr Oneil a few months ago and wanted to speak to someone and is asking for a call back.  Her phone number is [phone]

## 2024-06-06 NOTE — TELEPHONE ENCOUNTER
Phone Number Called: [phone]       Call outcome:  Spoke to Peace    Message: Pt's daughter in law clarified that she does not need anything at this time as a different surgeon did the pt's aneurysm surgery, not Dr. Oneil. They did want to let them know that pt has a new PCP at this time. Routing to  to remove PCP from chart

## 2024-07-02 ENCOUNTER — HOSPITAL ENCOUNTER (OUTPATIENT)
Dept: RADIOLOGY | Facility: MEDICAL CENTER | Age: 59
End: 2024-07-02
Attending: NURSE PRACTITIONER
Payer: COMMERCIAL

## 2024-07-02 DIAGNOSIS — I67.1 CEREBRAL ANEURYSM: ICD-10-CM

## 2024-07-02 ASSESSMENT — ENCOUNTER SYMPTOMS
FOCAL WEAKNESS: 1
FEVER: 0
DOUBLE VISION: 1
WEIGHT LOSS: 0
DIAPHORESIS: 0
SPEECH CHANGE: 1
CONSTITUTIONAL NEGATIVE: 1
WEAKNESS: 1
SENSORY CHANGE: 1
CHILLS: 0

## 2024-07-02 ASSESSMENT — LIFESTYLE VARIABLES: SUBSTANCE_ABUSE: 0

## 2025-04-03 ENCOUNTER — HOSPITAL ENCOUNTER (OUTPATIENT)
Dept: RADIOLOGY | Facility: MEDICAL CENTER | Age: 60
End: 2025-04-03
Payer: COMMERCIAL

## 2025-04-24 NOTE — PROGRESS NOTES
MRA head report received from [facility] and imaging pushed. Reviewed with Dr. Montes and compared to prior study. 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     Aneurysms under surveillance: basilar tip, right MCA, and fusiform right ICA aneurysm all stable compared to 2023. No intervention planned at this time. Recommend MRA in 2 years. Reminder placed in pt's EMR for MRA in approximately 4/2027.     Pt is a resident at long term care at [facility] with limited capacity to manage her care independently. Results and plan called to charge RN Joel, will forward note to fax  for communication with her health care team. Direct APRN call back number provided for additional questions.

## (undated) DEVICE — BITE BLOCK, DISP.

## (undated) DEVICE — KIT CUSTOM PROCEDURE SINGLE FOR ENDO  (15/CA)

## (undated) DEVICE — BLOCK BITE ENDOSCOPIC 2809 - (100/BX) INTERMEDIATE

## (undated) DEVICE — FILM CASSETTE ENDO